# Patient Record
Sex: FEMALE | Race: WHITE | NOT HISPANIC OR LATINO | Employment: FULL TIME | URBAN - METROPOLITAN AREA
[De-identification: names, ages, dates, MRNs, and addresses within clinical notes are randomized per-mention and may not be internally consistent; named-entity substitution may affect disease eponyms.]

---

## 2017-01-27 ENCOUNTER — OFFICE VISIT - HEALTHEAST (OUTPATIENT)
Dept: INTERNAL MEDICINE | Facility: CLINIC | Age: 27
End: 2017-01-27

## 2017-01-27 DIAGNOSIS — F32.A DEPRESSION: ICD-10-CM

## 2017-01-27 ASSESSMENT — MIFFLIN-ST. JEOR: SCORE: 1425.29

## 2017-02-24 ENCOUNTER — OFFICE VISIT - HEALTHEAST (OUTPATIENT)
Dept: INTERNAL MEDICINE | Facility: CLINIC | Age: 27
End: 2017-02-24

## 2017-02-24 DIAGNOSIS — F32.A MILD DEPRESSION: ICD-10-CM

## 2017-02-24 ASSESSMENT — MIFFLIN-ST. JEOR: SCORE: 1411.68

## 2017-03-24 ENCOUNTER — OFFICE VISIT - HEALTHEAST (OUTPATIENT)
Dept: INTERNAL MEDICINE | Facility: CLINIC | Age: 27
End: 2017-03-24

## 2017-03-24 DIAGNOSIS — F32.A MILD DEPRESSION: ICD-10-CM

## 2017-03-24 ASSESSMENT — MIFFLIN-ST. JEOR: SCORE: 1420.75

## 2017-05-20 ENCOUNTER — COMMUNICATION - HEALTHEAST (OUTPATIENT)
Dept: INTERNAL MEDICINE | Facility: CLINIC | Age: 27
End: 2017-05-20

## 2017-05-20 DIAGNOSIS — F32.A MILD DEPRESSION: ICD-10-CM

## 2018-02-06 ENCOUNTER — COMMUNICATION - HEALTHEAST (OUTPATIENT)
Dept: INTERNAL MEDICINE | Facility: CLINIC | Age: 28
End: 2018-02-06

## 2018-02-06 DIAGNOSIS — F32.A MILD DEPRESSION: ICD-10-CM

## 2018-02-13 ENCOUNTER — AMBULATORY - HEALTHEAST (OUTPATIENT)
Dept: INTERNAL MEDICINE | Facility: CLINIC | Age: 28
End: 2018-02-13

## 2018-02-13 DIAGNOSIS — F32.A MILD DEPRESSION: ICD-10-CM

## 2018-03-14 ENCOUNTER — OFFICE VISIT - HEALTHEAST (OUTPATIENT)
Dept: INTERNAL MEDICINE | Facility: CLINIC | Age: 28
End: 2018-03-14

## 2018-03-14 DIAGNOSIS — F32.A MILD DEPRESSION: ICD-10-CM

## 2018-04-18 ENCOUNTER — TELEPHONE (OUTPATIENT)
Dept: OTHER | Facility: CLINIC | Age: 28
End: 2018-04-18

## 2018-04-18 NOTE — TELEPHONE ENCOUNTER
4/18/2018    Call Regarding Onboarding [employer] other     Attempt 1    Message on voicemail     Comments:       Outreach   sb

## 2018-05-17 ENCOUNTER — COMMUNICATION - HEALTHEAST (OUTPATIENT)
Dept: FAMILY MEDICINE | Facility: CLINIC | Age: 28
End: 2018-05-17

## 2018-05-22 NOTE — TELEPHONE ENCOUNTER
5/22/2018    Call Regarding Onboarding [employer] [facility] OTHER     Attempt 2    Message on voicemail     Comments:       Outreach   SB

## 2018-06-06 NOTE — TELEPHONE ENCOUNTER
6/6/2018    Call Regarding Onboarding [employer] [facility] other     Attempt 3    Message on voicemail    Comments:       Outreach   Cathy Nelson

## 2019-03-09 ENCOUNTER — COMMUNICATION - HEALTHEAST (OUTPATIENT)
Dept: INTERNAL MEDICINE | Facility: CLINIC | Age: 29
End: 2019-03-09

## 2019-03-09 DIAGNOSIS — F32.A MILD DEPRESSION: ICD-10-CM

## 2019-03-22 ENCOUNTER — COMMUNICATION - HEALTHEAST (OUTPATIENT)
Dept: INTERNAL MEDICINE | Facility: CLINIC | Age: 29
End: 2019-03-22

## 2019-04-01 ENCOUNTER — OFFICE VISIT - HEALTHEAST (OUTPATIENT)
Dept: INTERNAL MEDICINE | Facility: CLINIC | Age: 29
End: 2019-04-01

## 2019-04-01 DIAGNOSIS — R68.82 LOW LIBIDO: ICD-10-CM

## 2019-04-01 DIAGNOSIS — F32.A MILD DEPRESSION: ICD-10-CM

## 2019-04-01 ASSESSMENT — MIFFLIN-ST. JEOR: SCORE: 1479.72

## 2019-09-20 ENCOUNTER — COMMUNICATION - HEALTHEAST (OUTPATIENT)
Dept: INTERNAL MEDICINE | Facility: CLINIC | Age: 29
End: 2019-09-20

## 2019-09-20 DIAGNOSIS — F32.A MILD DEPRESSION: ICD-10-CM

## 2019-12-17 ENCOUNTER — OFFICE VISIT - HEALTHEAST (OUTPATIENT)
Dept: INTERNAL MEDICINE | Facility: CLINIC | Age: 29
End: 2019-12-17

## 2019-12-17 DIAGNOSIS — F32.A MILD DEPRESSION: ICD-10-CM

## 2019-12-17 DIAGNOSIS — Z00.00 ANNUAL PHYSICAL EXAM: ICD-10-CM

## 2019-12-17 ASSESSMENT — MIFFLIN-ST. JEOR: SCORE: 1543.22

## 2019-12-17 ASSESSMENT — PATIENT HEALTH QUESTIONNAIRE - PHQ9: SUM OF ALL RESPONSES TO PHQ QUESTIONS 1-9: 3

## 2019-12-18 LAB
HPV SOURCE: NORMAL
HUMAN PAPILLOMA VIRUS 16 DNA: NEGATIVE
HUMAN PAPILLOMA VIRUS 18 DNA: NEGATIVE
HUMAN PAPILLOMA VIRUS FINAL DIAGNOSIS: NORMAL
HUMAN PAPILLOMA VIRUS OTHER HR: NEGATIVE
SPECIMEN DESCRIPTION: NORMAL

## 2019-12-27 LAB
BKR LAB AP ABNORMAL BLEEDING: NO
BKR LAB AP BIRTH CONTROL/HORMONES: NORMAL
BKR LAB AP CERVICAL APPEARANCE: NORMAL
BKR LAB AP GYN ADEQUACY: NORMAL
BKR LAB AP GYN INTERPRETATION: NORMAL
BKR LAB AP HPV REFLEX: NORMAL
BKR LAB AP LMP: 2017
BKR LAB AP PATIENT STATUS: NORMAL
BKR LAB AP PREVIOUS ABNORMAL: NORMAL
BKR LAB AP PREVIOUS NORMAL: 2016
HIGH RISK?: NO
PATH REPORT.COMMENTS IMP SPEC: NORMAL
RESULT FLAG (HE HISTORICAL CONVERSION): NORMAL

## 2019-12-28 ENCOUNTER — COMMUNICATION - HEALTHEAST (OUTPATIENT)
Dept: INTERNAL MEDICINE | Facility: CLINIC | Age: 29
End: 2019-12-28

## 2019-12-28 DIAGNOSIS — F32.A MILD DEPRESSION: ICD-10-CM

## 2019-12-31 ENCOUNTER — COMMUNICATION - HEALTHEAST (OUTPATIENT)
Dept: INTERNAL MEDICINE | Facility: CLINIC | Age: 29
End: 2019-12-31

## 2020-01-11 ENCOUNTER — COMMUNICATION - HEALTHEAST (OUTPATIENT)
Dept: INTERNAL MEDICINE | Facility: CLINIC | Age: 30
End: 2020-01-11

## 2020-01-11 DIAGNOSIS — F32.A MILD DEPRESSION: ICD-10-CM

## 2020-02-17 ENCOUNTER — COMMUNICATION - HEALTHEAST (OUTPATIENT)
Dept: INTERNAL MEDICINE | Facility: CLINIC | Age: 30
End: 2020-02-17

## 2020-02-17 ASSESSMENT — PATIENT HEALTH QUESTIONNAIRE - PHQ9: SUM OF ALL RESPONSES TO PHQ QUESTIONS 1-9: 1

## 2020-07-05 ENCOUNTER — COMMUNICATION - HEALTHEAST (OUTPATIENT)
Dept: INTERNAL MEDICINE | Facility: CLINIC | Age: 30
End: 2020-07-05

## 2020-07-05 DIAGNOSIS — F32.A MILD DEPRESSION: ICD-10-CM

## 2020-07-13 ENCOUNTER — OFFICE VISIT - HEALTHEAST (OUTPATIENT)
Dept: INTERNAL MEDICINE | Facility: CLINIC | Age: 30
End: 2020-07-13

## 2020-07-13 DIAGNOSIS — F32.A MILD DEPRESSION: ICD-10-CM

## 2020-07-13 ASSESSMENT — ANXIETY QUESTIONNAIRES
IF YOU CHECKED OFF ANY PROBLEMS ON THIS QUESTIONNAIRE, HOW DIFFICULT HAVE THESE PROBLEMS MADE IT FOR YOU TO DO YOUR WORK, TAKE CARE OF THINGS AT HOME, OR GET ALONG WITH OTHER PEOPLE: NOT DIFFICULT AT ALL
4. TROUBLE RELAXING: NOT AT ALL
1. FEELING NERVOUS, ANXIOUS, OR ON EDGE: SEVERAL DAYS
3. WORRYING TOO MUCH ABOUT DIFFERENT THINGS: NOT AT ALL
6. BECOMING EASILY ANNOYED OR IRRITABLE: SEVERAL DAYS
7. FEELING AFRAID AS IF SOMETHING AWFUL MIGHT HAPPEN: NOT AT ALL
GAD7 TOTAL SCORE: 2
5. BEING SO RESTLESS THAT IT IS HARD TO SIT STILL: NOT AT ALL
2. NOT BEING ABLE TO STOP OR CONTROL WORRYING: NOT AT ALL

## 2020-07-13 ASSESSMENT — PATIENT HEALTH QUESTIONNAIRE - PHQ9: SUM OF ALL RESPONSES TO PHQ QUESTIONS 1-9: 2

## 2020-08-04 ENCOUNTER — COMMUNICATION - HEALTHEAST (OUTPATIENT)
Dept: INTERNAL MEDICINE | Facility: CLINIC | Age: 30
End: 2020-08-04

## 2020-08-04 DIAGNOSIS — F32.A MILD DEPRESSION: ICD-10-CM

## 2020-08-24 ENCOUNTER — OFFICE VISIT - HEALTHEAST (OUTPATIENT)
Dept: INTERNAL MEDICINE | Facility: CLINIC | Age: 30
End: 2020-08-24

## 2020-08-24 DIAGNOSIS — F33.8 SEASONAL AFFECTIVE DISORDER (H): ICD-10-CM

## 2020-08-24 DIAGNOSIS — F32.A MILD DEPRESSION: ICD-10-CM

## 2020-08-24 ASSESSMENT — PATIENT HEALTH QUESTIONNAIRE - PHQ9: SUM OF ALL RESPONSES TO PHQ QUESTIONS 1-9: 3

## 2020-09-26 ENCOUNTER — COMMUNICATION - HEALTHEAST (OUTPATIENT)
Dept: INTERNAL MEDICINE | Facility: CLINIC | Age: 30
End: 2020-09-26

## 2020-09-26 DIAGNOSIS — F32.A MILD DEPRESSION: ICD-10-CM

## 2020-10-07 ENCOUNTER — COMMUNICATION - HEALTHEAST (OUTPATIENT)
Dept: INTERNAL MEDICINE | Facility: CLINIC | Age: 30
End: 2020-10-07

## 2021-04-27 ENCOUNTER — OFFICE VISIT - HEALTHEAST (OUTPATIENT)
Dept: INTERNAL MEDICINE | Facility: CLINIC | Age: 31
End: 2021-04-27

## 2021-04-27 ENCOUNTER — COMMUNICATION - HEALTHEAST (OUTPATIENT)
Dept: INTERNAL MEDICINE | Facility: CLINIC | Age: 31
End: 2021-04-27

## 2021-04-27 DIAGNOSIS — Z00.00 ANNUAL PHYSICAL EXAM: ICD-10-CM

## 2021-04-27 DIAGNOSIS — N63.20 LEFT BREAST MASS: ICD-10-CM

## 2021-04-27 DIAGNOSIS — F32.A MILD DEPRESSION: ICD-10-CM

## 2021-04-27 ASSESSMENT — ANXIETY QUESTIONNAIRES
6. BECOMING EASILY ANNOYED OR IRRITABLE: MORE THAN HALF THE DAYS
5. BEING SO RESTLESS THAT IT IS HARD TO SIT STILL: SEVERAL DAYS
6. BECOMING EASILY ANNOYED OR IRRITABLE: MORE THAN HALF THE DAYS
3. WORRYING TOO MUCH ABOUT DIFFERENT THINGS: SEVERAL DAYS
5. BEING SO RESTLESS THAT IT IS HARD TO SIT STILL: SEVERAL DAYS
7. FEELING AFRAID AS IF SOMETHING AWFUL MIGHT HAPPEN: NOT AT ALL
2. NOT BEING ABLE TO STOP OR CONTROL WORRYING: SEVERAL DAYS
4. TROUBLE RELAXING: NOT AT ALL
GAD7 TOTAL SCORE: 7
3. WORRYING TOO MUCH ABOUT DIFFERENT THINGS: SEVERAL DAYS
1. FEELING NERVOUS, ANXIOUS, OR ON EDGE: MORE THAN HALF THE DAYS
4. TROUBLE RELAXING: NOT AT ALL
GAD7 TOTAL SCORE: 5
IF YOU CHECKED OFF ANY PROBLEMS ON THIS QUESTIONNAIRE, HOW DIFFICULT HAVE THESE PROBLEMS MADE IT FOR YOU TO DO YOUR WORK, TAKE CARE OF THINGS AT HOME, OR GET ALONG WITH OTHER PEOPLE: SOMEWHAT DIFFICULT
2. NOT BEING ABLE TO STOP OR CONTROL WORRYING: SEVERAL DAYS
IF YOU CHECKED OFF ANY PROBLEMS ON THIS QUESTIONNAIRE, HOW DIFFICULT HAVE THESE PROBLEMS MADE IT FOR YOU TO DO YOUR WORK, TAKE CARE OF THINGS AT HOME, OR GET ALONG WITH OTHER PEOPLE: NOT DIFFICULT AT ALL
7. FEELING AFRAID AS IF SOMETHING AWFUL MIGHT HAPPEN: NOT AT ALL
1. FEELING NERVOUS, ANXIOUS, OR ON EDGE: NOT AT ALL

## 2021-04-27 ASSESSMENT — MIFFLIN-ST. JEOR: SCORE: 1563.64

## 2021-04-27 ASSESSMENT — PATIENT HEALTH QUESTIONNAIRE - PHQ9
SUM OF ALL RESPONSES TO PHQ QUESTIONS 1-9: 5
SUM OF ALL RESPONSES TO PHQ QUESTIONS 1-9: 5

## 2021-04-27 NOTE — ASSESSMENT & PLAN NOTE
ADD: wellbutrin (for appetite suppression and to counter effects of antidepressant on libido)  Follow up: 6 weeks VV

## 2021-05-04 ENCOUNTER — HOSPITAL ENCOUNTER (OUTPATIENT)
Dept: MAMMOGRAPHY | Facility: CLINIC | Age: 31
Discharge: HOME | End: 2021-05-04

## 2021-05-04 ENCOUNTER — AMBULATORY - HEALTHEAST (OUTPATIENT)
Dept: SURGERY | Facility: CLINIC | Age: 31
End: 2021-05-04

## 2021-05-04 DIAGNOSIS — N63.20 LEFT BREAST MASS: ICD-10-CM

## 2021-05-07 ENCOUNTER — COMMUNICATION - HEALTHEAST (OUTPATIENT)
Dept: ONCOLOGY | Facility: CLINIC | Age: 31
End: 2021-05-07

## 2021-05-10 ENCOUNTER — COMMUNICATION - HEALTHEAST (OUTPATIENT)
Dept: INTERNAL MEDICINE | Facility: CLINIC | Age: 31
End: 2021-05-10
Payer: COMMERCIAL

## 2021-05-11 ENCOUNTER — HOSPITAL ENCOUNTER (OUTPATIENT)
Dept: SURGERY | Facility: CLINIC | Age: 31
Discharge: HOME | End: 2021-05-11
Attending: SPECIALIST

## 2021-05-11 DIAGNOSIS — C50.412 MALIGNANT NEOPLASM OF UPPER-OUTER QUADRANT OF LEFT BREAST IN FEMALE, ESTROGEN RECEPTOR POSITIVE (H): ICD-10-CM

## 2021-05-11 DIAGNOSIS — Z17.0 MALIGNANT NEOPLASM OF UPPER-OUTER QUADRANT OF LEFT BREAST IN FEMALE, ESTROGEN RECEPTOR POSITIVE (H): ICD-10-CM

## 2021-05-11 ASSESSMENT — MIFFLIN-ST. JEOR: SCORE: 1563.64

## 2021-05-17 ENCOUNTER — RECORDS - HEALTHEAST (OUTPATIENT)
Dept: ADMINISTRATIVE | Facility: OTHER | Age: 31
End: 2021-05-17

## 2021-05-17 ENCOUNTER — AMBULATORY - HEALTHEAST (OUTPATIENT)
Dept: SURGERY | Facility: CLINIC | Age: 31
End: 2021-05-17

## 2021-05-17 LAB
LAB AP CHARGES (HE HISTORICAL CONVERSION): NORMAL
PATH REPORT.ADDENDUM SPEC: NORMAL
PATH REPORT.COMMENTS IMP SPEC: NORMAL
PATH REPORT.COMMENTS IMP SPEC: NORMAL
PATH REPORT.FINAL DX SPEC: NORMAL
PATH REPORT.GROSS SPEC: NORMAL
PATH REPORT.MICROSCOPIC SPEC OTHER STN: NORMAL
PATH REPORT.MICROSCOPIC SPEC OTHER STN: NORMAL
PATH REPORT.RELEVANT HX SPEC: NORMAL
RESULT FLAG (HE HISTORICAL CONVERSION): NORMAL

## 2021-05-18 ENCOUNTER — COMMUNICATION - HEALTHEAST (OUTPATIENT)
Dept: ONCOLOGY | Facility: HOSPITAL | Age: 31
End: 2021-05-18

## 2021-05-18 ENCOUNTER — AMBULATORY - HEALTHEAST (OUTPATIENT)
Dept: SURGERY | Facility: CLINIC | Age: 31
End: 2021-05-18

## 2021-05-18 DIAGNOSIS — C50.412 MALIGNANT NEOPLASM OF UPPER-OUTER QUADRANT OF LEFT BREAST IN FEMALE, ESTROGEN RECEPTOR POSITIVE (H): ICD-10-CM

## 2021-05-18 DIAGNOSIS — Z17.0 MALIGNANT NEOPLASM OF UPPER-OUTER QUADRANT OF LEFT BREAST IN FEMALE, ESTROGEN RECEPTOR POSITIVE (H): ICD-10-CM

## 2021-05-19 ENCOUNTER — AMBULATORY - HEALTHEAST (OUTPATIENT)
Dept: SURGERY | Facility: CLINIC | Age: 31
End: 2021-05-19

## 2021-05-19 ENCOUNTER — COMMUNICATION - HEALTHEAST (OUTPATIENT)
Dept: ONCOLOGY | Facility: HOSPITAL | Age: 31
End: 2021-05-19

## 2021-05-19 ENCOUNTER — AMBULATORY - HEALTHEAST (OUTPATIENT)
Dept: ONCOLOGY | Facility: HOSPITAL | Age: 31
End: 2021-05-19

## 2021-05-19 ENCOUNTER — OFFICE VISIT - HEALTHEAST (OUTPATIENT)
Dept: ONCOLOGY | Facility: HOSPITAL | Age: 31
End: 2021-05-19

## 2021-05-19 ENCOUNTER — RECORDS - HEALTHEAST (OUTPATIENT)
Dept: ADMINISTRATIVE | Facility: OTHER | Age: 31
End: 2021-05-19

## 2021-05-19 ENCOUNTER — COMMUNICATION - HEALTHEAST (OUTPATIENT)
Dept: INTERNAL MEDICINE | Facility: CLINIC | Age: 31
End: 2021-05-19

## 2021-05-19 DIAGNOSIS — Z17.0 MALIGNANT NEOPLASM OF OVERLAPPING SITES OF BREAST IN FEMALE, ESTROGEN RECEPTOR POSITIVE, UNSPECIFIED LATERALITY (H): ICD-10-CM

## 2021-05-19 DIAGNOSIS — Z17.0 MALIGNANT NEOPLASM OF UPPER-OUTER QUADRANT OF LEFT BREAST IN FEMALE, ESTROGEN RECEPTOR POSITIVE (H): ICD-10-CM

## 2021-05-19 DIAGNOSIS — Z11.59 ENCOUNTER FOR SCREENING FOR OTHER VIRAL DISEASES: ICD-10-CM

## 2021-05-19 DIAGNOSIS — F32.A MILD DEPRESSION: ICD-10-CM

## 2021-05-19 DIAGNOSIS — C50.412 MALIGNANT NEOPLASM OF UPPER-OUTER QUADRANT OF LEFT BREAST IN FEMALE, ESTROGEN RECEPTOR POSITIVE (H): ICD-10-CM

## 2021-05-19 DIAGNOSIS — C50.819 MALIGNANT NEOPLASM OF OVERLAPPING SITES OF BREAST IN FEMALE, ESTROGEN RECEPTOR POSITIVE, UNSPECIFIED LATERALITY (H): ICD-10-CM

## 2021-05-19 RX ORDER — DEXAMETHASONE 4 MG/1
TABLET ORAL
Qty: 36 TABLET | Refills: 1 | Status: SHIPPED | OUTPATIENT
Start: 2021-05-19 | End: 2021-07-30

## 2021-05-19 RX ORDER — PROCHLORPERAZINE MALEATE 10 MG
10 TABLET ORAL EVERY 6 HOURS PRN
Qty: 30 TABLET | Refills: 1 | Status: SHIPPED | OUTPATIENT
Start: 2021-05-19 | End: 2021-07-30

## 2021-05-19 ASSESSMENT — MIFFLIN-ST. JEOR: SCORE: 1578.49

## 2021-05-20 ENCOUNTER — COMMUNICATION - HEALTHEAST (OUTPATIENT)
Dept: ONCOLOGY | Facility: HOSPITAL | Age: 31
End: 2021-05-20

## 2021-05-21 ENCOUNTER — HOSPITAL ENCOUNTER (OUTPATIENT)
Dept: CARDIOLOGY | Facility: HOSPITAL | Age: 31
Discharge: HOME | End: 2021-05-21
Attending: INTERNAL MEDICINE

## 2021-05-21 DIAGNOSIS — C50.412 MALIGNANT NEOPLASM OF UPPER-OUTER QUADRANT OF LEFT BREAST IN FEMALE, ESTROGEN RECEPTOR POSITIVE (H): ICD-10-CM

## 2021-05-21 DIAGNOSIS — Z17.0 MALIGNANT NEOPLASM OF UPPER-OUTER QUADRANT OF LEFT BREAST IN FEMALE, ESTROGEN RECEPTOR POSITIVE (H): ICD-10-CM

## 2021-05-21 LAB
AORTIC ROOT: 3 CM
AORTIC VALVE MEAN VELOCITY: 84.5 CM/S
ASCENDING AORTA: 2.3 CM
AV DIMENSIONLESS INDEX VTI: 0.9
AV MEAN GRADIENT: 3 MMHG
AV PEAK GRADIENT: 7.5 MMHG
AV VALVE AREA: 3.2 CM2
BSA FOR ECHO PROCEDURE: 1.98 M2
CV BLOOD PRESSURE: ABNORMAL MMHG
CV ECHO HEIGHT: 66.3 IN
CV ECHO WEIGHT: 186 LBS
DOP CALC AO PEAK VEL: 137 CM/S
DOP CALC AO VTI: 24.1 CM
DOP CALC LVOT AREA: 3.46 CM2
DOP CALC LVOT DIAMETER: 2.1 CM
DOP CALC LVOT STROKE VOLUME: 76.9 CM3
DOP CALCLVOT PEAK VEL VTI: 22.2 CM
EJECTION FRACTION: 56 % (ref 55–75)
FRACTIONAL SHORTENING: 29.5 % (ref 28–44)
INTERVENTRICULAR SEPTUM IN END DIASTOLE: 1.1 CM (ref 0.6–0.9)
IVC PROX: 1 CM
IVS/PW RATIO: 0.9
LA AREA 1: 10.9 CM2
LA AREA 2: 16.9 CM2
LEFT ATRIUM LENGTH: 4.42 CM
LEFT ATRIUM SIZE: 2.8 CM
LEFT ATRIUM TO AORTIC ROOT RATIO: 0.93 NO UNITS
LEFT ATRIUM VOLUME INDEX: 17.9 ML/M2
LEFT ATRIUM VOLUME: 35.4 ML
LEFT VENTRICLE CARDIAC INDEX: 2.4 L/MIN/M2
LEFT VENTRICLE CARDIAC OUTPUT: 4.8 L/MIN
LEFT VENTRICLE DIASTOLIC VOLUME INDEX: 56.1 CM3/M2 (ref 29–61)
LEFT VENTRICLE DIASTOLIC VOLUME: 111 CM3 (ref 46–106)
LEFT VENTRICLE HEART RATE: 62 BPM
LEFT VENTRICLE MASS INDEX: 90.9 G/M2
LEFT VENTRICLE SYSTOLIC VOLUME INDEX: 24.7 CM3/M2 (ref 8–24)
LEFT VENTRICLE SYSTOLIC VOLUME: 49 CM3 (ref 14–42)
LEFT VENTRICULAR INTERNAL DIMENSION IN DIASTOLE: 4.4 CM (ref 3.8–5.2)
LEFT VENTRICULAR INTERNAL DIMENSION IN SYSTOLE: 3.1 CM (ref 2.2–3.5)
LEFT VENTRICULAR MASS: 180 G
LEFT VENTRICULAR OUTFLOW TRACT MEAN GRADIENT: 3 MMHG
LEFT VENTRICULAR OUTFLOW TRACT MEAN VELOCITY: 71.2 CM/S
LEFT VENTRICULAR POSTERIOR WALL IN END DIASTOLE: 1.2 CM (ref 0.6–0.9)
LV STROKE VOLUME INDEX: 38.8 ML/M2
MITRAL VALVE E/A RATIO: 1.7
MV AVERAGE E/E' RATIO: 5.2 CM/S
MV DECELERATION TIME: 301 MS
MV E'TISSUE VEL-LAT: 16.9 CM/S
MV E'TISSUE VEL-MED: 7.51 CM/S
MV LATERAL E/E' RATIO: 3.8
MV MEDIAL E/E' RATIO: 8.5
MV PEAK A VELOCITY: 37 CM/S
MV PEAK E VELOCITY: 63.9 CM/S
NUC REST DIASTOLIC VOLUME INDEX: 2976 LBS
NUC REST SYSTOLIC VOLUME INDEX: 66.25 IN
TRICUSPID VALVE ANULAR PLANE SYSTOLIC EXCURSION: 2.1 CM

## 2021-05-21 ASSESSMENT — MIFFLIN-ST. JEOR: SCORE: 1574.41

## 2021-05-24 ENCOUNTER — AMBULATORY - HEALTHEAST (OUTPATIENT)
Dept: LAB | Facility: CLINIC | Age: 31
End: 2021-05-24

## 2021-05-24 ENCOUNTER — OFFICE VISIT - HEALTHEAST (OUTPATIENT)
Dept: ONCOLOGY | Facility: HOSPITAL | Age: 31
End: 2021-05-24

## 2021-05-24 ENCOUNTER — RECORDS - HEALTHEAST (OUTPATIENT)
Dept: ADMINISTRATIVE | Facility: OTHER | Age: 31
End: 2021-05-24

## 2021-05-24 DIAGNOSIS — C50.412 MALIGNANT NEOPLASM OF UPPER-OUTER QUADRANT OF LEFT BREAST IN FEMALE, ESTROGEN RECEPTOR POSITIVE (H): ICD-10-CM

## 2021-05-24 DIAGNOSIS — Z17.0 MALIGNANT NEOPLASM OF UPPER-OUTER QUADRANT OF LEFT BREAST IN FEMALE, ESTROGEN RECEPTOR POSITIVE (H): ICD-10-CM

## 2021-05-24 DIAGNOSIS — Z71.83 ENCOUNTER FOR NONPROCREATIVE GENETIC COUNSELING: ICD-10-CM

## 2021-05-24 DIAGNOSIS — Z11.59 ENCOUNTER FOR SCREENING FOR OTHER VIRAL DISEASES: ICD-10-CM

## 2021-05-25 ENCOUNTER — ANESTHESIA - HEALTHEAST (OUTPATIENT)
Dept: SURGERY | Facility: AMBULATORY SURGERY CENTER | Age: 31
End: 2021-05-25

## 2021-05-25 ENCOUNTER — COMMUNICATION - HEALTHEAST (OUTPATIENT)
Dept: SCHEDULING | Facility: CLINIC | Age: 31
End: 2021-05-25

## 2021-05-25 LAB
SARS-COV-2 PCR COMMENT: NORMAL
SARS-COV-2 RNA SPEC QL NAA+PROBE: NEGATIVE
SARS-COV-2 VIRUS SPECIMEN SOURCE: NORMAL

## 2021-05-26 ENCOUNTER — RECORDS - HEALTHEAST (OUTPATIENT)
Dept: ADMINISTRATIVE | Facility: OTHER | Age: 31
End: 2021-05-26

## 2021-05-26 ENCOUNTER — SURGERY - HEALTHEAST (OUTPATIENT)
Dept: SURGERY | Facility: AMBULATORY SURGERY CENTER | Age: 31
End: 2021-05-26
Payer: COMMERCIAL

## 2021-05-26 ASSESSMENT — MIFFLIN-ST. JEOR: SCORE: 1574.41

## 2021-05-26 ASSESSMENT — PATIENT HEALTH QUESTIONNAIRE - PHQ9: SUM OF ALL RESPONSES TO PHQ QUESTIONS 1-9: 3

## 2021-05-26 NOTE — TELEPHONE ENCOUNTER
Who is calling:  Patient called back.  Reason for Call:  Patient states since Marietta Brandt, NP is the only one she has seen, isn't she my primary provider?  Tried to explain needs to be seen for medication refill and establish care with a provider. Patient states can't she just call and say that Mariettakevan Brandt is her primary provider?  Please call patient and explain why she needs a office visit to get more refills.  Date of last appointment with primary care: 3/11/2018  Okay to leave a detailed message: Yes

## 2021-05-27 ENCOUNTER — INFUSION - HEALTHEAST (OUTPATIENT)
Dept: INFUSION THERAPY | Facility: HOSPITAL | Age: 31
End: 2021-05-27

## 2021-05-27 ENCOUNTER — AMBULATORY - HEALTHEAST (OUTPATIENT)
Dept: INFUSION THERAPY | Facility: HOSPITAL | Age: 31
End: 2021-05-27

## 2021-05-27 ENCOUNTER — OFFICE VISIT - HEALTHEAST (OUTPATIENT)
Dept: ONCOLOGY | Facility: HOSPITAL | Age: 31
End: 2021-05-27

## 2021-05-27 VITALS — WEIGHT: 181 LBS | HEIGHT: 67 IN | BODY MASS INDEX: 28.41 KG/M2

## 2021-05-27 DIAGNOSIS — C50.412 MALIGNANT NEOPLASM OF UPPER-OUTER QUADRANT OF LEFT BREAST IN FEMALE, ESTROGEN RECEPTOR POSITIVE (H): ICD-10-CM

## 2021-05-27 DIAGNOSIS — Z17.0 MALIGNANT NEOPLASM OF UPPER-OUTER QUADRANT OF LEFT BREAST IN FEMALE, ESTROGEN RECEPTOR POSITIVE (H): ICD-10-CM

## 2021-05-27 LAB
ALBUMIN SERPL-MCNC: 4 G/DL (ref 3.5–5)
ALP SERPL-CCNC: 57 U/L (ref 45–120)
ALT SERPL W P-5'-P-CCNC: 12 U/L (ref 0–45)
ANION GAP SERPL CALCULATED.3IONS-SCNC: 9 MMOL/L (ref 5–18)
AST SERPL W P-5'-P-CCNC: 15 U/L (ref 0–40)
BASOPHILS # BLD AUTO: 0 THOU/UL (ref 0–0.2)
BASOPHILS NFR BLD AUTO: 0 % (ref 0–2)
BILIRUB SERPL-MCNC: 0.3 MG/DL (ref 0–1)
BUN SERPL-MCNC: 11 MG/DL (ref 8–22)
CALCIUM SERPL-MCNC: 8.6 MG/DL (ref 8.5–10.5)
CHLORIDE BLD-SCNC: 107 MMOL/L (ref 98–107)
CO2 SERPL-SCNC: 24 MMOL/L (ref 22–31)
CREAT SERPL-MCNC: 0.83 MG/DL (ref 0.6–1.1)
EOSINOPHIL # BLD AUTO: 0 THOU/UL (ref 0–0.4)
EOSINOPHIL NFR BLD AUTO: 0 % (ref 0–6)
ERYTHROCYTE [DISTWIDTH] IN BLOOD BY AUTOMATED COUNT: 12.6 % (ref 11–14.5)
GFR SERPL CREATININE-BSD FRML MDRD: >60 ML/MIN/1.73M2
GLUCOSE BLD-MCNC: 114 MG/DL (ref 70–125)
HCT VFR BLD AUTO: 38.6 % (ref 35–47)
HGB BLD-MCNC: 12.9 G/DL (ref 12–16)
IMM GRANULOCYTES # BLD: 0.1 THOU/UL
IMM GRANULOCYTES NFR BLD: 1 %
LYMPHOCYTES # BLD AUTO: 1.7 THOU/UL (ref 0.8–4.4)
LYMPHOCYTES NFR BLD AUTO: 10 % (ref 20–40)
MCH RBC QN AUTO: 29.5 PG (ref 27–34)
MCHC RBC AUTO-ENTMCNC: 33.4 G/DL (ref 32–36)
MCV RBC AUTO: 88 FL (ref 80–100)
MONOCYTES # BLD AUTO: 0.8 THOU/UL (ref 0–0.9)
MONOCYTES NFR BLD AUTO: 5 % (ref 2–10)
NEUTROPHILS # BLD AUTO: 13.6 THOU/UL (ref 2–7.7)
NEUTROPHILS NFR BLD AUTO: 84 % (ref 50–70)
PLATELET # BLD AUTO: 325 THOU/UL (ref 140–440)
PMV BLD AUTO: 9.7 FL (ref 8.5–12.5)
POTASSIUM BLD-SCNC: 3.5 MMOL/L (ref 3.5–5)
PROT SERPL-MCNC: 7 G/DL (ref 6–8)
RBC # BLD AUTO: 4.37 MILL/UL (ref 3.8–5.4)
SODIUM SERPL-SCNC: 140 MMOL/L (ref 136–145)
WBC: 16.2 THOU/UL (ref 4–11)

## 2021-05-27 RX ORDER — LIDOCAINE/PRILOCAINE 2.5 %-2.5%
CREAM (GRAM) TOPICAL
Qty: 30 G | Refills: 1 | Status: ON HOLD | OUTPATIENT
Start: 2021-05-27 | End: 2022-06-10

## 2021-05-27 NOTE — PROGRESS NOTES
"Internal Medicine Office Visit  [facility]  Patient Name: Veronica Spain  Patient Age: 28 y.o.  YOB: 1990  MRN: [id number]    Date of Visit: 2019  Reason for Office Visit:   Chief Complaint   Patient presents with     Medication Management           Assessment / Plan / Medical Decision Makin. Mild depression (H)  2. Low libido  - DECREASE from 75 mg to: venlafaxine (EFFEXOR XR) 37.5 MG 24 hr capsule; Take 1 capsule (37.5 mg total) by mouth daily.  Dispense: 90 capsule; Refill: 1  - Continue 37.5 mg at least one month then could discontinue if doing well  - Schedule physical with PAP and anxiety/depression check in 6 months. May schedule sooner if needing to discuss medication change.         Health Maintenance Review  Health Maintenance   Topic Date Due     TD 18+ HE  2007     TDAP ADULT ONE TIME DOSE  2007     ADVANCE DIRECTIVES DISCUSSED WITH PATIENT  2007     PAP SMEAR  2010     INFLUENZA VACCINE RULE BASED (1) 2019 (Originally 2018)     DEPRESSION FOLLOW UP  10/01/2019         I have discontinued Abbey Spain's venlafaxine. I am also having her start on venlafaxine. Additionally, I am having her maintain her levonorgestrel.      HPI:  Veronica Spain is a 28 y.o. year old who presents to the office today for follow-up of anxiety and depression.  She has been doing very well with venlafaxine 75 mg daily.  Her only complaint is that she has low libido.  She feels like this may be getting worse.  She has been stable with the medication for the past 2 years and also states that she is in \"a different place\" than she was when she began the medication.  She may be interested in weaning off of the medication altogether.    She does continue the Mirena for contraception.  She does not intend to have children.     Last tetanus vaccine was when she began college, she estimates around  or .     She just got  in " "[place] 2 months ago.     Review of Systems- pertinent positive in bold:  A 10-point ROS is negative except as stated in HPI         Current Scheduled Meds:  Outpatient Encounter Medications as of 4/1/2019   Medication Sig Dispense Refill     levonorgestrel (MIRENA) 20 mcg/24 hr (5 years) IUD 1 each by Intrauterine route once. Placed 5/2/2017       [DISCONTINUED] venlafaxine (EFFEXOR-XR) 75 MG 24 hr capsule Take 1 capsule (75 mg total) by mouth daily. Due for follow up in office 30 capsule 0     venlafaxine (EFFEXOR XR) 37.5 MG 24 hr capsule Take 1 capsule (37.5 mg total) by mouth daily. 90 capsule 1     No facility-administered encounter medications on file as of 4/1/2019.      Past Medical History:   Diagnosis Date     Abnormal Pap smear of cervix     age 13, subsequent pap smears normal     Past Surgical History:   Procedure Laterality Date     no surgical history       Social History     Tobacco Use     Smoking status: Never Smoker   Substance Use Topics     Alcohol use: Yes     Alcohol/week: 0.5 oz     Types: 1 drink(s) per week     Drug use: No       Objective / Physical Examination:  Vitals:    04/01/19 0744   BP: 110/72   Pulse: 60   Weight: 166 lb (75.3 kg)   Height: 5' 6\" (1.676 m)     Wt Readings from Last 3 Encounters:   04/01/19 166 lb (75.3 kg)   03/14/18 159 lb (72.1 kg)   03/24/17 153 lb (69.4 kg)     Body mass index is 26.79 kg/m .     General Appearance: Alert and oriented, cooperative, affect appropriate, speech clear, in no apparent distress  Lungs: Clear to auscultation bilaterally. Normal inspiratory and expiratory effort  Cardiovascular: Regular rate, normal S1, S2. No murmurs, rubs, or gallops  Psych: well groomed, maintains appropriate eye contact. Does not appear anxious or depressed. Good insight.       Orders Placed This Encounter   Procedures     Tdap vaccine,  8yo or older,  IM   Followup: Return in about 6 months (around 10/1/2019) for Annual physical. earlier if needed.      Marietta SOLANO" Rikki, CNP

## 2021-05-28 ENCOUNTER — AMBULATORY - HEALTHEAST (OUTPATIENT)
Dept: ONCOLOGY | Facility: HOSPITAL | Age: 31
End: 2021-05-28

## 2021-05-28 DIAGNOSIS — Z17.0 MALIGNANT NEOPLASM OF UPPER-OUTER QUADRANT OF LEFT BREAST IN FEMALE, ESTROGEN RECEPTOR POSITIVE (H): ICD-10-CM

## 2021-05-28 DIAGNOSIS — C50.412 MALIGNANT NEOPLASM OF UPPER-OUTER QUADRANT OF LEFT BREAST IN FEMALE, ESTROGEN RECEPTOR POSITIVE (H): ICD-10-CM

## 2021-05-28 ASSESSMENT — ANXIETY QUESTIONNAIRES
GAD7 TOTAL SCORE: 7
GAD7 TOTAL SCORE: 2

## 2021-05-30 VITALS — WEIGHT: 154 LBS | HEIGHT: 66 IN | BODY MASS INDEX: 24.75 KG/M2

## 2021-05-30 VITALS — BODY MASS INDEX: 24.59 KG/M2 | HEIGHT: 66 IN | WEIGHT: 153 LBS

## 2021-05-30 VITALS — HEIGHT: 66 IN | WEIGHT: 151 LBS | BODY MASS INDEX: 24.27 KG/M2

## 2021-06-01 ENCOUNTER — COMMUNICATION - HEALTHEAST (OUTPATIENT)
Dept: ONCOLOGY | Facility: HOSPITAL | Age: 31
End: 2021-06-01
Payer: COMMERCIAL

## 2021-06-01 VITALS — BODY MASS INDEX: 25.66 KG/M2 | WEIGHT: 159 LBS

## 2021-06-01 NOTE — TELEPHONE ENCOUNTER
called and informed pt that she is due for OV - pt is changing jobs and will call back once her insurance information is given to her.

## 2021-06-02 ENCOUNTER — HOSPITAL ENCOUNTER (OUTPATIENT)
Dept: PET IMAGING | Facility: HOSPITAL | Age: 31
Setting detail: RADIATION/ONCOLOGY SERIES
Discharge: STILL ADMITTED OR ADMINISTRATIVE | End: 2021-06-02
Attending: INTERNAL MEDICINE

## 2021-06-02 VITALS — WEIGHT: 166 LBS | BODY MASS INDEX: 26.68 KG/M2 | HEIGHT: 66 IN

## 2021-06-02 DIAGNOSIS — C50.412 MALIGNANT NEOPLASM OF UPPER-OUTER QUADRANT OF LEFT BREAST IN FEMALE, ESTROGEN RECEPTOR POSITIVE (H): ICD-10-CM

## 2021-06-02 DIAGNOSIS — Z17.0 MALIGNANT NEOPLASM OF UPPER-OUTER QUADRANT OF LEFT BREAST IN FEMALE, ESTROGEN RECEPTOR POSITIVE (H): ICD-10-CM

## 2021-06-02 LAB — GLUCOSE BLDC GLUCOMTR-MCNC: 103 MG/DL (ref 70–139)

## 2021-06-04 ENCOUNTER — RECORDS - HEALTHEAST (OUTPATIENT)
Dept: ADMINISTRATIVE | Facility: OTHER | Age: 31
End: 2021-06-04

## 2021-06-04 ENCOUNTER — AMBULATORY - HEALTHEAST (OUTPATIENT)
Dept: INFUSION THERAPY | Facility: HOSPITAL | Age: 31
End: 2021-06-04

## 2021-06-04 ENCOUNTER — OFFICE VISIT - HEALTHEAST (OUTPATIENT)
Dept: ONCOLOGY | Facility: HOSPITAL | Age: 31
End: 2021-06-04

## 2021-06-04 VITALS
WEIGHT: 180 LBS | DIASTOLIC BLOOD PRESSURE: 70 MMHG | HEART RATE: 80 BPM | BODY MASS INDEX: 28.93 KG/M2 | SYSTOLIC BLOOD PRESSURE: 126 MMHG | HEIGHT: 66 IN

## 2021-06-04 DIAGNOSIS — C50.412 MALIGNANT NEOPLASM OF UPPER-OUTER QUADRANT OF LEFT BREAST IN FEMALE, ESTROGEN RECEPTOR POSITIVE (H): ICD-10-CM

## 2021-06-04 DIAGNOSIS — Z17.0 MALIGNANT NEOPLASM OF UPPER-OUTER QUADRANT OF LEFT BREAST IN FEMALE, ESTROGEN RECEPTOR POSITIVE (H): ICD-10-CM

## 2021-06-04 LAB
ALBUMIN SERPL-MCNC: 4.1 G/DL (ref 3.5–5)
ALP SERPL-CCNC: 60 U/L (ref 45–120)
ALT SERPL W P-5'-P-CCNC: 33 U/L (ref 0–45)
ANION GAP SERPL CALCULATED.3IONS-SCNC: 8 MMOL/L (ref 5–18)
AST SERPL W P-5'-P-CCNC: 21 U/L (ref 0–40)
BASOPHILS # BLD AUTO: 0 THOU/UL (ref 0–0.2)
BASOPHILS NFR BLD AUTO: 0 % (ref 0–2)
BILIRUB SERPL-MCNC: 0.4 MG/DL (ref 0–1)
BUN SERPL-MCNC: 18 MG/DL (ref 8–22)
CALCIUM SERPL-MCNC: 8.7 MG/DL (ref 8.5–10.5)
CHLORIDE BLD-SCNC: 104 MMOL/L (ref 98–107)
CO2 SERPL-SCNC: 26 MMOL/L (ref 22–31)
CREAT SERPL-MCNC: 1.02 MG/DL (ref 0.6–1.1)
EOSINOPHIL # BLD AUTO: 0 THOU/UL (ref 0–0.4)
EOSINOPHIL NFR BLD AUTO: 1 % (ref 0–6)
ERYTHROCYTE [DISTWIDTH] IN BLOOD BY AUTOMATED COUNT: 11.9 % (ref 11–14.5)
GFR SERPL CREATININE-BSD FRML MDRD: >60 ML/MIN/1.73M2
GLUCOSE BLD-MCNC: 116 MG/DL (ref 70–125)
HCT VFR BLD AUTO: 38.2 % (ref 35–47)
HGB BLD-MCNC: 12.8 G/DL (ref 12–16)
IMM GRANULOCYTES # BLD: 0 THOU/UL
IMM GRANULOCYTES NFR BLD: 1 %
LYMPHOCYTES # BLD AUTO: 1 THOU/UL (ref 0.8–4.4)
LYMPHOCYTES NFR BLD AUTO: 45 % (ref 20–40)
MCH RBC QN AUTO: 29 PG (ref 27–34)
MCHC RBC AUTO-ENTMCNC: 33.5 G/DL (ref 32–36)
MCV RBC AUTO: 86 FL (ref 80–100)
MONOCYTES # BLD AUTO: 0.3 THOU/UL (ref 0–0.9)
MONOCYTES NFR BLD AUTO: 11 % (ref 2–10)
NEUTROPHILS # BLD AUTO: 0.9 THOU/UL (ref 2–7.7)
NEUTROPHILS NFR BLD AUTO: 42 % (ref 50–70)
PLATELET # BLD AUTO: 246 THOU/UL (ref 140–440)
PMV BLD AUTO: 9.7 FL (ref 8.5–12.5)
POTASSIUM BLD-SCNC: 3.7 MMOL/L (ref 3.5–5)
PROT SERPL-MCNC: 7.2 G/DL (ref 6–8)
RBC # BLD AUTO: 4.42 MILL/UL (ref 3.8–5.4)
SODIUM SERPL-SCNC: 138 MMOL/L (ref 136–145)
WBC: 2.3 THOU/UL (ref 4–11)

## 2021-06-04 NOTE — PROGRESS NOTES
Assessment/Plan:     1. Annual physical exam  - Reviewed the importance of monitoring for changes in breast appearance such as nipple inversion, changes in breast tissue texture, non-healing wounds, or nipple discharge   - regular exercise and healthy diet recommended   - Gynecologic Cytology (PAP Smear)  - HPV High Risk DNA Cervical    2. Mild depression (H)  - INCREASE from 37.5 to: venlafaxine (EFFEXOR XR) 75 MG 24 hr capsule; Take 1 capsule (75 mg total) by mouth daily.  Dispense: 30 capsule; Refill: 1  - Switching to an alternative antidepressant was discussed to help with low libido side effect or the addition of Wellbutrin to counter this side effect.  She will consider these options but declines to make any changes today      Subjective:     Veronica Nieves is a 29 y.o. female who presents for an annual exam.     Mild depression and anxiety.  Her dose was recently lowered due to issues with low libido.  She did not see any improvement with the dose reduction.  She notes that over the past few months she has felt more depression symptoms.  This has happened to her in the past in the winter months.    The patient reports that there is not domestic violence in her life.     Healthy Habits:   Regular Exercise: Yes, 4 days per week with weight training and cardiovascular exercise. Walks the dog   Sunscreen Use: Yes  Healthy Diet: Yes  Dental Visits Regularly: Yes  Sexually active: Yes    Health Maintenance   Topic Date Due     DEPRESSION ACTION PLAN  1989     HPV TEST  1989     ADVANCE CARE PLANNING  05/20/2007     PAP SMEAR  05/20/2014     PREVENTIVE CARE VISIT  02/10/2017     TD 18+ HE  04/01/2029     INFLUENZA VACCINE RULE BASED  Completed     TDAP ADULT ONE TIME DOSE  Completed     HIV SCREENING  Discontinued       Immunization History   Administered Date(s) Administered     Dtap 1990, 1990, 1990     Influenza,seasonal quad, PF, =/> 6months 12/17/2019     POLIO, Unspecified  1989, 1989     Td, Adult, Absorbed 2003     Tdap 2019     Immunization status: influenza vaccine recommended and given    Gynecologic History  No LMP recorded.  Contraception: IUD, Mirena. Placed   Last Pap: . Results were: normal     OB History    Para Term  AB Living   0 0 0 0 0 0   SAB TAB Ectopic Multiple Live Births   0 0 0 0         Current Outpatient Medications   Medication Sig Dispense Refill     levonorgestrel (MIRENA) 20 mcg/24 hr (5 years) IUD 1 each by Intrauterine route once. Placed 2017       venlafaxine (EFFEXOR XR) 75 MG 24 hr capsule Take 1 capsule (75 mg total) by mouth daily. 30 capsule 1     No current facility-administered medications for this visit.      Past Medical History:   Diagnosis Date     Abnormal Pap smear of cervix     age 13, subsequent pap smears normal     Past Surgical History:   Procedure Laterality Date     no surgical history       Patient has no known allergies.  Family History   Problem Relation Age of Onset     Heart disease Father      Depression Father      Depression Mother      Cancer Paternal Grandfather      Depression Sister      Social History     Socioeconomic History     Marital status: Single     Spouse name: Not on file     Number of children: Not on file     Years of education: Not on file     Highest education level: Not on file   Occupational History     Occupation:  Assistant      Employer: [employer]   Social Needs     Financial resource strain: Not on file     Food insecurity:     Worry: Not on file     Inability: Not on file     Transportation needs:     Medical: Not on file     Non-medical: Not on file   Tobacco Use     Smoking status: Never Smoker     Smokeless tobacco: Never Used   Substance and Sexual Activity     Alcohol use: Yes     Alcohol/week: 0.8 standard drinks     Types: 1 Standard drinks or equivalent per week     Drug use: No     Sexual activity: Yes     Partners:  "Male     Birth control/protection: I.U.D.   Lifestyle     Physical activity:     Days per week: Not on file     Minutes per session: Not on file     Stress: Not on file   Relationships     Social connections:     Talks on phone: Not on file     Gets together: Not on file     Attends Worship service: Not on file     Active member of club or organization: Not on file     Attends meetings of clubs or organizations: Not on file     Relationship status: Not on file     Intimate partner violence:     Fear of current or ex partner: Not on file     Emotionally abused: Not on file     Physically abused: Not on file     Forced sexual activity: Not on file   Other Topics Concern     Not on file   Social History Narrative     Not on file       Review of Systems  General:  Negative except as noted above  Eyes: Negative except as noted above  Ears/Nose/Throat: Negative except as noted above  Cardiovascular: Negative except as noted above  Respiratory:  Negative except as noted above  Gastrointestinal:  Negative except as noted above  Musculoskeletal:  Negative except as noted above  Skin: Negative except as noted above  Neurologic: Negative except as noted above  Psychiatric: Negative except as noted above  Endocrine: Negative except as noted above  Heme/Lymphatic: Negative except as noted above   Allergic/Immunologic: Negative except as noted above      Objective:      Vitals:    12/17/19 0711   BP: 126/70   Pulse: 80   Weight: 180 lb (81.6 kg)   Height: 5' 6\" (1.676 m)     Wt Readings from Last 3 Encounters:   12/17/19 180 lb (81.6 kg)   04/01/19 166 lb (75.3 kg)   03/14/18 159 lb (72.1 kg)     Body mass index is 29.05 kg/m .     Physical Exam:  General Appearance: Alert, cooperative, no distress.  Head: Normocephalic, without obvious abnormality, atraumatic  Eyes: PERRL, conjunctiva/corneas clear, EOM's intact  Ears: Normal TM's and external ear canals, both ears  Throat: Lips, mucosa, and tongue normal  Neck: Supple, " symmetrical, trachea midline, no adenopathy;  thyroid: not enlarged, symmetric, no tenderness/mass/nodules  Lungs: Clear to auscultation bilaterally, respirations unlabored  Breasts: No breast masses, tenderness, asymmetry, or nipple discharge.  Heart: Regular rate and rhythm, S1 and S2 normal, no murmur, rub, or gallop  Abdomen: Soft, non-tender, bowel sounds active all four quadrants,  no masses, no organomegaly  Pelvic: Genital: EXTERNAL GENITALIA: Normal appearing vulva without masses, tenderness or lesions. PERINEUM: normal and intact. URETHRAL MEATUS: normal VAGINA:  vagina with normal color and without discharge or lesions. CERVIX: normal appearing cervix without discharge or lesions. Non-friable. No CMT. UTERUS: uterus is normal size, shape, consistency, and non-tender. ADNEXA: no tenderness or fullness.   Extremities: Extremities normal, atraumatic, no cyanosis or edema  Skin: Skin color, texture, turgor normal, no rashes or lesions  Lymph nodes: Cervical, supraclavicular, and axillary nodes normal  Neurologic: Normal  Psych: Normal affect.  Does not appear anxious or depressed.

## 2021-06-04 NOTE — TELEPHONE ENCOUNTER
RN cannot approve Refill Request    RN can NOT refill this medication Protocol failed and NO refill given.         Monique Dunn, [facility] Triage/Med Refill 12/29/2019    Requested Prescriptions   Pending Prescriptions Disp Refills     venlafaxine (EFFEXOR-XR) 37.5 MG 24 hr capsule [Pharmacy Med Name: VENLAFAXINE HCL ER 37.5 MG CAP] 90 capsule 0     Sig: TAKE 1 CAPSULE BY MOUTH EVERY DAY       Venlafaxine/Desvenlafaxine Refill Protocol Failed - 12/28/2019 12:49 PM        Failed - LFT or AST or ALT in last year     No results found for: ALBUMIN, BILITOT, BILIDIR, ALKPHOS, AST, ALT, PROT             Failed - Fasting lipid cascade in last year     Cholesterol   Date Value Ref Range Status   02/10/2016 189 <=199 mg/dL Final     Triglycerides   Date Value Ref Range Status   02/10/2016 92 <=149 mg/dL Final     HDL Cholesterol   Date Value Ref Range Status   02/10/2016 67 >=50 mg/dL Final     LDL Calculated   Date Value Ref Range Status   02/10/2016 104 <=129 mg/dL Final     Patient Fasting > 8hrs?   Date Value Ref Range Status   02/10/2016 Yes  Final   02/10/2016 Yes  Final             Passed - PCP or prescribing provider visit in last year     Last office visit with prescriber/PCP: 4/1/2019 Marietta Brandt FNP OR same dept: 4/1/2019 Marietta Brandt FNP OR same specialty: 4/1/2019 Marietta Brandt FNP  Last physical: 12/17/2019 Last MTM visit: Visit date not found   Next visit within 3 mo: Visit date not found  Next physical within 3 mo: Visit date not found  Prescriber OR PCP: GUANAKO Velásquez  Last diagnosis associated with med order: 1. Mild depression (H)  - venlafaxine (EFFEXOR-XR) 37.5 MG 24 hr capsule [Pharmacy Med Name: VENLAFAXINE HCL ER 37.5 MG CAP]; TAKE 1 CAPSULE BY MOUTH EVERY DAY  Dispense: 90 capsule; Refill: 0    If protocol passes may refill for 12 months if within 3 months of last provider visit (or a total of 15 months).             Passed - Blood Pressure in last year     BP  Readings from Last 1 Encounters:   12/17/19 126/70

## 2021-06-05 NOTE — TELEPHONE ENCOUNTER
RN cannot approve Refill Request    RN can NOT refill this medication Protocol failed and NO refill given.      Monique Dunn, [facility] Triage/Med Refill 1/14/2020    Requested Prescriptions   Pending Prescriptions Disp Refills     venlafaxine (EFFEXOR-XR) 75 MG 24 hr capsule [Pharmacy Med Name: VENLAFAXINE HCL ER 75 MG CAP] 30 capsule 1     Sig: TAKE 1 CAPSULE BY MOUTH EVERY DAY       Venlafaxine/Desvenlafaxine Refill Protocol Failed - 1/11/2020  7:33 AM        Failed - LFT or AST or ALT in last year     No results found for: ALBUMIN, BILITOT, BILIDIR, ALKPHOS, AST, ALT, PROT             Failed - Fasting lipid cascade in last year     Cholesterol   Date Value Ref Range Status   02/10/2016 189 <=199 mg/dL Final     Triglycerides   Date Value Ref Range Status   02/10/2016 92 <=149 mg/dL Final     HDL Cholesterol   Date Value Ref Range Status   02/10/2016 67 >=50 mg/dL Final     LDL Calculated   Date Value Ref Range Status   02/10/2016 104 <=129 mg/dL Final     Patient Fasting > 8hrs?   Date Value Ref Range Status   02/10/2016 Yes  Final   02/10/2016 Yes  Final             Passed - PCP or prescribing provider visit in last year     Last office visit with prescriber/PCP: 4/1/2019 Marietta Brandt FNP OR [facility] dept: 4/1/2019 Marietta Brandt FNP OR same specialty: 4/1/2019 Marietta Brandt FNP  Last physical: 12/17/2019 Last MTM visit: Visit date not found   Next visit within 3 mo: Visit date not found  Next physical within 3 mo: Visit date not found  Prescriber OR PCP: GUANAKO Velásquez  Last diagnosis associated with med order: 1. Mild depression (H)  - venlafaxine (EFFEXOR-XR) 75 MG 24 hr capsule [Pharmacy Med Name: VENLAFAXINE HCL ER 75 MG CAP]; TAKE 1 CAPSULE BY MOUTH EVERY DAY  Dispense: 30 capsule; Refill: 1    If protocol passes may refill for 12 months if within 3 months of last provider visit (or a total of 15 months).             Passed - Blood Pressure in last year     BP Readings from Last  1 Encounters:   12/17/19 126/70

## 2021-06-06 NOTE — TELEPHONE ENCOUNTER
Who is calling:  Patient   Reason for Call: She had an appt tomorrow but she cancelled it. She stated the new dosage that Marietta Brandt gave her has been working great and she would like to stay on it indefinitely. She's wondering if she still has to come in for a med check, if she does if someone can just call her so she can r/s the appt.   Date of last appointment with primary care: 12/17/19   Okay to leave a detailed message: Yes

## 2021-06-06 NOTE — TELEPHONE ENCOUNTER
Called to pt  - mornings are good - before 830 am    Would do appt prior to 7am if scheduled on Wed     PhQ9 completed in flow sheets

## 2021-06-06 NOTE — TELEPHONE ENCOUNTER
Call patient: Let her know that due to the medication change we require a follow up but since she is doing well we could do this as a phone visit. Please call her to schedule a phone visit at her convenience and send a MyChart message with the PHQ-9 (or do over the phone if she does not have MyChart) so that I can review this prior to the visit

## 2021-06-08 ENCOUNTER — OFFICE VISIT - HEALTHEAST (OUTPATIENT)
Dept: INTERNAL MEDICINE | Facility: CLINIC | Age: 31
End: 2021-06-08

## 2021-06-08 ENCOUNTER — OFFICE VISIT - HEALTHEAST (OUTPATIENT)
Dept: ONCOLOGY | Facility: HOSPITAL | Age: 31
End: 2021-06-08

## 2021-06-08 DIAGNOSIS — F32.A MILD DEPRESSION: ICD-10-CM

## 2021-06-08 DIAGNOSIS — C50.412 MALIGNANT NEOPLASM OF UPPER-OUTER QUADRANT OF LEFT BREAST IN FEMALE, ESTROGEN RECEPTOR POSITIVE (H): ICD-10-CM

## 2021-06-08 DIAGNOSIS — Z17.0 MALIGNANT NEOPLASM OF UPPER-OUTER QUADRANT OF LEFT BREAST IN FEMALE, ESTROGEN RECEPTOR POSITIVE (H): ICD-10-CM

## 2021-06-08 DIAGNOSIS — Z71.83 ENCOUNTER FOR NONPROCREATIVE GENETIC COUNSELING: ICD-10-CM

## 2021-06-08 RX ORDER — DULOXETIN HYDROCHLORIDE 30 MG/1
30 CAPSULE, DELAYED RELEASE ORAL 2 TIMES DAILY
Qty: 180 CAPSULE | Refills: 1 | Status: SHIPPED | OUTPATIENT
Start: 2021-06-08 | End: 2022-01-18

## 2021-06-08 ASSESSMENT — ANXIETY QUESTIONNAIRES
5. BEING SO RESTLESS THAT IT IS HARD TO SIT STILL: NOT AT ALL
6. BECOMING EASILY ANNOYED OR IRRITABLE: SEVERAL DAYS
GAD7 TOTAL SCORE: 3
2. NOT BEING ABLE TO STOP OR CONTROL WORRYING: NOT AT ALL
1. FEELING NERVOUS, ANXIOUS, OR ON EDGE: SEVERAL DAYS
IF YOU CHECKED OFF ANY PROBLEMS ON THIS QUESTIONNAIRE, HOW DIFFICULT HAVE THESE PROBLEMS MADE IT FOR YOU TO DO YOUR WORK, TAKE CARE OF THINGS AT HOME, OR GET ALONG WITH OTHER PEOPLE: NOT DIFFICULT AT ALL
4. TROUBLE RELAXING: NOT AT ALL
7. FEELING AFRAID AS IF SOMETHING AWFUL MIGHT HAPPEN: SEVERAL DAYS
3. WORRYING TOO MUCH ABOUT DIFFERENT THINGS: NOT AT ALL

## 2021-06-08 ASSESSMENT — PATIENT HEALTH QUESTIONNAIRE - PHQ9: SUM OF ALL RESPONSES TO PHQ QUESTIONS 1-9: 1

## 2021-06-08 NOTE — ASSESSMENT & PLAN NOTE
Continue with duloxetine only at this time. She tolerated bupropion well but given diagnosis of breast cancer since starting the medication she would prefer to be on as few medications as possible.

## 2021-06-09 NOTE — TELEPHONE ENCOUNTER
RN cannot approve Refill Request    RN can NOT refill this medication PCP messaged that patient is overdue for Labs. Last office visit: 4/1/2019 Marietta Brandt FNP Last Physical: 12/17/2019 Last MTM visit: Visit date not found Last visit same specialty: 4/1/2019 Marietta Brandt FNP.  Next visit within 3 mo: Visit date not found  Next physical within 3 mo: Visit date not found      Ijeoma Balderrama, [facility] Triage/Med Refill 7/5/2020    Requested Prescriptions   Pending Prescriptions Disp Refills     venlafaxine (EFFEXOR-XR) 75 MG 24 hr capsule [Pharmacy Med Name: VENLAFAXINE HCL ER 75 MG CAP] 90 capsule 1     Sig: TAKE 1 CAPSULE BY MOUTH EVERY DAY       Venlafaxine/Desvenlafaxine Refill Protocol Failed - 7/5/2020 12:20 AM        Failed - LFT or AST or ALT in last year     No results found for: ALBUMIN, BILITOT, BILIDIR, ALKPHOS, AST, ALT, PROT             Failed - Fasting lipid cascade in last year     Cholesterol   Date Value Ref Range Status   02/10/2016 189 <=199 mg/dL Final     Triglycerides   Date Value Ref Range Status   02/10/2016 92 <=149 mg/dL Final     HDL Cholesterol   Date Value Ref Range Status   02/10/2016 67 >=50 mg/dL Final     LDL Calculated   Date Value Ref Range Status   02/10/2016 104 <=129 mg/dL Final     Patient Fasting > 8hrs?   Date Value Ref Range Status   02/10/2016 Yes  Final   02/10/2016 Yes  Final             Passed - PCP or prescribing provider visit in last year     Last office visit with prescriber/PCP: 4/1/2019 Marietta Brandt FNP OR same dept: Visit date not found OR same specialty: 4/1/2019 Marietta Brandt FNP  Last physical: 12/17/2019 Last MTM visit: Visit date not found   Next visit within 3 mo: Visit date not found  Next physical within 3 mo: Visit date not found  Prescriber OR PCP: GUANAKO Velásquez  Last diagnosis associated with med order: 1. Mild depression (H)  - venlafaxine (EFFEXOR-XR) 75 MG 24 hr capsule [Pharmacy Med Name: VENLAFAXINE HCL  ER 75 MG CAP]; TAKE 1 CAPSULE BY MOUTH EVERY DAY  Dispense: 90 capsule; Refill: 1    If protocol passes may refill for 12 months if within 3 months of last provider visit (or a total of 15 months).             Passed - Blood Pressure in last year     BP Readings from Last 1 Encounters:   12/17/19 126/70

## 2021-06-09 NOTE — PROGRESS NOTES
Internal Medicine Office Visit  [facility]   Patient Name: Veronica Nieves  Patient Age: 30 y.o.  YOB: 1990  MRN: [id number]    Date of Visit: 2020  Reason for Office Visit:   Chief Complaint   Patient presents with     Medication Management           Assessment / Plan / Medical Decision Makin. Mild depression and anxiety  - STOP: venlafaxine due to low libido  - START: DULoxetine (CYMBALTA) 30 MG capsule; Take 1 capsule (30 mg total) by mouth daily.  Dispense: 30 capsule; Refill: 1  - Virtual visit in 6 weeks to see if this switch effective and to titrate dose if needed         Health Maintenance Review  Health Maintenance   Topic Date Due     DEPRESSION ACTION PLAN  1989     ADVANCE CARE PLANNING  2007     INFLUENZA VACCINE RULE BASED (1) 2020     PREVENTIVE CARE VISIT  2020     PAP SMEAR  2024     HPV TEST  2024     TD 18+ HE  2029     TDAP ADULT ONE TIME DOSE  Completed     HIV SCREENING  Discontinued         I have discontinued Abbey Nieves's venlafaxine. I am also having her start on DULoxetine. Additionally, I am having her maintain her levonorgestreL.      HPI:  Veronica Nieves is a 30 y.o. year old who presents to the office today for follow up of anxiety and depression. Her mood has been doing well with the venlafaxine 75 mg daily. A lower dose made her feel more irritable and murphy. She still notes poor libido with this medication.         Review of Systems- pertinent positive in bold:  Negative for thoughts of suicide or self harm       Current Scheduled Meds:  Outpatient Encounter Medications as of 2020   Medication Sig Dispense Refill     levonorgestrel (MIRENA) 20 mcg/24 hr (5 years) IUD 1 each by Intrauterine route once. Placed 2017       [DISCONTINUED] venlafaxine (EFFEXOR-XR) 75 MG 24 hr capsule Take 1 capsule (75 mg total) by mouth daily. Due for appointment. Do Not Crush 90 capsule 0      DULoxetine (CYMBALTA) 30 MG capsule Take 1 capsule (30 mg total) by mouth daily. 30 capsule 1     No facility-administered encounter medications on file as of 7/13/2020.          Past Medical History:   Diagnosis Date     Abnormal Pap smear of cervix     age 13, subsequent pap smears normal     Past Surgical History:   Procedure Laterality Date     no surgical history       Social History     Tobacco Use     Smoking status: Never Smoker     Smokeless tobacco: Never Used   Substance Use Topics     Alcohol use: Yes     Alcohol/week: 0.8 standard drinks     Types: 1 Standard drinks or equivalent per week     Drug use: No       Objective / Physical Examination:  Vitals:    07/13/20 0843   BP: 120/64   Pulse: 70   Weight: 168 lb (76.2 kg)     Wt Readings from Last 3 Encounters:   07/13/20 168 lb (76.2 kg)   12/17/19 180 lb (81.6 kg)   04/01/19 166 lb (75.3 kg)     Body mass index is 27.12 kg/m .     Constitutional: In no apparent distress  Psych: Alert and oriented x3.     No orders of the defined types were placed in this encounter.  Followup: Return in about 6 weeks (around 8/24/2020) for Recheck. earlier if needed.        Marietta Brandt, CNP

## 2021-06-10 NOTE — PROGRESS NOTES
"Veronica Nieves is a 30 y.o. female who is being evaluated via a billable video visit.      The patient has been notified of following:     \"This video visit will be conducted via a call between you and your physician/provider. We have found that certain health care needs can be provided without the need for an in-person physical exam.  This service lets us provide the care you need with a video conversation.  If a prescription is necessary we can send it directly to your pharmacy.  If lab work is needed we can place an order for that and you can then stop by our lab to have the test done at a later time.    Video visits are billed at different rates depending on your insurance coverage. Please reach out to your insurance provider with any questions.    If during the course of the call the physician/provider feels a video visit is not appropriate, you will not be charged for this service.\"    Patient has given verbal consent to a Video visit? Yes  How would you like to obtain your AVS? AVS Preference: Mail a copy.  If dropped by the video visit, the video invitation should be sent to: Text to cell phone: [phone]  Will anyone else be joining your video visit? No    Internal Medicine Office Visit- VIDEO VISIT  [facility]   Patient Name: Veronica Nieves  Patient Age: 30 y.o.  YOB: 1990  MRN: [id number]      Video-Visit Details    Type of service:  Video Visit  Video Start Time: 7:53 AM  Video End Time (time video stopped): 8:00 AM  Originating Location (pt. Location): Home    Distant Location (provider location):  [facility]     Mode of Communication:  Video Conference via Paga      Date of Visit: 2020  Reason for Video Visit:   Chief Complaint   Patient presents with     Medication Management       Assessment / Plan / Medical Decision Makin. Mild depression and anxiety  2. Seasonal affective disorder  - continue duloxetine 30 mg daily " "for now.   - in November INCREASE to: DULoxetine (CYMBALTA) 30 MG capsule; Take 1 capsule (30 mg total) by mouth 2 (two) times a day.  Dispense: 180 capsule; Refill: 1  - Could consider low dose of wellbutrin to help offset impact on libido  - February recheck/follow up. Can be either FTF or VV       Video visit duration: 7 minutes     Health Maintenance Review  Health Maintenance   Topic Date Due     DEPRESSION ACTION PLAN  1990     ADVANCE CARE PLANNING  05/20/2007     INFLUENZA VACCINE RULE BASED (1) 08/01/2020     PREVENTIVE CARE VISIT  12/17/2020     PAP SMEAR  12/17/2024     HPV TEST  12/17/2024     TD 18+ HE  04/01/2029     TDAP ADULT ONE TIME DOSE  Completed     HEPATITIS B VACCINES  Aged Out     HIV SCREENING  Discontinued         I have changed Abbey Nieves's DULoxetine. I am also having her maintain her levonorgestreL.      HPI:  Veronica Nieves is 30 y.o. year old and was contacted today for a video visit due. For the first week that she switched to the duloxetine her mood was tammy but then things \"leveled out\". Her libido has improved with the switch slightly. Typically in the winter she increases her dose due to seasonal depression changes.       Review of Systems- pertinent positive in bold:  No thoughts of suicide or self harm       Current Scheduled Meds:  Outpatient Encounter Medications as of 8/24/2020   Medication Sig Dispense Refill     DULoxetine (CYMBALTA) 30 MG capsule Take 1 capsule (30 mg total) by mouth 2 (two) times a day. 180 capsule 1     levonorgestrel (MIRENA) 20 mcg/24 hr (5 years) IUD 1 each by Intrauterine route once. Placed 5/2/2017       [DISCONTINUED] DULoxetine (CYMBALTA) 30 MG capsule TAKE 1 CAPSULE BY MOUTH EVERY DAY 30 capsule 11     No facility-administered encounter medications on file as of 8/24/2020.          Past Medical History:   Diagnosis Date     Abnormal Pap smear of cervix     age 13, subsequent pap smears normal     Past Surgical History:   Procedure " Laterality Date     no surgical history       Social History     Tobacco Use     Smoking status: Never Smoker     Smokeless tobacco: Never Used   Substance Use Topics     Alcohol use: Yes     Alcohol/week: 0.8 standard drinks     Types: 1 Standard drinks or equivalent per week     Drug use: No       Objective / Physical Examination:  PHQ-9 Total Score: 3 (8/24/2020  7:00 AM)  Insight is good. Thought process is logical. Patient is speaking in full sentences.      No orders of the defined types were placed in this encounter.  Followup: Return in about 6 months (around 2/24/2021) for Next scheduled follow up. earlier if needed.

## 2021-06-11 NOTE — TELEPHONE ENCOUNTER
RN cannot approve Refill Request    RN can NOT refill this medication medication not on med list. Last office visit: 7/13/2020 Marietta Brandt FNP Last Physical: 12/17/2019 Last MTM visit: Visit date not found Last visit same specialty: 7/13/2020 Marietta Brandt FNP.  Next visit within 3 mo: Visit date not found  Next physical within 3 mo: Visit date not found      Kate Oliva, [facility] Triage/Med Refill 9/28/2020    Requested Prescriptions   Pending Prescriptions Disp Refills     venlafaxine (EFFEXOR-XR) 75 MG 24 hr capsule [Pharmacy Med Name: VENLAFAXINE HCL ER 75 MG CAP] 90 capsule 0     Sig: TAKE 1 CAPSULE (75 MG TOTAL) BY MOUTH DAILY. DUE FOR APPOINTMENT. DO NOT CRUSH       Venlafaxine/Desvenlafaxine Refill Protocol Failed - 9/26/2020 12:46 AM        Failed - LFT or AST or ALT in last year     No results found for: ALBUMIN, BILITOT, BILIDIR, ALKPHOS, AST, ALT, PROT             Failed - Fasting lipid cascade in last year     Cholesterol   Date Value Ref Range Status   02/10/2016 189 <=199 mg/dL Final     Triglycerides   Date Value Ref Range Status   02/10/2016 92 <=149 mg/dL Final     HDL Cholesterol   Date Value Ref Range Status   02/10/2016 67 >=50 mg/dL Final     LDL Calculated   Date Value Ref Range Status   02/10/2016 104 <=129 mg/dL Final     Patient Fasting > 8hrs?   Date Value Ref Range Status   02/10/2016 Yes  Final   02/10/2016 Yes  Final             Passed - PCP or prescribing provider visit in last year     Last office visit with prescriber/PCP: 7/13/2020 Marietta Brandt FNP OR same dept: 7/13/2020 Marietta Brandt FNP OR same specialty: 7/13/2020 Marietta Brandt FNP  Last physical: 12/17/2019 Last MTM visit: Visit date not found   Next visit within 3 mo: Visit date not found  Next physical within 3 mo: Visit date not found  Prescriber OR PCP: GUANAKO Velásquez  Last diagnosis associated with med order: 1. Mild depression (H)  - venlafaxine (EFFEXOR-XR) 75 MG 24 hr  capsule [Pharmacy Med Name: VENLAFAXINE HCL ER 75 MG CAP]; Take 1 capsule (75 mg total) by mouth daily. Due for appointment. Do Not Crush  Dispense: 90 capsule; Refill: 0    If protocol passes may refill for 12 months if within 3 months of last provider visit (or a total of 15 months).             Passed - Blood Pressure in last year     BP Readings from Last 1 Encounters:   07/13/20 120/64

## 2021-06-12 NOTE — TELEPHONE ENCOUNTER
Medication Request  Medication name: Venlafaxine HCL ER 75 mg, one capsule daily  Requested Pharmacy: [facility]  Reason for request: N/A - electronic request   When did you use medication last?:  N/A - electronic request   Patient offered appointment:  N/A - electronic request  Okay to leave a detailed message: no

## 2021-06-12 NOTE — TELEPHONE ENCOUNTER
Last Office Visit  8/24/2020 Marietta Brandt FNP  Notes:  1. Mild depression and anxiety  2. Seasonal affective disorder  - continue duloxetine 30 mg daily for now.   - in November INCREASE to: DULoxetine (CYMBALTA) 30 MG capsule; Take 1 capsule (30 mg total) by mouth 2 (two) times a day.  Dispense: 180 capsule; Refill: 1  - Could consider low dose of wellbutrin to help offset impact on libido  - February recheck/follow up. Can be either FTF or VV     Last Filled:  venlafaxine (EFFEXOR-XR) 75 MG 24 hr capsule (Discontinued) 90 capsule 0 7/6/2020 7/13/2020 No   Sig - Route: Take 1 capsule (75 mg total) by mouth daily. Due for appointment. Do Not Crush - Oral   Sent to pharmacy as: venlafaxine ER 75 mg capsule,extended release 24 hr (EFFEXOR-XR)   Reason for Discontinue: Therapy completed   E-Prescribing Status: Receipt confirmed by pharmacy (7/6/2020  9:36 AM CDT)       Next OV:  Visit date not found    Requesting discontinued medication     Medication teed up for provider signature

## 2021-06-16 ENCOUNTER — AMBULATORY - HEALTHEAST (OUTPATIENT)
Dept: NURSING | Facility: CLINIC | Age: 31
End: 2021-06-16

## 2021-06-16 NOTE — PROGRESS NOTES
Internal Medicine Office Visit  [facility]  Patient Name: Veronica Spain  Patient Age: 27 y.o.  YOB: 1990  MRN: [id number]    Date of Visit: 3/14/2018  Reason for Office Visit:   Chief Complaint   Patient presents with     Medication Management           Assessment / Plan / Medical Decision Makin. Mild depression and anxiety  - Not interested in dose change of venlafaxine at this time. Discussed a decreased dose to see if this helped with sexual side effects. An alternative would be to add wellbutrin.   - venlafaxine (EFFEXOR-XR) 75 MG 24 hr capsule; TAKE 1 CAPSULE BY MOUTH DAILY.  Dispense: 90 capsule; Refill: 3  - Reminder to schedule physical with PAP, likely due within the next year based on her memory of last PAP smear       Health Maintenance Review  Health Maintenance   Topic Date Due     DEPRESSION FOLLOW UP  1989     TD 18+ HE  2007     TDAP ADULT ONE TIME DOSE  2007     ADVANCE DIRECTIVES DISCUSSED WITH PATIENT  2007     PAP SMEAR  2010     INFLUENZA VACCINE RULE BASED (1) 2017         I have discontinued Ms. Spain's norgestimate-ethinyl estradiol. I am also having her maintain her venlafaxine and levonorgestrel.      HPI:  Veronica Spain is a 27 y.o. year old who presents to the office today for follow up of mild depression and anxiety. She has done very well with venlafaxine since this was started. She does note sexual side effects with decreased libido but is not currently overly concerned by this. She will be getting  next year, not feeling stressed over this but may want to consider options for addressing decreased libido.        Review of Systems- pertinent positive in bold:  A 10-point ROS is negative except as stated in HPI         Current Scheduled Meds:  Outpatient Encounter Prescriptions as of 3/14/2018   Medication Sig Dispense Refill     levonorgestrel (MIRENA) 20 mcg/24 hr (5 years) IUD 1  each by Intrauterine route once. Placed 5/2/2017       [DISCONTINUED] norgestimate-ethinyl estradiol (ORTHO TRI-CYCLEN LO, 28,) 0.18/0.215 mg/ 0.25 mg-25 mcg Tab tablet Take 1 tablet by mouth daily.       [DISCONTINUED] venlafaxine (EFFEXOR-XR) 75 MG 24 hr capsule TAKE 1 CAPSULE BY MOUTH DAILY. 30 capsule 0     venlafaxine (EFFEXOR-XR) 75 MG 24 hr capsule TAKE 1 CAPSULE BY MOUTH DAILY. 90 capsule 3     No facility-administered encounter medications on file as of 3/14/2018.      Past Medical History:   Diagnosis Date     Abnormal Pap smear of cervix     age 13, subsequent pap smears normal     Past Surgical History:   Procedure Laterality Date     no surgical history       Social History   Substance Use Topics     Smoking status: Never Smoker     Smokeless tobacco: Not on file     Alcohol use 0.5 oz/week     1 drink(s) per week       Objective / Physical Examination:  Vitals:    03/14/18 1620   BP: 102/66   Pulse: 62   Weight: 159 lb (72.1 kg)     Wt Readings from Last 3 Encounters:   03/14/18 159 lb (72.1 kg)   03/24/17 153 lb (69.4 kg)   02/24/17 151 lb (68.5 kg)     Body mass index is 25.66 kg/(m^2).     General Appearance: Alert and oriented, cooperative, affect appropriate, speech clear, in no apparent distress  Psych: Alert and oriented x3. Normal mood and affect. Insight is good     No orders of the defined types were placed in this encounter.  Followup: Return in about 1 year (around 3/14/2019) for Annual physical. earlier if needed.          Marietta Brandt, CNP

## 2021-06-17 NOTE — TELEPHONE ENCOUNTER
Records in Epic Chart Review / Radiology images in Nil.    Notes  5/11/2021 - Progress Notes / Surgery Consult, Dr. Newman.  4/27/2021 - Progress Notes / Physical, GUANAKO Bob.    Labs  5/4/2021 - Surgical Pathology / Left Breast Core Biopsy.    Imaging  5/4/2021 - US Left Breast Core Biopsy & Clip placement.  5/4/2021 - Mammo Diagnostic Bilateral & US Left Breast.    Media  5/17/2021 - Genetics Scan / HER2 FISH, [employer].

## 2021-06-17 NOTE — TELEPHONE ENCOUNTER
Telephoned and spoke with Ry to check in after her consult with Dr. Garcia yesterday.  She has a full schedule with the following appointments:  5/21/21 - Echo   5/24/21 - [facility] for genetic counseling  5/26/21 - Dr. Newman port-A-Cath placement    5/27/21 - [facility] start date    Ry feels she has a good grasp of the information shared yesterday, and her questions were sufficiently answered by Dr. Garcia at that time.  We reviewed some support resources such as [employer], [employer], and [employer].  Ry has also reviewed the information Dr. Newman's nurse gave her so this is review.      She understands hair loss is an expected side effect of chemotherapy.  An e-mail with wi resources will be sent today.  Emotional support and encouragement offered and calls welcomed. Dede Levine RN

## 2021-06-17 NOTE — PROGRESS NOTES
[facility] paperwork for Ry's , Anshul, has been completed, signed and faxed to [employer] fax # [phone].

## 2021-06-17 NOTE — PROGRESS NOTES
"This is a 30 y.o.  female who I'm asked to see by Marietta Brandt FNP for evaluation of a left breast cancer.  This was picked up by the patient.  She states that first she had pain in her breast but then noticed a mass and dimpling of the skin.  She had a mammogram and ultrasound where they show a very large area of concern with what appears to be 3 separate masses.  A needle biopsy was done which shows an invasive ductal carcinoma.  DCIS was also seen in all of the specimens taken.  It is estrogen receptor positive, progesterone receptor positive and HER-2 Is indeterminate and has been sent for FISH.  They did not see any suspicious adenopathy on the ultrasound.    She has no family history of breast cancer.      PAST MEDICAL HISTORY:  Past Medical History:   Diagnosis Date     Abnormal Pap smear of cervix     age 13, subsequent pap smears normal     Past Surgical History:   Procedure Laterality Date     no surgical history       US BREAST CORE BIOPSY LEFT Left 5/4/2021       Medications:    Current Outpatient Medications:      buPROPion (WELLBUTRIN XL) 150 MG 24 hr tablet, Take 1 tablet (150 mg total) by mouth every morning., Disp: 30 tablet, Rfl: 2     DULoxetine (CYMBALTA) 30 MG capsule, Take 1 capsule (30 mg total) by mouth 2 (two) times a day., Disp: 180 capsule, Rfl: 1     levonorgestrel (MIRENA) 20 mcg/24 hr (5 years) IUD, 1 each by Intrauterine route once. Placed 5/2/2017, Disp: , Rfl:     Allergies:  No Known Allergies    Social History:   reports that she has never smoked. She has never used smokeless tobacco. She reports current alcohol use of about 3.0 standard drinks of alcohol per week. She reports that she does not use drugs.    ROS:  A 12 point comprehensive review of systems was negative except as noted.    Physical Exam  Resp 16   Ht 5' 7\" (1.702 m)   Wt 181 lb (82.1 kg)   Breastfeeding No   BMI 28.35 kg/m      General:alert, appears stated age, cooperative and mildly obese  Lungs:clear " to auscultation bilaterally  CV:regular rate and rhythm, S1, S2 normal, no murmur, click, rub or gallop  Breasts: Large very ill-defined palpable mass in the lateral aspect of the left breast.  Mild dimpling of the skin but not involving the skin.  Lymph Nodes:no axillary nodes palpated  Neuro:Grossly normal  Musculoskeletal: Normal range of motion of her upper extremities.  Skin: Normal skin turgor.    Reviewed her mammograms and ultrasound and pathology.     Impression: Left Breast Cancer. Clinically T2-3, N0.  Discussed the surgical options for treatment of breast cancer which generally are a lumpectomy (partial mastectomy) combined with radiation versus a mastectomy.  Explained that the survival benefit is the same for both.  The difference is in local recurrence risk.  The patient is a Poor candidate for a lumpectomy.  The mass is simply too large to consider a lumpectomy.  Even if she was a candidate for neoadjuvant chemotherapy, it appears to have calcifications throughout it so this is likely be large area of DCIS and I think even if we could do neoadjuvant I am not sure that it would make her a candidate for a lumpectomy.  At this point is not clear whether or not she is even a candidate for neoadjuvant chemotherapy.  It will depend on the HER-2 analysis and that is pending.  Discussed SLN biopsy.  The procedure and rationale were explained.  Discussed that at this point we do not know yet whether or not she will need chemotherapy and we may not know until we get all of the results of surgery back.  Again the HER-2 results will play a big part of this.  Talked about what a mastectomy looks like.  Talk about the options for reconstruction which she is actually not too interested in at this time.  She asked about the opposite breast and explained that she does not improve her survival by doing anything in the opposite side.  Because of her age, she does qualify for genetic testing and she is interested in  pursuing that.  Answered all of her questions as best as I could.  At this point we are going to wait to see with her to comes back as and then go from there.  I did explain that if it comes back positive, I would strongly recommend neoadjuvant chemotherapy and then we would get her in to see oncology and I would arrange for port placement.      Plan: Await results of HER-2 testing which should be back within the next week.  We will make a referral to genetic testing although the results would not likely change what she does immediately.  Also told her to consider visiting with the plastic surgeon just so that she makes an educated decision about whether or not to do reconstruction at this time.        60 minutes spent on the date of the encounter doing chart review, history and exam, documentation and further activities per the note

## 2021-06-17 NOTE — PROGRESS NOTES
Assessment/Plan:       Problem List Items Addressed This Visit     Mild depression and anxiety     ADD: wellbutrin (for appetite suppression and to counter effects of antidepressant on libido)  Follow up: 6 weeks VV          Relevant Medications    buPROPion (WELLBUTRIN XL) 150 MG 24 hr tablet      Other Visit Diagnoses     Annual physical exam    -  Primary    Left breast mass        Relevant Orders    US Breast Bilateral Limited 1-3 Quadrants    Mammo Diagnostic Bilateral         - Reviewed the importance of monitoring for changes in breast appearance such as nipple inversion, changes in breast tissue texture, non-healing wounds, or nipple discharge   - Plant-based diet and 150 minutes of physical activity per week recommended   - Health screenings and vaccines appropriate for age, biological gender, and risk factors reviewed and ordered as per this discussion         Subjective:     Veronica Nieves is a 30 y.o. female who presents for an annual exam.     In Nov/Dec she was having tenderness in the left breast and now she sees a dimpling on the left outer breast. It is minimally tender, now just if she squeezes it. No family history of breast cancer.     The patient reports that there is not domestic violence in her life.     Healthy Habits:   Regular Exercise: Yes  Sunscreen Use: Yes  Healthy Diet: snacking more, weight gain   Dental Visits Regularly: Yes    Health Maintenance   Topic Date Due     DEPRESSION ACTION PLAN  Never done     HEPATITIS C SCREENING  Never done     COVID-19 Vaccine (1) Never done     ADVANCE CARE PLANNING  Never done     INFLUENZA VACCINE RULE BASED (1) 06/30/2021 (Originally 8/1/2020)     PREVENTIVE CARE VISIT  04/27/2022     PAP SMEAR  12/17/2024     HPV TEST  12/17/2024     TD 18+ HE  04/01/2029     TDAP ADULT ONE TIME DOSE  Completed     Pneumococcal Vaccine: Pediatrics (0 to 5 Years) and At-Risk Patients (6 to 64 Years)  Aged Out     HEPATITIS B VACCINES  Aged Out     HIV  SCREENING  Discontinued         Immunization History   Administered Date(s) Administered     Dtap 1989, 1989, 1989     INFLUENZA,SEASONAL QUAD, PF, =/> 6months 2019     POLIO, Unspecified 1989, 1989     Td, Adult, Absorbed 2003     Tdap 2019       Gynecologic History  No LMP recorded.  Contraception: IUD  Last Pap: 2019. Results were: normal HPV testing: negative     OB History    Para Term  AB Living   0 0 0 0 0 0   SAB TAB Ectopic Multiple Live Births   0 0 0 0         Current Outpatient Medications   Medication Sig Dispense Refill     DULoxetine (CYMBALTA) 30 MG capsule Take 1 capsule (30 mg total) by mouth 2 (two) times a day. 180 capsule 1     levonorgestrel (MIRENA) 20 mcg/24 hr (5 years) IUD 1 each by Intrauterine route once. Placed 2017       buPROPion (WELLBUTRIN XL) 150 MG 24 hr tablet Take 1 tablet (150 mg total) by mouth every morning. 30 tablet 2     No current facility-administered medications for this visit.      Past Medical History:   Diagnosis Date     Abnormal Pap smear of cervix     age 13, subsequent pap smears normal     Past Surgical History:   Procedure Laterality Date     no surgical history       Patient has no known allergies.  Family History   Problem Relation Age of Onset     Heart disease Father      Depression Father      Depression Mother      Cancer Paternal Grandfather      Depression Sister      Social History     Socioeconomic History     Marital status: Single     Spouse name: Not on file     Number of children: Not on file     Years of education: Not on file     Highest education level: Not on file   Occupational History     Occupation:  Assistant      Employer: [employer]   Social Needs     Financial resource strain: Not on file     Food insecurity     Worry: Not on file     Inability: Not on file     Transportation needs     Medical: Not on file     Non-medical: Not on file  "  Tobacco Use     Smoking status: Never Smoker     Smokeless tobacco: Never Used   Substance and Sexual Activity     Alcohol use: Yes     Alcohol/week: 3.0 standard drinks     Types: 3 Standard drinks or equivalent per week     Drug use: No     Sexual activity: Yes     Partners: Male     Birth control/protection: I.U.D.   Lifestyle     Physical activity     Days per week: Not on file     Minutes per session: Not on file     Stress: Not on file   Relationships     Social connections     Talks on phone: Not on file     Gets together: Not on file     Attends Nondenominational service: Not on file     Active member of club or organization: Not on file     Attends meetings of clubs or organizations: Not on file     Relationship status: Not on file     Intimate partner violence     Fear of current or ex partner: Not on file     Emotionally abused: Not on file     Physically abused: Not on file     Forced sexual activity: Not on file   Other Topics Concern     Not on file   Social History Narrative     Not on file         Objective:      Vitals:    04/27/21 0748   BP: 96/62   Pulse: 78   SpO2: 98%   Weight: 181 lb (82.1 kg)   Height: 5' 7\" (1.702 m)     Wt Readings from Last 3 Encounters:   04/27/21 181 lb (82.1 kg)   07/13/20 168 lb (76.2 kg)   12/17/19 180 lb (81.6 kg)     Body mass index is 28.35 kg/m .     Physical Exam:  General Appearance: Alert, cooperative, no distress.  Eyes: PERRL, conjunctiva/corneas clear, EOM's intact  Ears: Normal TM's and external ear canals, both ears  Throat: Lips, mucosa, and tongue normal  Neck: Supple, symmetrical, trachea midline, no adenopathy;  thyroid: not enlarged, symmetric, no tenderness/mass/nodules  Lungs: Clear to auscultation bilaterally, respirations unlabored  Breasts: No breast masses, tenderness, or nipple discharge- right breast. Left lateral breast with a slight dimpling visible with leaning forward. Several palpable masses lateral breast which are non-tender   Heart: Regular " rate and rhythm, S1 and S2 normal, no murmur, rub, or gallop  Abdomen: Soft, non-tender, bowel sounds active all four quadrants,  no masses, no organomegaly  Extremities: Extremities normal, atraumatic, no cyanosis or edema  Skin: Skin color, texture, turgor normal, no rashes or lesions  Lymph nodes: Cervical, supraclavicular, and axillary nodes normal  Neurologic: Normal  Psych: Normal affect.  Does not appear anxious or depressed.

## 2021-06-17 NOTE — CONSULTS
[facility] Hematology and Oncology Consult Note    Patient: Veronica Nieves  MRN: [id number]  Date of Service: 05/19/2021      Reason for Visit:    Invasive ductal carcinoma of the left breast    Assessment/Plan:    1.  Invasive ductal carcinoma of the left breast: Met with the patient and her  today.  We reviewed her pathologic findings.  We spent some time discussing her receptor status.  She appears to have triple positive, multifocal breast cancer, node negative by imaging.  We will plan on giving her neoadjuvant chemotherapy with TCHP.  We reviewed the schedule of this regimen.  We talked about some of the more common side effects.  These include, but are not limited to, fatigue, hair loss, nausea, neuropathy, cytopenias, skin rash, diarrhea, and a low risk of cardiotoxicity.  We also reviewed the overall treatment plan.  She will have surgery after 6 cycles of chemotherapy.  We will then continue 1 year of Herceptin and pertuzumab.  We briefly reviewed adjuvant endocrine therapy.  She was given some written information on the medications to take home and review.  Questions were answered.  Fertility was discussed, they are not planning on having any other children.  We will get a baseline echocardiogram.  She will have a Port-A-Cath placed.  We will plan on starting treatment on May 26.    ECOG Performance   ECOG Performance Status: 0    Distress Assessment  Distress Assessment Score: 1    Problem List:    1. Malignant neoplasm of upper-outer quadrant of left breast in female, estrogen receptor positive (H)  Echo Complete     Staging History:    Cancer Staging  Malignant neoplasm of upper-outer quadrant of left breast in female, estrogen receptor positive (H)  Staging form: Breast, AJCC 8th Edition  - Clinical stage from 5/19/2021: Stage IB (cT2, cN0, cM0, G2, ER+, NE+, HER2+) - Signed by Jefferson Garcia MD on 5/19/2021    History:    Ry is a 30-year-old female who was recently diagnosed with a  left-sided invasive ductal carcinoma.  She states that she had some discomfort in her breast in December.  Over the intervening months she developed some dimpling and a more palpable mass.  She went on to have imaging in early May which showed 3 masses suspicious for malignancy.  The largest was biopsied showing an invasive ductal carcinoma, grade 2.  Sonographic evaluation of the left axilla was negative.  Tumor is ER/FL positive and HER-2/elías positive.  She was referred for consideration of neoadjuvant therapy.  She is feeling okay today.  No acute complaints.  She has less discomfort in her breast.  No areas of pain.    Past History:    Past Medical History:   Diagnosis Date     Abnormal Pap smear of cervix     age 13, subsequent pap smears normal     Breast cancer (H) 05/04/2021    Family History   Problem Relation Age of Onset     Heart disease Father      Depression Father      Depression Mother      No Medical Problems Brother      Depression Sister      Cancer Paternal Grandfather      Cancer Maternal Grandmother      Lung cancer Maternal Grandmother         50's      [unfilled] Social History     Socioeconomic History     Marital status:      Spouse name: Not on file     Number of children: 0     Years of education: Not on file     Highest education level: Not on file   Occupational History     Occupation:  Assistant      Employer: [employer]   Social Needs     Financial resource strain: Not on file     Food insecurity     Worry: Not on file     Inability: Not on file     Transportation needs     Medical: Not on file     Non-medical: Not on file   Tobacco Use     Smoking status: Never Smoker     Smokeless tobacco: Never Used   Substance and Sexual Activity     Alcohol use: Yes     Alcohol/week: 6.0 - 7.0 standard drinks     Types: 3 Standard drinks or equivalent, 3 - 4 Glasses of wine per week     Drug use: No     Sexual activity: Never     Partners: Male     Birth  control/protection: I.U.D.   Lifestyle     Physical activity     Days per week: Not on file     Minutes per session: Not on file     Stress: Not on file   Relationships     Social connections     Talks on phone: Not on file     Gets together: Not on file     Attends Restorationist service: Not on file     Active member of club or organization: Not on file     Attends meetings of clubs or organizations: Not on file     Relationship status: Not on file     Intimate partner violence     Fear of current or ex partner: Not on file     Emotionally abused: Not on file     Physically abused: Not on file     Forced sexual activity: Not on file   Other Topics Concern     Not on file   Social History Narrative     Not on file        Allergies:    No Known Allergies    Review of Systems:    General  General (WDL): Exceptions to WDL  Fatigue: Yes - Recent (Less than 3 months)  ENT  ENT (WDL): All ENT elements are within defined limits  Respiratory  Respiratory (WDL): All respiratory elements are within defined limits  Cardiovascular  Cardiovascular (WDL): All cardiovascular elements are within defined limits  Endocrine  Endocrine (WDL): Exceptions to WDL  Heat Intolerance: Yes - Chronic (Greater than 3 months)  Gastrointestinal  Gastrointestinal (WDL): Exceptions to WDL  Constipation: Yes - Recent (Less than 3 months)  Musculoskeletal  Musculoskeletal (WDL): Exceptions to WDL  Back Pain: Yes - Recent (Less than 3 months)  Neurological  Neurological (WDL): Exceptions to WDL  Headaches: Yes - Chronic (Greater than 3 months)  Dominant Hand: Right  Psychological/Emotional  Psychological/Emotional (WDL): Exceptions to WDL  Depression: Yes - Recent (Less than 3 months)  Anxiety: Yes - Chronic (Greater than 3 months)  Hematological/Lymphatic  Hematological/Lymphatic (WDL): All hematological/lymphatic elements are within defined limits  Dermatological  Dermatologic (WDL): Exceptions to WDL  Changes in moles: Yes - Recent (Less than 3  "months)(left breast)  Genitourinary/Reproductive  Genitourinary/Reproductive (WDL): Exceptions to WDL  Urination more than 2 times a night: Yes - Chronic (Greater than 3 months)  Reproductive (Females only)  Menstrual irritation or increase in discharge: No  Age at start of periods: 12  Last menstural cycle: 2017  Number of pregnancies: 1  Age at first pregnancy: 26  Patient Pregnant?: No  Is the patient trying to get pregnant?: No  Is the patient on birth control: Yes(mirena IUD for 4 years)  Pain  Currently in Pain: No/denies    Physical Exam:    Recent Vitals 5/19/2021   Height 5' 6.25\"   Weight 186 lbs 14 oz   BSA (m2) 1.99 m2   /73   Pulse 65   Temp 98.4   Temp src 1   SpO2 98   Some recent data might be hidden     General: patient appears stated age of 30 y.o.. Nontoxic and in no distress.   HEENT: Head: atraumatic, normocephalic. Sclerae anicteric.  Chest:  Normal respiratory effort  Cardiac:  No edema.   Abdomen: abdomen is soft, non-distended  Extremities: normal tone and muscle bulk.  Skin: no lesions or rash. Warm and dry.   CNS: alert and oriented. Grossly non-focal.   Psychiatric: normal mood and affect.     Lab Results:    No results found for this or any previous visit (from the past 168 hour(s)).    Imaging Results:    Mammo Diagnostic Bilateral    Result Date: 5/4/2021  EXAM: MAMMO DIAGNOSTIC 3D BILATERAL, US BREAST LEFT LIMITED 1-3 QUADRANTS LOCATION: [facility] SERVICES  [facility] DATE/TIME: 5/4/2021 1:02 PM INDICATION: 30-year-old female presents with a palpable lump within the left breast. The patient initially noted pain within this area in November/December 2020 which later developed into a palpable lump and now notes associated dimpling of the skin. COMPARISON: None. Baseline examination. MAMMOGRAPHIC FINDINGS: Bilateral full-field and left breast spot magnification digital diagnostic mammograms performed. The breasts are extremely dense, which lowers the sensitivity of " mammography. Images evaluated with the assistance of CAD. Breast tomosynthesis was used in interpretation. A BB marker was placed on the skin overlying the area of palpable concern within the left breast. There are multiple underlying irregular masses with spiculated margins in a segmental distribution within the left breast at the 3:00 position, posterior depth. These masses measure approximately 3.3 cm in AP dimension on mammography. There are associated fine, pleomorphic calcifications which extend to the anterior and posterior margins of the masses. Recommend targeted left breast ultrasound for further  evaluation. The remaining left breast parenchyma is unremarkable. There are no suspicious masses, calcifications or architectural distortion within the right breast. ULTRASOUND FINDINGS: Patient directed physical examination of the area of palpable concern within the left breast demonstrates an approximately 2.5 cm irregular, firm mass. Targeted left breast ultrasound at the area of palpable concern at the 3:00 position, extending from 2-6 cm from the nipple, demonstrates at least three irregular, hypoechoic masses with spiculated and angular margins. The centrally located and largest of the three masses measures approximately 2.4 x 1.6 x 2.9 cm. Associated hyperechoic foci within this mass are consistent with calcifications. The remaining two masses are smaller in size. The maximal extent of these three masses together measures approximately 4.8 cm. Sonographic evaluation of the left axilla demonstrates no definitive lymphadenopathy.     1.  Three irregular masses within the left breast at the 3:00 position correlate with the palpable area of concern. These findings are highly suspicious for malignancy. Recommend a single site, representative ultrasound-guided biopsy of the largest, centrally located mass. Surgical consultation is also recommended. 2.  No definitive left axillary lymphadenopathy. ACR BI-RADS  Category 5: Highly Suggestive of Malignancy. I personally scanned and discussed the findings and recommendations with the patient at the conclusion of the examination. For the convenience of the patient, a same day ultrasound-guided left breast biopsy is offered and will subsequently be performed.    Mammo Post Clip Placement Left    Addendum Date: 5/7/2021    Pathology shows invasive ductal carcinoma. This is consistent with imaging findings. A verbal report was given to the patient. Surgical consultation is recommended.    Result Date: 5/4/2021  US Breast Core Biopsy Left Mammo Post Clip Placement Left INDICATION: Left breast mass. PROCEDURE: Informed consent was obtained from the patient. Ultrasound was used to localize the mass at the 3 o'clock position of the left breast, 4 cm from the nipple.  The skin was marked, then prepped and draped in a sterile fashion. 1% lidocaine was used for local anesthesia. Under direct sonographic guidance, a 12-gauge coaxial needle was used to obtain 3 core biopsies.  A biopsy marking clip was then placed.  Digital mammograms performed in a separate room, using separate equipment, to document clip placement. The mammogram showed the clip to be in good position. The patient tolerated this well; there are no immediate complications. IMPRESSION: 1. Ultrasound-guided biopsy of mass in the left breast at the 3 o'clock position. Pathology pending. 2. Post procedure mammogram for marker placement.     Us Breast Core Biopsy Left    Addendum Date: 5/7/2021    Pathology shows invasive ductal carcinoma. This is consistent with imaging findings. A verbal report was given to the patient. Surgical consultation is recommended.    Result Date: 5/4/2021  US Breast Core Biopsy Left Mammo Post Clip Placement Left INDICATION: Left breast mass. PROCEDURE: Informed consent was obtained from the patient. Ultrasound was used to localize the mass at the 3 o'clock position of the left breast, 4 cm from  the nipple.  The skin was marked, then prepped and draped in a sterile fashion. 1% lidocaine was used for local anesthesia. Under direct sonographic guidance, a 12-gauge coaxial needle was used to obtain 3 core biopsies.  A biopsy marking clip was then placed.  Digital mammograms performed in a separate room, using separate equipment, to document clip placement. The mammogram showed the clip to be in good position. The patient tolerated this well; there are no immediate complications. IMPRESSION: 1. Ultrasound-guided biopsy of mass in the left breast at the 3 o'clock position. Pathology pending. 2. Post procedure mammogram for marker placement.     Us Breast Left Limited 1-3 Quadrants    Result Date: 5/4/2021  EXAM: MAMMO DIAGNOSTIC 3D BILATERAL, US BREAST LEFT LIMITED 1-3 QUADRANTS LOCATION: [facility] DATE/TIME: 5/4/2021 1:02 PM INDICATION: 30-year-old female presents with a palpable lump within the left breast. The patient initially noted pain within this area in November/December 2020 which later developed into a palpable lump and now notes associated dimpling of the skin. COMPARISON: None. Baseline examination. MAMMOGRAPHIC FINDINGS: Bilateral full-field and left breast spot magnification digital diagnostic mammograms performed. The breasts are extremely dense, which lowers the sensitivity of mammography. Images evaluated with the assistance of CAD. Breast tomosynthesis was used in interpretation. A BB marker was placed on the skin overlying the area of palpable concern within the left breast. There are multiple underlying irregular masses with spiculated margins in a segmental distribution within the left breast at the 3:00 position, posterior depth. These masses measure approximately 3.3 cm in AP dimension on mammography. There are associated fine, pleomorphic calcifications which extend to the anterior and posterior margins of the masses. Recommend targeted left breast  ultrasound for further  evaluation. The remaining left breast parenchyma is unremarkable. There are no suspicious masses, calcifications or architectural distortion within the right breast. ULTRASOUND FINDINGS: Patient directed physical examination of the area of palpable concern within the left breast demonstrates an approximately 2.5 cm irregular, firm mass. Targeted left breast ultrasound at the area of palpable concern at the 3:00 position, extending from 2-6 cm from the nipple, demonstrates at least three irregular, hypoechoic masses with spiculated and angular margins. The centrally located and largest of the three masses measures approximately 2.4 x 1.6 x 2.9 cm. Associated hyperechoic foci within this mass are consistent with calcifications. The remaining two masses are smaller in size. The maximal extent of these three masses together measures approximately 4.8 cm. Sonographic evaluation of the left axilla demonstrates no definitive lymphadenopathy.     1.  Three irregular masses within the left breast at the 3:00 position correlate with the palpable area of concern. These findings are highly suspicious for malignancy. Recommend a single site, representative ultrasound-guided biopsy of the largest, centrally located mass. Surgical consultation is also recommended. 2.  No definitive left axillary lymphadenopathy. ACR BI-RADS Category 5: Highly Suggestive of Malignancy. I personally scanned and discussed the findings and recommendations with the patient at the conclusion of the examination. For the convenience of the patient, a same day ultrasound-guided left breast biopsy is offered and will subsequently be performed.      Signed by: Jefferson Garcia MD

## 2021-06-17 NOTE — TELEPHONE ENCOUNTER
Per  echo needs to be done before may 26th. There is no availability spoke with heart care manager they will be working on getting the pt before 05/26/21 and call us.

## 2021-06-17 NOTE — TELEPHONE ENCOUNTER
Called Abbey to follow up regarding the positive results of her left  breast biopsy performed at [facility]  on  5/4/2021 . Reviewed pathology report, receptror studies, and the anatomy of the breast. She understands that the Her-2 receptor study has been sent out for further testing (FISH) and she will be informed of the result in the future when the result is available. She has already been scheduled for a surgical consult with Dr. Newman on 5/11/2021 at [facility], check in at 12:00 p.m. She is aware that she may bring one visitor with her to her consult.  Educational resource packet was sent to confirmed e-mail address on file. Support and reassurance provided. Writer's direct number provided for any questions or concerns.

## 2021-06-17 NOTE — PROGRESS NOTES
Called [facility] regarding patient's Her 2 by FISH results. LMOM to check into these results and return my call.

## 2021-06-17 NOTE — PROGRESS NOTES
Per Breast Radiologist request, scheduled patient to see Dr. Newman  for a surgical consult on 5-11-21 at 1220 at the [facility].  Patient given RN contact information for questions or concerns.

## 2021-06-17 NOTE — TELEPHONE ENCOUNTER
New Patient Oncology Nurse Navigator Note     Referring provider: Dr. Audrey Newman     Referring Clinic/Organization: [facility]     Referred to (specialty): Medical Oncology    Requested provider (if applicable): N/A     Date Referral Received: 5/18/2021     Evaluation for : Breast cancer     Clinical History (per Nurse review of records provided):      This is a 30 y.o.  female evaluated by Dr. Audrey Newman for a left breast cancer.  This was picked up by the patient.  Patient first noted pain in her breast but then noticed a mass and dimpling of the skin.  She had a mammogram and ultrasound where they show a very large area of concern with what appears to be 3 separate masses.  A needle biopsy was done which shows an invasive ductal carcinoma.  DCIS was also seen in all of the specimens taken.  It is estrogen receptor positive, progesterone receptor positive and HER-2 positive by FISH.  No suspicious adenopathy on the ultrasound.       Clinical Assessment / Barriers to Care (Per Nurse): N/A     Records Location (Care Everywhere, Media, etc.): [facility]     Records Needed: N/A     Additional testing needed prior to consult: N/A    Miguelcarolyn is scheduled to meet with Dr. Jefferson Garcia tomorrow 5/19.  Welcome letter and Patient History form will be e-mailed to Abbey today.  She is currently in [place] and will be flying back to [place] later today. Dede Levine RN

## 2021-06-17 NOTE — PROGRESS NOTES
Left a message with Abbey that her tumor is HER2 pos. I think she should do ela-adjuvant chemotherapy. Referral placed. Will call her again and make arrangements for a Port Placement.

## 2021-06-17 NOTE — PROGRESS NOTES
"5/24/2021     Veronica Nieves is a 31 y.o. female who is being evaluated via a billable telephone visit.      The patient has been notified of following:     \"This telephone visit will be conducted via a call between you and your physician/provider. We have found that certain health care needs can be provided without the need for a physical exam.  This service lets us provide the care you need with a short phone conversation.  If a prescription is necessary we can send it directly to your pharmacy.  If lab work is needed we can place an order for that and you can then stop by our lab to have the test done at a later time.    Telephone visits are billed at different rates depending on your insurance coverage. During this emergency period, for some insurers they may be billed the same as an in-person visit.  Please reach out to your insurance provider with any questions.    If during the course of the call the physician/provider feels a telephone visit is not appropriate, you will not be charged for this service.\"    Patient has given verbal consent to a Telephone visit? Yes    What phone number would you like to be contacted at? [phone]    Patient would like to receive their AVS by AVS Preference: Gabriela.    Referring Provider: Marietta Brandt FNP; Dr. Newman    Present for Today's Visit: Veronica     Presenting Information:   I met with Veronica Nieves today for genetic counseling (telephone visit due to covid19) to discuss her recent diagnosis of breast cancer.  She is here today to review this history, cancer screening recommendations, and available genetic testing options.    Personal History:  Veronica is a 31 y.o. female. She was recently diagnosed with a left breast cancer at age 30. Biopsy completed 5/4/2021 revealed an invasive ductal carcinoma with DCIS; ER positive, ND positive, and HER2 positive. She has met with Dr. Garcia and will begin neoadjuvant chemotherapy on 5/27/2021.       She had her " "first menstrual period at age 12, she does not have children, and is premenopausal. Veronica has her ovaries, fallopian tubes and uterus in place, and she has had no ovarian cancer screening to date.  She reports past history of oral contraceptive use (most recently has had mirena IUD) and that she has never been on hormone replacement therapy.      Her most recent OB-GYN exam and Pap smear in December 2019 were normal. She has not yet had a colonoscopy given her age.  She does not regularly do any other cancer screening at this time.  Veronica reported no tobacco use, and about 3-4 drinks per week alcohol use on average.    Family History: (Please see scanned pedigree for detailed family history information)  Siblings    One full-brother, age 33, healthy with no known cancer history.     One maternal half-sister, age 38, with a history of ovarian cysts and no known cancer history.   Maternal    Mother, age 59, healthy with no known cancer history; s/p ROMAIN-BSO for endometriosis in her late 30's/early 40's.    Maternal grandfather passed in his mid 80's with a history of an unspecified type of cancer diagnosed in his 80's.     Maternal grandmother, in her late 80's, with a history of a lung cancer. She does have smoking history.     No cancer history reported in a maternal aunt and uncle or any of her maternal first-cousins.   Paternal    Father passed at age 48 from heart issues with no known cancer history.     Paternal grandfather passed at age 59 from an unspecified type of cancer diagnosed in his 50's. Ry reports that this cancer \"attacked the nerves\".     No cancer history reported in one paternal aunt and uncle, her paternal grandmother, or any of her paternal first-cousins.       Her maternal ethnicity is [place]. Her paternal ethnicity is [place].  There is no known Ashkenazi Roman Catholic ancestry on either side of her family. There is no reported consanguinity.    Discussion:    Veronica's personal history of early-onset " breast cancer is suggestive of a hereditary cancer syndrome.    We reviewed the features of sporadic, familial, and hereditary cancers. In looking at Veronica's family history, it is possible that a cancer susceptibility gene is present due to her breast cancer diagnosis at age 30.    We discussed the natural history and genetics of hereditary cancers. A detailed handout regarding the information we discussed will be sent to Veronica via [employer]. Topics included: inheritance pattern, cancer risks, cancer screening recommendations, and also risks, benefits and limitations of testing.    We discussed the BRCA1 and BRCA2 genes, which are associated with a condition known as Hereditary Breast and Ovarian Cancer syndrome (HBOC). Women with a mutation in either of these genes are at increased risk for breast and ovarian cancer. There is also an increased risk for a second primary breast cancer for women. Men with a mutation in either BRCA1 or BRCA2 are at increased risk for male breast and prostate cancer. Both women and men may also be at increased risk for pancreatic cancer and melanoma.     Based on her personal and family history, Veronica meets current National Comprehensive Cancer Network (NCCN) criteria for genetic testing of high-penetrance breast and/or ovarian cancer susceptibility genes.      We discussed that there are additional genes that could cause increased risk for breast cancer. As many of these genes present with overlapping features in a family and accurate cancer risk cannot always be established based upon the pedigree analysis alone, it would be reasonable for Veronica to consider panel genetic testing to analyze multiple genes at once.    We discussed genetic testing options for Ry given her personal history of breast cancer including genes associated with an increased risk for breast cancer (Breast and Gynecologic Cancers Panel; 23 genes). After our discussion, Ry opted to proceed with genetic testing  via the Breast and Gynecologic Cancers Panel through [facility].   Genetic testing is available for 23 genes associated with hereditary gynecologic, breast, and related cancers: Breast and gynecologic cancers panel (JOHN, BARD1, BRCA1, BRCA2, BRIP1, CDH1, CHEK2, DICER1, EPCAM, MLH1, MSH2, MSH6, NBN, NF1, PALB2, PMS2, PTEN, RAD51C, RAD51D, RECQL, SMARCA4, STK11, TP53).  We discussed that some of the genes in the BRCANext-Expanded panel are associated with specific hereditary cancer syndromes and published management guidelines: Hereditary Breast and Ovarian Cancer syndrome (BRCA1, BRCA2), Valencia syndrome (MLH1, MSH2, MSH6, PMS2, EPCAM), Hereditary Diffuse Gastric Cancer (CDH1), Cowden syndrome (PTEN), Li Fraumeni syndrome (TP53), Peutz-Jeghers syndrome (STK11), and Neurofibromatosis type 1 (NF1).    Risk-reducing salpingo-oophorectomy can be considered in women with mutations in BRIP1, RAD51C, or RAD51D. Breast and/or other cancer risk management guidelines are available for JOHN, CHEK2, PALB2, NF1, and NBN.  The remaining genes (BARD1, DICER1, RECQL, and SMARCA4) are associated with increased cancer risk and may allow us to make medical recommendations when mutations are identified.      Consent was obtained over the phone with no witness required due to the current covid19 global pandemic.    Medical Management: For Veronica, we reviewed that the information from genetic testing may determine:    surgery to treat Veronica's active cancer diagnosis (i.e. lumpectomy versus bilateral mastectomy, partial versus total colectomy, etc.),    additional cancer screening for which Veronica may qualify (i.e. mammogram and breast MRI, more frequent colonoscopies, more frequent dermatologic exams, etc.),    options for risk reducing surgeries Veronica could consider (i.e. bilateral mastectomy, surgery to remove her ovaries and/or uterus, etc.),      and targeted chemotherapies for Veronica's active cancer, or if she were to develop certain  cancers in the future (i.e. immunotherapy for individuals with Valencia syndrome, PARP inhibitors, etc.).     These recommendations and possible targeted chemotherapies will be discussed in detail once genetic testing is completed.     Plan:  1) Today Veronica elected to proceed with Breast and Gynecologic Cancers panel genetic testing (23 genes) through [facility].  2) This information should be available in approximately 3 weeks.  3) Veronica will be contacted by our scheduling department to set up a result disclosure appointment over the phone.     Time spent over the phone: 54 minutes    Mary Michael MS, [facility] [place]  Licensed Genetic Counselor  [facility]  [phone]

## 2021-06-17 NOTE — PROGRESS NOTES
"Abbey presents to [facility] today for a surgical consult with Dr. Newman  regarding her newly diagnosed left breast cancer.  She is accompanied by her  for consult.    RN assessment and EMR update. Resp 16   Ht 5' 7\" (1.702 m)   Wt 181 lb (82.1 kg)   Breastfeeding No   BMI 28.35 kg/m    Patient has  Mirena IUD.  Talked to her about changing that to a non-hormonal IUD or using a non-hormonal type of birth control.  Patient given a Breast Cancer Packet, contents reviewed with emphasis on resources for young survivors.  She met with Dr. Newman  see dictation for details of visit. She will plan to await Her 2 results by FISH.  She will also have genetic testing.  Will make final decisions after.   Support provided, invited calls.  RN time 22 mins.  "

## 2021-06-17 NOTE — PROGRESS NOTES
"Oncology Rooming Note    05/19/21 11:05 AM    Veronica Nieves is a 30 y.o. female who presents for:    Chief Complaint   Patient presents with     HE Cancer     Consult     Breast Cancer       Initial Vitals: /73   Pulse 65   Temp 98.4  F (36.9  C) (Oral)   Ht 5' 6.25\" (1.683 m)   Wt 186 lb 14.4 oz (84.8 kg)   SpO2 98%   BMI 29.94 kg/m       Estimated body mass index is 29.94 kg/m  as calculated from the following:    Height as of this encounter: 5' 6.25\" (1.683 m).    Weight as of this encounter: 186 lb 14.4 oz (84.8 kg).     Body surface area is 1.99 meters squared.      Allergies reviewed: Yes  Medications reviewed: Yes    Refills needed: No  Pharmacy name entered into EPIC: @PrefferedPHARMACY@      Clinical concerns: none      Conchis Carias RN      "

## 2021-06-18 ENCOUNTER — OFFICE VISIT - HEALTHEAST (OUTPATIENT)
Dept: ONCOLOGY | Facility: HOSPITAL | Age: 31
End: 2021-06-18

## 2021-06-18 ENCOUNTER — INFUSION - HEALTHEAST (OUTPATIENT)
Dept: INFUSION THERAPY | Facility: HOSPITAL | Age: 31
End: 2021-06-18

## 2021-06-18 ENCOUNTER — AMBULATORY - HEALTHEAST (OUTPATIENT)
Dept: INFUSION THERAPY | Facility: HOSPITAL | Age: 31
End: 2021-06-18

## 2021-06-18 DIAGNOSIS — C50.412 MALIGNANT NEOPLASM OF UPPER-OUTER QUADRANT OF LEFT BREAST IN FEMALE, ESTROGEN RECEPTOR POSITIVE (H): ICD-10-CM

## 2021-06-18 DIAGNOSIS — Z17.0 MALIGNANT NEOPLASM OF UPPER-OUTER QUADRANT OF LEFT BREAST IN FEMALE, ESTROGEN RECEPTOR POSITIVE (H): ICD-10-CM

## 2021-06-18 LAB
ALBUMIN SERPL-MCNC: 4 G/DL (ref 3.5–5)
ALP SERPL-CCNC: 72 U/L (ref 45–120)
ALT SERPL W P-5'-P-CCNC: 21 U/L (ref 0–45)
ANION GAP SERPL CALCULATED.3IONS-SCNC: 9 MMOL/L (ref 5–18)
AST SERPL W P-5'-P-CCNC: 13 U/L (ref 0–40)
BASOPHILS # BLD AUTO: 0 THOU/UL (ref 0–0.2)
BASOPHILS NFR BLD AUTO: 0 % (ref 0–2)
BILIRUB SERPL-MCNC: 0.3 MG/DL (ref 0–1)
BUN SERPL-MCNC: 13 MG/DL (ref 8–22)
CALCIUM SERPL-MCNC: 9.2 MG/DL (ref 8.5–10.5)
CHLORIDE BLD-SCNC: 108 MMOL/L (ref 98–107)
CO2 SERPL-SCNC: 23 MMOL/L (ref 22–31)
CREAT SERPL-MCNC: 0.96 MG/DL (ref 0.6–1.1)
EOSINOPHIL # BLD AUTO: 0 THOU/UL (ref 0–0.4)
EOSINOPHIL NFR BLD AUTO: 0 % (ref 0–6)
ERYTHROCYTE [DISTWIDTH] IN BLOOD BY AUTOMATED COUNT: 12.4 % (ref 11–14.5)
GFR SERPL CREATININE-BSD FRML MDRD: >60 ML/MIN/1.73M2
GLUCOSE BLD-MCNC: 168 MG/DL (ref 70–125)
HCT VFR BLD AUTO: 32.9 % (ref 35–47)
HGB BLD-MCNC: 11 G/DL (ref 12–16)
IMM GRANULOCYTES # BLD: 0.3 THOU/UL
IMM GRANULOCYTES NFR BLD: 1 %
LYMPHOCYTES # BLD AUTO: 1.3 THOU/UL (ref 0.8–4.4)
LYMPHOCYTES NFR BLD AUTO: 6 % (ref 20–40)
MCH RBC QN AUTO: 29.3 PG (ref 27–34)
MCHC RBC AUTO-ENTMCNC: 33.4 G/DL (ref 32–36)
MCV RBC AUTO: 88 FL (ref 80–100)
MONOCYTES # BLD AUTO: 0.5 THOU/UL (ref 0–0.9)
MONOCYTES NFR BLD AUTO: 2 % (ref 2–10)
NEUTROPHILS # BLD AUTO: 21.1 THOU/UL (ref 2–7.7)
NEUTROPHILS NFR BLD AUTO: 91 % (ref 50–70)
PLATELET # BLD AUTO: 249 THOU/UL (ref 140–440)
PMV BLD AUTO: 9.6 FL (ref 8.5–12.5)
POTASSIUM BLD-SCNC: 3.7 MMOL/L (ref 3.5–5)
PROT SERPL-MCNC: 6.9 G/DL (ref 6–8)
RBC # BLD AUTO: 3.75 MILL/UL (ref 3.8–5.4)
SODIUM SERPL-SCNC: 140 MMOL/L (ref 136–145)
WBC: 23.2 THOU/UL (ref 4–11)

## 2021-06-18 RX ORDER — BISMUTH SUBSALICYLATE 262 MG/1
1 TABLET, CHEWABLE ORAL 4 TIMES DAILY PRN
Status: SHIPPED | COMMUNITY
Start: 2021-06-18 | End: 2022-09-22

## 2021-06-18 RX ORDER — ACETAMINOPHEN 325 MG/1
650 TABLET ORAL EVERY 6 HOURS PRN
Status: SHIPPED | COMMUNITY
Start: 2021-06-18

## 2021-06-18 NOTE — PATIENT INSTRUCTIONS - HE
Patient Instructions by Jefferson Garcia MD at 5/19/2021 10:45 AM     Author: Jefferson Garcia MD Service: -- Author Type: Physician    Filed: 5/19/2021 12:38 PM Encounter Date: 5/19/2021 Status: Signed    : Jefferson Garcia MD (Physician)       Patient Education     Taxotere Injection 20 mg/mL  Uses  For treating cancer.  Instructions  This is an IV medicine. It is given through a sterile tube directly into the vein by a healthcare provider.  This medicine is given gradually through the IV line.  This medicine should be given over 60 minutes.  This medicine should be given by a trained health care provider.  It is important that you keep taking each dose of this medicine on time even if you are feeling well.  If you miss a dose, contact your doctor for instructions.  Please tell your doctor and pharmacist about all the medicines you take. Include both prescription and over-the-counter medicines. Also tell them about any vitamins, herbal medicines, or anything else you take for your health.  Your doctor may prescribe other medications to reduce side effects. Follow instructions carefully.  It is very important that you continue using this medicine for the full number of days that it is prescribed. Please do not stop the medicine even if you start to feel better after the first few days.  It is very important that you keep all appointments for medical exams and tests while on this medicine.  Cautions  Tell your doctor and pharmacist if you ever had an allergic reaction to a medicine. Symptoms of an allergic reaction can include trouble breathing, skin rash, itching, swelling, or severe dizziness.  Some patients on this medicine have developed severe, life-threatening infections. Please speak with your doctor about the risks and benefits of using this medicine.  Some patients taking this medicine have experienced serious side effects. Please speak with your doctor to understand the risks and benefits  associated with this medicine.  Your ability to stay alert or to react quickly may be impaired by this medicine. Do not drive or operate machinery until you know how this medicine will affect you.  Please check with your doctor before drinking alcohol while on this medicine.  If possible, avoid using with marijuana or other medicines that can cause dizziness or drowsiness. These include allergy/cold products, muscle relaxers, sleep aids, and pain relievers.  This medicine may reduce your body's ability to fight infections. Try to avoid contact with people with colds, flu or other infections.  Contact your doctor if you develop any signs of a new infection such as fever, cough, sore throat, or chills.  Wash your hands often and avoid close contact with people with infections such as colds and flu.  Speak with your health care provider before receiving any vaccinations.  Tell the doctor or pharmacist if you are pregnant, planning to be pregnant, or breastfeeding.  Do not breastfeed while on this medicine. You may safely start breastfeeding 1 week after stopping treatment.  This medicine can hurt a new baby in the womb. If you become pregnant while on this medicine, tell your doctor immediately. Your doctor may switch you to a different medicine.  Women must use reliable forms of birth control while taking this medicine and for 6 months after stopping to prevent pregnancy.  Men with a female partner who is of childbearing age must use a condom and a second form of birth control during sexual activity while taking this medicine and for 3 months after stopping to prevent pregnancy.  Do not take Atglen's wort while on this medicine.  Ask your pharmacist if this medicine can interact with any of your other medicines. Be sure to tell them about all the medicines you take.  Please tell all your doctors and dentists that you are on this medicine before they provide care.  Do not start or stop any other medicines without  first speaking to your doctor or pharmacist.  Call your doctor right away if you notice any unusual bleeding or bruising.  Side Effects  The following is a list of some common side effects from this medicine. Please speak with your doctor about what you should do if you experience these or other side effects.    decreased appetite    constipation    diarrhea    dizziness    drowsiness or sedation    feeling of heat or flushing    hair loss    increased risk for an infection    reaction at the area of the injection (pain, redness, swelling)    nausea    darkening of skin, especially face and breast    stomach upset or abdominal pain    vomiting  Call your doctor or get medical help right away if you notice any of these more serious side effects:    bleeding that is severe or takes longer to stop    unusual bruising or discoloration on skin    increased risk of cancer    chest pain    severe, watery or bloody diarrhea    swelling of the legs, feet, and hands    pain in the eye    fever or chills    flu-like symptoms    fast or irregular heart beats    pain in the joints    signs of kidney damage (such as bloody or bubbly urine, or changes in urine color or amount)    mouth sores or irritation    muscle cramps    muscle pain    muscle weakness    shortness of breath    sore throat    unusual or unexplained tiredness or weakness    blood in urine    blurring or changes of vision    sudden or unexplained weight gain  A few people may have an allergic reactions to this medicine. Symptoms can include difficulty breathing, skin rash, itching, swelling, or severe dizziness. If you notice any of these symptoms, seek medical help quickly.  Extra  Please speak with your doctor, nurse, or pharmacist if you have any questions about this medicine.  [email or web address]  IMPORTANT NOTE: This document tells you briefly how to take your medicine, but it does not tell you all there is to know about it.Your  doctor or pharmacist may give you other documents about your medicine. Please talk to them if you have any questions.Always follow their advice. There is a more complete description of this medicine available in English.Scan this code on your smartphone or tablet or use the web address below. You can also ask your pharmacist for a printout. If you have any questions, please ask your pharmacist.     2021 [employer].      Patient Education     Carboplatin Solution for injection  What is this medicine?  CARBOPLATIN (MARIANNE pierce mahendra tin) is a chemotherapy drug. It targets fast dividing cells, like cancer cells, and causes these cells to die. This medicine is used to treat ovarian cancer and many other cancers.  This medicine may be used for other purposes; ask your health care provider or pharmacist if you have questions.  What should I tell my health care provider before I take this medicine?  They need to know if you have any of these conditions:    blood disorders    hearing problems    kidney disease    recent or ongoing radiation therapy    an unusual or allergic reaction to carboplatin, cisplatin, other chemotherapy, other medicines, foods, dyes, or preservatives    pregnant or trying to get pregnant    breast-feeding  How should I use this medicine?  This drug is usually given as an infusion into a vein. It is administered in a hospital or clinic by a specially trained health care professional.  Talk to your pediatrician regarding the use of this medicine in children. Special care may be needed.  Overdosage: If you think you have taken too much of this medicine contact a poison control center or emergency room at once.  NOTE: This medicine is only for you. Do not share this medicine with others.  What if I miss a dose?  It is important not to miss a dose. Call your doctor or health care professional if you are unable to keep an appointment.  What may interact with this medicine?    medicines for  seizures    medicines to increase blood counts like filgrastim, pegfilgrastim, sargramostim    some antibiotics like amikacin, gentamicin, neomycin, streptomycin, tobramycin    vaccines  Talk to your doctor or health care professional before taking any of these medicines:    acetaminophen    aspirin    ibuprofen    ketoprofen    naproxen  This list may not describe all possible interactions. Give your health care provider a list of all the medicines, herbs, non-prescription drugs, or dietary supplements you use. Also tell them if you smoke, drink alcohol, or use illegal drugs. Some items may interact with your medicine.  What should I watch for while using this medicine?  Your condition will be monitored carefully while you are receiving this medicine. You will need important blood work done while you are taking this medicine.  This drug may make you feel generally unwell. This is not uncommon, as chemotherapy can affect healthy cells as well as cancer cells. Report any side effects. Continue your course of treatment even though you feel ill unless your doctor tells you to stop.  In some cases, you may be given additional medicines to help with side effects. Follow all directions for their use.  Call your doctor or health care professional for advice if you get a fever, chills or sore throat, or other symptoms of a cold or flu. Do not treat yourself. This drug decreases your body's ability to fight infections. Try to avoid being around people who are sick.  This medicine may increase your risk to bruise or bleed. Call your doctor or health care professional if you notice any unusual bleeding.  Be careful brushing and flossing your teeth or using a toothpick because you may get an infection or bleed more easily. If you have any dental work done, tell your dentist you are receiving this medicine.  Avoid taking products that contain aspirin, acetaminophen, ibuprofen, naproxen, or ketoprofen unless instructed by your  doctor. These medicines may hide a fever.  Do not become pregnant while taking this medicine. Women should inform their doctor if they wish to become pregnant or think they might be pregnant. There is a potential for serious side effects to an unborn child. Talk to your health care professional or pharmacist for more information. Do not breast-feed an infant while taking this medicine.  What side effects may I notice from receiving this medicine?  Side effects that you should report to your doctor or health care professional as soon as possible:    allergic reactions like skin rash, itching or hives, swelling of the face, lips, or tongue    signs of infection - fever or chills, cough, sore throat, pain or difficulty passing urine    signs of decreased platelets or bleeding - bruising, pinpoint red spots on the skin, black, tarry stools, nosebleeds    signs of decreased red blood cells - unusually weak or tired, fainting spells, lightheadedness    breathing problems    changes in hearing    changes in vision    chest pain    high blood pressure    low blood counts - This drug may decrease the number of white blood cells, red blood cells and platelets. You may be at increased risk for infections and bleeding.    nausea and vomiting    pain, swelling, redness or irritation at the injection site    pain, tingling, numbness in the hands or feet    problems with balance, talking, walking    trouble passing urine or change in the amount of urine  Side effects that usually do not require medical attention (report to your doctor or health care professional if they continue or are bothersome):    hair loss    loss of appetite    metallic taste in the mouth or changes in taste  This list may not describe all possible side effects. Call your doctor for medical advice about side effects. You may report side effects to FDA at 7-881-FDA-6865.  Where should I keep my medicine?  This drug is given in a hospital or clinic and will  not be stored at home.  NOTE:This sheet is a summary. It may not cover all possible information. If you have questions about this medicine, talk to your doctor, pharmacist, or health care provider. Copyright  2015 Gold Standard      Patient Education     Pertuzumab Solution for injection  What is this medicine?  PERTUZUMAB (per TOOZ ue mab) is a monoclonal antibody that targets a protein called HER2. HER2 is found in some breast cancers. This medicine can stop cancer cell growth. This medicine is used with other cancer treatments.  This medicine may be used for other purposes; ask your health care provider or pharmacist if you have questions.  What should I tell my health care provider before I take this medicine?  They need to know if you have any of these conditions:    heart disease    heart failure    high blood pressure    history of irregular heart beat    recent or ongoing radiation therapy    an unusual or allergic reaction to pertuzumab, other medicines, foods, dyes, or preservatives    pregnant or trying to get pregnant    breast-feeding  How should I use this medicine?  This medicine is for infusion into a vein. It is given by a health care professional in a hospital or clinic setting.  Talk to your pediatrician regarding the use of this medicine in children. Special care may be needed.  Overdosage: If you think you've taken too much of this medicine contact a poison control center or emergency room at once.  NOTE: This medicine is only for you. Do not share this medicine with others.  What if I miss a dose?  It is important not to miss your dose. Call your doctor or health care professional if you are unable to keep an appointment.  What may interact with this medicine?  Interactions are not expected.  Give your health care provider a list of all the medicines, herbs, non-prescription drugs, or dietary supplements you use. Also tell them if you smoke, drink alcohol, or use illegal drugs. Some items may  interact with your medicine.  What should I watch for while using this medicine?  Your condition will be monitored carefully while you are receiving this medicine. Report any side effects. Continue your course of treatment even though you feel ill unless your doctor tells you to stop.  Do not become pregnant while taking this medicine. Women should inform their doctor if they wish to become pregnant or think they might be pregnant. There is a potential for serious side effects to an unborn child. Talk to your health care professional or pharmacist for more information. Do not breast-feed an infant while taking this medicine.  Call your doctor or health care professional for advice if you get a fever, chills or sore throat, or other symptoms of a cold or flu. Do not treat yourself. Try to avoid being around people who are sick.  You may experience fever, chills, and headache during the infusion. Report any side effects during the infusion to your health care professional.  What side effects may I notice from receiving this medicine?  Side effects that you should report to your doctor or health care professional as soon as possible:    breathing problems    chest pain or palpitations    dizziness    feeling faint or lightheaded    fever or chills    skin rash, itching or hives    sore throat    swelling of the face, lips, or tongue    swelling of the legs or ankles    unusually weak or tired  Side effects that usually do not require medical attention (Report these to your doctor or health care professional if they continue or are bothersome.):    diarrhea    hair loss    nausea, vomiting    tiredness  This list may not describe all possible side effects. Call your doctor for medical advice about side effects. You may report side effects to FDA at 4-088-FDA-7403.  Where should I keep my medicine?  This drug is given in a hospital or clinic and will not be stored at home.  NOTE: This sheet is a summary. It may not cover  all possible information. If you have questions about this medicine, talk to your doctor, pharmacist, or health care provider.  NOTE:This sheet is a summary. It may not cover all possible information. If you have questions about this medicine, talk to your doctor, pharmacist, or health care provider. Copyright  2015 Gold Standard          Patient Education     Trastuzumab Solution for injection  What is this medicine?  TRASTUZUMAB (tras TOO zoo mab) is a monoclonal antibody. It targets a protein called HER2. This protein is found in some stomach and breast cancers. This medicine can stop cancer cell growth. This medicine may be used with other cancer treatments.  This medicine may be used for other purposes; ask your health care provider or pharmacist if you have questions.  What should I tell my health care provider before I take this medicine?  They need to know if you have any of these conditions:    heart disease    heart failure    infection (especially a virus infection such as chickenpox, cold sores, or herpes)    lung or breathing disease, like asthma    recent or ongoing radiation therapy    an unusual or allergic reaction to trastuzumab, benzyl alcohol, or other medications, foods, dyes, or preservatives    pregnant or trying to get pregnant    breast-feeding  How should I use this medicine?  This drug is given as an infusion into a vein. It is administered in a hospital or clinic by a specially trained health care professional.  Talk to your pediatrician regarding the use of this medicine in children. This medicine is not approved for use in children.  Overdosage: If you think you have taken too much of this medicine contact a poison control center or emergency room at once.  NOTE: This medicine is only for you. Do not share this medicine with others.  What if I miss a dose?  It is important not to miss a dose. Call your doctor or health care professional if you are unable to keep an appointment.  What  may interact with this medicine?    cyclophosphamide    doxorubicin    warfarin  This list may not describe all possible interactions. Give your health care provider a list of all the medicines, herbs, non-prescription drugs, or dietary supplements you use. Also tell them if you smoke, drink alcohol, or use illegal drugs. Some items may interact with your medicine.  What should I watch for while using this medicine?  Visit your doctor for checks on your progress. Report any side effects. Continue your course of treatment even though you feel ill unless your doctor tells you to stop.  Call your doctor or health care professional for advice if you get a fever, chills or sore throat, or other symptoms of a cold or flu. Do not treat yourself. Try to avoid being around people who are sick.  You may experience fever, chills and shaking during your first infusion. These effects are usually mild and can be treated with other medicines. Report any side effects during the infusion to your health care professional. Fever and chills usually do not happen with later infusions.  What side effects may I notice from receiving this medicine?  Side effects that you should report to your doctor or other health care professional as soon as possible:    breathing difficulties    chest pain or palpitations    cough    dizziness or fainting    fever or chills, sore throat    skin rash, itching or hives    swelling of the legs or ankles    unusually weak or tired  Side effects that usually do not require medical attention (report to your doctor or other health care professional if they continue or are bothersome):    loss of appetite    headache    muscle aches    nausea  This list may not describe all possible side effects. Call your doctor for medical advice about side effects. You may report side effects to FDA at 4-875-FDA-1491.  Where should I keep my medicine?  This drug is given in a hospital or clinic and will not be stored at  home.  NOTE:This sheet is a summary. It may not cover all possible information. If you have questions about this medicine, talk to your doctor, pharmacist, or health care provider. Copyright  2015 Gold Standard

## 2021-06-18 NOTE — LETTER
Letter by Marietta Brandt FNP at      Author: Marietta Brandt FNP Service: -- Author Type: --    Filed:  Encounter Date: 3/9/2019 Status: (Other)       Veronica Spain  [address]  [place] [address]      03/11/19      Dear Abbey,      In reviewing your records, we have determined a medication check is needed before your next refill, please call the [facility] to schedule an appointment.      Medication check/review      We have made attempts to call you for an appointment, please verify your contact information is correct when calling back for an appointment, or if you have transferred your care to another clinic, please contact us so we can update our records.     Please call [phone] to schedule an appointment.    We believe that a strong preventative care program, including regular physicals and follow-up care is an important part of a healthy lifestyle and we are committed to helping you maintain your health.    Thank you for choosing us as your health care provider.    Sincerely,    [place] [id number] [facility], [address]  [place] [address] [phone]

## 2021-06-19 ENCOUNTER — HEALTH MAINTENANCE LETTER (OUTPATIENT)
Age: 31
End: 2021-06-19

## 2021-06-20 NOTE — LETTER
Letter by Marietta Brandt FNP at      Author: Marietta Brandt FNP Service: -- Author Type: --    Filed:  Encounter Date: 12/31/2019 Status: Signed         Veronica M Timon  [address] [place] [address]             December 31, 2019         Dear Ms. Nieves,    Below are the results from your recent visit:    Your pap smear is normal and negative for HPV. As a result you may repeat it in five years.     Please call with questions or contact us using [id number].    Sincerely,        Electronically signed by GUANAKO Velásquez

## 2021-06-21 NOTE — LETTER
Letter by Dede Levine RN at      Author: Dede Levine RN Service: -- Author Type: --    Filed:  Encounter Date: 5/18/2021 Status: (Other)       Dear Abbey:    Thank you for choosing [facility] for your care.  We are committed to providing you with the highest quality and compassionate healthcare services.  The following information pertains to your first appointment with our clinic.    Date/Time of appointment:  Wednesday, May 19, 2021--please arrive no later than 10:30am for your 11:00am consult with Dr. Garcia    Name of your Physician: Jefferson Garcia MD (Medical oncology, 2nd floor)    What to bring to your appointment:    Completed Patient History/Initial Nursing Assessment     Your current insurance card(s).    Parking:    Please refer to the map included to direct you.  The [facility] is located at the [address] end of [facility] in [place], [place].      After turning onto [address] from [facility], take a right turn at the first stop sign.  We have designated parking on the left, identified as parking for Cancer Center patients (Lot D).     The Code to Enter Lot D is: 0501. This code changes monthly and will always coincide with the current month followed by 01. For example August will be 0801.  The month will continue to change but the 01 will remain constant.  If lot D is full please use Parking Lot A, directly across the street.    Please enter the Cancer Center on the north end of the [facility].  You will see a sign on the building.        For Medical Oncology appointments, please take the elevator to the second floor to check in.   We hope these instructions are helpful to you.  If you have any questions or concerns, please call us at [phone].  It is our pleasure to assist you.    Warm Regards,  Dede Levine RN, BSN, OCN, CBCN  Cancer Care Navigator  [phone]

## 2021-06-23 ENCOUNTER — COMMUNICATION - HEALTHEAST (OUTPATIENT)
Dept: ONCOLOGY | Facility: HOSPITAL | Age: 31
End: 2021-06-23

## 2021-06-24 NOTE — TELEPHONE ENCOUNTER
Pt needs to Est care with PCP - please assist in scheduling appt    No more fills until seen      LVM for pt and mailed letter

## 2021-06-25 NOTE — PROGRESS NOTES
Ry came to chemo infusion following port lab draw and NP visit for cycle 1 day 1 treatment with TCHP.  She has been educated and consented.  Each medication given today was reviewed prior to administration.  Education folder also given and potential side effects discussed.  After Chemotherapy discharge instructions also reviewed. Ry received treatment as ordered and tolerated it well while in clinic today.  Port was flushed with ns, heparinized then de-accessed and site covered.  Ry left clinic ambulatory.

## 2021-06-25 NOTE — PROGRESS NOTES
Progress Notes by Anshul Carbajal at 2/24/2017  2:20 PM     Author: Anshul Carbajal Service: -- Author Type: Nurse Practitioner    Filed: 2/24/2017  3:33 PM Encounter Date: 2/24/2017 Status: Signed    : Anshul Carbajal [facility]    Date of Service: 2/24/2017  Primary Provider: No Primary Care Provider    Patient Care Team:  No Primary Care Provider as PCP - General     ______________________________________________________________________     Patient's Pharmacy:    [facility] [id number] [facility] [address] [place] [address]  [address] [place] [address]  Phone: [phone] Fax: [phone]     Patient's Insurance:    Payor: MEDICA / Plan: [employer] INSPIRATION HE / Product Type: PPO/POS/FFS /      ______________________________________________________________________     Veronica LIGIA Spain is a 26 y.o. female who comes in today for:    Chief Complaint   Patient presents with   ? Depression     Follow up. Has been doing better. Has had been irritated a little bit, but better.        Current Outpatient Prescriptions   Medication Sig   ? norgestimate-ethinyl estradiol (ORTHO TRI-CYCLEN LO, 28,) 0.18/0.215 mg/ 0.25 mg-25 mcg Tab tablet Take 1 tablet by mouth daily.   ? venlafaxine (EFFEXOR XR) 75 MG 24 hr capsule Take 1 capsule (75 mg total) by mouth daily.     ______________________________________________________________________     History of present illness:  Veronica Spain is a pleasant 26 year-old female that presents today in clinic for a one-month follow-up of a new diagnosis of mild depression.  She reports she's been doing well on the Effexor XR 37.5 mg each morning and feels this seems to keep her on a more even keel, but she has experienced a bit of increased irritability over the last 1-1/2 weeks.  She denies any suicidal thoughts/ideation or plan.  She denies any outward side effects of her medication.  She feels medication has been beneficial  "and feels an overall improvement to her symptoms.  Her boyfriend has also noted some improvement in her demeanor and attitude which she feels has improved.      On review of systems, the patient has no fever, chest pain, palpitations, or shortness of breath.  Positive for symptoms as noted in the HPI.    ______________________________________________________________________      Wt Readings from Last 3 Encounters:   02/24/17 151 lb (68.5 kg)   01/27/17 154 lb (69.9 kg)   02/10/16 150 lb 9.6 oz (68.3 kg)     BP Readings from Last 3 Encounters:   02/24/17 118/70   01/27/17 132/80   02/10/16 116/62      Visit Vitals   ? /70   ? Pulse 62   ? Ht 5' 6\" (1.676 m)   ? Wt 151 lb (68.5 kg)   ? BMI 24.37 kg/m2        Physical Exam:  General Appearance: Alert, cooperative, no distress, appears stated age  Eyes: PERRL, conjunctiva/corneas clear, EOM's intact  Nose: Nares normal, septum midline,mucosa normal, no drainage  Throat: Lips, mucosa, and tongue normal; teeth and gums normal  Lungs: Clear to auscultation bilaterally, respirations unlabored  Heart: Regular rate and rhythm, S1 and S2 normal, no murmur, rub, or gallop  Lymph nodes: Cervical, supraclavicular nodes normal  Neurologic: She is alert & oriented x 3.  Psychiatric: She has a normal mood and affect.     ______________________________________________________________________     Assessment:    1. Mild depression - improved, but still with irritability.      ______________________________________________________________________     PHQ-2 Total Score: 2 (2/9/2017  8:00 AM)  PHQ-9 Total Score: 6 (2/9/2017  8:00 AM)    Plan:  1. Check BMP  2. Increase to Effexor XR 75 mg by mouth once daily #30, RF x 2  3. Follow-up in 1 month    Anshul Carbajal, [facility]  Internal Medicine  [facility]    Return in about 1 month (around 3/24/2017), or if symptoms worsen or fail to improve.        "

## 2021-06-25 NOTE — PROGRESS NOTES
Progress Notes by Anshul Carbajal at 3/24/2017  2:20 PM     Author: Anshul Carbajal Service: -- Author Type: Nurse Practitioner    Filed: 4/19/2017  9:11 PM Encounter Date: 3/24/2017 Status: Signed    : Anshul Carbajal [facility]    Date of Service: 3/24/2017  Primary Provider: Anshul Carbajal CNP    Patient Care Team:  Anshul Carbajal CNP as PCP - General (Nurse Practitioner)     ______________________________________________________________________     Patient's Pharmacy:    [facility] [id number] [address] [address]  Phone: [phone] Fax: [phone]     Patient's Insurance:    Payor: MEDICA / Plan: [employer] INSPIRATION HE / Product Type: PPO/POS/FFS /      ______________________________________________________________________     Veronica LIGIA Spain is a 26 y.o. female who comes in today for:    Chief Complaint   Patient presents with   ? Follow-up     Medication check.      Current Outpatient Prescriptions   Medication Sig   ? norgestimate-ethinyl estradiol (ORTHO TRI-CYCLEN LO, 28,) 0.18/0.215 mg/ 0.25 mg-25 mcg Tab tablet Take 1 tablet by mouth daily.   ? venlafaxine (EFFEXOR XR) 75 MG 24 hr capsule Take 1 capsule (75 mg total) by mouth daily.     ______________________________________________________________________     History of present illness:  Depression  Veronica Spain is a pleasant 26 year-old female who presents in clinic today for follow-up of depression.  She complains of late afternoon/evening irritability after work at times. Onset was approximately 2 months ago. Symptoms have been stable since that time. Current symptoms are no longer present. Patient denies anhedonia, depressed mood, difficulty concentrating, fatigue, feelings of worthlessness/guilt, hopelessness, hypersomnia, impaired memory, insomnia, psychomotor agitation, psychomotor retardation, recurrent thoughts of death,  "suicidal attempt, suicidal thoughts with specific plan, suicidal thoughts without plan, weight gain and weight loss. Family history significant for anxiety and depression. Possible organic causes contributing are: none. Risk factors: positive family history in  father, mother and sister(s) and negative life event miscarriage. Previous treatment includes individual therapy. She complains of the following side effects from the treatment: none.    On review of systems, the patient denies fever, chills, night sweats, shortness of breath, chest pain or palpitations.  Positive for symptoms as noted in the HPI.  ______________________________________________________________________      Wt Readings from Last 3 Encounters:   03/24/17 153 lb (69.4 kg)   02/24/17 151 lb (68.5 kg)   01/27/17 154 lb (69.9 kg)     BP Readings from Last 3 Encounters:   03/24/17 104/62   02/24/17 118/70   01/27/17 132/80      /62  Pulse 60  Ht 5' 6\" (1.676 m)  Wt 153 lb (69.4 kg)  BMI 24.69 kg/m2     Physical Exam:  General Appearance: Alert, cooperative, no distress, appears stated age  Head: Normocephalic, without obvious abnormality, atraumatic  Eyes: PERRL, conjunctiva/corneas clear, EOM's intact, eyeglasses  Ears: Normal TM's and external ear canals, both ears  Nose: Nares normal, septum midline,mucosa normal, no drainage  Throat: Lips, mucosa, and tongue normal; teeth and gums normal  Neck: Supple, symmetrical, trachea midline, no adenopathy;  thyroid: not enlarged, symmetric, no tenderness/mass/nodules  Back: Symmetric, no curvature, ROM normal, no CVA tenderness  Lungs: Clear to auscultation bilaterally, respirations unlabored  Heart: Regular rate and rhythm, S1 and S2 normal, no murmur, rub, or gallop  Abdomen: Soft, non-tender, bowel sounds active all four quadrants  Musculoskeletal: Normal range of motion. No joint swelling or deformity.   Extremities: Extremities normal, atraumatic, no cyanosis or edema  Skin: Skin color, " texture, turgor normal, no rashes or lesions  Lymph nodes: Cervical and supraclavicular nodes normal  Neurologic: She is alert & oriented x 3.  Psychiatric: She has a normal mood and affect.     ______________________________________________________________________     Assessment:    1. Mild depression       ______________________________________________________________________     PHQ-2 Total Score: 2 (2/9/2017  8:00 AM)  PHQ-9 Total Score: 6 (2/9/2017  8:00 AM)    Plan:  Patient Instructions   1. Continue current dose Effexor XR  2. Follow up in 3 months    Anshul Carbajal [facility]  Internal Medicine  [facility]    Return in about 3 months (around 6/24/2017).

## 2021-06-25 NOTE — PROGRESS NOTES
[email or web address] [facility] Hematology and Oncology Progress Note    Patient: Veronica Nieves  MRN: [id number]  Date of Service: 05/27/2021        Reason for Visit    Chief Complaint   Patient presents with     HE Cancer       Assessment and Plan  Cancer Staging  Malignant neoplasm of upper-outer quadrant of left breast in female, estrogen receptor positive (H)  Staging form: Breast, AJCC 8th Edition  - Clinical stage from 5/19/2021: Stage IB (cT2, cN0, cM0, G2, ER+, CT+, HER2+) - Signed by Jefferson Garcia MD on 5/19/2021    1. Breast cancer, stage IB, Triple positive: she will start neoadjuvant chemo today with TCHP. She already signed a consent with Dr. Garcia.  I did review the expected side effects again.  We talked about myelosuppression and infection.  Patient states that she is not interested in having children but I did tell her that she will likely go into menopause when she is on chemotherapy but because of her young age it will likely not last so she will need to continue using some sort of birth control.  Patient does understand that she is in a low stage so she is curable but we do want to do the chemotherapy first to give her the best chance of that.  She will work with the surgeon once her chemotherapy is done.    2.  Leukocytosis: Unclear where that is coming from.  Could be stress related.  At this time no signs of infection.  We will continue to monitor her blood counts.    3.  Early onset cancer: Patient did have genetic testing done today.        ECOG Performance   ECOG Performance Status: 0    Distress Assessment  Distress Assessment Score: 4(new treatment): anxiety related to diagnosis and the unknown side effect of treatment. She declined any further assistance at this time and thinks it will get better with chemo. Continue to monitor and refer to psychologist if needed.       Pain  Currently in Pain: Yes  Location: port site        Problem List    1. Malignant neoplasm of upper-outer quadrant of left  breast in female, estrogen receptor positive (H)  CC OFFICE VISIT LONG    lidocaine-prilocaine (EMLA) cream    Infusion Appointment    CC OFFICE VISIT LONG    DISCONTINUED: sodium chloride 0.9% 250 mL infusion    DISCONTINUED: palonosetron injection 0.25 mg (ALOXI)    DISCONTINUED: fosaprepitant 150 mg, dexAMETHasone 12 mg in sodium chloride 0.9% 150 mL    DISCONTINUED: CARBOplatin 900 mg in dextrose 5% 250 mL (PARAPLATIN)    DISCONTINUED: sodium chloride flush 20 mL (NS)    DISCONTINUED: heparin lockflush (PF) porcine 300-600 Units    DISCONTINUED: diphenhydrAMINE injection 50 mg (BENADRYL)    DISCONTINUED: famotidine 20 mg/2 mL injection 20 mg (PEPCID)    DISCONTINUED: hydrocortisone sod succ (PF) injection 100 mg    DISCONTINUED: acetaminophen tablet 1,000 mg (TYLENOL)    DISCONTINUED: sodium chloride 0.9% 500 mL        CC: Marietta Brandt FNP    ______________________________________________________________________________    History of Present Illness    DIAGNOSIS: left-sided invasive ductal carcinoma. Tumor is ER/NV positive and HER-2/elías positive. Grade 2. Multifocal. Largest of 2.9cm. 3 tumors.  Mammogram does not show any lymphadenopathy    TREATMENT: here today to start neoadjuvant chemo with TCHP.     INTERIM HISTORY: Patient is here today to start her neoadjuvant chemotherapy.  She is a little nervous to get started but feels ready and wants to get going.  She says that her breast seems to be a little more painful since she has had the biopsy and things like that.  No changes to her skin.  She denies any new bone or back pain.      Review of Systems    ROS: As above in the history, otherwise the remainder of the ROS is negative and not contributory to current reason for visit.     Past History  Past Medical History:   Diagnosis Date     Abnormal Pap smear of cervix     age 13, subsequent pap smears normal     Anxiety      Breast cancer (H) 05/04/2021     Depression          Past Surgical History:    Procedure Laterality Date     no surgical history       US BREAST CORE BIOPSY LEFT Left 5/4/2021     WISDOM TOOTH EXTRACTION  2008       PHYSICAL EXAM  /71   Pulse 66   Temp 98.5  F (36.9  C) (Oral)   Wt 186 lb (84.4 kg)   SpO2 96%   BMI 29.80 kg/m      GENERAL: no acute distress. Cooperative in conversation. Here alone. Mask on  RESP: Regular respiratory rate. No expiratory wheezes   MUSCULOSKELETAL: no bilateral leg swelling  NEURO: non focal. Alert and oriented x3.   PSYCH: within normal limits. No depression or anxiety.  SKIN: exposed skin is dry intact.         Lab Results    Recent Results (from the past 168 hour(s))   Echo Complete   Result Value Ref Range    LV volume diastolic 111 (!) 46.0 - 106.0 cm3    LV volume systolic 49 (!) 14.0 - 42.0 cm3    HR 62 bpm    IVSd 1.1 (!) 0.6 - 0.9 cm    LVIDd 4.4 3.8 - 5.2 cm    LVIDs 3.1 2.2 - 3.5 cm    LVOT diam 2.1 cm    LVOT mean gradient 3 mmHg    LVOT peak VTI 22.2 cm    LVOT mean sona 71.2 cm/s    LV PWd 1.2 (!) 0.6 - 0.9 cm    MV E' lat sona 16.9 cm/s    MV E' med sona 7.51 cm/s    AV mean sona 84.5 cm/s    AV mean gradient 3 mmHg    AV VTI 24.1 cm    AV peak sona 137 cm/s    AO root 3 cm    AO ascending 2.3 cm    LA size 2.8 cm    LA/AO root ratio 0.933 no units    MV decel time 301 ms    MV peak A sona 37 cm/s    MV peak E sona 63.9 cm/s    TAPSE 2.1 cm    LA area 2 16.9 cm2    LA area 1 10.9 cm2    LA length 4.42 cm    BSA 1.98 m2    Hieght 66.25 in    Weight 2,976 lbs    /74 mmHg    IVS/PW ratio 0.9     LV FS 29.5 28.0 - 44.0 %    IVC proximal 1.0 cm    Echo LVEF calculated 56 55 - 75 %    LA volume 35.4 mL    LV mass 180.0 g    AV area 3.2 cm2    MV E/A Ratio 1.7     LVOT area 3.46 cm2    LVOT SV 76.9 cm3    AV peak gradient 7.5 mmHg    LV systolic volume index 24.7 8.0 - 24.0 cm3/m2    LV diastolic volume index 56.1 29.0 - 61.0 cm3/m2    LA volume index 17.9 mL/m2    LV mass index 90.9 g/m2    LV SVi 38.8 ml/m2    MV med E/e' ratio 8.5     MV  lat E/e' ratio 3.8     LV CO 4.8 l/min    LV Ci 2.4 l/min/m2    Height 66.3 in    Weight 186 lbs    MV Avg E/e' Ratio 5.2 cm/s    AV DIM IND VTI 0.9    COVID-19 Virus PCR MRF    Specimen: Respiratory   Result Value Ref Range    COVID-19 VIRUS SPECIMEN SOURCE Nasopharyngeal     2019-nCOV       Test received-See reflex to IDDL test SARS CoV2 (COVID-19) Virus RT-PCR   SARS-CoV-2 (COVID-19)-PCR    Specimen: Respiratory   Result Value Ref Range    SARS-CoV-2 Virus Specimen Source Nasopharyngeal     SARS-CoV-2 PCR Result NEGATIVE     SARS-COV-2 PCR COMMENT       Testing was performed using the Xpert Xpress SARS-CoV-2 Assay on the [employer]   POCT Pregnancy (Beta-hCG, Qual), Urine (Point of Care) on DOS   Result Value Ref Range    POC Preg, Urine Negative Negative    POCT Kit Lot Number [id number]     POCT Kit Expiration Date 202,301     Pos Control Valid Control Valid Control    Neg Control Valid Control Valid Control    Dipstick Lot Number      Dipstick Expiration Date      POC Specific [place], Urine     Comprehensive Metabolic Panel   Result Value Ref Range    Sodium 140 136 - 145 mmol/L    Potassium 3.5 3.5 - 5.0 mmol/L    Chloride 107 98 - 107 mmol/L    CO2 24 22 - 31 mmol/L    Anion Gap, Calculation 9 5 - 18 mmol/L    Glucose 114 70 - 125 mg/dL    BUN 11 8 - 22 mg/dL    Creatinine 0.83 0.60 - 1.10 mg/dL    GFR MDRD Af Amer >60 >60 mL/min/1.73m2    GFR MDRD Non Af Amer >60 >60 mL/min/1.73m2    Bilirubin, Total 0.3 0.0 - 1.0 mg/dL    Calcium 8.6 8.5 - 10.5 mg/dL    Protein, Total 7.0 6.0 - 8.0 g/dL    Albumin 4.0 3.5 - 5.0 g/dL    Alkaline Phosphatase 57 45 - 120 U/L    AST 15 0 - 40 U/L    ALT 12 0 - 45 U/L   HM1 (CBC with Diff)   Result Value Ref Range    WBC 16.2 (H) 4.0 - 11.0 thou/uL    RBC 4.37 3.80 - 5.40 mill/uL    Hemoglobin 12.9 12.0 - 16.0 g/dL    Hematocrit 38.6 35.0 - 47.0 %    MCV 88 80 - 100 fL    MCH 29.5 27.0 - 34.0 pg    MCHC 33.4 32.0 - 36.0 g/dL    RDW 12.6 11.0 - 14.5 %    Platelets 325 140 - 440  thou/uL    MPV 9.7 8.5 - 12.5 fL    Neutrophils % 84 (H) 50 - 70 %    Lymphocytes % 10 (L) 20 - 40 %    Monocytes % 5 2 - 10 %    Eosinophils % 0 0 - 6 %    Basophils % 0 0 - 2 %    Immature Granulocyte % 1 (H) <=0 %    Neutrophils Absolute 13.6 (H) 2.0 - 7.7 thou/uL    Lymphocytes Absolute 1.7 0.8 - 4.4 thou/uL    Monocytes Absolute 0.8 0.0 - 0.9 thou/uL    Eosinophils Absolute 0.0 0.0 - 0.4 thou/uL    Basophils Absolute 0.0 0.0 - 0.2 thou/uL    Immature Granulocyte Absolute 0.1 (H) <=0.0 thou/uL       Imaging    Echo Complete    Result Date: 5/21/2021  1. Normal left ventricular size and systolic performance with a visually estimated ejection fraction of 60-65%. 2. No significant valvular heart disease is identified on this study. 3. Normal right ventricular size and systolic performance.    Mammo Diagnostic Bilateral    Result Date: 5/4/2021  EXAM: MAMMO DIAGNOSTIC 3D BILATERAL, US BREAST LEFT LIMITED 1-3 QUADRANTS LOCATION: [facility]  [facility] DATE/TIME: 5/4/2021 1:02 PM INDICATION: 30-year-old female presents with a palpable lump within the left breast. The patient initially noted pain within this area in November/December 2020 which later developed into a palpable lump and now notes associated dimpling of the skin. COMPARISON: None. Baseline examination. MAMMOGRAPHIC FINDINGS: Bilateral full-field and left breast spot magnification digital diagnostic mammograms performed. The breasts are extremely dense, which lowers the sensitivity of mammography. Images evaluated with the assistance of CAD. Breast tomosynthesis was used in interpretation. A BB marker was placed on the skin overlying the area of palpable concern within the left breast. There are multiple underlying irregular masses with spiculated margins in a segmental distribution within the left breast at the 3:00 position, posterior depth. These masses measure approximately 3.3 cm in AP dimension on mammography. There are associated  fine, pleomorphic calcifications which extend to the anterior and posterior margins of the masses. Recommend targeted left breast ultrasound for further  evaluation. The remaining left breast parenchyma is unremarkable. There are no suspicious masses, calcifications or architectural distortion within the right breast. ULTRASOUND FINDINGS: Patient directed physical examination of the area of palpable concern within the left breast demonstrates an approximately 2.5 cm irregular, firm mass. Targeted left breast ultrasound at the area of palpable concern at the 3:00 position, extending from 2-6 cm from the nipple, demonstrates at least three irregular, hypoechoic masses with spiculated and angular margins. The centrally located and largest of the three masses measures approximately 2.4 x 1.6 x 2.9 cm. Associated hyperechoic foci within this mass are consistent with calcifications. The remaining two masses are smaller in size. The maximal extent of these three masses together measures approximately 4.8 cm. Sonographic evaluation of the left axilla demonstrates no definitive lymphadenopathy.     1.  Three irregular masses within the left breast at the 3:00 position correlate with the palpable area of concern. These findings are highly suspicious for malignancy. Recommend a single site, representative ultrasound-guided biopsy of the largest, centrally located mass. Surgical consultation is also recommended. 2.  No definitive left axillary lymphadenopathy. ACR BI-RADS Category 5: Highly Suggestive of Malignancy. I personally scanned and discussed the findings and recommendations with the patient at the conclusion of the examination. For the convenience of the patient, a same day ultrasound-guided left breast biopsy is offered and will subsequently be performed.    Mammo Post Clip Placement Left    Addendum Date: 5/7/2021    Pathology shows invasive ductal carcinoma. This is consistent with imaging findings. A verbal report  was given to the patient. Surgical consultation is recommended.    Result Date: 5/4/2021  US Breast Core Biopsy Left Mammo Post Clip Placement Left INDICATION: Left breast mass. PROCEDURE: Informed consent was obtained from the patient. Ultrasound was used to localize the mass at the 3 o'clock position of the left breast, 4 cm from the nipple.  The skin was marked, then prepped and draped in a sterile fashion. 1% lidocaine was used for local anesthesia. Under direct sonographic guidance, a 12-gauge coaxial needle was used to obtain 3 core biopsies.  A biopsy marking clip was then placed.  Digital mammograms performed in a separate room, using separate equipment, to document clip placement. The mammogram showed the clip to be in good position. The patient tolerated this well; there are no immediate complications. IMPRESSION: 1. Ultrasound-guided biopsy of mass in the left breast at the 3 o'clock position. Pathology pending. 2. Post procedure mammogram for marker placement.     Us Breast Core Biopsy Left    Addendum Date: 5/7/2021    Pathology shows invasive ductal carcinoma. This is consistent with imaging findings. A verbal report was given to the patient. Surgical consultation is recommended.    Result Date: 5/4/2021  US Breast Core Biopsy Left Mammo Post Clip Placement Left INDICATION: Left breast mass. PROCEDURE: Informed consent was obtained from the patient. Ultrasound was used to localize the mass at the 3 o'clock position of the left breast, 4 cm from the nipple.  The skin was marked, then prepped and draped in a sterile fashion. 1% lidocaine was used for local anesthesia. Under direct sonographic guidance, a 12-gauge coaxial needle was used to obtain 3 core biopsies.  A biopsy marking clip was then placed.  Digital mammograms performed in a separate room, using separate equipment, to document clip placement. The mammogram showed the clip to be in good position. The patient tolerated this well; there are no  immediate complications. IMPRESSION: 1. Ultrasound-guided biopsy of mass in the left breast at the 3 o'clock position. Pathology pending. 2. Post procedure mammogram for marker placement.     Us Breast Left Limited 1-3 Quadrants    Result Date: 5/4/2021  EXAM: MAMMO DIAGNOSTIC 3D BILATERAL, US BREAST LEFT LIMITED 1-3 QUADRANTS LOCATION: [facility] DATE/TIME: 5/4/2021 1:02 PM INDICATION: 30-year-old female presents with a palpable lump within the left breast. The patient initially noted pain within this area in November/December 2020 which later developed into a palpable lump and now notes associated dimpling of the skin. COMPARISON: None. Baseline examination. MAMMOGRAPHIC FINDINGS: Bilateral full-field and left breast spot magnification digital diagnostic mammograms performed. The breasts are extremely dense, which lowers the sensitivity of mammography. Images evaluated with the assistance of CAD. Breast tomosynthesis was used in interpretation. A BB marker was placed on the skin overlying the area of palpable concern within the left breast. There are multiple underlying irregular masses with spiculated margins in a segmental distribution within the left breast at the 3:00 position, posterior depth. These masses measure approximately 3.3 cm in AP dimension on mammography. There are associated fine, pleomorphic calcifications which extend to the anterior and posterior margins of the masses. Recommend targeted left breast ultrasound for further  evaluation. The remaining left breast parenchyma is unremarkable. There are no suspicious masses, calcifications or architectural distortion within the right breast. ULTRASOUND FINDINGS: Patient directed physical examination of the area of palpable concern within the left breast demonstrates an approximately 2.5 cm irregular, firm mass. Targeted left breast ultrasound at the area of palpable concern at the 3:00 position, extending from 2-6 cm  from the nipple, demonstrates at least three irregular, hypoechoic masses with spiculated and angular margins. The centrally located and largest of the three masses measures approximately 2.4 x 1.6 x 2.9 cm. Associated hyperechoic foci within this mass are consistent with calcifications. The remaining two masses are smaller in size. The maximal extent of these three masses together measures approximately 4.8 cm. Sonographic evaluation of the left axilla demonstrates no definitive lymphadenopathy.     1.  Three irregular masses within the left breast at the 3:00 position correlate with the palpable area of concern. These findings are highly suspicious for malignancy. Recommend a single site, representative ultrasound-guided biopsy of the largest, centrally located mass. Surgical consultation is also recommended. 2.  No definitive left axillary lymphadenopathy. ACR BI-RADS Category 5: Highly Suggestive of Malignancy. I personally scanned and discussed the findings and recommendations with the patient at the conclusion of the examination. For the convenience of the patient, a same day ultrasound-guided left breast biopsy is offered and will subsequently be performed.    Total time spent with patient and  in face to face time, chart review and documentation was 30 minutes.      Signed by: Marleen Shukla, JAROCHO

## 2021-06-25 NOTE — PROGRESS NOTES
"Oncology Rooming Note    05/27/21 8:42 AM    Veronica Nieves is a 31 y.o. female who presents for:    Chief Complaint   Patient presents with     HE Cancer       Initial Vitals: /71   Pulse 66   Temp 98.5  F (36.9  C) (Oral)   Wt 186 lb (84.4 kg)   SpO2 96%   BMI 29.80 kg/m       Estimated body mass index is 29.8 kg/m  as calculated from the following:    Height as of 5/26/21: 5' 6.25\" (1.683 m).    Weight as of this encounter: 186 lb (84.4 kg).     Body surface area is 1.99 meters squared.      Allergies reviewed: Yes  Medications reviewed: Yes    Refills needed: No  Pharmacy name entered into EPIC:       Clinical concerns: no concerns      Ellen Ace RN      "

## 2021-06-25 NOTE — PROGRESS NOTES
Progress Notes by Anshul Carbajal at 1/27/2017  2:20 PM     Author: Anshul Carbajal Service: -- Author Type: Nurse Practitioner    Filed: 2/8/2017 11:23 PM Encounter Date: 1/27/2017 Status: Signed    : Anshul Carbajal Internal Medicine/[facility]    Date of Service: 1/27/2017  Primary Provider: No Primary Care Provider    Patient Care Team:  No Primary Care Provider as PCP - General     ______________________________________________________________________     Patient's Pharmacy:    [facility] [id number] [address]  [address] [facility] [address]  Phone: [phone] Fax: [phone]     Patient's Insurance:    Payor: MEDICA / Plan: MEDICA INSPIRATION HE / Product Type: PPO/POS/FFS /      ______________________________________________________________________     Veronica LIGIA Spain is 26 y.o. female who comes in today for:    Chief Complaint   Patient presents with   ? Depression     Has been going through some things the past year. Having mood swings where she is fine, and then one little thing can change that. Is wanting to get on antidepressesnts .       Patient Active Problem List   Diagnosis   ? Mild depression     Current Outpatient Prescriptions   Medication Sig   ? norgestimate-ethinyl estradiol (ORTHO TRI-CYCLEN LO, 28,) 0.18/0.215 mg/ 0.25 mg-25 mcg Tab tablet Take 1 tablet by mouth daily.   ? venlafaxine (EFFEXOR XR) 37.5 MG 24 hr capsule Take 1 capsule (37.5 mg total) by mouth daily.     Social History     Social History   ? Marital status: Single     Spouse name: N/A   ? Number of children: N/A   ? Years of education: N/A     Occupational History   ?  Assistant  [employer]     Social History Main Topics   ? Smoking status: Never Smoker   ? Smokeless tobacco: Not on file   ? Alcohol use 0.5 oz/week     1 drink(s) per week   ? Drug use: No   ? Sexual activity: Yes     Partners: Male     Birth control/  protection: OCP     Other Topics Concern   ? Not on file     Social History Narrative     ______________________________________________________________________     History of present illness:  Veronica Spain is a pleasant 26 year-old white female with a past medical history significant for mild depression who presents in clinic today to discuss possibly starting on an antidepressant medication for symptoms of general anxiety and depression.  She states that she has noticed more irritability with her work, driving during her commute, and with her significant other all of which have been affecting her mood.  She describes herself as a somewhat anxious, happy person with an outgoing personality and an optimistic outlook on life, generally speaking.  She states that in college she had feelings of insecurity and negative self-worth for which she sought professional counseling at the school. She felt that the counseling was beneficial and improved those feelings of herself.  Since then, her life has been fairly well balanced until recently, when she and her boyfriend of 3 years suffered a miscarriage approximately 3 months into her pregnancy.  This has been quite difficult for her and her boyfriend and has also caused some distance between them.  She has also had feelings of negativity and a more pessimistic outlook on things lately.  In addition, another stressor in her life is that she has not talked to her mother for some time now and this has been bothering her a bit recently, possibly as a result of her miscarriage.  She reveals that her mother, father, and sister all have major depression disorder and her brother has general anxiety disorder.        On review of systems, the patient denies any chest pain, shortness of breath, suicidal ideation, or plans to hurt herself or others. Positive for symptoms as noted in the HPI.    ______________________________________________________________________      Wt Readings from  "Last 3 Encounters:   01/27/17 154 lb (69.9 kg)   02/10/16 150 lb 9.6 oz (68.3 kg)     BP Readings from Last 3 Encounters:   01/27/17 132/80   02/10/16 116/62     Visit Vitals   ? /80   ? Pulse 74   ? Ht 5' 6\" (1.676 m)   ? Wt 154 lb (69.9 kg)   ? BMI 24.86 kg/m2        Physical Exam:  General Appearance: Alert, pleasant, cooperative, no distress, appears stated age  Lungs: Clear to auscultation bilaterally, respirations unlabored  Heart: Regular rate and rhythm, S1 and S2 normal, no murmur, rub, or gallop,  Neurologic: She is alert and oriented x 3.  Psychiatric: She has a normal mood and affect, anxious at times, some slight tearfulness   ______________________________________________________________________    Assessment:    1. Depression       ______________________________________________________________________      No Data Recorded  PHQ-9 Total Score: 8 (2/10/2016  7:00 AM)     Plan:  Patient Instructions   1. Try Effexor XR 37.5 mg Take 1 capsule orally once daily.  2. This treats both anxiety and depression symptoms.  3. Follow-up in 1 month to assess how you tolerate this medication.      Return in about 1 month (around 2/27/2017), or if symptoms worsen or fail to improve.    Anshul Carbajal, [facility]  Internal Medicine  [facility]     Return in about 1 month (around 2/27/2017), or if symptoms worsen or fail to improve.        "

## 2021-06-26 NOTE — ANESTHESIA POSTPROCEDURE EVALUATION
Patient: Veronica Nieves  Procedure(s):  Port Placement  Anesthesia type: MAC    Patient location: Phase II Recovery  Last vitals:   Vitals Value Taken Time   /72 05/26/21 0800   Temp 36.6  C (97.9  F) 05/26/21 0735   Pulse 74 05/26/21 0800   Resp 16 05/26/21 0800   SpO2 97 % 05/26/21 0800     Post vital signs: stable  Level of consciousness: awake, alert and oriented  Post-anesthesia pain: pain controlled  Post-anesthesia nausea and vomiting: no  Pulmonary: unassisted, return to baseline  Cardiovascular: stable and blood pressure at baseline  Hydration: adequate  Anesthetic events: no    QCDR Measures:  ASA# 11 - Brie-op Cardiac Arrest: ASA11B - Patient did NOT experience unanticipated cardiac arrest  ASA# 12 - Brie-op Mortality Rate: ASA12B - Patient did NOT die  ASA# 13 - PACU Re-Intubation Rate: NA - No ETT / LMA used for case  ASA# 10 - Composite Anes Safety: ASA10A - No serious adverse event    Additional Notes:

## 2021-06-26 NOTE — PROGRESS NOTES
"Oncology Rooming Note    06/04/21 12:55 PM    Veronica Nieves is a 31 y.o. female who presents for:    Chief Complaint   Patient presents with     HE Cancer     Breast Cancer       Initial Vitals: /82   Pulse 94   Temp 97.9  F (36.6  C) (Oral)   Wt 179 lb 14.4 oz (81.6 kg)   SpO2 98%   BMI 28.82 kg/m       Estimated body mass index is 28.82 kg/m  as calculated from the following:    Height as of 5/26/21: 5' 6.25\" (1.683 m).    Weight as of this encounter: 179 lb 14.4 oz (81.6 kg).     Body surface area is 1.95 meters squared.      Allergies reviewed: Yes  Medications reviewed: Yes    Refills needed: No  Pharmacy name entered into EPIC: @PrefferedPHARMACY@      Clinical concerns: concerns - Fatigue, intermittent nausea. Thought only supposed to take nausea meds if vomiting. Took today and nausea improved.  Tongue feels like, like burnt on pizza. Brushed it and bled.       Maria Ines Burnett RN      "

## 2021-06-26 NOTE — PROGRESS NOTES
[facility] Hematology and Oncology Progress Note    Patient: Veronica Nieves  MRN: [id number]  Date of Service: 06/04/2021        Reason for Visit    Chief Complaint   Patient presents with     HE Cancer     Breast Cancer       Assessment and Plan  Cancer Staging  Malignant neoplasm of upper-outer quadrant of left breast in female, estrogen receptor positive (H)  Staging form: Breast, AJCC 8th Edition  - Clinical stage from 5/19/2021: Stage IB (cT2, cN0, cM0, G2, ER+, SD+, HER2+) - Signed by Jefferson Garcia MD on 5/19/2021    1. Breast cancer, stage IB, Triple positive: she has started neoadjuvant chemo with TCHP. Doing pretty well. She will return in 2 weeks for cycle 2. Will monitor clinically if she is responding. PET scan done for radiation planning just shows breast tumor. No other disease.     2.  Neutropenia: chemo induced. Educated pt and family about neutropenic precautions. take temp TID for the next couple of days and call with fever 100.4 or higher or any symptoms of infections. increase handwashing. Stay away from sick people.     3.  Early onset cancer: Patient did have genetic testing done. Still pending.     4. Mild mucositis, nausea: use antiemetics. Alcohol free dental products. Use salt/baking soda rinses. Encourage her to call us if she wants magic mouthwash.         ECOG Performance   ECOG Performance Status: 0    Distress Assessment  Distress Assessment Score: 3      Pain  Currently in Pain: No/denies        Problem List    1. Malignant neoplasm of upper-outer quadrant of left breast in female, estrogen receptor positive (H)  CC OFFICE VISIT LONG        CC: Marietta Brandt FNP    ______________________________________________________________________________    History of Present Illness    DIAGNOSIS: left-sided invasive ductal carcinoma. Tumor is ER/SD positive and HER-2/elías positive. Grade 2. Multifocal. Largest of 2.9cm. 3 tumors.  Mammogram does not show any  lymphadenopathy    TREATMENT: started neoadjuvant chemo with TCHP on 5/27/21. Today is cycle 1, day 9.     INTERIM HISTORY: Patient is here today for midcycle check. She is doing pretty well. She is having mild nausea, no vomiting. Tongue feels like there are cuts/burning. Not preventing her from eating. Feels tired. Mild constipation.       Review of Systems    ROS: As above in the history, otherwise the remainder of the ROS is negative and not contributory to current reason for visit.     Past History  Past Medical History:   Diagnosis Date     Abnormal Pap smear of cervix     age 13, subsequent pap smears normal     Anxiety      Breast cancer (H) 05/04/2021     Depression          Past Surgical History:   Procedure Laterality Date     no surgical history       OK INSJ PRPH CTR VAD W/SUBQ PORT AGE 5 YR/> N/A 5/26/2021    Procedure: Port Placement;  Surgeon: Audrey Newman MD;  Location: [facility];  Service: General     US BREAST CORE BIOPSY LEFT Left 5/4/2021     WISDOM TOOTH EXTRACTION  2008       PHYSICAL EXAM  /82   Pulse 94   Temp 97.9  F (36.6  C) (Oral)   Wt 179 lb 14.4 oz (81.6 kg)   SpO2 98%   BMI 28.82 kg/m      GENERAL: no acute distress. Cooperative in conversation. Here with . Masks on  RESP: Regular respiratory rate. No expiratory wheezes   MUSCULOSKELETAL: no bilateral leg swelling  NEURO: non focal. Alert and oriented x3.   PSYCH: within normal limits. No depression or anxiety.  SKIN: exposed skin is dry intact.         Lab Results    Recent Results (from the past 168 hour(s))   POCT Glucose    Specimen: Capillary; Blood   Result Value Ref Range    Glucose 103 70 - 139 mg/dL   Comprehensive Metabolic Panel   Result Value Ref Range    Sodium 138 136 - 145 mmol/L    Potassium 3.7 3.5 - 5.0 mmol/L    Chloride 104 98 - 107 mmol/L    CO2 26 22 - 31 mmol/L    Anion Gap, Calculation 8 5 - 18 mmol/L    Glucose 116 70 - 125 mg/dL    BUN 18 8 - 22 mg/dL    Creatinine 1.02 0.60 -  1.10 mg/dL    GFR MDRD Af Amer >60 >60 mL/min/1.73m2    GFR MDRD Non Af Amer >60 >60 mL/min/1.73m2    Bilirubin, Total 0.4 0.0 - 1.0 mg/dL    Calcium 8.7 8.5 - 10.5 mg/dL    Protein, Total 7.2 6.0 - 8.0 g/dL    Albumin 4.1 3.5 - 5.0 g/dL    Alkaline Phosphatase 60 45 - 120 U/L    AST 21 0 - 40 U/L    ALT 33 0 - 45 U/L   HM1 (CBC with Diff)   Result Value Ref Range    WBC 2.3 (L) 4.0 - 11.0 thou/uL    RBC 4.42 3.80 - 5.40 mill/uL    Hemoglobin 12.8 12.0 - 16.0 g/dL    Hematocrit 38.2 35.0 - 47.0 %    MCV 86 80 - 100 fL    MCH 29.0 27.0 - 34.0 pg    MCHC 33.5 32.0 - 36.0 g/dL    RDW 11.9 11.0 - 14.5 %    Platelets 246 140 - 440 thou/uL    MPV 9.7 8.5 - 12.5 fL    Neutrophils % 42 (L) 50 - 70 %    Lymphocytes % 45 (H) 20 - 40 %    Monocytes % 11 (H) 2 - 10 %    Eosinophils % 1 0 - 6 %    Basophils % 0 0 - 2 %    Immature Granulocyte % 1 (H) <=0 %    Neutrophils Absolute 0.9 (L) 2.0 - 7.7 thou/uL    Lymphocytes Absolute 1.0 0.8 - 4.4 thou/uL    Monocytes Absolute 0.3 0.0 - 0.9 thou/uL    Eosinophils Absolute 0.0 0.0 - 0.4 thou/uL    Basophils Absolute 0.0 0.0 - 0.2 thou/uL    Immature Granulocyte Absolute 0.0 <=0.0 thou/uL       Imaging    Echo Complete    Result Date: 5/21/2021  1. Normal left ventricular size and systolic performance with a visually estimated ejection fraction of 60-65%. 2. No significant valvular heart disease is identified on this study. 3. Normal right ventricular size and systolic performance.    Nm Pet Ct Skull To Mid Thigh    Result Date: 6/2/2021  EXAM: NM PET CT SKULL TO MID THIGH LOCATION: [facility] DATE/TIME: 6/2/2021 12:11 PM INDICATION: New diagnosis of malignant neoplasm of upper-outer quadrant of left breast in female, estrogen receptor positive. Radiation treatment planning for breast cancer. Adjuvant chemotherapy. Initial treatment strategy. COMPARISON: None. TECHNIQUE: Serum glucose level 103 mg/dL. One hour post intravenous administration of 8.5 mCi F-18  FDG, PET imaging was performed from the skull base to the mid thighs utilizing attenuation correction with concurrent axial CT and PET/CT image fusion. Dose reduction techniques were used. FINDINGS: Small FDG avid mass lateral left breast, SUV max 6.7, corresponds to the patient's breast cancer. FDG activity throughout the remainder of the body is physiologic. Specifically, no enlarged or FDG avid left axillary lymph nodes. Right Port-A-Cath in good position. IUD within the uterus.     1.  FDG activity confined to the patient's left breast cancer. No additional sites of suspicious FDG activity.        Signed by: Marleen Shukla, CNP

## 2021-06-26 NOTE — TELEPHONE ENCOUNTER
LM for patient as a reminder per Dr Garcia that she be cautious when she goes out of town this weekend, to bring a thermometer and monitor for fever, report temperature over 100.5 degrees, avoid being in public, avoid strenuous activity, wear a mask and avoid spending time with people who are ill.  LM with this information on patient VM.  Advised to call if any concerns.

## 2021-06-26 NOTE — PROGRESS NOTES
Veronica Nieves is a 31 y.o. female who is being evaluated via a billable video visit.      How would you like to obtain your AVS? MyChart.  If dropped from the video visit, the video invitation should be resent by: Text to cell phone: [phone]  Will anyone else be joining your video visit? No    Internal Medicine Office Visit- VIDEO VISIT  [facility]   Patient Name: Veronica Nieves  Patient Age: 31 y.o.  YOB: 1990  MRN: [id number]      Video-Visit Details    Type of service:  Video Visit  Video Start Time: 7:54 AM  Video End Time (time video stopped): 8:02 AM  Originating Location (pt. Location): [place]    Distant Location (provider location):  [place] [facility]     Mode of Communication:  Video Conference via [employer]      Date of Visit: 6/8/2021  Reason for Video Visit:   Chief Complaint   Patient presents with     Anxiety     med check, GAD7 score 3 , PHQ9 score 1, ran out of wellbutrin 150 1 1/2 week ago, would like to discuss        Assessment / Plan / Medical Decision Making:    Problem List Items Addressed This Visit     Mild depression and anxiety     Continue with duloxetine only at this time. She tolerated bupropion well but given diagnosis of breast cancer since starting the medication she would prefer to be on as few medications as possible.          Relevant Medications    DULoxetine (CYMBALTA) 30 MG capsule           Video visit duration: 8 minutes     Health Maintenance Review  Health Maintenance   Topic Date Due     DEPRESSION ACTION PLAN  Never done     HEPATITIS C SCREENING  Never done     Pneumococcal Vaccine: Pediatrics (0 to 5 Years) and At-Risk Patients (6 to 64 Years) (1 of 4 - PCV13) Never done     COVID-19 Vaccine (1) Never done     ADVANCE CARE PLANNING  Never done     INFLUENZA VACCINE RULE BASED (Season Ended) 08/01/2021     PREVENTIVE CARE VISIT  04/27/2022     PAP SMEAR  12/17/2024     HPV TEST  12/17/2024     TD 18+ HE   04/01/2029     TDAP ADULT ONE TIME DOSE  Completed     HEPATITIS B VACCINES  Aged Out     HIV SCREENING  Discontinued         I have discontinued Ry Nieves's buPROPion and HYDROcodone-acetaminophen. I am also having her maintain her levonorgestreL, inulin (FIBER GUMMIES ORAL), lactobacillus combination no.8 (ADULT PROBIOTIC ORAL), dexAMETHasone, prochlorperazine, lidocaine-prilocaine, and DULoxetine.    No orders of the defined types were placed in this encounter.  Followup: Return in about 6 months (around 12/8/2021) for Next scheduled follow up. earlier if needed.  Marietta Brandt, CNP      HPI:  Veronica Nieves is 31 y.o. year old and was contacted today for a video visit. Since her last visit for depression and anxiety she was diagnosed with breast cancer. She has been taking things one day at a time and generally feels like she is doing well. She hasn't felt a big change in mood generally. She started wellbutrin to help with low libido but then she was diagnosed with breast cancer.        Current Scheduled Meds:  Outpatient Encounter Medications as of 6/8/2021   Medication Sig Dispense Refill     dexAMETHasone (DECADRON) 4 MG tablet Take 8mg every 12 hours for 3 days, starting the day before chemotherapy.. 36 tablet 1     DULoxetine (CYMBALTA) 30 MG capsule Take 1 capsule (30 mg total) by mouth 2 (two) times a day. 180 capsule 1     inulin (FIBER GUMMIES ORAL) Take by mouth daily.       lactobacillus combination no.8 (ADULT PROBIOTIC ORAL) Take 1 capsule by mouth daily.       levonorgestrel (MIRENA) 20 mcg/24 hr (5 years) IUD 1 each by Intrauterine route once. Placed 5/2/2017       lidocaine-prilocaine (EMLA) cream Place over port 30 min. before being accessed. 30 g 1     prochlorperazine (COMPAZINE) 10 MG tablet Take 1 tablet (10 mg total) by mouth every 6 (six) hours as needed (For breakthrough nausea/vomiting). 30 tablet 1     [DISCONTINUED] DULoxetine (CYMBALTA) 30 MG capsule Take 1 capsule (30 mg  total) by mouth 2 (two) times a day. 180 capsule 1     [DISCONTINUED] buPROPion (WELLBUTRIN XL) 150 MG 24 hr tablet Take 1 tablet (150 mg total) by mouth every morning. 30 tablet 2     [DISCONTINUED] HYDROcodone-acetaminophen 5-325 mg per tablet Take 1 tablet by mouth every 4 (four) hours as needed for pain. 20 tablet 0     No facility-administered encounter medications on file as of 6/8/2021.          Past Medical History:   Diagnosis Date     Abnormal Pap smear of cervix     age 13, subsequent pap smears normal     Anxiety      Breast cancer (H) 05/04/2021     Depression      Past Surgical History:   Procedure Laterality Date     no surgical history       [facility] INSJ PRPH CTR VAD W/SUBQ PORT AGE 5 YR/> N/A 5/26/2021    Procedure: Port Placement;  Surgeon: Audrey Newman MD;  Location: [facility];  Service: General     US BREAST CORE BIOPSY LEFT Left 5/4/2021     WISDOM TOOTH EXTRACTION  2008     Social History     Tobacco Use     Smoking status: Never Smoker     Smokeless tobacco: Never Used   Substance Use Topics     Alcohol use: Yes     Alcohol/week: 6.0 - 7.0 standard drinks     Types: 3 - 4 Glasses of wine, 3 Standard drinks or equivalent per week     Drug use: No       Objective / Physical Examination:  Insight is good. Does not appear anxious/depressed

## 2021-06-26 NOTE — ANESTHESIA CARE TRANSFER NOTE
Last vitals:   Vitals:    05/26/21 0634   BP: 127/68   Pulse: (!) 56   Resp: 16   Temp: 36.6  C (97.9  F)   SpO2: 98%     Patient's level of consciousness is drowsy  Spontaneous respirations: yes  Maintains airway independently: yes  Dentition unchanged: yes  Oropharynx: oropharynx clear of all foreign objects    QCDR Measures:  ASA# 20 - Surgical Safety Checklist: WHO surgical safety checklist completed prior to induction    PQRS# 430 - Adult PONV Prevention: 4558F - Pt received => 2 anti-emetic agents (different classes) preop & intraop  ASA# 8 - Peds PONV Prevention: NA - Not pediatric patient, not GA or 2 or more risk factors NOT present  PQRS# 424 - Brie-op Temp Management: 4559F - At least one body temp DOCUMENTED => 35.5C or 95.9F within required timeframe  PQRS# 426 - PACU Transfer Protocol: - Transfer of care checklist used  ASA# 14 - Acute Post-op Pain: ASA14B - Patient did NOT experience pain >= 7 out of 10

## 2021-06-26 NOTE — PROGRESS NOTES
"6/8/2021    Referring Provider: Dr. Newman    Presenting Information:  I spoke to Veronica by phone today to discuss her genetic testing results. Her blood was drawn on 5/27/2021. Breast and Gyn Cancers panel testing was ordered  from [facility]. This testing was done because of Veronica's personal history of early-onset breast cancer.    Genetic Testing Result: NEGATIVE  Veronica is negative for mutations in the JOHN, BARD1, BRCA1, BRCA2, BRIP1, CDH1, CHEK2, DICER1, EPCAM, MLH1, MSH2, MSH6, NBN, NF1, PALB2, PMS2, PTEN, RAD51C, RAD51D, RECQL, SMARCA4, STK11, and TP53 genes. No mutations were found in any of the 23 genes analyzed. This test involved sequencing and deletion/duplication analysis of all genes with the exceptions of EPCAM (deletions/duplications only).    Testing did not detect an identifiable mutation associated with Hereditary Breast and Ovarian Cancer syndrome (BRCA1, BRCA2), Valencia syndrome (MLH1, MSH2, MSH6, PMS2, EPCAM), Hereditary Diffuse Gastric Cancer (CDH1), Cowden syndrome (PTEN), Li Fraumeni syndrome (TP53), Peutz-Jeghers syndrome (STK11), or Neurofibromatosis type 1 (NF1).    A copy of the test report can be found in the Media tab and named \"Genetics Scan- INVITAE\". The report is scanned in as a linked document.    Interpretation:  We discussed several different interpretations of this negative test result.    1. One explanation may be that there is a different gene or combination of genes and environment that are associated with Veronica's cancer history.    2. There is also a small possibility that there is a mutation in one of these genes, and we could not find it with our current testing methods.       Screening:  Based on this negative test result, it is important for Veronica and her relatives to refer back to the family history for appropriate cancer screening.      Veronica should continue to follow her oncology team's recommendations for the treatment and follow-up for her breast cancer.     Veronica s " close female relatives remain at increased risk for breast cancer given their family history. Breast cancer screening is generally recommended to begin approximately 10 years younger than the earliest age of breast cancer diagnosis in the family (but not before age 30), or at age 40, whichever comes first. In this family, screening may begin at age 30. Breast screening options should be discussed with an individual's primary care provider and a genetic counselor, to determine at what age to begin screening, what screening is appropriate, and if additional screening (such as breast MRI) is necessary based on personal/family history factors.     Other population cancer screening options, such as those recommended by the American Cancer Society and the National Comprehensive Cancer Network (NCCN), are also appropriate for Veronica and her family. These screening recommendations may change if there are changes to Veronica's personal and/or family history. Final screening recommendations should be made by each individual's managing physician.      Inheritance:  We reviewed the autosomal dominant inheritance of mutations in these 23 genes. We discussed that Veronica cannot/did not pass on an identifiable mutation in these genes to her children based on this test result.  Mutations in these genes do not skip generations.      Additional Testing Considerations:  No further genetic is recommended for Veronica or her family at this time. Veronica was encouraged to contact me should her personal or family history change as this may change genetic testing recommendations.     Summary:  We do not have an explanation for Veronica's personal history of breast cancer.  Because of that, it is important that she continue with cancer screening based on her personal and family history as discussed above.    Genetic testing is rapidly advancing, and new cancer susceptibility genes will most likely be identified in the future.  Therefore, I  encouraged Veronica to contact me annually or if there are changes in her personal or family history.  This may change how we assess her cancer risk, screening, and the testing we would offer.    Plan:  1. A copy of the test results will be mailed to Veronica.  2. She plans to follow-up with her oncology team for treatment and follow up for her breast cancer and her primary care providers for routine medical care and cancer screening.  3. She should contact me annually, or sooner if her family history changes.    If Veronica has any further questions, I encouraged her to contact me at [phone].    Time spent on the phone: 9 minutes.    Mary Michael MS, [facility]  Licensed Genetic Counselor  [facility]  [phone]

## 2021-06-26 NOTE — ANESTHESIA PREPROCEDURE EVALUATION
Anesthesia Evaluation      Patient summary reviewed   No history of anesthetic complications     Airway   Mallampati: I  Neck ROM: full   Pulmonary - negative ROS and normal exam                          Cardiovascular - negative ROS and normal exam  Exercise tolerance: > or = 4 METS   Neuro/Psych    (+) depression, anxiety/panic attacks,     Endo/Other       Comments: Left breast Ca    GI/Hepatic/Renal - negative ROS           Dental - normal exam                        Anesthesia Plan  Planned anesthetic: MAC  FiO2 less than 0.30 during cautery  Fire risk precautions    Decadron  Zofran  ASA 2     Anesthetic plan and risks discussed with: patient  Anesthesia plan special considerations: antiemetics,   Post-op plan: routine recovery

## 2021-06-26 NOTE — PROGRESS NOTES
Ry came to chemo infusion following port lab draw and MD visit for cycle 2 day 1 treatment with TCHP.  Lab results noted and she met [facility] for treatment today.   Each medication given today was reviewed prior to administration.  Ry received treatment as ordered and tolerated it well while in clinic today.  Port was flushed with ns, heparinized then de-accessed and site covered.  Ry left clinic ambulatory.

## 2021-06-27 DIAGNOSIS — Z17.0 MALIGNANT NEOPLASM OF UPPER-OUTER QUADRANT OF LEFT BREAST IN FEMALE, ESTROGEN RECEPTOR POSITIVE (H): Primary | ICD-10-CM

## 2021-06-27 DIAGNOSIS — C50.412 MALIGNANT NEOPLASM OF UPPER-OUTER QUADRANT OF LEFT BREAST IN FEMALE, ESTROGEN RECEPTOR POSITIVE (H): Primary | ICD-10-CM

## 2021-06-28 ENCOUNTER — AMBULATORY - HEALTHEAST (OUTPATIENT)
Dept: INFUSION THERAPY | Facility: HOSPITAL | Age: 31
End: 2021-06-28

## 2021-06-28 ENCOUNTER — COMMUNICATION - HEALTHEAST (OUTPATIENT)
Dept: ONCOLOGY | Facility: HOSPITAL | Age: 31
End: 2021-06-28

## 2021-06-28 DIAGNOSIS — C50.412 MALIGNANT NEOPLASM OF UPPER-OUTER QUADRANT OF LEFT BREAST IN FEMALE, ESTROGEN RECEPTOR POSITIVE (H): ICD-10-CM

## 2021-06-28 DIAGNOSIS — Z17.0 MALIGNANT NEOPLASM OF UPPER-OUTER QUADRANT OF LEFT BREAST IN FEMALE, ESTROGEN RECEPTOR POSITIVE (H): ICD-10-CM

## 2021-06-28 LAB
BASOPHILS # BLD AUTO: 0 THOU/UL (ref 0–0.2)
BASOPHILS NFR BLD AUTO: 0 % (ref 0–2)
EOSINOPHIL COUNT (ABSOLUTE): 0 THOU/UL (ref 0–0.4)
EOSINOPHIL NFR BLD AUTO: 2 % (ref 0–6)
ERYTHROCYTE [DISTWIDTH] IN BLOOD BY AUTOMATED COUNT: 12.4 % (ref 11–14.5)
HCT VFR BLD AUTO: 30.4 % (ref 35–47)
HGB BLD-MCNC: 10.1 G/DL (ref 12–16)
IMMATURE GRANULOCYTE % - MAN (DIFF): 0 %
IMMATURE GRANULOCYTE ABSOLUTE - MAN (DIFF): 0 THOU/UL
LYMPHOCYTES # BLD AUTO: 0.9 THOU/UL (ref 0.8–4.4)
LYMPHOCYTES NFR BLD AUTO: 62 % (ref 20–40)
MCH RBC QN AUTO: 28.8 PG (ref 27–34)
MCHC RBC AUTO-ENTMCNC: 33.2 G/DL (ref 32–36)
MCV RBC AUTO: 87 FL (ref 80–100)
MONOCYTES # BLD AUTO: 0.4 THOU/UL (ref 0–0.9)
MONOCYTES NFR BLD AUTO: 28 % (ref 2–10)
PLAT MORPH BLD: ABNORMAL
PLATELET # BLD AUTO: 139 THOU/UL (ref 140–440)
PMV BLD AUTO: 9.4 FL (ref 8.5–12.5)
RBC # BLD AUTO: 3.51 MILL/UL (ref 3.8–5.4)
TOTAL NEUTROPHILS-ABS(DIFF): 0.1 THOU/UL (ref 2–7.7)
TOTAL NEUTROPHILS-REL(DIFF): 8 % (ref 50–70)
WBC: 1.4 THOU/UL (ref 4–11)

## 2021-06-29 ENCOUNTER — COMMUNICATION - HEALTHEAST (OUTPATIENT)
Dept: ONCOLOGY | Facility: HOSPITAL | Age: 31
End: 2021-06-29
Payer: COMMERCIAL

## 2021-07-04 NOTE — LETTER
Letter by Mary Michael, Genetic Counselor at      Author: Mary Michael, Genetic Counselor Service: -- Author Type: --    Filed:  Encounter Date: 6/8/2021 Status: (Other)         Jefferson Garcia MD  [address] [place] [address]                                  June 8, 2021    Patient: Veronica Nieves   MR Number: [id number]   YOB: 1990   Date of Visit: 6/8/2021     Dear Dr. Jose MD:    Thank you for referring Veronica Nieves to me for evaluation. Below are the relevant portions of my assessment and plan of care.    If you have questions, please do not hesitate to call me. I look forward to following Veronica along with you.    Sincerely,        Mary Michael, Genetic Counselor          CC  Marge Childress, CNP  Dede Levine, RN  Marleen Shukla, CNP  MD Marietta Rojas, [facility]  Mary Michael, Genetic Counselor  6/8/2021 10:24 AM  Sign when Signing Visit  6/8/2021    Referring Provider: Dr. Newman    Presenting Information:  I spoke to Veronica by phone today to discuss her genetic testing results. Her blood was drawn on 5/27/2021. Breast and Gyn Cancers panel testing was ordered  from [facility]. This testing was done because of Veronica's personal history of early-onset breast cancer.    Genetic Testing Result: NEGATIVE  Veronica is negative for mutations in the JOHN, BARD1, BRCA1, BRCA2, BRIP1, CDH1, CHEK2, DICER1, EPCAM, MLH1, MSH2, MSH6, NBN, NF1, PALB2, PMS2, PTEN, RAD51C, RAD51D, RECQL, SMARCA4, STK11, and TP53 genes. No mutations were found in any of the 23 genes analyzed. This test involved sequencing and deletion/duplication analysis of all genes with the exceptions of EPCAM (deletions/duplications only).    Testing did not detect an identifiable mutation associated with Hereditary Breast and Ovarian Cancer syndrome (BRCA1, BRCA2), Valencia syndrome (MLH1, MSH2, MSH6, PMS2, EPCAM), Hereditary Diffuse Gastric Cancer (CDH1), Cowden syndrome (PTEN), Li  "Fraumeni syndrome (TP53), Peutz-Jeghers syndrome (STK11), or Neurofibromatosis type 1 (NF1).    A copy of the test report can be found in the Media tab and named \"Genetics Scan- INVITAE\". The report is scanned in as a linked document.    Interpretation:  We discussed several different interpretations of this negative test result.    1. One explanation may be that there is a different gene or combination of genes and environment that are associated with Veronica's cancer history.    2. There is also a small possibility that there is a mutation in one of these genes, and we could not find it with our current testing methods.       Screening:  Based on this negative test result, it is important for Veronica and her relatives to refer back to the family history for appropriate cancer screening.      Veronica should continue to follow her oncology team's recommendations for the treatment and follow-up for her breast cancer.     Angélica close female relatives remain at increased risk for breast cancer given their family history. Breast cancer screening is generally recommended to begin approximately 10 years younger than the earliest age of breast cancer diagnosis in the family (but not before age 30), or at age 40, whichever comes first. In this family, screening may begin at age 30. Breast screening options should be discussed with an individual's primary care provider and a genetic counselor, to determine at what age to begin screening, what screening is appropriate, and if additional screening (such as breast MRI) is necessary based on personal/family history factors.     Other population cancer screening options, such as those recommended by the American Cancer Society and the National Comprehensive Cancer Network (NCCN), are also appropriate for Veronica and her family. These screening recommendations may change if there are changes to Veronica's personal and/or family history. Final screening recommendations should be made " by each individual's managing physician.      Inheritance:  We reviewed the autosomal dominant inheritance of mutations in these 23 genes. We discussed that Veronica cannot/did not pass on an identifiable mutation in these genes to her children based on this test result.  Mutations in these genes do not skip generations.      Additional Testing Considerations:  No further genetic is recommended for Veronica or her family at this time. Veronica was encouraged to contact me should her personal or family history change as this may change genetic testing recommendations.     Summary:  We do not have an explanation for Veronica's personal history of breast cancer.  Because of that, it is important that she continue with cancer screening based on her personal and family history as discussed above.    Genetic testing is rapidly advancing, and new cancer susceptibility genes will most likely be identified in the future.  Therefore, I encouraged Veronica to contact me annually or if there are changes in her personal or family history.  This may change how we assess her cancer risk, screening, and the testing we would offer.    Plan:  1. A copy of the test results will be mailed to Veronica.  2. She plans to follow-up with her oncology team for treatment and follow up for her breast cancer and her primary care providers for routine medical care and cancer screening.  3. She should contact me annually, or sooner if her family history changes.    If Veronica has any further questions, I encouraged her to contact me at [phone].    Time spent on the phone: 9 minutes.    Mary Michale MS, [facility]  Licensed Genetic Counselor  [facility]  [phone]

## 2021-07-04 NOTE — LETTER
Letter by Mary Michael, Genetic Counselor at      Author: Mary Michael, Genetic Counselor Service: -- Author Type: --    Filed:  Encounter Date: 6/8/2021 Status: (Other)       Negative Genetic Test Result    Genetic Testing  You had a blood test that looked at the genetic information in one or more genes associated with increased cancer risk.  The testing looked for any harmful changes that would stop this particular gene from working like it should. If an individual does not have any harmful changes or variants of unknown significance found from their blood test, their genetic test result is reported as negative.       Results  The genetic test did not identify any pathogenic (harmful) changes in the genes that were tested. There are several possible explanations for a negative test result. Without knowing the gene mutation in your family, the cause of the cancer in you or your relatives is still unknown. Your genetic counselor can help interpret the result for you and your relatives. In this case, there are several reasons that may explain the negative test result:    There may be a gene mutation in the family that you did not inherit.     You may have a gene mutation in a different gene that was not included in the test, or has not yet been discovered.     The cancers in you or your family may be due to a combination of genetic factors and environment (multifactorial/familial).    The cancers in you or your family may be sporadic/random cancers.    There is very small chance that a mutation was not found by current testing methods.  As testing technology evolves over time, it may still be possible to identify a mutation in a gene that was not found on this test.    It is important to note which genes were included in your test. A list of these genes can be found on your test result.    Screening Recommendations  Due to this negative test result, cancer screening recommendations should be based on your  personal and family history. This may include increased cancer screening for you and/or your family members. Your genetic counselor and health care provider can help make appropriate recommendations.      Please call us if you have any questions or concerns.   [facility] Mary Michael MS, [facility]  [phone]

## 2021-07-06 VITALS — WEIGHT: 186 LBS | HEIGHT: 66 IN | BODY MASS INDEX: 29.8 KG/M2 | BODY MASS INDEX: 29.8 KG/M2

## 2021-07-06 VITALS — BODY MASS INDEX: 29.89 KG/M2 | WEIGHT: 186 LBS | HEIGHT: 66 IN

## 2021-07-07 ENCOUNTER — AMBULATORY - HEALTHEAST (OUTPATIENT)
Dept: NURSING | Facility: CLINIC | Age: 31
End: 2021-07-07

## 2021-07-07 ENCOUNTER — COMMUNICATION - HEALTHEAST (OUTPATIENT)
Dept: ONCOLOGY | Facility: HOSPITAL | Age: 31
End: 2021-07-07

## 2021-07-07 NOTE — TELEPHONE ENCOUNTER
Telephone Encounter by Gina Hayes RN at 7/7/2021 12:20 PM     Author: Gina Hayes RN Service: -- Author Type: Registered Nurse    Filed: 7/7/2021 12:21 PM Encounter Date: 7/7/2021 Status: Signed    : Gina Hayes RN (Registered Nurse)       Patient calls in today stating that she has been fighting a cold for the past couple of weeks.  She states that she is slowly getting better but does still have a little stuffy nose and some mucus when she coughs.  She wants to know if she should continue to go forward with her second Covid vaccine this evening or if she should reschedule.  Per Dr. Garcia, she should be okay to go ahead and get her second vaccine.  This information was given to the patient who verbalized understanding and was appreciative.    Gina Hayes RN

## 2021-07-07 NOTE — TELEPHONE ENCOUNTER
Patient was in today for a midcycle check after receiving cycle 2-day 1 TCHP on 6/18/2021.  Patient is getting this treatment for her diagnosis of breast cancer that is being followed by Dr. Jefferson Garcia.  WBC 1.4, hemoglobin 10.1, platelets 139, ANC 0.1.  These lab results have been reviewed by Marleen Shukla CNP in the absence of Dr. Garcia.  Today is day 10 of cycle 2.  Patient is neutropenic but denies any fevers or ill feelings.  She has been given neutropenic precautions in the past and again today and has been told to call if anything comes up.  She verbalized understanding and will be back for cycle 3-day 1 on 7/9 for a lab appointment, DrJohnnie Appointment and then infusion.    Patient was given information on white blood cell boosting agents.  I did let her know that if her counts do not rebound on their own when she comes back for the next cycle, she may be talk to you about these medications.  She verbalized understanding and was very appreciative.    Gina Hayes RN

## 2021-07-08 ASSESSMENT — ANXIETY QUESTIONNAIRES: GAD7 TOTAL SCORE: 3

## 2021-07-09 ENCOUNTER — AMBULATORY - HEALTHEAST (OUTPATIENT)
Dept: INFUSION THERAPY | Facility: HOSPITAL | Age: 31
End: 2021-07-09

## 2021-07-09 ENCOUNTER — OFFICE VISIT - HEALTHEAST (OUTPATIENT)
Dept: ONCOLOGY | Facility: HOSPITAL | Age: 31
End: 2021-07-09

## 2021-07-09 DIAGNOSIS — Z17.0 MALIGNANT NEOPLASM OF UPPER-OUTER QUADRANT OF LEFT BREAST IN FEMALE, ESTROGEN RECEPTOR POSITIVE (H): ICD-10-CM

## 2021-07-09 DIAGNOSIS — C50.412 MALIGNANT NEOPLASM OF UPPER-OUTER QUADRANT OF LEFT BREAST IN FEMALE, ESTROGEN RECEPTOR POSITIVE (H): ICD-10-CM

## 2021-07-09 LAB
ALBUMIN SERPL-MCNC: 4 G/DL (ref 3.5–5)
ALP SERPL-CCNC: 62 U/L (ref 45–120)
ALT SERPL W P-5'-P-CCNC: 28 U/L (ref 0–45)
ANION GAP SERPL CALCULATED.3IONS-SCNC: 9 MMOL/L (ref 5–18)
AST SERPL W P-5'-P-CCNC: 19 U/L (ref 0–40)
BASOPHILS # BLD AUTO: 0 THOU/UL (ref 0–0.2)
BASOPHILS NFR BLD AUTO: 0 % (ref 0–2)
BILIRUB SERPL-MCNC: 0.2 MG/DL (ref 0–1)
BUN SERPL-MCNC: 15 MG/DL (ref 8–22)
CALCIUM SERPL-MCNC: 9.4 MG/DL (ref 8.5–10.5)
CHLORIDE BLD-SCNC: 107 MMOL/L (ref 98–107)
CO2 SERPL-SCNC: 24 MMOL/L (ref 22–31)
CREAT SERPL-MCNC: 0.89 MG/DL (ref 0.6–1.1)
EOSINOPHIL # BLD AUTO: 0 THOU/UL (ref 0–0.4)
EOSINOPHIL NFR BLD AUTO: 0 % (ref 0–6)
ERYTHROCYTE [DISTWIDTH] IN BLOOD BY AUTOMATED COUNT: 13.2 % (ref 11–14.5)
GFR SERPL CREATININE-BSD FRML MDRD: >60 ML/MIN/1.73M2
GLUCOSE BLD-MCNC: 142 MG/DL (ref 70–125)
HCT VFR BLD AUTO: 30.9 % (ref 35–47)
HGB BLD-MCNC: 10.5 G/DL (ref 12–16)
IMM GRANULOCYTES # BLD: 0.2 THOU/UL
IMM GRANULOCYTES NFR BLD: 1 %
LYMPHOCYTES # BLD AUTO: 1.1 THOU/UL (ref 0.8–4.4)
LYMPHOCYTES NFR BLD AUTO: 7 % (ref 20–40)
MCH RBC QN AUTO: 29.2 PG (ref 27–34)
MCHC RBC AUTO-ENTMCNC: 34 G/DL (ref 32–36)
MCV RBC AUTO: 86 FL (ref 80–100)
MONOCYTES # BLD AUTO: 0.6 THOU/UL (ref 0–0.9)
MONOCYTES NFR BLD AUTO: 4 % (ref 2–10)
NEUTROPHILS # BLD AUTO: 12.3 THOU/UL (ref 2–7.7)
NEUTROPHILS NFR BLD AUTO: 87 % (ref 50–70)
PLATELET # BLD AUTO: 253 THOU/UL (ref 140–440)
PMV BLD AUTO: 8.5 FL (ref 8.5–12.5)
POTASSIUM BLD-SCNC: 3.7 MMOL/L (ref 3.5–5)
PROT SERPL-MCNC: 7.2 G/DL (ref 6–8)
RBC # BLD AUTO: 3.59 MILL/UL (ref 3.8–5.4)
SODIUM SERPL-SCNC: 140 MMOL/L (ref 136–145)
WBC: 14.1 THOU/UL (ref 4–11)

## 2021-07-09 ASSESSMENT — MIFFLIN-ST. JEOR: SCORE: 1583.94

## 2021-07-11 NOTE — PROGRESS NOTES
Progress Notes by Jefferson Garcia MD at 7/9/2021  9:15 AM     Author: Jefferson Garcia MD Service: -- Author Type: Physician    Filed: 7/11/2021 10:26 AM Encounter Date: 7/9/2021 Status: Signed    : Jefferson Garcia MD (Physician)       [facility] Hematology and Oncology Progress Note    Patient: Veronica Nieves  MRN: [id number]  Date of Service: 07/09/2021      Assessment and Plan:    Cancer Staging  Malignant neoplasm of upper-outer quadrant of left breast in female, estrogen receptor positive (H)  Staging form: Breast, AJCC 8th Edition  - Clinical stage from 5/19/2021: Stage IB (cT2, cN0, cM0, G2, ER+, NH+, HER2+) - Signed by Jefferson Garcia MD on 5/19/2021    1.  Invasive ductal carcinoma of the left breast: She will receive her third cycle of neoadjuvant chemotherapy today.  We will continue same dosing.  She is tolerating the treatment generally well.  We will repeat an ultrasound prior to her next dose.  6 cycles are planned.  I spent some time with the patient and her  today reviewing the treatment plan in general and how the adjuvant therapy depends on surgical pathologic findings.  Multiple questions were answered.    2.  Loose stools: She is not having a lot of frequency but just some stools.  She will take Imodium as needed.    3.  Chemotherapy-induced neutropenia: She has severe neutropenia on day 10.  We will going to start pegfilgrastim with this cycle.  Rationale for this was explained.    Total time on this visit was 50 minutes including document review, face-to-face time with patient, and documentation.    ECOG Performance    0    Distress Assessment  Distress Assessment Score: 4(Having Cancer)    Pain  Currently in Pain: No/denies    Diagnosis:    1.  Invasive ductal carcinoma of the left breast: Diagnosed May, 2021.  Initial imaging suggested 3 masses.  Biopsy of the largest mass showed an invasive ductal carcinoma, grade 2.  ER/NH positive, HER-2 positive.  Evaluation of the  "axilla was negative.    Treatment:    Neoadjuvant chemotherapy with TCHP initiated on May 27, 2021.    Interim History:    Ry presents today for follow-up visit.  She is here for cycle 3 of chemotherapy.  Having some loose stool but not diarrhea.  No skin rash or mouth sores.  No shortness of breath or edema.    Review of Systems:    As above in the history.     Review of Systems otherwise Negative for:  General: chills, fever or night sweats  Psychological: anxiety or depression  Ophthalmic: blurry vision, double vision or loss of vision, vision change  ENT: epistaxis, oral lesions, hearing changes  Hematological and Lymphatic: bleeding, bruising, jaundice, swollen lymph nodes  Endocrine: hot flashes, unexpected weight changes  Respiratory: cough, hemoptysis, orthopnea or shortness of breath/SHAHID  Cardiovascular: chest pain, edema, palpitations or PND  Gastrointestinal: abdominal pain, blood in stools, change in bowel habits  Genito-Urinary: change in urinary stream, incontinence, frequency/urgency  Musculoskeletal: joint pain, stiffness, swelling, muscle pain  Neurological: dizziness, headaches, numbness/tingling  Dermatological: lumps and rash    Past History:    Past Medical History:   Diagnosis Date   ? Abnormal Pap smear of cervix     age 13, subsequent pap smears normal   ? Anxiety    ? Breast cancer (H) 05/04/2021   ? Depression      Physical Exam:    Recent Vitals 7/9/2021   Height 5' 6.25\"   Weight 188 lbs 2 oz   BSA (m2) 2 m2   /82   Pulse 72   Temp 98.2   Temp src 1   SpO2 96   Some recent data might be hidden     General: patient appears stated age of 31 y.o.. Nontoxic and in no distress.   HEENT: Head: atraumatic, normocephalic. Sclerae anicteric.  Chest:  Normal respiratory effort  Cardiac:  No edema.   Abdomen: abdomen is non-distended  Extremities: normal tone and muscle bulk.  Skin: no lesions or rash. Warm and dry.   CNS: alert and oriented. Grossly non-focal.   Psychiatric: normal mood and " affect.     Lab Results:    Recent Results (from the past 168 hour(s))   Comprehensive Metabolic Panel   Result Value Ref Range    Sodium 140 136 - 145 mmol/L    Potassium 3.7 3.5 - 5.0 mmol/L    Chloride 107 98 - 107 mmol/L    CO2 24 22 - 31 mmol/L    Anion Gap, Calculation 9 5 - 18 mmol/L    Glucose 142 (H) 70 - 125 mg/dL    BUN 15 8 - 22 mg/dL    Creatinine 0.89 0.60 - 1.10 mg/dL    GFR MDRD Af Amer >60 >60 mL/min/1.73m2    GFR MDRD Non Af Amer >60 >60 mL/min/1.73m2    Bilirubin, Total 0.2 0.0 - 1.0 mg/dL    Calcium 9.4 8.5 - 10.5 mg/dL    Protein, Total 7.2 6.0 - 8.0 g/dL    Albumin 4.0 3.5 - 5.0 g/dL    Alkaline Phosphatase 62 45 - 120 U/L    AST 19 0 - 40 U/L    ALT 28 0 - 45 U/L   HM1 (CBC with Diff)   Result Value Ref Range    WBC 14.1 (H) 4.0 - 11.0 thou/uL    RBC 3.59 (L) 3.80 - 5.40 mill/uL    Hemoglobin 10.5 (L) 12.0 - 16.0 g/dL    Hematocrit 30.9 (L) 35.0 - 47.0 %    MCV 86 80 - 100 fL    MCH 29.2 27.0 - 34.0 pg    MCHC 34.0 32.0 - 36.0 g/dL    RDW 13.2 11.0 - 14.5 %    Platelets 253 140 - 440 thou/uL    MPV 8.5 8.5 - 12.5 fL    Neutrophils % 87 (H) 50 - 70 %    Lymphocytes % 7 (L) 20 - 40 %    Monocytes % 4 2 - 10 %    Eosinophils % 0 0 - 6 %    Basophils % 0 0 - 2 %    Immature Granulocyte % 1 (H) <=0 %    Neutrophils Absolute 12.3 (H) 2.0 - 7.7 thou/uL    Lymphocytes Absolute 1.1 0.8 - 4.4 thou/uL    Monocytes Absolute 0.6 0.0 - 0.9 thou/uL    Eosinophils Absolute 0.0 0.0 - 0.4 thou/uL    Basophils Absolute 0.0 0.0 - 0.2 thou/uL    Immature Granulocyte Absolute 0.2 (H) <=0.0 thou/uL     Imaging:    No results found.      Signed by: Jefferson Garcia MD

## 2021-07-11 NOTE — PROGRESS NOTES
"Progress Notes by Elvia Moore RN at 7/9/2021  9:15 AM     Author: Elvia Moore RN Service: -- Author Type: Registered Nurse    Filed: 7/11/2021 10:26 AM Encounter Date: 7/9/2021 Status: Signed    : Elvia Moore RN (Registered Nurse)       Fluoride toothpaste  ..  Oncology Rooming Note    07/09/21 9:21 AM    Veronica Nieves is a 31 y.o. female who presents for:    Chief Complaint   Patient presents with   ? HE Cancer     Malignant neoplasm of upper-outer quadrant of left breast in female, estrogen receptor positive        Initial Vitals: There were no vitals taken for this visit.     Estimated body mass index is 30.44 kg/m  as calculated from the following:    Height as of 5/26/21: 5' 6.25\" (1.683 m).    Weight as of 6/18/21: 190 lb (86.2 kg).     There is no height or weight on file to calculate BSA.      Allergies reviewed: Yes  Medications reviewed: Yes    Refills needed: No      Clinical concerns: concerns - Questions: Taxotere (sp?) hair loss can be permanent?? What about after chemo? Frequency of PET scans?  When/if there is a scan to see if chemo is working?   Cancer is aggressive - some others have chemo everyday - is this chemo that is q three weeks - is it a aggressive treatment.       Elvia Moore RN           "

## 2021-07-13 ENCOUNTER — DOCUMENTATION ONLY (OUTPATIENT)
Dept: ONCOLOGY | Facility: HOSPITAL | Age: 31
End: 2021-07-13

## 2021-07-13 NOTE — PROGRESS NOTES
[employer] paperwork faxed to [employer] @ [phone]. Original at [facility] for patient to .   Pt notified and copy made for chart.

## 2021-07-19 ENCOUNTER — LAB (OUTPATIENT)
Dept: INFUSION THERAPY | Facility: HOSPITAL | Age: 31
End: 2021-07-19
Attending: INTERNAL MEDICINE
Payer: COMMERCIAL

## 2021-07-19 DIAGNOSIS — C50.819 MALIGNANT NEOPLASM OF OVERLAPPING SITES OF BREAST IN FEMALE, ESTROGEN RECEPTOR POSITIVE, UNSPECIFIED LATERALITY (H): Primary | ICD-10-CM

## 2021-07-19 DIAGNOSIS — C50.412 MALIGNANT NEOPLASM OF UPPER-OUTER QUADRANT OF LEFT BREAST IN FEMALE, ESTROGEN RECEPTOR POSITIVE (H): ICD-10-CM

## 2021-07-19 DIAGNOSIS — Z17.0 MALIGNANT NEOPLASM OF OVERLAPPING SITES OF BREAST IN FEMALE, ESTROGEN RECEPTOR POSITIVE, UNSPECIFIED LATERALITY (H): Primary | ICD-10-CM

## 2021-07-19 DIAGNOSIS — Z17.0 MALIGNANT NEOPLASM OF UPPER-OUTER QUADRANT OF LEFT BREAST IN FEMALE, ESTROGEN RECEPTOR POSITIVE (H): ICD-10-CM

## 2021-07-19 LAB
ALBUMIN SERPL-MCNC: 3.9 G/DL (ref 3.5–5)
ALP SERPL-CCNC: 101 U/L (ref 45–120)
ALT SERPL W P-5'-P-CCNC: 24 U/L (ref 0–45)
ANION GAP SERPL CALCULATED.3IONS-SCNC: 6 MMOL/L (ref 5–18)
AST SERPL W P-5'-P-CCNC: 21 U/L (ref 0–40)
BASOPHILS # BLD MANUAL: 0.1 10E3/UL (ref 0–0.2)
BASOPHILS NFR BLD MANUAL: 1 %
BILIRUB SERPL-MCNC: 0.2 MG/DL (ref 0–1)
BUN SERPL-MCNC: 13 MG/DL (ref 8–22)
CALCIUM SERPL-MCNC: 8.6 MG/DL (ref 8.5–10.5)
CHLORIDE BLD-SCNC: 109 MMOL/L (ref 98–107)
CO2 SERPL-SCNC: 25 MMOL/L (ref 22–31)
CREAT SERPL-MCNC: 0.92 MG/DL (ref 0.6–1.1)
EOSINOPHIL # BLD MANUAL: 0 10E3/UL (ref 0–0.7)
EOSINOPHIL NFR BLD MANUAL: 0 %
ERYTHROCYTE [DISTWIDTH] IN BLOOD BY AUTOMATED COUNT: 13.9 % (ref 10–15)
GFR SERPL CREATININE-BSD FRML MDRD: 83 ML/MIN/1.73M2
GLUCOSE BLD-MCNC: 88 MG/DL (ref 70–125)
HCT VFR BLD AUTO: 30.7 % (ref 35–47)
HGB BLD-MCNC: 10.2 G/DL (ref 11.7–15.7)
LYMPHOCYTES # BLD MANUAL: 1.8 10E3/UL (ref 0.8–5.3)
LYMPHOCYTES NFR BLD MANUAL: 17 %
MCH RBC QN AUTO: 29.1 PG (ref 26.5–33)
MCHC RBC AUTO-ENTMCNC: 33.2 G/DL (ref 31.5–36.5)
MCV RBC AUTO: 88 FL (ref 78–100)
METAMYELOCYTES # BLD MANUAL: 0.3 10E3/UL
METAMYELOCYTES NFR BLD MANUAL: 3 %
MONOCYTES # BLD MANUAL: 0.8 10E3/UL (ref 0–1.3)
MONOCYTES NFR BLD MANUAL: 8 %
MYELOCYTES # BLD MANUAL: 0.3 10E3/UL
MYELOCYTES NFR BLD MANUAL: 3 %
NEUTROPHILS # BLD MANUAL: 7.2 10E3/UL (ref 1.6–8.3)
NEUTROPHILS NFR BLD MANUAL: 68 %
PLAT MORPH BLD: ABNORMAL
PLATELET # BLD AUTO: 173 10E3/UL (ref 150–450)
POTASSIUM BLD-SCNC: 3.6 MMOL/L (ref 3.5–5)
PROT SERPL-MCNC: 6.4 G/DL (ref 6–8)
RBC # BLD AUTO: 3.51 10E6/UL (ref 3.8–5.2)
RBC MORPH BLD: ABNORMAL
SODIUM SERPL-SCNC: 140 MMOL/L (ref 136–145)
WBC # BLD AUTO: 10.6 10E3/UL (ref 4–11)

## 2021-07-19 PROCEDURE — 36415 COLL VENOUS BLD VENIPUNCTURE: CPT

## 2021-07-19 PROCEDURE — 85041 AUTOMATED RBC COUNT: CPT

## 2021-07-19 PROCEDURE — 36591 DRAW BLOOD OFF VENOUS DEVICE: CPT

## 2021-07-19 PROCEDURE — 250N000011 HC RX IP 250 OP 636: Performed by: INTERNAL MEDICINE

## 2021-07-19 PROCEDURE — 84075 ASSAY ALKALINE PHOSPHATASE: CPT

## 2021-07-19 RX ORDER — HEPARIN SODIUM,PORCINE 10 UNIT/ML
5 VIAL (ML) INTRAVENOUS
Status: DISCONTINUED | OUTPATIENT
Start: 2021-07-19 | End: 2021-07-19 | Stop reason: HOSPADM

## 2021-07-19 RX ORDER — HEPARIN SODIUM,PORCINE 10 UNIT/ML
5 VIAL (ML) INTRAVENOUS
Status: CANCELLED | OUTPATIENT
Start: 2021-07-19

## 2021-07-19 RX ORDER — HEPARIN SODIUM (PORCINE) LOCK FLUSH IV SOLN 100 UNIT/ML 100 UNIT/ML
5 SOLUTION INTRAVENOUS
Status: DISCONTINUED | OUTPATIENT
Start: 2021-07-19 | End: 2021-07-19 | Stop reason: HOSPADM

## 2021-07-19 RX ORDER — HEPARIN SODIUM (PORCINE) LOCK FLUSH IV SOLN 100 UNIT/ML 100 UNIT/ML
5 SOLUTION INTRAVENOUS
Status: CANCELLED | OUTPATIENT
Start: 2021-07-19

## 2021-07-19 RX ADMIN — HEPARIN 5 ML: 100 SYRINGE at 08:24

## 2021-07-20 ENCOUNTER — TELEPHONE (OUTPATIENT)
Dept: ONCOLOGY | Facility: HOSPITAL | Age: 31
End: 2021-07-20

## 2021-07-20 NOTE — TELEPHONE ENCOUNTER
Call placed to patient after Dr. Garcia reviewed her blood counts from yesterday.  We had added in pegfilgrastim after her last cycle as she was neutropenic previously.  Per Dr. Garcia the Neulasta is doing what we want to do and we will continue with cycles going forward having Neulasta as part of the plan.  This information was given to the patient who verbalized understanding and was appreciative of the call.    Gina Hayes RN

## 2021-07-22 NOTE — PROGRESS NOTES
[facility] Hematology and Oncology Progress Note    Patient: Veronica Nieves  MRN: [id number]  Date of Service: 06/18/2021      Assessment and Plan:    Cancer Staging  Malignant neoplasm of upper-outer quadrant of left breast in female, estrogen receptor positive (H)  Staging form: Breast, AJCC 8th Edition  - Clinical stage from 5/19/2021: Stage IB (cT2, cN0, cM0, G2, ER+, CA+, HER2+) - Signed by Jefferson Garcia MD on 5/19/2021    1.  Invasive ductal carcinoma of the left breast: She is here for cycle 2 of chemotherapy today.  She has been tolerating it okay.  He has had some diarrhea and some reflux.  We will continue with cycle 2 today at full dosing.  I plan is to give 6 cycles of treatment.  We will reimage after cycle 3 to confirm response.  I asked her to try Imodium if needed for the diarrhea.    ECOG Performance   ECOG Performance Status: 0    Distress Assessment  Distress Assessment Score: 3    Pain  Currently in Pain: Yes  Location: scalp, headache    Diagnosis:    1.  Invasive ductal carcinoma of the left breast: Diagnosed May, 2021.  Initial imaging suggested 3 masses.  Biopsy of the largest mass showed an invasive ductal carcinoma, grade 2.  ER/CA positive, HER-2 positive.  Evaluation of the axilla was negative.    Treatment:    Neoadjuvant chemotherapy with TCHP initiated on May 27, 2021.    Interim History:    Ry presents today for follow-up visit.  She is here for cycle 2 of chemotherapy.  Overall doing okay.  She has had some GERD and is taking Pepcid for it.  She is also had occasional diarrhea.  Appetite is okay.  Maybe some slight tingling in the fingertips for a day or 2 after her first treatment.    Review of Systems:    As above in the history.     Review of Systems otherwise Negative for:  General: chills, fever or night sweats  Psychological: anxiety or depression  Ophthalmic: blurry vision, double vision or loss of vision, vision change  ENT: epistaxis, oral lesions, hearing  changes  Hematological and Lymphatic: bleeding, bruising, jaundice, swollen lymph nodes  Endocrine: hot flashes, unexpected weight changes  Respiratory: cough, hemoptysis, orthopnea or shortness of breath/SHAHID  Cardiovascular: chest pain, edema, palpitations or PND  Gastrointestinal: abdominal pain, blood in stools, change in bowel habits  Genito-Urinary: change in urinary stream, incontinence, frequency/urgency  Musculoskeletal: joint pain, stiffness, swelling, muscle pain  Neurological: dizziness, headaches, numbness/tingling  Dermatological: lumps and rash    Past History:    Past Medical History:   Diagnosis Date     Abnormal Pap smear of cervix     age 13, subsequent pap smears normal     Anxiety      Breast cancer (H) 05/04/2021     Depression      Physical Exam:    Recent Vitals 6/18/2021   Height -   Weight 190 lbs   BSA (m2) 2.01 m2   /68   Pulse 85   Temp 98.1   Temp src -   SpO2 97   Some recent data might be hidden     General: patient appears stated age of 31 y.o.. Nontoxic and in no distress.   HEENT: Head: atraumatic, normocephalic. Sclerae anicteric.  Chest:  Normal respiratory effort  Cardiac:  No edema.   Abdomen: abdomen is non-distended  Extremities: normal tone and muscle bulk.  Skin: no lesions or rash. Warm and dry.   CNS: alert and oriented. Grossly non-focal.   Psychiatric: normal mood and affect.     Lab Results:    Recent Results (from the past 168 hour(s))   Comprehensive Metabolic Panel   Result Value Ref Range    Sodium 140 136 - 145 mmol/L    Potassium 3.7 3.5 - 5.0 mmol/L    Chloride 108 (H) 98 - 107 mmol/L    CO2 23 22 - 31 mmol/L    Anion Gap, Calculation 9 5 - 18 mmol/L    Glucose 168 (H) 70 - 125 mg/dL    BUN 13 8 - 22 mg/dL    Creatinine 0.96 0.60 - 1.10 mg/dL    GFR MDRD Af Amer >60 >60 mL/min/1.73m2    GFR MDRD Non Af Amer >60 >60 mL/min/1.73m2    Bilirubin, Total 0.3 0.0 - 1.0 mg/dL    Calcium 9.2 8.5 - 10.5 mg/dL    Protein, Total 6.9 6.0 - 8.0 g/dL    Albumin 4.0 3.5 -  5.0 g/dL    Alkaline Phosphatase 72 45 - 120 U/L    AST 13 0 - 40 U/L    ALT 21 0 - 45 U/L   HM1 (CBC with Diff)   Result Value Ref Range    WBC 23.2 (H) 4.0 - 11.0 thou/uL    RBC 3.75 (L) 3.80 - 5.40 mill/uL    Hemoglobin 11.0 (L) 12.0 - 16.0 g/dL    Hematocrit 32.9 (L) 35.0 - 47.0 %    MCV 88 80 - 100 fL    MCH 29.3 27.0 - 34.0 pg    MCHC 33.4 32.0 - 36.0 g/dL    RDW 12.4 11.0 - 14.5 %    Platelets 249 140 - 440 thou/uL    MPV 9.6 8.5 - 12.5 fL     Imaging:    Echo Complete    Result Date: 5/21/2021  1. Normal left ventricular size and systolic performance with a visually estimated ejection fraction of 60-65%. 2. No significant valvular heart disease is identified on this study. 3. Normal right ventricular size and systolic performance.    Nm Pet Ct Skull To Mid Thigh    Result Date: 6/2/2021  EXAM: NM PET CT SKULL TO MID THIGH LOCATION: [facility] DATE/TIME: 6/2/2021 12:11 PM INDICATION: New diagnosis of malignant neoplasm of upper-outer quadrant of left breast in female, estrogen receptor positive. Radiation treatment planning for breast cancer. Adjuvant chemotherapy. Initial treatment strategy. COMPARISON: None. TECHNIQUE: Serum glucose level 103 mg/dL. One hour post intravenous administration of 8.5 mCi F-18 FDG, PET imaging was performed from the skull base to the mid thighs utilizing attenuation correction with concurrent axial CT and PET/CT image fusion. Dose reduction techniques were used. FINDINGS: Small FDG avid mass lateral left breast, SUV max 6.7, corresponds to the patient's breast cancer. FDG activity throughout the remainder of the body is physiologic. Specifically, no enlarged or FDG avid left axillary lymph nodes. Right Port-A-Cath in good position. IUD within the uterus.     1.  FDG activity confined to the patient's left breast cancer. No additional sites of suspicious FDG activity.      Signed by: Jefferson Garcia MD

## 2021-07-22 NOTE — PROGRESS NOTES
"Oncology Rooming Note    06/18/21 8:22 AM    Veronica Nieves is a 31 y.o. female who presents for:    Chief Complaint   Patient presents with     HE Cancer       Initial Vitals: /68   Pulse 85   Temp 98.1  F (36.7  C)   Wt 190 lb (86.2 kg)   SpO2 97%   BMI 30.44 kg/m       Estimated body mass index is 30.44 kg/m  as calculated from the following:    Height as of 5/26/21: 5' 6.25\" (1.683 m).    Weight as of this encounter: 190 lb (86.2 kg).     Body surface area is 2.01 meters squared.      Allergies reviewed: Yes  Medications reviewed: Yes    Refills needed: no    Pharmacy name entered into EPIC:     Clinical concerns: no concerns      Ellen Ace RN      "

## 2021-07-23 ENCOUNTER — ANCILLARY PROCEDURE (OUTPATIENT)
Dept: MAMMOGRAPHY | Facility: CLINIC | Age: 31
End: 2021-07-23
Attending: INTERNAL MEDICINE
Payer: COMMERCIAL

## 2021-07-23 DIAGNOSIS — C50.411 MALIGNANT NEOPLASM OF UPPER-OUTER QUADRANT OF RIGHT BREAST IN FEMALE, ESTROGEN RECEPTOR POSITIVE (H): ICD-10-CM

## 2021-07-23 DIAGNOSIS — Z17.0 MALIGNANT NEOPLASM OF UPPER-OUTER QUADRANT OF RIGHT BREAST IN FEMALE, ESTROGEN RECEPTOR POSITIVE (H): ICD-10-CM

## 2021-07-23 PROCEDURE — 76642 ULTRASOUND BREAST LIMITED: CPT | Mod: LT

## 2021-07-23 PROCEDURE — 77065 DX MAMMO INCL CAD UNI: CPT | Mod: LT

## 2021-07-30 ENCOUNTER — ONCOLOGY VISIT (OUTPATIENT)
Dept: ONCOLOGY | Facility: HOSPITAL | Age: 31
End: 2021-07-30
Attending: INTERNAL MEDICINE
Payer: COMMERCIAL

## 2021-07-30 ENCOUNTER — LAB (OUTPATIENT)
Dept: INFUSION THERAPY | Facility: HOSPITAL | Age: 31
End: 2021-07-30
Attending: INTERNAL MEDICINE
Payer: COMMERCIAL

## 2021-07-30 VITALS
HEIGHT: 66 IN | SYSTOLIC BLOOD PRESSURE: 151 MMHG | TEMPERATURE: 98.7 F | WEIGHT: 186.9 LBS | OXYGEN SATURATION: 99 % | DIASTOLIC BLOOD PRESSURE: 77 MMHG | RESPIRATION RATE: 18 BRPM | HEART RATE: 72 BPM | BODY MASS INDEX: 30.04 KG/M2

## 2021-07-30 DIAGNOSIS — C50.412 MALIGNANT NEOPLASM OF UPPER-OUTER QUADRANT OF LEFT BREAST IN FEMALE, ESTROGEN RECEPTOR POSITIVE (H): Primary | ICD-10-CM

## 2021-07-30 DIAGNOSIS — Z17.0 MALIGNANT NEOPLASM OF UPPER-OUTER QUADRANT OF LEFT BREAST IN FEMALE, ESTROGEN RECEPTOR POSITIVE (H): Primary | ICD-10-CM

## 2021-07-30 LAB
ALBUMIN SERPL-MCNC: 4.1 G/DL (ref 3.5–5)
ALP SERPL-CCNC: 83 U/L (ref 45–120)
ALT SERPL W P-5'-P-CCNC: 17 U/L (ref 0–45)
ANION GAP SERPL CALCULATED.3IONS-SCNC: 3 MMOL/L (ref 5–18)
AST SERPL W P-5'-P-CCNC: 16 U/L (ref 0–40)
BASOPHILS # BLD AUTO: 0 10E3/UL (ref 0–0.2)
BASOPHILS NFR BLD AUTO: 0 %
BILIRUB SERPL-MCNC: 0.2 MG/DL (ref 0–1)
BUN SERPL-MCNC: 14 MG/DL (ref 8–22)
CALCIUM SERPL-MCNC: 9.9 MG/DL (ref 8.5–10.5)
CHLORIDE BLD-SCNC: 109 MMOL/L (ref 98–107)
CO2 SERPL-SCNC: 25 MMOL/L (ref 22–31)
CREAT SERPL-MCNC: 1.01 MG/DL (ref 0.6–1.1)
EOSINOPHIL # BLD AUTO: 0 10E3/UL (ref 0–0.7)
EOSINOPHIL NFR BLD AUTO: 0 %
ERYTHROCYTE [DISTWIDTH] IN BLOOD BY AUTOMATED COUNT: 15 % (ref 10–15)
GFR SERPL CREATININE-BSD FRML MDRD: 74 ML/MIN/1.73M2
GLUCOSE BLD-MCNC: 233 MG/DL (ref 70–125)
HCT VFR BLD AUTO: 28.6 % (ref 35–47)
HGB BLD-MCNC: 9.7 G/DL (ref 11.7–15.7)
IMM GRANULOCYTES # BLD: 0.2 10E3/UL
IMM GRANULOCYTES NFR BLD: 1 %
LYMPHOCYTES # BLD AUTO: 0.9 10E3/UL (ref 0.8–5.3)
LYMPHOCYTES NFR BLD AUTO: 6 %
MCH RBC QN AUTO: 30 PG (ref 26.5–33)
MCHC RBC AUTO-ENTMCNC: 33.9 G/DL (ref 31.5–36.5)
MCV RBC AUTO: 89 FL (ref 78–100)
MONOCYTES # BLD AUTO: 0.2 10E3/UL (ref 0–1.3)
MONOCYTES NFR BLD AUTO: 1 %
NEUTROPHILS # BLD AUTO: 14.1 10E3/UL (ref 1.6–8.3)
NEUTROPHILS NFR BLD AUTO: 92 %
NRBC # BLD AUTO: 0 10E3/UL
NRBC BLD AUTO-RTO: 0 /100
PLATELET # BLD AUTO: 258 10E3/UL (ref 150–450)
POTASSIUM BLD-SCNC: 4 MMOL/L (ref 3.5–5)
PROT SERPL-MCNC: 7.1 G/DL (ref 6–8)
RBC # BLD AUTO: 3.23 10E6/UL (ref 3.8–5.2)
SODIUM SERPL-SCNC: 137 MMOL/L (ref 136–145)
WBC # BLD AUTO: 15.2 10E3/UL (ref 4–11)

## 2021-07-30 PROCEDURE — 82247 BILIRUBIN TOTAL: CPT | Performed by: INTERNAL MEDICINE

## 2021-07-30 PROCEDURE — 96372 THER/PROPH/DIAG INJ SC/IM: CPT | Performed by: INTERNAL MEDICINE

## 2021-07-30 PROCEDURE — 85025 COMPLETE CBC W/AUTO DIFF WBC: CPT | Performed by: INTERNAL MEDICINE

## 2021-07-30 PROCEDURE — G0463 HOSPITAL OUTPT CLINIC VISIT: HCPCS | Mod: 25

## 2021-07-30 PROCEDURE — 96367 TX/PROPH/DG ADDL SEQ IV INF: CPT

## 2021-07-30 PROCEDURE — 258N000003 HC RX IP 258 OP 636: Performed by: INTERNAL MEDICINE

## 2021-07-30 PROCEDURE — 96413 CHEMO IV INFUSION 1 HR: CPT

## 2021-07-30 PROCEDURE — 96417 CHEMO IV INFUS EACH ADDL SEQ: CPT

## 2021-07-30 PROCEDURE — 250N000011 HC RX IP 250 OP 636: Performed by: INTERNAL MEDICINE

## 2021-07-30 PROCEDURE — 99214 OFFICE O/P EST MOD 30 MIN: CPT | Performed by: INTERNAL MEDICINE

## 2021-07-30 PROCEDURE — 96377 APPLICATON ON-BODY INJECTOR: CPT | Performed by: INTERNAL MEDICINE

## 2021-07-30 PROCEDURE — 96375 TX/PRO/DX INJ NEW DRUG ADDON: CPT

## 2021-07-30 PROCEDURE — 82040 ASSAY OF SERUM ALBUMIN: CPT | Performed by: INTERNAL MEDICINE

## 2021-07-30 RX ORDER — PALONOSETRON 0.05 MG/ML
0.25 INJECTION, SOLUTION INTRAVENOUS ONCE
Status: CANCELLED
Start: 2021-08-19

## 2021-07-30 RX ORDER — METHYLPREDNISOLONE SODIUM SUCCINATE 125 MG/2ML
125 INJECTION, POWDER, LYOPHILIZED, FOR SOLUTION INTRAMUSCULAR; INTRAVENOUS
Status: CANCELLED
Start: 2021-08-19

## 2021-07-30 RX ORDER — DIPHENHYDRAMINE HYDROCHLORIDE 50 MG/ML
50 INJECTION INTRAMUSCULAR; INTRAVENOUS
Status: CANCELLED
Start: 2021-07-30

## 2021-07-30 RX ORDER — ALBUTEROL SULFATE 0.83 MG/ML
2.5 SOLUTION RESPIRATORY (INHALATION)
Status: CANCELLED | OUTPATIENT
Start: 2021-08-19

## 2021-07-30 RX ORDER — ALBUTEROL SULFATE 0.83 MG/ML
2.5 SOLUTION RESPIRATORY (INHALATION)
Status: CANCELLED | OUTPATIENT
Start: 2021-07-30

## 2021-07-30 RX ORDER — DIPHENHYDRAMINE HYDROCHLORIDE 50 MG/ML
50 INJECTION INTRAMUSCULAR; INTRAVENOUS
Status: CANCELLED
Start: 2021-08-19

## 2021-07-30 RX ORDER — EPINEPHRINE 1 MG/ML
0.3 INJECTION, SOLUTION INTRAMUSCULAR; SUBCUTANEOUS EVERY 5 MIN PRN
Status: CANCELLED | OUTPATIENT
Start: 2021-08-19

## 2021-07-30 RX ORDER — MEPERIDINE HYDROCHLORIDE 25 MG/ML
25 INJECTION INTRAMUSCULAR; INTRAVENOUS; SUBCUTANEOUS EVERY 30 MIN PRN
Status: CANCELLED | OUTPATIENT
Start: 2021-08-19

## 2021-07-30 RX ORDER — METHYLPREDNISOLONE SODIUM SUCCINATE 125 MG/2ML
125 INJECTION, POWDER, LYOPHILIZED, FOR SOLUTION INTRAMUSCULAR; INTRAVENOUS
Status: CANCELLED
Start: 2021-07-30

## 2021-07-30 RX ORDER — PROCHLORPERAZINE MALEATE 10 MG
10 TABLET ORAL EVERY 6 HOURS PRN
Qty: 30 TABLET | Refills: 1 | Status: SHIPPED | OUTPATIENT
Start: 2021-07-30 | End: 2021-10-05

## 2021-07-30 RX ORDER — PALONOSETRON 0.05 MG/ML
0.25 INJECTION, SOLUTION INTRAVENOUS ONCE
Status: COMPLETED | OUTPATIENT
Start: 2021-07-30 | End: 2021-07-30

## 2021-07-30 RX ORDER — ACETAMINOPHEN 325 MG/1
650 TABLET ORAL
Status: CANCELLED
Start: 2021-07-30

## 2021-07-30 RX ORDER — HEPARIN SODIUM (PORCINE) LOCK FLUSH IV SOLN 100 UNIT/ML 100 UNIT/ML
5 SOLUTION INTRAVENOUS
Status: CANCELLED | OUTPATIENT
Start: 2021-07-30

## 2021-07-30 RX ORDER — DEXAMETHASONE 4 MG/1
TABLET ORAL
Qty: 36 TABLET | Refills: 1 | Status: SHIPPED | OUTPATIENT
Start: 2021-07-30 | End: 2021-10-05

## 2021-07-30 RX ORDER — LORAZEPAM 2 MG/ML
0.5 INJECTION INTRAMUSCULAR EVERY 4 HOURS PRN
Status: CANCELLED
Start: 2021-08-19

## 2021-07-30 RX ORDER — ACETAMINOPHEN 325 MG/1
650 TABLET ORAL
Status: CANCELLED
Start: 2021-08-19

## 2021-07-30 RX ORDER — ALBUTEROL SULFATE 90 UG/1
1-2 AEROSOL, METERED RESPIRATORY (INHALATION)
Status: CANCELLED
Start: 2021-08-19

## 2021-07-30 RX ORDER — DIPHENHYDRAMINE HCL 50 MG
50 CAPSULE ORAL
Status: CANCELLED | OUTPATIENT
Start: 2021-07-30

## 2021-07-30 RX ORDER — HEPARIN SODIUM (PORCINE) LOCK FLUSH IV SOLN 100 UNIT/ML 100 UNIT/ML
5 SOLUTION INTRAVENOUS
Status: DISCONTINUED | OUTPATIENT
Start: 2021-07-30 | End: 2021-07-30 | Stop reason: HOSPADM

## 2021-07-30 RX ORDER — NALOXONE HYDROCHLORIDE 0.4 MG/ML
0.2 INJECTION, SOLUTION INTRAMUSCULAR; INTRAVENOUS; SUBCUTANEOUS
Status: CANCELLED | OUTPATIENT
Start: 2021-08-19

## 2021-07-30 RX ORDER — PALONOSETRON 0.05 MG/ML
0.25 INJECTION, SOLUTION INTRAVENOUS ONCE
Status: CANCELLED
Start: 2021-07-30

## 2021-07-30 RX ORDER — HEPARIN SODIUM,PORCINE 10 UNIT/ML
5 VIAL (ML) INTRAVENOUS
Status: CANCELLED | OUTPATIENT
Start: 2021-08-19

## 2021-07-30 RX ORDER — LORAZEPAM 2 MG/ML
0.5 INJECTION INTRAMUSCULAR EVERY 4 HOURS PRN
Status: CANCELLED
Start: 2021-07-30

## 2021-07-30 RX ORDER — ALBUTEROL SULFATE 90 UG/1
1-2 AEROSOL, METERED RESPIRATORY (INHALATION)
Status: CANCELLED
Start: 2021-07-30

## 2021-07-30 RX ORDER — HEPARIN SODIUM,PORCINE 10 UNIT/ML
5 VIAL (ML) INTRAVENOUS
Status: CANCELLED | OUTPATIENT
Start: 2021-07-30

## 2021-07-30 RX ORDER — DIPHENHYDRAMINE HCL 50 MG
50 CAPSULE ORAL
Status: CANCELLED | OUTPATIENT
Start: 2021-08-19

## 2021-07-30 RX ORDER — EPINEPHRINE 1 MG/ML
0.3 INJECTION, SOLUTION INTRAMUSCULAR; SUBCUTANEOUS EVERY 5 MIN PRN
Status: CANCELLED | OUTPATIENT
Start: 2021-07-30

## 2021-07-30 RX ORDER — HEPARIN SODIUM (PORCINE) LOCK FLUSH IV SOLN 100 UNIT/ML 100 UNIT/ML
5 SOLUTION INTRAVENOUS
Status: CANCELLED | OUTPATIENT
Start: 2021-08-19

## 2021-07-30 RX ORDER — NALOXONE HYDROCHLORIDE 0.4 MG/ML
0.2 INJECTION, SOLUTION INTRAMUSCULAR; INTRAVENOUS; SUBCUTANEOUS
Status: CANCELLED | OUTPATIENT
Start: 2021-07-30

## 2021-07-30 RX ORDER — MEPERIDINE HYDROCHLORIDE 25 MG/ML
25 INJECTION INTRAMUSCULAR; INTRAVENOUS; SUBCUTANEOUS EVERY 30 MIN PRN
Status: CANCELLED | OUTPATIENT
Start: 2021-07-30

## 2021-07-30 RX ADMIN — CARBOPLATIN 680 MG: 10 INJECTION, SOLUTION INTRAVENOUS at 14:25

## 2021-07-30 RX ADMIN — PALONOSETRON 0.25 MG: 0.25 INJECTION, SOLUTION INTRAVENOUS at 11:24

## 2021-07-30 RX ADMIN — PERTUZUMAB 420 MG: 30 INJECTION, SOLUTION, CONCENTRATE INTRAVENOUS at 12:32

## 2021-07-30 RX ADMIN — SODIUM CHLORIDE 250 ML: 9 INJECTION, SOLUTION INTRAVENOUS at 11:23

## 2021-07-30 RX ADMIN — DOCETAXEL 150 MG: 20 INJECTION, SOLUTION, CONCENTRATE INTRAVENOUS at 13:21

## 2021-07-30 RX ADMIN — PEGFILGRASTIM 6 MG: KIT SUBCUTANEOUS at 14:26

## 2021-07-30 RX ADMIN — SODIUM CHLORIDE 508 MG: 9 INJECTION, SOLUTION INTRAVENOUS at 11:58

## 2021-07-30 RX ADMIN — HEPARIN 5 ML: 100 SYRINGE at 14:58

## 2021-07-30 RX ADMIN — FOSAPREPITANT: 150 INJECTION, POWDER, LYOPHILIZED, FOR SOLUTION INTRAVENOUS at 11:25

## 2021-07-30 ASSESSMENT — MIFFLIN-ST. JEOR: SCORE: 1578.65

## 2021-07-30 ASSESSMENT — PAIN SCALES - GENERAL: PAINLEVEL: NO PAIN (0)

## 2021-07-30 NOTE — LETTER
"    7/30/2021         RE: Veronica Nieves  [address]  [facility] [address]        Dear Colleague,    Thank you for referring your patient, Veronica Nieves, to the [facility]. Please see a copy of my visit note below.    Oncology Rooming Note    July 30, 2021 9:53 AM   Veronica Nieves is a 31 year old female who presents for:    Chief Complaint   Patient presents with     Oncology Clinic Visit     3 weeks Malignant neoplasm of upper-outer quadrant of left breast, Labs & Infusion today     Initial Vitals: BP (!) 151/77 (BP Location: Right arm, Patient Position: Sitting, Cuff Size: Adult Regular)   Pulse 72   Temp 98.7  F (37.1  C)   Resp 18   Ht 1.683 m (5' 6.26\")   Wt 84.8 kg (186 lb 14.4 oz)   SpO2 99%   BMI 29.93 kg/m   Estimated body mass index is 29.93 kg/m  as calculated from the following:    Height as of this encounter: 1.683 m (5' 6.26\").    Weight as of this encounter: 84.8 kg (186 lb 14.4 oz). Body surface area is 1.99 meters squared.  No Pain (0) Comment: Data Unavailable   No LMP recorded.  Allergies reviewed: Yes  Medications reviewed: Yes    Medications: MEDICATION REFILLS NEEDED TODAY. Provider was notified.  Pharmacy name entered into [id number] [place] [facility] [address]    Clinical concerns Needs refill of Dexamethasone 4 tabs to go to [facility]. Until her mail order arrives. Refill Compazine as well.       Angie Henriquez [facility] Hematology and Oncology Progress Note    Patient: Veronica Nieves  MRN: [id number]  Date of Service: Jul 30, 2021        Assessment and Plan:    Cancer Staging  Malignant neoplasm of upper-outer quadrant of left breast in female, estrogen receptor positive (H)  Staging form: Breast, AJCC 8th Edition  - Clinical stage from 5/19/2021: Stage IB (cT2, cN0, cM0, G2, ER+, WV+, HER2+) - Signed by Jefferson Garcia MD on 5/19/2021     1.  Invasive ductal carcinoma of the left " breast: She will receive her fourth cycle of neoadjuvant chemotherapy today.  She remains at full doses.  She had an ultrasound a week ago which shows decreased tumor size.  Reviewed with them that whether she has a mastectomy or lumpectomy will be based on discussions with her Dr. Newman.  We will plan on 6 cycles of chemotherapy.  We will get her back set up with surgery and plastic surgery when she comes in for her next treatment in 3 weeks.     2.  Chemotherapy-induced neutropenia: She is now receiving Neulasta with each cycle.    ECOG Performance  1    Diagnosis:    1.  Invasive ductal carcinoma of the left breast: Diagnosed May, 2021.  Initial imaging suggested 3 masses.  Biopsy of the largest mass showed an invasive ductal carcinoma, grade 2.  ER/CA positive, HER-2 positive.  Evaluation of the axilla was negative.    Treatment:    Neoadjuvant chemotherapy with TCHP initiated on May 27, 2021.    Interim History:    Abbey presents today for a follow-up visit.  He has some peripheral neuropathy.  No constipation.  Some dyspnea on exertion.  He is getting cycle 4 of chemotherapy today.  Taste is off.  Appetite is decreased.  No acute complaints today.    Review of Systems:    As above in the history.     Review of Systems otherwise Negative for:  General: chills, fever or night sweats  Psychological: anxiety or depression  Ophthalmic: blurry vision, double vision or loss of vision, vision change  ENT: epistaxis, oral lesions, hearing changes  Hematological and Lymphatic: bleeding, bruising, jaundice, swollen lymph nodes  Endocrine: hot flashes, unexpected weight changes  Respiratory: cough, hemoptysis, orthopnea or shortness of breath/SHAHID  Cardiovascular: chest pain, edema, palpitations or PND  Gastrointestinal: abdominal pain, blood in stools, change in bowel habits, constipation, diarrhea or nausea/vomiting  Genito-Urinary: change in urinary stream, incontinence, frequency/urgency  Musculoskeletal: joint pain,  "stiffness, swelling, muscle pain  Neurological: dizziness, headaches, numbness/tingling  Dermatological: lumps and rash    Past History:    Past Medical History:   Diagnosis Date     Abnormal Pap smear of cervix     age 13, subsequent pap smears normal     Anxiety      Breast cancer (H) 05/04/2021     Depression      Physical Exam:    BP (!) 151/77 (BP Location: Right arm, Patient Position: Sitting, Cuff Size: Adult Regular)   Pulse 72   Temp 98.7  F (37.1  C)   Resp 18   Ht 1.683 m (5' 6.26\")   Wt 84.8 kg (186 lb 14.4 oz)   SpO2 99%   BMI 29.93 kg/m      General: patient appears stated age of 31 year old. Nontoxic and in no distress.   HEENT: Head: atraumatic, normocephalic. Sclerae anicteric.  Chest:  Normal respiratory effort  Cardiac:  No edema.   Abdomen: abdomen is non-distended  Extremities: normal tone and muscle bulk.  Skin: no lesions or rash on visible skin. Warm and dry.   CNS: alert and oriented. Grossly non-focal.   Psychiatric: normal mood and affect.     Lab Results:    Imaging:    No results found.      Signed by: Jefferson Garcia MD        Again, thank you for allowing me to participate in the care of your patient.        Sincerely,        Jefferson Garcia MD    "

## 2021-07-30 NOTE — PROGRESS NOTES
Pt arrived ambulatory to clinic for Cycle # 4 Day # 1 of her chemotherapy regimen.  Port was accessed using aseptic technique without difficulties with excellent blood return.  Administered premedications, chemotherapy, and Neulasta patch per MD order.  Pt tolerated infusion well, no s/s of infusion reaction.  Placed Neulasta patch at 1430, instructed pt to remove 28 hours later.  Port was flushed with NS and Heparin then de-accessed using 2x2 and papertape.  Pt verbalized understanding of plan of care and return to clinic.

## 2021-07-30 NOTE — PROGRESS NOTES
"Oncology Rooming Note    July 30, 2021 9:53 AM   Veronica Nieves is a 31 year old female who presents for:    Chief Complaint   Patient presents with     Oncology Clinic Visit     3 weeks Malignant neoplasm of upper-outer quadrant of left breast, Labs & Infusion today     Initial Vitals: BP (!) 151/77 (BP Location: Right arm, Patient Position: Sitting, Cuff Size: Adult Regular)   Pulse 72   Temp 98.7  F (37.1  C)   Resp 18   Ht 1.683 m (5' 6.26\")   Wt 84.8 kg (186 lb 14.4 oz)   SpO2 99%   BMI 29.93 kg/m   Estimated body mass index is 29.93 kg/m  as calculated from the following:    Height as of this encounter: 1.683 m (5' 6.26\").    Weight as of this encounter: 84.8 kg (186 lb 14.4 oz). Body surface area is 1.99 meters squared.  No Pain (0) Comment: Data Unavailable   No LMP recorded.  Allergies reviewed: Yes  Medications reviewed: Yes    Medications: MEDICATION REFILLS NEEDED TODAY. Provider was notified.  Pharm[facility] into [id number] [place] [facility] [address] [facility]    Clinical concerns Needs refill of Dexamethasone 4 tabs to go to [facility]. Until her mail order arrives. Refill Compazine as well.       Angie Henriquez, [facility]              "

## 2021-08-09 ENCOUNTER — LAB (OUTPATIENT)
Dept: INFUSION THERAPY | Facility: HOSPITAL | Age: 31
End: 2021-08-09
Attending: INTERNAL MEDICINE
Payer: COMMERCIAL

## 2021-08-09 DIAGNOSIS — C50.412 MALIGNANT NEOPLASM OF UPPER-OUTER QUADRANT OF LEFT BREAST IN FEMALE, ESTROGEN RECEPTOR POSITIVE (H): Primary | ICD-10-CM

## 2021-08-09 DIAGNOSIS — Z17.0 MALIGNANT NEOPLASM OF UPPER-OUTER QUADRANT OF LEFT BREAST IN FEMALE, ESTROGEN RECEPTOR POSITIVE (H): Primary | ICD-10-CM

## 2021-08-09 LAB
ALBUMIN SERPL-MCNC: 3.6 G/DL (ref 3.5–5)
ALP SERPL-CCNC: 131 U/L (ref 45–120)
ALT SERPL W P-5'-P-CCNC: 20 U/L (ref 0–45)
ANION GAP SERPL CALCULATED.3IONS-SCNC: 9 MMOL/L (ref 5–18)
AST SERPL W P-5'-P-CCNC: 20 U/L (ref 0–40)
BASOPHILS # BLD MANUAL: 0 10E3/UL (ref 0–0.2)
BASOPHILS NFR BLD MANUAL: 0 %
BILIRUB SERPL-MCNC: 0.2 MG/DL (ref 0–1)
BUN SERPL-MCNC: 9 MG/DL (ref 8–22)
CALCIUM SERPL-MCNC: 8.3 MG/DL (ref 8.5–10.5)
CHLORIDE BLD-SCNC: 108 MMOL/L (ref 98–107)
CO2 SERPL-SCNC: 24 MMOL/L (ref 22–31)
CREAT SERPL-MCNC: 0.94 MG/DL (ref 0.6–1.1)
ELLIPTOCYTES BLD QL SMEAR: SLIGHT
EOSINOPHIL # BLD MANUAL: 0 10E3/UL (ref 0–0.7)
EOSINOPHIL NFR BLD MANUAL: 0 %
ERYTHROCYTE [DISTWIDTH] IN BLOOD BY AUTOMATED COUNT: 15.9 % (ref 10–15)
GFR SERPL CREATININE-BSD FRML MDRD: 81 ML/MIN/1.73M2
GLUCOSE BLD-MCNC: 97 MG/DL (ref 70–125)
HCT VFR BLD AUTO: 29.4 % (ref 35–47)
HGB BLD-MCNC: 9.6 G/DL (ref 11.7–15.7)
LYMPHOCYTES # BLD MANUAL: 1.8 10E3/UL (ref 0.8–5.3)
LYMPHOCYTES NFR BLD MANUAL: 9 %
MCH RBC QN AUTO: 29.4 PG (ref 26.5–33)
MCHC RBC AUTO-ENTMCNC: 32.7 G/DL (ref 31.5–36.5)
MCV RBC AUTO: 90 FL (ref 78–100)
METAMYELOCYTES # BLD MANUAL: 0.4 10E3/UL
METAMYELOCYTES NFR BLD MANUAL: 2 %
MONOCYTES # BLD MANUAL: 1 10E3/UL (ref 0–1.3)
MONOCYTES NFR BLD MANUAL: 5 %
MYELOCYTES # BLD MANUAL: 1.4 10E3/UL
MYELOCYTES NFR BLD MANUAL: 7 %
NEUTROPHILS # BLD MANUAL: 15.5 10E3/UL (ref 1.6–8.3)
NEUTROPHILS NFR BLD MANUAL: 77 %
NRBC # BLD AUTO: 0.4 10E3/UL
NRBC BLD MANUAL-RTO: 2 %
PLAT MORPH BLD: ABNORMAL
PLATELET # BLD AUTO: 191 10E3/UL (ref 150–450)
POLYCHROMASIA BLD QL SMEAR: SLIGHT
POTASSIUM BLD-SCNC: 3.6 MMOL/L (ref 3.5–5)
PROT SERPL-MCNC: 6.4 G/DL (ref 6–8)
RBC # BLD AUTO: 3.26 10E6/UL (ref 3.8–5.2)
RBC MORPH BLD: ABNORMAL
SODIUM SERPL-SCNC: 141 MMOL/L (ref 136–145)
WBC # BLD AUTO: 20.1 10E3/UL (ref 4–11)

## 2021-08-09 PROCEDURE — 85027 COMPLETE CBC AUTOMATED: CPT

## 2021-08-09 PROCEDURE — 250N000011 HC RX IP 250 OP 636: Performed by: INTERNAL MEDICINE

## 2021-08-09 PROCEDURE — 36591 DRAW BLOOD OFF VENOUS DEVICE: CPT

## 2021-08-09 PROCEDURE — 82040 ASSAY OF SERUM ALBUMIN: CPT

## 2021-08-09 RX ORDER — HEPARIN SODIUM,PORCINE 10 UNIT/ML
5 VIAL (ML) INTRAVENOUS
Status: CANCELLED | OUTPATIENT
Start: 2021-08-09

## 2021-08-09 RX ORDER — HEPARIN SODIUM (PORCINE) LOCK FLUSH IV SOLN 100 UNIT/ML 100 UNIT/ML
5 SOLUTION INTRAVENOUS
Status: DISCONTINUED | OUTPATIENT
Start: 2021-08-09 | End: 2021-08-09 | Stop reason: HOSPADM

## 2021-08-09 RX ORDER — HEPARIN SODIUM (PORCINE) LOCK FLUSH IV SOLN 100 UNIT/ML 100 UNIT/ML
5 SOLUTION INTRAVENOUS
Status: CANCELLED | OUTPATIENT
Start: 2021-08-09

## 2021-08-09 RX ADMIN — HEPARIN 5 ML: 100 SYRINGE at 08:23

## 2021-08-10 ENCOUNTER — MYC MEDICAL ADVICE (OUTPATIENT)
Dept: ONCOLOGY | Facility: HOSPITAL | Age: 31
End: 2021-08-10

## 2021-08-18 ENCOUNTER — TELEPHONE (OUTPATIENT)
Dept: ONCOLOGY | Facility: HOSPITAL | Age: 31
End: 2021-08-18

## 2021-08-20 ENCOUNTER — TELEPHONE (OUTPATIENT)
Dept: SURGERY | Facility: CLINIC | Age: 31
End: 2021-08-20

## 2021-08-20 ENCOUNTER — INFUSION THERAPY VISIT (OUTPATIENT)
Dept: INFUSION THERAPY | Facility: HOSPITAL | Age: 31
End: 2021-08-20
Attending: INTERNAL MEDICINE
Payer: COMMERCIAL

## 2021-08-20 ENCOUNTER — TELEPHONE (OUTPATIENT)
Dept: ONCOLOGY | Facility: HOSPITAL | Age: 31
End: 2021-08-20

## 2021-08-20 VITALS
DIASTOLIC BLOOD PRESSURE: 70 MMHG | SYSTOLIC BLOOD PRESSURE: 151 MMHG | WEIGHT: 181 LBS | BODY MASS INDEX: 28.99 KG/M2 | TEMPERATURE: 98.5 F | RESPIRATION RATE: 16 BRPM | OXYGEN SATURATION: 98 % | HEART RATE: 77 BPM

## 2021-08-20 DIAGNOSIS — Z17.0 MALIGNANT NEOPLASM OF UPPER-OUTER QUADRANT OF LEFT BREAST IN FEMALE, ESTROGEN RECEPTOR POSITIVE (H): Primary | ICD-10-CM

## 2021-08-20 DIAGNOSIS — C50.412 MALIGNANT NEOPLASM OF UPPER-OUTER QUADRANT OF LEFT BREAST IN FEMALE, ESTROGEN RECEPTOR POSITIVE (H): Primary | ICD-10-CM

## 2021-08-20 LAB
ALBUMIN SERPL-MCNC: 4 G/DL (ref 3.5–5)
ALP SERPL-CCNC: 75 U/L (ref 45–120)
ALT SERPL W P-5'-P-CCNC: 25 U/L (ref 0–45)
ANION GAP SERPL CALCULATED.3IONS-SCNC: 12 MMOL/L (ref 5–18)
AST SERPL W P-5'-P-CCNC: 18 U/L (ref 0–40)
BASOPHILS # BLD AUTO: 0 10E3/UL (ref 0–0.2)
BASOPHILS NFR BLD AUTO: 0 %
BILIRUB SERPL-MCNC: 0.3 MG/DL (ref 0–1)
BUN SERPL-MCNC: 13 MG/DL (ref 8–22)
CALCIUM SERPL-MCNC: 9.6 MG/DL (ref 8.5–10.5)
CHLORIDE BLD-SCNC: 108 MMOL/L (ref 98–107)
CO2 SERPL-SCNC: 22 MMOL/L (ref 22–31)
CREAT SERPL-MCNC: 0.88 MG/DL (ref 0.6–1.1)
EOSINOPHIL # BLD AUTO: 0 10E3/UL (ref 0–0.7)
EOSINOPHIL NFR BLD AUTO: 0 %
ERYTHROCYTE [DISTWIDTH] IN BLOOD BY AUTOMATED COUNT: 17 % (ref 10–15)
GFR SERPL CREATININE-BSD FRML MDRD: 87 ML/MIN/1.73M2
GLUCOSE BLD-MCNC: 149 MG/DL (ref 70–125)
HCT VFR BLD AUTO: 27.7 % (ref 35–47)
HGB BLD-MCNC: 9.3 G/DL (ref 11.7–15.7)
IMM GRANULOCYTES # BLD: 0.1 10E3/UL
IMM GRANULOCYTES NFR BLD: 1 %
LYMPHOCYTES # BLD AUTO: 0.8 10E3/UL (ref 0.8–5.3)
LYMPHOCYTES NFR BLD AUTO: 6 %
MCH RBC QN AUTO: 30.7 PG (ref 26.5–33)
MCHC RBC AUTO-ENTMCNC: 33.6 G/DL (ref 31.5–36.5)
MCV RBC AUTO: 91 FL (ref 78–100)
MONOCYTES # BLD AUTO: 0.3 10E3/UL (ref 0–1.3)
MONOCYTES NFR BLD AUTO: 2 %
NEUTROPHILS # BLD AUTO: 11.2 10E3/UL (ref 1.6–8.3)
NEUTROPHILS NFR BLD AUTO: 91 %
NRBC # BLD AUTO: 0 10E3/UL
NRBC BLD AUTO-RTO: 0 /100
PLATELET # BLD AUTO: 218 10E3/UL (ref 150–450)
POTASSIUM BLD-SCNC: 3.9 MMOL/L (ref 3.5–5)
PROT SERPL-MCNC: 6.8 G/DL (ref 6–8)
RBC # BLD AUTO: 3.03 10E6/UL (ref 3.8–5.2)
SODIUM SERPL-SCNC: 142 MMOL/L (ref 136–145)
WBC # BLD AUTO: 12.2 10E3/UL (ref 4–11)

## 2021-08-20 PROCEDURE — 96417 CHEMO IV INFUS EACH ADDL SEQ: CPT

## 2021-08-20 PROCEDURE — 250N000011 HC RX IP 250 OP 636: Performed by: INTERNAL MEDICINE

## 2021-08-20 PROCEDURE — 96413 CHEMO IV INFUSION 1 HR: CPT

## 2021-08-20 PROCEDURE — 96377 APPLICATON ON-BODY INJECTOR: CPT | Mod: XS

## 2021-08-20 PROCEDURE — 82040 ASSAY OF SERUM ALBUMIN: CPT | Performed by: INTERNAL MEDICINE

## 2021-08-20 PROCEDURE — 85025 COMPLETE CBC W/AUTO DIFF WBC: CPT | Performed by: INTERNAL MEDICINE

## 2021-08-20 PROCEDURE — 96372 THER/PROPH/DIAG INJ SC/IM: CPT | Performed by: INTERNAL MEDICINE

## 2021-08-20 PROCEDURE — 96367 TX/PROPH/DG ADDL SEQ IV INF: CPT

## 2021-08-20 PROCEDURE — 258N000003 HC RX IP 258 OP 636: Performed by: INTERNAL MEDICINE

## 2021-08-20 PROCEDURE — 96375 TX/PRO/DX INJ NEW DRUG ADDON: CPT

## 2021-08-20 RX ORDER — ALBUTEROL SULFATE 90 UG/1
1-2 AEROSOL, METERED RESPIRATORY (INHALATION)
Status: CANCELLED
Start: 2021-09-09

## 2021-08-20 RX ORDER — DIPHENHYDRAMINE HYDROCHLORIDE 50 MG/ML
50 INJECTION INTRAMUSCULAR; INTRAVENOUS
Status: DISCONTINUED | OUTPATIENT
Start: 2021-08-20 | End: 2021-08-20 | Stop reason: HOSPADM

## 2021-08-20 RX ORDER — HEPARIN SODIUM,PORCINE 10 UNIT/ML
5 VIAL (ML) INTRAVENOUS
Status: CANCELLED | OUTPATIENT
Start: 2021-09-09

## 2021-08-20 RX ORDER — DIPHENHYDRAMINE HCL 50 MG
50 CAPSULE ORAL
Status: CANCELLED | OUTPATIENT
Start: 2021-09-09

## 2021-08-20 RX ORDER — METHYLPREDNISOLONE SODIUM SUCCINATE 125 MG/2ML
125 INJECTION, POWDER, LYOPHILIZED, FOR SOLUTION INTRAMUSCULAR; INTRAVENOUS
Status: DISCONTINUED | OUTPATIENT
Start: 2021-08-20 | End: 2021-08-20 | Stop reason: HOSPADM

## 2021-08-20 RX ORDER — ALBUTEROL SULFATE 0.83 MG/ML
2.5 SOLUTION RESPIRATORY (INHALATION)
Status: DISCONTINUED | OUTPATIENT
Start: 2021-08-20 | End: 2021-08-20 | Stop reason: HOSPADM

## 2021-08-20 RX ORDER — LORAZEPAM 2 MG/ML
0.5 INJECTION INTRAMUSCULAR EVERY 4 HOURS PRN
Status: CANCELLED
Start: 2021-09-09

## 2021-08-20 RX ORDER — PALONOSETRON 0.05 MG/ML
0.25 INJECTION, SOLUTION INTRAVENOUS ONCE
Status: COMPLETED | OUTPATIENT
Start: 2021-08-20 | End: 2021-08-20

## 2021-08-20 RX ORDER — HEPARIN SODIUM (PORCINE) LOCK FLUSH IV SOLN 100 UNIT/ML 100 UNIT/ML
5 SOLUTION INTRAVENOUS
Status: CANCELLED | OUTPATIENT
Start: 2021-09-09

## 2021-08-20 RX ORDER — METHYLPREDNISOLONE SODIUM SUCCINATE 125 MG/2ML
125 INJECTION, POWDER, LYOPHILIZED, FOR SOLUTION INTRAMUSCULAR; INTRAVENOUS
Status: CANCELLED
Start: 2021-09-09

## 2021-08-20 RX ORDER — NALOXONE HYDROCHLORIDE 0.4 MG/ML
0.2 INJECTION, SOLUTION INTRAMUSCULAR; INTRAVENOUS; SUBCUTANEOUS
Status: DISCONTINUED | OUTPATIENT
Start: 2021-08-20 | End: 2021-08-20 | Stop reason: HOSPADM

## 2021-08-20 RX ORDER — MEPERIDINE HYDROCHLORIDE 25 MG/ML
25 INJECTION INTRAMUSCULAR; INTRAVENOUS; SUBCUTANEOUS EVERY 30 MIN PRN
Status: DISCONTINUED | OUTPATIENT
Start: 2021-08-20 | End: 2021-08-20 | Stop reason: HOSPADM

## 2021-08-20 RX ORDER — DIPHENHYDRAMINE HYDROCHLORIDE 50 MG/ML
50 INJECTION INTRAMUSCULAR; INTRAVENOUS
Status: CANCELLED
Start: 2021-09-09

## 2021-08-20 RX ORDER — HEPARIN SODIUM (PORCINE) LOCK FLUSH IV SOLN 100 UNIT/ML 100 UNIT/ML
5 SOLUTION INTRAVENOUS
Status: DISCONTINUED | OUTPATIENT
Start: 2021-08-20 | End: 2021-08-20 | Stop reason: HOSPADM

## 2021-08-20 RX ORDER — ALBUTEROL SULFATE 90 UG/1
1-2 AEROSOL, METERED RESPIRATORY (INHALATION)
Status: DISCONTINUED | OUTPATIENT
Start: 2021-08-20 | End: 2021-08-20 | Stop reason: HOSPADM

## 2021-08-20 RX ORDER — NALOXONE HYDROCHLORIDE 0.4 MG/ML
0.2 INJECTION, SOLUTION INTRAMUSCULAR; INTRAVENOUS; SUBCUTANEOUS
Status: CANCELLED | OUTPATIENT
Start: 2021-09-09

## 2021-08-20 RX ORDER — ACETAMINOPHEN 325 MG/1
650 TABLET ORAL
Status: CANCELLED
Start: 2021-09-09

## 2021-08-20 RX ORDER — SODIUM FLUORIDE 6 MG/ML
PASTE, DENTIFRICE DENTAL
Status: ON HOLD | COMMUNITY
Start: 2021-08-05 | End: 2022-06-10

## 2021-08-20 RX ORDER — PALONOSETRON 0.05 MG/ML
0.25 INJECTION, SOLUTION INTRAVENOUS ONCE
Status: CANCELLED
Start: 2021-09-09

## 2021-08-20 RX ORDER — EPINEPHRINE 1 MG/ML
0.3 INJECTION, SOLUTION INTRAMUSCULAR; SUBCUTANEOUS EVERY 5 MIN PRN
Status: DISCONTINUED | OUTPATIENT
Start: 2021-08-20 | End: 2021-08-20 | Stop reason: HOSPADM

## 2021-08-20 RX ORDER — EPINEPHRINE 1 MG/ML
0.3 INJECTION, SOLUTION INTRAMUSCULAR; SUBCUTANEOUS EVERY 5 MIN PRN
Status: CANCELLED | OUTPATIENT
Start: 2021-09-09

## 2021-08-20 RX ORDER — ALBUTEROL SULFATE 0.83 MG/ML
2.5 SOLUTION RESPIRATORY (INHALATION)
Status: CANCELLED | OUTPATIENT
Start: 2021-09-09

## 2021-08-20 RX ORDER — MEPERIDINE HYDROCHLORIDE 25 MG/ML
25 INJECTION INTRAMUSCULAR; INTRAVENOUS; SUBCUTANEOUS EVERY 30 MIN PRN
Status: CANCELLED | OUTPATIENT
Start: 2021-09-09

## 2021-08-20 RX ADMIN — SODIUM CHLORIDE 250 ML: 9 INJECTION, SOLUTION INTRAVENOUS at 10:31

## 2021-08-20 RX ADMIN — HEPARIN 5 ML: 100 SYRINGE at 14:10

## 2021-08-20 RX ADMIN — CARBOPLATIN 720 MG: 10 INJECTION INTRAVENOUS at 13:30

## 2021-08-20 RX ADMIN — DOCETAXEL 150 MG: 20 INJECTION, SOLUTION, CONCENTRATE INTRAVENOUS at 12:22

## 2021-08-20 RX ADMIN — PEGFILGRASTIM 6 MG: KIT SUBCUTANEOUS at 13:33

## 2021-08-20 RX ADMIN — SODIUM CHLORIDE 508 MG: 9 INJECTION, SOLUTION INTRAVENOUS at 11:45

## 2021-08-20 RX ADMIN — PERTUZUMAB 420 MG: 30 INJECTION, SOLUTION, CONCENTRATE INTRAVENOUS at 11:09

## 2021-08-20 RX ADMIN — DEXAMETHASONE SODIUM PHOSPHATE: 10 INJECTION, SOLUTION INTRAMUSCULAR; INTRAVENOUS at 10:37

## 2021-08-20 RX ADMIN — PALONOSETRON 0.25 MG: 0.05 INJECTION, SOLUTION INTRAVENOUS at 10:31

## 2021-08-20 ASSESSMENT — PAIN SCALES - GENERAL: PAINLEVEL: NO PAIN (0)

## 2021-08-20 NOTE — TELEPHONE ENCOUNTER
----- Message from Dede DANIEL Lazar sent at 8/20/2021  4:18 PM CDT -----  Regarding: FW: Surgery    ----- Message -----  From: Aundrea Ma  Sent: 8/20/2021   4:16 PM CDT  To: Dede SANCHEZ Cady  Subject: FW: Surgery                                      This appears to be a request for a consult not actually surgery? Can you help her set up a consult with Trent if it isn't done yet.  Thank you!   ----- Message -----  From: Rohini Charles RN  Sent: 8/20/2021  11:36 AM CDT  To: Laurence London, Audrey Newman MD, #  Subject: Surgery                                          Hello - This is a patient of Dr Garcia who is currently receiving 6 rounds of chemo for her breast cancer.  She is currently receiving cycle 5 of 6 and would like to get a surgery consult set up.  Please contact patient at your convenience.  Thanks!  DEMETRIUS Nova

## 2021-08-20 NOTE — PROGRESS NOTES
Infusion Nursing Note:  Veronica Nieves presents today for C5 D1.    Patient seen by provider today: No   present during visit today: Not Applicable.    Note: Pt arrived ambulatory to [facility] infusion. No visit with provider today. Pt had questions concerning up coming planning for surgery and radiation. Rohini ROMO came over to discuss questions with patient.      Intravenous Access:  Port with good blood return, labs drawn.    Treatment Conditions:  Lab Results   Component Value Date    HGB 9.3 08/20/2021     Lab Results   Component Value Date    WBC 12.2 08/20/2021      Lab Results   Component Value Date    ANEU 15.5 08/09/2021     Lab Results   Component Value Date     08/20/2021      Lab Results   Component Value Date     08/20/2021                   Lab Results   Component Value Date    POTASSIUM 3.9 08/20/2021           No results found for: MAG         Lab Results   Component Value Date    CR 0.88 08/20/2021                   Lab Results   Component Value Date    BURT 9.6 08/20/2021                Lab Results   Component Value Date    BILITOTAL 0.3 08/20/2021           Lab Results   Component Value Date    ALBUMIN 4.0 08/20/2021                    Lab Results   Component Value Date    ALT 25 08/20/2021           Lab Results   Component Value Date    AST 18 08/20/2021       Results reviewed, labs MET treatment parameters, ok to proceed with treatment.      Post Infusion Assessment:  Patient pre-medicated and tolerated infusion treatment plan without incident. Neulasta placed.  Blood return noted pre and post infusion.  Site patent and intact, free from redness, edema or discomfort.  No evidence of extravasations.  Access discontinued per protocol.       Discharge Plan:   Patient discharged in stable condition accompanied by: self. Plan to check labs 8/30.      Vandana Dillon RN

## 2021-08-20 NOTE — TELEPHONE ENCOUNTER
Spoke to patient today in clinic and addressed her questions about surgery timing, radiation and COVID booster per Dr Garcia notes.  Pt will have radiation M-F for 6 weeks in the future, message sent to Dr Matthews team to set up surgical consult and she can have the COVID booster per CDC guidelines when it becomes available.  In talking to the patient it was discovered that the recent [facility] paperwork willed out for [employer] was supposed to be for her , document was corrected and re-faxed to [phone].  Pt verbalized understanding of all.

## 2021-08-23 NOTE — PROGRESS NOTES
[facility] Hematology and Oncology Progress Note    Patient: Veronica Nieves  MRN: [id number]  Date of Service: Jul 30, 2021        Assessment and Plan:    Cancer Staging  Malignant neoplasm of upper-outer quadrant of left breast in female, estrogen receptor positive (H)  Staging form: Breast, AJCC 8th Edition  - Clinical stage from 5/19/2021: Stage IB (cT2, cN0, cM0, G2, ER+, GA+, HER2+) - Signed by Jefferson Garcia MD on 5/19/2021     1.  Invasive ductal carcinoma of the left breast: She will receive her fourth cycle of neoadjuvant chemotherapy today.  She remains at full doses.  She had an ultrasound a week ago which shows decreased tumor size.  Reviewed with them that whether she has a mastectomy or lumpectomy will be based on discussions with her Dr. Newman.  We will plan on 6 cycles of chemotherapy.  We will get her back set up with surgery and plastic surgery when she comes in for her next treatment in 3 weeks.     2.  Chemotherapy-induced neutropenia: She is now receiving Neulasta with each cycle.    ECOG Performance  1    Diagnosis:    1.  Invasive ductal carcinoma of the left breast: Diagnosed May, 2021.  Initial imaging suggested 3 masses.  Biopsy of the largest mass showed an invasive ductal carcinoma, grade 2.  ER/GA positive, HER-2 positive.  Evaluation of the axilla was negative.    Treatment:    Neoadjuvant chemotherapy with TCHP initiated on May 27, 2021.    Interim History:    Abbey presents today for a follow-up visit.  He has some peripheral neuropathy.  No constipation.  Some dyspnea on exertion.  He is getting cycle 4 of chemotherapy today.  Taste is off.  Appetite is decreased.  No acute complaints today.    Review of Systems:    As above in the history.     Review of Systems otherwise Negative for:  General: chills, fever or night sweats  Psychological: anxiety or depression  Ophthalmic: blurry vision, double vision or loss of vision, vision change  ENT: epistaxis, oral lesions,  "hearing changes  Hematological and Lymphatic: bleeding, bruising, jaundice, swollen lymph nodes  Endocrine: hot flashes, unexpected weight changes  Respiratory: cough, hemoptysis, orthopnea or shortness of breath/SHAHID  Cardiovascular: chest pain, edema, palpitations or PND  Gastrointestinal: abdominal pain, blood in stools, change in bowel habits, constipation, diarrhea or nausea/vomiting  Genito-Urinary: change in urinary stream, incontinence, frequency/urgency  Musculoskeletal: joint pain, stiffness, swelling, muscle pain  Neurological: dizziness, headaches, numbness/tingling  Dermatological: lumps and rash    Past History:    Past Medical History:   Diagnosis Date     Abnormal Pap smear of cervix     age 13, subsequent pap smears normal     Anxiety      Breast cancer (H) 05/04/2021     Depression      Physical Exam:    BP (!) 151/77 (BP Location: Right arm, Patient Position: Sitting, Cuff Size: Adult Regular)   Pulse 72   Temp 98.7  F (37.1  C)   Resp 18   Ht 1.683 m (5' 6.26\")   Wt 84.8 kg (186 lb 14.4 oz)   SpO2 99%   BMI 29.93 kg/m      General: patient appears stated age of 31 year old. Nontoxic and in no distress.   HEENT: Head: atraumatic, normocephalic. Sclerae anicteric.  Chest:  Normal respiratory effort  Cardiac:  No edema.   Abdomen: abdomen is non-distended  Extremities: normal tone and muscle bulk.  Skin: no lesions or rash on visible skin. Warm and dry.   CNS: alert and oriented. Grossly non-focal.   Psychiatric: normal mood and affect.     Lab Results:    Imaging:    No results found.      Signed by: Jefferson Garcia MD    "

## 2021-08-30 ENCOUNTER — LAB (OUTPATIENT)
Dept: INFUSION THERAPY | Facility: HOSPITAL | Age: 31
End: 2021-08-30
Attending: INTERNAL MEDICINE
Payer: COMMERCIAL

## 2021-08-30 DIAGNOSIS — C50.412 MALIGNANT NEOPLASM OF UPPER-OUTER QUADRANT OF LEFT BREAST IN FEMALE, ESTROGEN RECEPTOR POSITIVE (H): Primary | ICD-10-CM

## 2021-08-30 DIAGNOSIS — Z17.0 MALIGNANT NEOPLASM OF UPPER-OUTER QUADRANT OF LEFT BREAST IN FEMALE, ESTROGEN RECEPTOR POSITIVE (H): Primary | ICD-10-CM

## 2021-08-30 LAB
BASOPHILS # BLD AUTO: 0 10E3/UL (ref 0–0.2)
BASOPHILS NFR BLD AUTO: 0 %
EOSINOPHIL # BLD AUTO: 0 10E3/UL (ref 0–0.7)
EOSINOPHIL NFR BLD AUTO: 0 %
ERYTHROCYTE [DISTWIDTH] IN BLOOD BY AUTOMATED COUNT: 16.3 % (ref 10–15)
HCT VFR BLD AUTO: 28.6 % (ref 35–47)
HGB BLD-MCNC: 9.4 G/DL (ref 11.7–15.7)
IMM GRANULOCYTES # BLD: 0.4 10E3/UL
IMM GRANULOCYTES NFR BLD: 4 %
LYMPHOCYTES # BLD AUTO: 1.4 10E3/UL (ref 0.8–5.3)
LYMPHOCYTES NFR BLD AUTO: 17 %
MCH RBC QN AUTO: 31 PG (ref 26.5–33)
MCHC RBC AUTO-ENTMCNC: 32.9 G/DL (ref 31.5–36.5)
MCV RBC AUTO: 94 FL (ref 78–100)
MONOCYTES # BLD AUTO: 0.7 10E3/UL (ref 0–1.3)
MONOCYTES NFR BLD AUTO: 9 %
NEUTROPHILS # BLD AUTO: 6.2 10E3/UL (ref 1.6–8.3)
NEUTROPHILS NFR BLD AUTO: 70 %
NRBC # BLD AUTO: 0 10E3/UL
NRBC BLD AUTO-RTO: 0 /100
PLATELET # BLD AUTO: 145 10E3/UL (ref 150–450)
RBC # BLD AUTO: 3.03 10E6/UL (ref 3.8–5.2)
WBC # BLD AUTO: 8.4 10E3/UL (ref 4–11)

## 2021-08-30 PROCEDURE — 36591 DRAW BLOOD OFF VENOUS DEVICE: CPT

## 2021-08-30 PROCEDURE — 250N000011 HC RX IP 250 OP 636: Performed by: INTERNAL MEDICINE

## 2021-08-30 PROCEDURE — 85025 COMPLETE CBC W/AUTO DIFF WBC: CPT

## 2021-08-30 RX ORDER — HEPARIN SODIUM (PORCINE) LOCK FLUSH IV SOLN 100 UNIT/ML 100 UNIT/ML
5 SOLUTION INTRAVENOUS
Status: CANCELLED | OUTPATIENT
Start: 2021-08-30

## 2021-08-30 RX ORDER — HEPARIN SODIUM,PORCINE 10 UNIT/ML
5 VIAL (ML) INTRAVENOUS
Status: DISCONTINUED | OUTPATIENT
Start: 2021-08-30 | End: 2021-08-30 | Stop reason: HOSPADM

## 2021-08-30 RX ORDER — HEPARIN SODIUM (PORCINE) LOCK FLUSH IV SOLN 100 UNIT/ML 100 UNIT/ML
5 SOLUTION INTRAVENOUS
Status: DISCONTINUED | OUTPATIENT
Start: 2021-08-30 | End: 2021-08-30 | Stop reason: HOSPADM

## 2021-08-30 RX ORDER — HEPARIN SODIUM,PORCINE 10 UNIT/ML
5 VIAL (ML) INTRAVENOUS
Status: CANCELLED | OUTPATIENT
Start: 2021-08-30

## 2021-08-30 RX ADMIN — HEPARIN 5 ML: 100 SYRINGE at 09:57

## 2021-08-30 NOTE — PROGRESS NOTES
Infusion Nursing Note:  Veronica Nieves presents today for lab draw.    Patient seen by provider today: No   present during visit today: Not Applicable.    Note: N/A.      Intravenous Access:  Implanted Port.    Treatment Conditions:  Not Applicable.      Post Infusion Assessment:  Not Applicable.       Discharge Plan:   Patient and/or family verbalized understanding of discharge instructions and all questions answered.  Patient discharged in stable condition accompanied by: self.  Departure Mode: Ambulatory.      Raquel DOYLE RN                  .

## 2021-09-07 ENCOUNTER — OFFICE VISIT (OUTPATIENT)
Dept: SURGERY | Facility: CLINIC | Age: 31
End: 2021-09-07
Attending: NURSE PRACTITIONER
Payer: COMMERCIAL

## 2021-09-07 DIAGNOSIS — Z17.0 MALIGNANT NEOPLASM OF UPPER-OUTER QUADRANT OF LEFT BREAST IN FEMALE, ESTROGEN RECEPTOR POSITIVE (H): Primary | ICD-10-CM

## 2021-09-07 DIAGNOSIS — C50.412 MALIGNANT NEOPLASM OF UPPER-OUTER QUADRANT OF LEFT BREAST IN FEMALE, ESTROGEN RECEPTOR POSITIVE (H): Primary | ICD-10-CM

## 2021-09-07 PROCEDURE — G0463 HOSPITAL OUTPT CLINIC VISIT: HCPCS

## 2021-09-07 PROCEDURE — 99215 OFFICE O/P EST HI 40 MIN: CPT | Performed by: SPECIALIST

## 2021-09-07 NOTE — NURSING NOTE
Abbey presents to [facility] for a surgical consult with Dr. Newman  regarding her triple positive  breast cancer.  She has been receiving NAC with Dr. Garcia.  She is accompanied by her  for consult.  RN assessment and EMR update.  Patient given a Breast Cancer Packet, contents reviewed.  She met with Dr. Newman  see dictation for details of visit. She will plan a bilateral mastectomy with reconstruction.  She will call and make an appointment with a plastic surgeon.  Support provided, invited calls.  RN time 15 mins.

## 2021-09-07 NOTE — LETTER
9/7/2021         RE: Veronica Nieves  [address]  [place] [address]        Dear Colleague,    Thank you for referring your patient, Veronica Nieves, to the [facility] [place]. Please see a copy of my visit note below.    History of Present Illness:  Abbey comes in to discuss further surgical treatment options for her left breast cancer.  This is a patient who I first met several months ago when she was diagnosed with a HER-2 positive breast cancer.  She has been receiving neoadjuvant chemotherapy.  She states that the mass essentially went away after the second dose of chemo.  She has just finished her last dose.  She is overall feeling well.  She did have a follow-up mammogram about assisted through the initial portion of her chemo which showed that the lesion was getting smaller.    PAST MEDICAL HISTORY:  Past Medical History:   Diagnosis Date     Abnormal Pap smear of cervix     age 13, subsequent pap smears normal     Anxiety      Breast cancer (H) 05/04/2021     Depression      Past Surgical History:   Procedure Laterality Date     OTHER SURGICAL HISTORY      no surgical history     HI INSJ PRPH CTR VAD W/SUBQ PORT AGE 4 YR/> N/A 5/26/2021    Procedure: Port Placement;  Surgeon: Audrey Newman MD;  Location: [facility];  Service: General     US BREAST CORE BIOPSY LEFT Left 5/4/2021     WISDOM TOOTH EXTRACTION  2008         Medications:    Current Outpatient Medications:      acetaminophen (TYLENOL) 325 MG tablet, [ACETAMINOPHEN (TYLENOL) 325 MG TABLET] Take 650 mg by mouth every 6 (six) hours as needed for pain., Disp: , Rfl:      bismuth subsalicylate (BISMUTH SUBSALICYLATE) 262 mg Chew chew tab, [BISMUTH SUBSALICYLATE (BISMUTH SUBSALICYLATE) 262 MG CHEW CHEW TAB] Chew 1 tablet 4 (four) times a day as needed., Disp: , Rfl:      dexamethasone (DECADRON) 4 MG tablet, [DEXAMETHASONE (DECADRON) 4 MG TABLET] Take 8mg every 12 hours for 3 days, starting the day before  chemotherapy.., Disp: 36 tablet, Rfl: 1     DULoxetine (CYMBALTA) 30 MG capsule, [DULOXETINE (CYMBALTA) 30 MG CAPSULE] Take 1 capsule (30 mg total) by mouth 2 (two) times a day., Disp: 180 capsule, Rfl: 1     famotidine-calcium carbonate-magnesium hydroxide (PEPCID COMPLETE) -165 mg Chew, [FAMOTIDINE-CALCIUM CARBONATE-MAGNESIUM HYDROXIDE (PEPCID COMPLETE) -165 MG CHEW] Chew 1 tablet daily as needed., Disp: , Rfl:      inulin (FIBER GUMMIES ORAL), [INULIN (FIBER GUMMIES ORAL)] Take by mouth daily., Disp: , Rfl:      lactobacillus combination no.8 (ADULT PROBIOTIC ORAL), [LACTOBACILLUS COMBINATION NO.8 (ADULT PROBIOTIC ORAL)] Take 1 capsule by mouth daily. (Patient not taking: Reported on 7/30/2021), Disp: , Rfl:      levonorgestrel (MIRENA) 20 mcg/24 hr (5 years) IUD, [LEVONORGESTREL (MIRENA) 20 MCG/24 HR (5 YEARS) IUD] 1 each by Intrauterine route once. Placed 5/2/2017, Disp: , Rfl:      lidocaine-prilocaine (EMLA) cream, [LIDOCAINE-PRILOCAINE (EMLA) CREAM] Place over port 30 min. before being accessed., Disp: 30 g, Rfl: 1     prochlorperazine (COMPAZINE) 10 MG tablet, Take 1 tablet (10 mg) by mouth every 6 hours as needed, Disp: 30 tablet, Rfl: 1     SODIUM FLUORIDE 5000 PPM 1.1 % PSTE, , Disp: , Rfl:       Allergies:   No Known Allergies     Social History:  Social History     Tobacco Use     Smoking status: Never Smoker     Smokeless tobacco: Never Used   Substance Use Topics     Alcohol use: Yes     Alcohol/week: 6.0 - 7.0 standard drinks     Drug use: No        ROS:  Pertinent items are noted in HPI.    PHYSICAL EXAM:  General: alert, appears stated age and cooperative  Lungs:  clear to auscultation bilaterally  CV: regular rate and rhythm  Breast: No palpable masses in either breast.  Skin dimpling has completely resolved.  Axilla: No axillary adenopathy    Impression: HER-2 positive left breast cancer, status post neoadjuvant chemotherapy.  We went over the options again for surgery.  These would  include a lumpectomy combined with radiation versus a mastectomy which may or may not need radiation depending on the final pathology.  I did explain that because this was multifocal, we could try a lumpectomy but I would still have to remove a fairly large area.  She states that she is already thought about it and she actually would like to have bilateral mastectomies even though her genetic testing did come back negative.  We then talked about the different options for reconstruction.  I think she is definitely a candidate for immediate reconstruction unless she wants tissue flaps.  If she wants that then she should just do a tissue expander to start with until we are absolutely sure she does not need radiation.  I think there is a good chance she will not need radiation given the fact that this was not quite 5 cm to start with and she was clinically node-negative to start with.  We went over all of the different options.  She is not a perfect candidate for nipple sparing and she is not eager to try to pursue that.    Plan: Bilateral mastectomies with left sentinel lymph node biopsy.  She is going to make an appointment with the plastic surgeon soon and then we will start scheduling.  She asked appropriate questions.  Explained that this is all now typically an outpatient procedure.  We talked about typical follow-up.  Answered all of her questions.        60 minutes spent on the date of the encounter doing chart review, review of test results, patient visit, documentation and discussion with other provider(s)         Again, thank you for allowing me to participate in the care of your patient.        Sincerely,        Audrey Newman MD

## 2021-09-08 RX ORDER — CEFAZOLIN SODIUM 2 G/100ML
2 INJECTION, SOLUTION INTRAVENOUS
Status: CANCELLED | OUTPATIENT
Start: 2021-10-07

## 2021-09-08 RX ORDER — CEFAZOLIN SODIUM 2 G/100ML
2 INJECTION, SOLUTION INTRAVENOUS SEE ADMIN INSTRUCTIONS
Status: CANCELLED | OUTPATIENT
Start: 2021-10-07

## 2021-09-08 NOTE — H&P (VIEW-ONLY)
History of Present Illness:  Abbey comes in to discuss further surgical treatment options for her left breast cancer.  This is a patient who I first met several months ago when she was diagnosed with a HER-2 positive breast cancer.  She has been receiving neoadjuvant chemotherapy.  She states that the mass essentially went away after the second dose of chemo.  She has just finished her last dose.  She is overall feeling well.  She did have a follow-up mammogram about half-way through the initial portion of her chemo which showed that the lesion was getting smaller.    PAST MEDICAL HISTORY:  Past Medical History:   Diagnosis Date     Abnormal Pap smear of cervix     age 13, subsequent pap smears normal     Anxiety      Breast cancer (H) 05/04/2021     Depression      Past Surgical History:   Procedure Laterality Date     OTHER SURGICAL HISTORY      no surgical history     [place] INSJ PRPH CTR VAD W/SUBQ PORT AGE 5 YR/> N/A 5/26/2021    Procedure: Port Placement;  Surgeon: Audrey Newman MD;  Location: [facility];  Service: General     US BREAST CORE BIOPSY LEFT Left 5/4/2021     WISDOM TOOTH EXTRACTION  2008         Medications:    Current Outpatient Medications:      acetaminophen (TYLENOL) 325 MG tablet, [ACETAMINOPHEN (TYLENOL) 325 MG TABLET] Take 650 mg by mouth every 6 (six) hours as needed for pain., Disp: , Rfl:      bismuth subsalicylate (BISMUTH SUBSALICYLATE) 262 mg Chew chew tab, [BISMUTH SUBSALICYLATE (BISMUTH SUBSALICYLATE) 262 MG CHEW CHEW TAB] Chew 1 tablet 4 (four) times a day as needed., Disp: , Rfl:      dexamethasone (DECADRON) 4 MG tablet, [DEXAMETHASONE (DECADRON) 4 MG TABLET] Take 8mg every 12 hours for 3 days, starting the day before chemotherapy.., Disp: 36 tablet, Rfl: 1     DULoxetine (CYMBALTA) 30 MG capsule, [DULOXETINE (CYMBALTA) 30 MG CAPSULE] Take 1 capsule (30 mg total) by mouth 2 (two) times a day., Disp: 180 capsule, Rfl: 1     famotidine-calcium carbonate-magnesium hydroxide  (PEPCID COMPLETE) -165 mg Chew, [FAMOTIDINE-CALCIUM CARBONATE-MAGNESIUM HYDROXIDE (PEPCID COMPLETE) -165 MG CHEW] Chew 1 tablet daily as needed., Disp: , Rfl:      inulin (FIBER GUMMIES ORAL), [INULIN (FIBER GUMMIES ORAL)] Take by mouth daily., Disp: , Rfl:      lactobacillus combination no.8 (ADULT PROBIOTIC ORAL), [LACTOBACILLUS COMBINATION NO.8 (ADULT PROBIOTIC ORAL)] Take 1 capsule by mouth daily. (Patient not taking: Reported on 7/30/2021), Disp: , Rfl:      levonorgestrel (MIRENA) 20 mcg/24 hr (5 years) IUD, [LEVONORGESTREL (MIRENA) 20 MCG/24 HR (5 YEARS) IUD] 1 each by Intrauterine route once. Placed 5/2/2017, Disp: , Rfl:      lidocaine-prilocaine (EMLA) cream, [LIDOCAINE-PRILOCAINE (EMLA) CREAM] Place over port 30 min. before being accessed., Disp: 30 g, Rfl: 1     prochlorperazine (COMPAZINE) 10 MG tablet, Take 1 tablet (10 mg) by mouth every 6 hours as needed, Disp: 30 tablet, Rfl: 1     SODIUM FLUORIDE 5000 PPM 1.1 % PSTE, , Disp: , Rfl:       Allergies:   No Known Allergies     Social History:  Social History     Tobacco Use     Smoking status: Never Smoker     Smokeless tobacco: Never Used   Substance Use Topics     Alcohol use: Yes     Alcohol/week: 6.0 - 7.0 standard drinks     Drug use: No        ROS:  Pertinent items are noted in HPI.    PHYSICAL EXAM:  General: alert, appears stated age and cooperative  Lungs:  clear to auscultation bilaterally  CV: regular rate and rhythm  Breast: No palpable masses in either breast.  Skin dimpling has completely resolved.  Axilla: No axillary adenopathy    Impression: HER-2 positive left breast cancer, status post neoadjuvant chemotherapy.  We went over the options again for surgery.  These would include a lumpectomy combined with radiation versus a mastectomy which may or may not need radiation depending on the final pathology.  I did explain that because this was multifocal, we could try a lumpectomy but I would still have to remove a fairly large  area.  She states that she is already thought about it and she actually would like to have bilateral mastectomies even though her genetic testing did come back negative.  We then talked about the different options for reconstruction.  I think she is definitely a candidate for immediate reconstruction unless she wants tissue flaps.  If she wants that then she should just do a tissue expander to start with until we are absolutely sure she does not need radiation.  I think there is a good chance she will not need radiation given the fact that this was not quite 5 cm to start with and she was clinically node-negative to start with.  We went over all of the different options.  She is not a perfect candidate for nipple sparing and she is not eager to try to pursue that.    Plan: Bilateral mastectomies with left sentinel lymph node biopsy.  She is going to make an appointment with the plastic surgeon soon and then we will start scheduling.  She asked appropriate questions.  Explained that this is all now typically an outpatient procedure.  We talked about typical follow-up.  Answered all of her questions.        60 minutes spent on the date of the encounter doing chart review, review of test results, patient visit, documentation and discussion with other provider(s)

## 2021-09-08 NOTE — PROGRESS NOTES
History of Present Illness:  Abbey comes in to discuss further surgical treatment options for her left breast cancer.  This is a patient who I first met several months ago when she was diagnosed with a HER-2 positive breast cancer.  She has been receiving neoadjuvant chemotherapy.  She states that the mass essentially went away after the second dose of chemo.  She has just finished her last dose.  She is overall feeling well.  She did have a follow-up mammogram about half-way through the initial portion of her chemo which showed that the lesion was getting smaller.    PAST MEDICAL HISTORY:  Past Medical History:   Diagnosis Date     Abnormal Pap smear of cervix     age 13, subsequent pap smears normal     Anxiety      Breast cancer (H) 05/04/2021     Depression      Past Surgical History:   Procedure Laterality Date     OTHER SURGICAL HISTORY      no surgical history     [place] INSJ PRPH CTR VAD W/SUBQ PORT AGE 4 YR/> N/A 5/26/2021    Procedure: Port Placement;  Surgeon: Audrey Newman MD;  Location: [facility];  Service: General     US BREAST CORE BIOPSY LEFT Left 5/4/2021     WISDOM TOOTH EXTRACTION  2008         Medications:    Current Outpatient Medications:      acetaminophen (TYLENOL) 325 MG tablet, [ACETAMINOPHEN (TYLENOL) 325 MG TABLET] Take 650 mg by mouth every 6 (six) hours as needed for pain., Disp: , Rfl:      bismuth subsalicylate (BISMUTH SUBSALICYLATE) 262 mg Chew chew tab, [BISMUTH SUBSALICYLATE (BISMUTH SUBSALICYLATE) 262 MG CHEW CHEW TAB] Chew 1 tablet 4 (four) times a day as needed., Disp: , Rfl:      dexamethasone (DECADRON) 4 MG tablet, [DEXAMETHASONE (DECADRON) 4 MG TABLET] Take 8mg every 12 hours for 3 days, starting the day before chemotherapy.., Disp: 36 tablet, Rfl: 1     DULoxetine (CYMBALTA) 30 MG capsule, [DULOXETINE (CYMBALTA) 30 MG CAPSULE] Take 1 capsule (30 mg total) by mouth 2 (two) times a day., Disp: 180 capsule, Rfl: 1     famotidine-calcium carbonate-magnesium hydroxide  (PEPCID COMPLETE) -165 mg Chew, [FAMOTIDINE-CALCIUM CARBONATE-MAGNESIUM HYDROXIDE (PEPCID COMPLETE) -165 MG CHEW] Chew 1 tablet daily as needed., Disp: , Rfl:      inulin (FIBER GUMMIES ORAL), [INULIN (FIBER GUMMIES ORAL)] Take by mouth daily., Disp: , Rfl:      lactobacillus combination no.8 (ADULT PROBIOTIC ORAL), [LACTOBACILLUS COMBINATION NO.8 (ADULT PROBIOTIC ORAL)] Take 1 capsule by mouth daily. (Patient not taking: Reported on 7/30/2021), Disp: , Rfl:      levonorgestrel (MIRENA) 20 mcg/24 hr (5 years) IUD, [LEVONORGESTREL (MIRENA) 20 MCG/24 HR (5 YEARS) IUD] 1 each by Intrauterine route once. Placed 5/2/2017, Disp: , Rfl:      lidocaine-prilocaine (EMLA) cream, [LIDOCAINE-PRILOCAINE (EMLA) CREAM] Place over port 30 min. before being accessed., Disp: 30 g, Rfl: 1     prochlorperazine (COMPAZINE) 10 MG tablet, Take 1 tablet (10 mg) by mouth every 6 hours as needed, Disp: 30 tablet, Rfl: 1     SODIUM FLUORIDE 5000 PPM 1.1 % PSTE, , Disp: , Rfl:       Allergies:   No Known Allergies     Social History:  Social History     Tobacco Use     Smoking status: Never Smoker     Smokeless tobacco: Never Used   Substance Use Topics     Alcohol use: Yes     Alcohol/week: 6.0 - 7.0 standard drinks     Drug use: No        ROS:  Pertinent items are noted in HPI.    PHYSICAL EXAM:  General: alert, appears stated age and cooperative  Lungs:  clear to auscultation bilaterally  CV: regular rate and rhythm  Breast: No palpable masses in either breast.  Skin dimpling has completely resolved.  Axilla: No axillary adenopathy    Impression: HER-2 positive left breast cancer, status post neoadjuvant chemotherapy.  We went over the options again for surgery.  These would include a lumpectomy combined with radiation versus a mastectomy which may or may not need radiation depending on the final pathology.  I did explain that because this was multifocal, we could try a lumpectomy but I would still have to remove a fairly large  area.  She states that she is already thought about it and she actually would like to have bilateral mastectomies even though her genetic testing did come back negative.  We then talked about the different options for reconstruction.  I think she is definitely a candidate for immediate reconstruction unless she wants tissue flaps.  If she wants that then she should just do a tissue expander to start with until we are absolutely sure she does not need radiation.  I think there is a good chance she will not need radiation given the fact that this was not quite 5 cm to start with and she was clinically node-negative to start with.  We went over all of the different options.  She is not a perfect candidate for nipple sparing and she is not eager to try to pursue that.    Plan: Bilateral mastectomies with left sentinel lymph node biopsy.  She is going to make an appointment with the plastic surgeon soon and then we will start scheduling.  She asked appropriate questions.  Explained that this is all now typically an outpatient procedure.  We talked about typical follow-up.  Answered all of her questions.        60 minutes spent on the date of the encounter doing chart review, review of test results, patient visit, documentation and discussion with other provider(s)

## 2021-09-10 ENCOUNTER — LAB (OUTPATIENT)
Dept: INFUSION THERAPY | Facility: HOSPITAL | Age: 31
End: 2021-09-10
Attending: INTERNAL MEDICINE
Payer: COMMERCIAL

## 2021-09-10 ENCOUNTER — ONCOLOGY VISIT (OUTPATIENT)
Dept: ONCOLOGY | Facility: HOSPITAL | Age: 31
End: 2021-09-10
Attending: INTERNAL MEDICINE
Payer: COMMERCIAL

## 2021-09-10 ENCOUNTER — MYC MEDICAL ADVICE (OUTPATIENT)
Dept: ONCOLOGY | Facility: HOSPITAL | Age: 31
End: 2021-09-10

## 2021-09-10 VITALS
SYSTOLIC BLOOD PRESSURE: 138 MMHG | HEART RATE: 82 BPM | BODY MASS INDEX: 29.47 KG/M2 | DIASTOLIC BLOOD PRESSURE: 73 MMHG | RESPIRATION RATE: 12 BRPM | TEMPERATURE: 98.4 F | WEIGHT: 184 LBS | OXYGEN SATURATION: 99 %

## 2021-09-10 DIAGNOSIS — C50.412 MALIGNANT NEOPLASM OF UPPER-OUTER QUADRANT OF LEFT BREAST IN FEMALE, ESTROGEN RECEPTOR POSITIVE (H): Primary | ICD-10-CM

## 2021-09-10 DIAGNOSIS — Z17.0 MALIGNANT NEOPLASM OF UPPER-OUTER QUADRANT OF LEFT BREAST IN FEMALE, ESTROGEN RECEPTOR POSITIVE (H): Primary | ICD-10-CM

## 2021-09-10 DIAGNOSIS — Z11.59 ENCOUNTER FOR SCREENING FOR OTHER VIRAL DISEASES: ICD-10-CM

## 2021-09-10 LAB
ALBUMIN SERPL-MCNC: 4.2 G/DL (ref 3.5–5)
ALP SERPL-CCNC: 69 U/L (ref 45–120)
ALT SERPL W P-5'-P-CCNC: 36 U/L (ref 0–45)
ANION GAP SERPL CALCULATED.3IONS-SCNC: 9 MMOL/L (ref 5–18)
AST SERPL W P-5'-P-CCNC: 22 U/L (ref 0–40)
BASOPHILS # BLD AUTO: 0 10E3/UL (ref 0–0.2)
BASOPHILS NFR BLD AUTO: 0 %
BILIRUB SERPL-MCNC: 0.3 MG/DL (ref 0–1)
BUN SERPL-MCNC: 14 MG/DL (ref 8–22)
CALCIUM SERPL-MCNC: 9.8 MG/DL (ref 8.5–10.5)
CHLORIDE BLD-SCNC: 109 MMOL/L (ref 98–107)
CO2 SERPL-SCNC: 22 MMOL/L (ref 22–31)
CREAT SERPL-MCNC: 0.99 MG/DL (ref 0.6–1.1)
EOSINOPHIL # BLD AUTO: 0 10E3/UL (ref 0–0.7)
EOSINOPHIL NFR BLD AUTO: 0 %
ERYTHROCYTE [DISTWIDTH] IN BLOOD BY AUTOMATED COUNT: 16.1 % (ref 10–15)
GFR SERPL CREATININE-BSD FRML MDRD: 76 ML/MIN/1.73M2
GLUCOSE BLD-MCNC: 229 MG/DL (ref 70–125)
HCT VFR BLD AUTO: 28.7 % (ref 35–47)
HGB BLD-MCNC: 9.5 G/DL (ref 11.7–15.7)
IMM GRANULOCYTES # BLD: 0.1 10E3/UL
IMM GRANULOCYTES NFR BLD: 1 %
LYMPHOCYTES # BLD AUTO: 0.6 10E3/UL (ref 0.8–5.3)
LYMPHOCYTES NFR BLD AUTO: 6 %
MCH RBC QN AUTO: 32 PG (ref 26.5–33)
MCHC RBC AUTO-ENTMCNC: 33.1 G/DL (ref 31.5–36.5)
MCV RBC AUTO: 97 FL (ref 78–100)
MONOCYTES # BLD AUTO: 0.2 10E3/UL (ref 0–1.3)
MONOCYTES NFR BLD AUTO: 2 %
NEUTROPHILS # BLD AUTO: 9.4 10E3/UL (ref 1.6–8.3)
NEUTROPHILS NFR BLD AUTO: 91 %
NRBC # BLD AUTO: 0 10E3/UL
NRBC BLD AUTO-RTO: 0 /100
PLATELET # BLD AUTO: 228 10E3/UL (ref 150–450)
POTASSIUM BLD-SCNC: 3.9 MMOL/L (ref 3.5–5)
PROT SERPL-MCNC: 7.1 G/DL (ref 6–8)
RBC # BLD AUTO: 2.97 10E6/UL (ref 3.8–5.2)
SODIUM SERPL-SCNC: 140 MMOL/L (ref 136–145)
WBC # BLD AUTO: 10.2 10E3/UL (ref 4–11)

## 2021-09-10 PROCEDURE — 85025 COMPLETE CBC W/AUTO DIFF WBC: CPT | Performed by: INTERNAL MEDICINE

## 2021-09-10 PROCEDURE — 99214 OFFICE O/P EST MOD 30 MIN: CPT | Performed by: INTERNAL MEDICINE

## 2021-09-10 PROCEDURE — 250N000011 HC RX IP 250 OP 636: Performed by: INTERNAL MEDICINE

## 2021-09-10 PROCEDURE — 96413 CHEMO IV INFUSION 1 HR: CPT

## 2021-09-10 PROCEDURE — 96372 THER/PROPH/DIAG INJ SC/IM: CPT | Performed by: INTERNAL MEDICINE

## 2021-09-10 PROCEDURE — 96417 CHEMO IV INFUS EACH ADDL SEQ: CPT

## 2021-09-10 PROCEDURE — 96377 APPLICATON ON-BODY INJECTOR: CPT | Performed by: INTERNAL MEDICINE

## 2021-09-10 PROCEDURE — G0463 HOSPITAL OUTPT CLINIC VISIT: HCPCS | Mod: 25

## 2021-09-10 PROCEDURE — 258N000003 HC RX IP 258 OP 636: Performed by: INTERNAL MEDICINE

## 2021-09-10 PROCEDURE — 96367 TX/PROPH/DG ADDL SEQ IV INF: CPT

## 2021-09-10 PROCEDURE — 96375 TX/PRO/DX INJ NEW DRUG ADDON: CPT

## 2021-09-10 PROCEDURE — 82040 ASSAY OF SERUM ALBUMIN: CPT | Performed by: INTERNAL MEDICINE

## 2021-09-10 RX ORDER — HEPARIN SODIUM (PORCINE) LOCK FLUSH IV SOLN 100 UNIT/ML 100 UNIT/ML
5 SOLUTION INTRAVENOUS
Status: DISCONTINUED | OUTPATIENT
Start: 2021-09-10 | End: 2021-09-10 | Stop reason: HOSPADM

## 2021-09-10 RX ORDER — METHYLPREDNISOLONE SODIUM SUCCINATE 125 MG/2ML
125 INJECTION, POWDER, LYOPHILIZED, FOR SOLUTION INTRAMUSCULAR; INTRAVENOUS
Status: DISCONTINUED | OUTPATIENT
Start: 2021-09-10 | End: 2021-09-10 | Stop reason: HOSPADM

## 2021-09-10 RX ORDER — DIPHENHYDRAMINE HYDROCHLORIDE 50 MG/ML
50 INJECTION INTRAMUSCULAR; INTRAVENOUS
Status: DISCONTINUED | OUTPATIENT
Start: 2021-09-10 | End: 2021-09-10 | Stop reason: HOSPADM

## 2021-09-10 RX ORDER — PALONOSETRON 0.05 MG/ML
0.25 INJECTION, SOLUTION INTRAVENOUS ONCE
Status: COMPLETED | OUTPATIENT
Start: 2021-09-10 | End: 2021-09-10

## 2021-09-10 RX ADMIN — SODIUM CHLORIDE 250 ML: 9 INJECTION, SOLUTION INTRAVENOUS at 10:28

## 2021-09-10 RX ADMIN — DEXAMETHASONE SODIUM PHOSPHATE: 10 INJECTION, SOLUTION INTRAMUSCULAR; INTRAVENOUS at 10:57

## 2021-09-10 RX ADMIN — PALONOSETRON 0.25 MG: 0.05 INJECTION, SOLUTION INTRAVENOUS at 10:50

## 2021-09-10 RX ADMIN — DOCETAXEL 150 MG: 20 INJECTION, SOLUTION, CONCENTRATE INTRAVENOUS at 12:42

## 2021-09-10 RX ADMIN — PEGFILGRASTIM 6 MG: KIT SUBCUTANEOUS at 13:47

## 2021-09-10 RX ADMIN — PERTUZUMAB 420 MG: 30 INJECTION, SOLUTION, CONCENTRATE INTRAVENOUS at 11:31

## 2021-09-10 RX ADMIN — HEPARIN 5 ML: 100 SYRINGE at 14:24

## 2021-09-10 RX ADMIN — SODIUM CHLORIDE 508 MG: 9 INJECTION, SOLUTION INTRAVENOUS at 12:05

## 2021-09-10 RX ADMIN — CARBOPLATIN 685 MG: 10 INJECTION INTRAVENOUS at 13:44

## 2021-09-10 ASSESSMENT — PAIN SCALES - GENERAL: PAINLEVEL: NO PAIN (0)

## 2021-09-10 NOTE — LETTER
"    9/10/2021         RE: Veronica Nieves  [address]  [facility] [address]        Dear Colleague,    Thank you for referring your patient, Veronica Nieves, to the [facility]. Please see a copy of my visit note below.    Oncology Rooming Note    September 10, 2021 9:11 AM   Veronica Nieves is a 31 year old female who presents for:    Chief Complaint   Patient presents with     Oncology Clinic Visit     Malignant neoplasm of upper-outer quadrant of left breast in female, estrogen receptor positive (H)     Initial Vitals: /73 (BP Location: Right arm, Patient Position: Sitting, Cuff Size: Adult Regular)   Pulse 82   Temp 98.4  F (36.9  C) (Oral)   Resp 12   Wt 83.5 kg (184 lb)   SpO2 99%   BMI 29.47 kg/m   Estimated body mass index is 29.47 kg/m  as calculated from the following:    Height as of 7/30/21: 1.683 m (5' 6.26\").    Weight as of this encounter: 83.5 kg (184 lb). Body surface area is 1.98 meters squared.  No Pain (0) Comment: Data Unavailable   No LMP recorded. Patient has had an implant.  Allergies reviewed: Yes  Medications reviewed: Yes    Medications: Medication refills not needed today.  Pharmacy name entered into [employer]: [id number] [place] [address]    Clinical concerns:  Malignant neoplasm of upper-outer quadrant of left breast in female, estrogen receptor positive (H)      Shanda Lema [facility] Hematology and Oncology Progress Note    Patient: Veronica Nieves  MRN: [id number]  Date of Service: Sep 10, 2021        Assessment and Plan:    Cancer Staging  Malignant neoplasm of upper-outer quadrant of left breast in female, estrogen receptor positive (H)  Staging form: Breast, AJCC 8th Edition  - Clinical stage from 5/19/2021: Stage IB (cT2, cN0, cM0, G2, ER+, CT+, HER2+) - Signed by Jefferson Garcia MD on 5/19/2021     1.  Invasive ductal carcinoma of the left breast: Today here is her sixth and " final cycle of chemotherapy.  Overall she is tolerated it well.  No major side effects.  She will be having surgery on October 7.  We will see her back a few weeks after that to review the pathology report and decide on the plan going forward.  She is planning on mastectomy.  Probably will not need chemotherapy.  She will receive adjuvant endocrine therapy.  Questions were answered.    ECOG Performance  1    Diagnosis:    1.  Invasive ductal carcinoma of the left breast: Diagnosed May, 2021.  Initial imaging suggested 3 masses.  Biopsy of the largest mass showed an invasive ductal carcinoma, grade 2.  ER/VT positive, HER-2 positive.  Evaluation of the axilla was negative.    Treatment:    Neoadjuvant chemotherapy with TCHP initiated on May 27, 2021.    Interim History:    Abbey presents today for a follow-up visit.  Last chemotherapy today.  Occasional diarrhea.  Occasional peripheral neuropathy.  She is exercising.  Taste is off.  No acute complaints today.     Review of Systems:    As above in the history.     Review of Systems otherwise Negative for:  General: chills, fever or night sweats  Psychological: anxiety or depression  Ophthalmic: blurry vision, double vision or loss of vision, vision change  ENT: epistaxis, oral lesions, hearing changes  Hematological and Lymphatic: bleeding, bruising, jaundice, swollen lymph nodes  Endocrine: hot flashes, unexpected weight changes  Respiratory: cough, hemoptysis, orthopnea or shortness of breath/SHAHID  Cardiovascular: chest pain, edema, palpitations or PND  Gastrointestinal: abdominal pain, blood in stools, change in bowel habits, constipation, diarrhea or nausea/vomiting  Genito-Urinary: change in urinary stream, incontinence, frequency/urgency  Musculoskeletal: joint pain, stiffness, swelling, muscle pain  Neurological: dizziness, headaches, numbness/tingling  Dermatological: lumps and rash    Past History:    Past Medical History:   Diagnosis Date     Abnormal Pap  smear of cervix     age 13, subsequent pap smears normal     Anxiety      Breast cancer (H) 05/04/2021     Depression      Physical Exam:    /73 (BP Location: Right arm, Patient Position: Sitting, Cuff Size: Adult Regular)   Pulse 82   Temp 98.4  F (36.9  C) (Oral)   Resp 12   Wt 83.5 kg (184 lb)   SpO2 99%   BMI 29.47 kg/m      General: patient appears stated age of 31 year old. Nontoxic and in no distress.   HEENT: Head: atraumatic, normocephalic. Sclerae anicteric.  Chest:  Normal respiratory effort  Cardiac:  No edema.   Abdomen: abdomen is non-distended  Extremities: normal tone and muscle bulk.  Skin: no lesions or rash on visible skin. Warm and dry.   CNS: alert and oriented. Grossly non-focal.   Psychiatric: normal mood and affect.     Lab Results:    Imaging:      Signed by: Jefferson Garcia MD        Again, thank you for allowing me to participate in the care of your patient.        Sincerely,        Jefferson Garcia MD

## 2021-09-10 NOTE — PROGRESS NOTES
"Oncology Rooming Note    September 10, 2021 9:11 AM   Veronica Nieves is a 31 year old female who presents for:    Chief Complaint   Patient presents with     Oncology Clinic Visit     Malignant neoplasm of upper-outer quadrant of left breast in female, estrogen receptor positive (H)     Initial Vitals: /73 (BP Location: Right arm, Patient Position: Sitting, Cuff Size: Adult Regular)   Pulse 82   Temp 98.4  F (36.9  C) (Oral)   Resp 12   Wt 83.5 kg (184 lb)   SpO2 99%   BMI 29.47 kg/m   Estimated body mass index is 29.47 kg/m  as calculated from the following:    Height as of 7/30/21: 1.683 m (5' 6.26\").    Weight as of this encounter: 83.5 kg (184 lb). Body surface area is 1.98 meters squared.  No Pain (0) Comment: Data Unavailable   No LMP recorded. Patient has had an implant.  Allergies reviewed: Yes  Medications reviewed: Yes    Medications: Medication refills not needed today.  Pharmacy name entered into [employer]: [id number] [place] [address]    Clinical concerns:  Malignant neoplasm of upper-outer quadrant of left breast in female, estrogen receptor positive (H)      Shanda Lema, JERRI            "

## 2021-09-10 NOTE — PROGRESS NOTES
Ry came to chemo infusion following port lab draw and MD visit for cycle 6 day 1 treatment with TCHP.  Lab results noted and she met provison for treatment today.   Each medication given today was reviewed prior to administration.  Ry received treatment as ordered and tolerated it well while in clinic today.  Port was flushed with ns, heparinized then de-accessed and site covered. Neulasta on body injector placed on her right lower abdomen and care of this was reviewd.  Ry left clinic ambulatory.

## 2021-09-13 NOTE — PROGRESS NOTES
RECORDS STATUS - BREAST    RECORDS REQUESTED FROM: EPIC   DATE REQUESTED: 9/14/2021    NOTES DETAILS STATUS   OFFICE NOTE from referring provider     OFFICE NOTE from medical oncologist Complete 9/10/2021 Malignant Neoplasm    OFFICE NOTE from surgeon Complete See Breast Biopsy in EPIC   OFFICE NOTE from radiation oncologist     DISCHARGE SUMMARY from hospital Complete 10/7/2021 Pre-Admit    5/26/2021 Malignant Neoplasm of upper-outer quad of breast    DISCHARGE REPORT from the ER     OPERATIVE REPORT Complete See Breast Biopsy in EPIC   MEDICATION LIST Complete Epic    CLINICAL TRIAL TREATMENTS TO DATE     LABS     REQUEST BLOCKS FOR ALL BREAST CANCER PTS     PATHOLOGY REPORTS  (Tissue diagnosis, Stage, ER/SC percentage positive and intensity of staining, HER2 IHC, FISH, and all biopsies from breast and any distant metastasis)                 Complete-   [facility]   5/4/2021   BREAST BIOPSY CONTAINING NEOPLASTIC LESION:     -   BIOPSY TYPE:  ULTRASOUND-GUIDED CORE BIOPSIES     -   BIOPSY SITE:  LEFT BREAST, 3 O'CLOCK POSITION, 4 CM FROM NIPPLE     -   HISTOLOGIC TYPE:  INVASIVE DUCTAL CARCINOMA     -   HISTOLOGIC GRADE (JAY HISTOLOGIC SCORE):              -  TUBULE FORMATION SCORE 3              -  NUCLEAR PLEOMORPHISM SCORE 2              -  MITOTIC RATE SCORE 2            -  OVERALL GRADE:  2 (TOTAL SCORE:  7/9)     -   DUCTAL CARCINOMA IN SITU (DCIS):              -  PRESENT, SOLID AND PAPILLARY FORMS              -  INTERMEDIATE NUCLEAR GRADE              -  NO NECROSIS              -  DCIS IS PRESENT IN EACH OF THREE BIOPSY CORES, AND COMPRISES APPROXIMATELY                   20% OF TOTAL TUMOR VOLUME       -   LOBULAR CARCINOMA IN SITU (LCIS):  NOT IDENTIFIED     -   MICROCALCIFICATIONS:  RARE, ASSOCIATED WITH MALIGNANT EPITHELIUM     -   SMALL-VESSEL LYMPHATIC INVASION:  NOT IDENTIFIED     -   ADDITIONAL FINDINGS:  SMALL FOCUS OF MUCINOUS CHANGE IN TUMOR; NORMAL ADIPOSE TISSUE     -    ADDITIONAL STUDIES:            -  ESTROGEN RECEPTORS POSITIVE (90% OF TUMOR NUCLEI STAIN STRONGLY)            -  PROGESTERONE RECEPTORS POSITIVE (60% OF TUMOR NUCLEI STAIN STRONGLY)            -  HER2/VAIBHAV RECEPTORS EQUIVOCAL TO WEAKLY POSITIVE FOR OVEREXPRESSION    GENONOMIC TESTING     TYPE:   (Next Generation Sequencing, including Foundation One testing, and Oncotype score) Complete 5/17/2021 Her 2 Darion Genomic Report in EPIC   IMAGING (NEED IMAGES & REPORT)     CT SCANS     MRI     MAMMO Complete 7/23/2021, 5/7/2021 more in PACS   ULTRASOUND Complete US Breast 7/23/2021 more in PACS   PET Complete 6/2/2021   BONE SCAN     BRAIN MRI       Action    Action Taken 9/13/2021 8:14AM EDUARDO     I pulled HE imaging into PACS.

## 2021-09-14 ENCOUNTER — TELEPHONE (OUTPATIENT)
Dept: SURGERY | Facility: CLINIC | Age: 31
End: 2021-09-14

## 2021-09-14 ENCOUNTER — PRE VISIT (OUTPATIENT)
Dept: RADIATION ONCOLOGY | Facility: HOSPITAL | Age: 31
End: 2021-09-14

## 2021-09-14 ENCOUNTER — OFFICE VISIT (OUTPATIENT)
Dept: RADIATION ONCOLOGY | Facility: HOSPITAL | Age: 31
End: 2021-09-14
Attending: RADIOLOGY
Payer: COMMERCIAL

## 2021-09-14 DIAGNOSIS — C50.412 MALIGNANT NEOPLASM OF UPPER-OUTER QUADRANT OF LEFT BREAST IN FEMALE, ESTROGEN RECEPTOR POSITIVE (H): ICD-10-CM

## 2021-09-14 DIAGNOSIS — Z17.0 MALIGNANT NEOPLASM OF UPPER-OUTER QUADRANT OF LEFT BREAST IN FEMALE, ESTROGEN RECEPTOR POSITIVE (H): ICD-10-CM

## 2021-09-14 PROCEDURE — G0463 HOSPITAL OUTPT CLINIC VISIT: HCPCS

## 2021-09-14 PROCEDURE — 99205 OFFICE O/P NEW HI 60 MIN: CPT | Performed by: RADIOLOGY

## 2021-09-14 NOTE — PROGRESS NOTES
Pt here for consult with Dr. Love for breast cancer. She is finished with NAC, plan for bilateral mastectomies 10/7/21. She meets with Plastics later this week to discuss reconstruction. Pt was given new patient folder which includes [facility] RT info, RN welcomeletter, site specific RT education sheet, Prior authorization info, and ACS RT booklet. We discussed the routine for radiation including consult, simulation and immobilization devices, treatment positioning, daily treatment,weekly RO visit, and common s/e of skin irritation and fatigue. Pt verbalized their understanding.

## 2021-09-14 NOTE — PROGRESS NOTES
[facility] Radiation Oncology Consult Note      Patient: Veronica Nieves  MRN: [id number]  Date of Service: 09/14/2021    Assessment:       ICD-10-CM    1. Malignant neoplasm of upper-outer quadrant of left breast in female, estrogen receptor positive (H)  C50.412 Radiation Therapy Referral    Z17.0         Impression/Plan:   31 year old female S/P neoadjuvant TCHP chemotherapy for HER-2 pos left breast cancer. Multifocal masses measuring approximately 4.8 cm. PET CT showing confined to left breast, no additional site of suspicious FDG activity.     1. This is a 31 year old female who felt a lump in her left breast. This was a multifocal mass measuring about 4.8 cm and was ultimately found to be triple positive left breast IDC. Her initial PET CT was node negative, FDG activity confined to left breast mass. She has completed TCHP chemotherapy which will be followed by Trastuzumab/Pertuzumab.     She is planning to undergo bilateral mastectomy and SLN biopsy with immediate reconstruction. Given her current node negative status and <5cm mass, minimal benefit for radiation therapy in regards to local recurrence. If there is residual disease remaining in the chest with a questionable margin and/or at least one positive sentinel lymph node, she will return for further discussion of radiation therapy as there will be a benefit for local recurrence.     Subjective:   This patient was referred to myself by Dr. Garcia for consideration of radiation therapy.    HPI:  Veronica Nieves is a 31 year old female with triple positive left breast cancer.     The patient felt a lump in her left breast and presented for a diagnostic mammogram + left breast US on 5/4/2021. These showed at least three irregular masses together measuring approximately 4.8 cm, largest centrally located mass being 2.4 x 1.6 x 2.9 cm. An US core guided biopsy was performed same day, pathology showed IDC grade II, DCIS intermediate grade,  ER/AZ pos, HER-2 pos.     A PET CT was performed on 6/2/2021, findings showing FDG activity confined to patient's left breast cancer without additional sites of suspicious FDG activity. She completed 6 cycles of TCHP chemotherapy from 5/27/2021 - 9/10/2021. Ultrasound and diagnostic mammogram on 7/23/2021 did show decrease in size of largest mass from 2.4cm down to 1.4 cm consistent with treatment response. She is planning to undergo bilateral mastectomy + SLN biopsy with immediate reconstruction.    The patient presents to discuss radiation therapy. She has no complaints today.     Past Medical History:   Diagnosis Date     Abnormal Pap smear of cervix     age 13, subsequent pap smears normal     Anxiety      Breast cancer (H) 05/04/2021     Depression      Past Surgical History:   Procedure Laterality Date     OTHER SURGICAL HISTORY      no surgical history     AZ INSJ PRPH CTR VAD W/SUBQ PORT AGE 5 YR/> N/A 5/26/2021    Procedure: Port Placement;  Surgeon: Audrey Newman MD;  Location: [facility];  Service: General     US BREAST CORE BIOPSY LEFT Left 5/4/2021     WISDOM TOOTH EXTRACTION  2008     Current Outpatient Medications   Medication     acetaminophen (TYLENOL) 325 MG tablet     bismuth subsalicylate (BISMUTH SUBSALICYLATE) 262 mg Chew chew tab     dexamethasone (DECADRON) 4 MG tablet     DULoxetine (CYMBALTA) 30 MG capsule     famotidine-calcium carbonate-magnesium hydroxide (PEPCID COMPLETE) -165 mg Chew     inulin (FIBER GUMMIES ORAL)     lactobacillus combination no.8 (ADULT PROBIOTIC ORAL)     levonorgestrel (MIRENA) 20 mcg/24 hr (5 years) IUD     lidocaine-prilocaine (EMLA) cream     prochlorperazine (COMPAZINE) 10 MG tablet     SODIUM FLUORIDE 5000 PPM 1.1 % PSTE     No current facility-administered medications for this visit.     Patient has no known allergies.  Social History     Socioeconomic History     Marital status:      Spouse name: Not on file     Number of children: 0      Years of education: Not on file     Highest education level: Not on file   Occupational History     Not on file   Tobacco Use     Smoking status: Never Smoker     Smokeless tobacco: Never Used   Substance and Sexual Activity     Alcohol use: Yes     Alcohol/week: 6.0 - 7.0 standard drinks     Drug use: No     Sexual activity: Never     Partners: Male     Birth control/protection: I.U.D.   Other Topics Concern     Not on file   Social History Narrative     Not on file     Social Determinants of Health     Financial Resource Strain:      Difficulty of Paying Living Expenses:    Food Insecurity:      Worried About Running Out of Food in the Last Year:      Ran Out of Food in the Last Year:    Transportation Needs:      Lack of Transportation (Medical):      Lack of Transportation (Non-Medical):    Physical Activity:      Days of Exercise per Week:      Minutes of Exercise per Session:    Stress:      Feeling of Stress :    Social Connections:      Frequency of Communication with Friends and Family:      Frequency of Social Gatherings with Friends and Family:      Attends [employer] Services:      Active Member of Clubs or Organizations:      Attends Club or Organization Meetings:      Marital Status:    Intimate Partner Violence:      Fear of Current or Ex-Partner:      Emotionally Abused:      Physically Abused:      Sexually Abused:      , never smoker. Endorses about 7 alcoholic drinks per week.     Family History   Problem Relation Age of Onset     Heart Disease Father      Depression Father      Depression Mother      No Known Problems Brother      Depression Sister      Cancer Paternal Grandfather      Cancer Maternal Grandmother      Lung Cancer Maternal Grandmother         50's     Cancer in paternal grandfather, lung cancer in maternal grandmother.    ROS:  Reviewed with Veronica Nieves today.      General  Constitutional  Constitutional (WDL): Exceptions to WDL  Fatigue: Fatigue relieved by  rest  EENT  Eye Disorders  Eye Disorder (WDL): All eye disorder elements are within defined limits  Ear Disorders  Ear Disorder (WDL): All ear disorder elements are within defined limits  Respiratory    Respiratory  Respiratory (WDL): All respiratory elements are within defined limits  Cardiovascular  Cardiovascular  Cardiovascular (WDL): All cardiovascular elements are within defined limits  Gastrointestinal  Gastrointestinal  Gastrointestinal (WDL): Exceptions to WDL  Anorexia: Loss of appetite without alteration in eating habits  Nausea: Loss of appetite without alteration in eating habits  Musculoskeletal  Musculoskeletal and Connective Tissue Disorders  Musculoskeletal & Connective (WDL): All musculoskeletal & connective elements are within defined limits  Integumentary              Integumentary  Integumentary (WDL): Exceptions to WDL  Alopecia: Hair loss of greater than or equal to 50% normal for that individual that is readily apparent to others OR a wig or hair piece is necessary if the patient desires to completely camouflage the hair loss OR associated with psychosocial impact  Neurological  Neurosensory  Neurosensory (WDL): All neurosensory elements are within defined limits  Genitourinary/Reproductive  Genitourinary  Genitourinary (WDL): All genitourinary elements are within defined limits  Lymphatic   Lymph System Disorders  Lymph (WDL): All lymph elements are within defined limits  Patient Coping  Patient Coping: Accepting;Open/discussion  Pain       AUA Assessment                                                                         Accompanied by  Accompanied By: self only      Objective:        Exam:  There were no vitals filed for this visit.    GENERAL: No acute distress. Cooperative in conversation. Mask on.   RESP: Normal respiratory effort.   ABD: Soft, nontender.  NEURO: Non focal. Alert and oriented x3.   PSYCH: Within normal limits. No depression or anxiety.  SKIN: Warm dry intact.        Recent Labs:   Recent Results (from the past 168 hour(s))   Comprehensive metabolic panel   Result Value Ref Range    Sodium 140 136 - 145 mmol/L    Potassium 3.9 3.5 - 5.0 mmol/L    Chloride 109 (H) 98 - 107 mmol/L    Carbon Dioxide (CO2) 22 22 - 31 mmol/L    Anion Gap 9 5 - 18 mmol/L    Urea Nitrogen 14 8 - 22 mg/dL    Creatinine 0.99 0.60 - 1.10 mg/dL    Calcium 9.8 8.5 - 10.5 mg/dL    Glucose 229 (H) 70 - 125 mg/dL    Alkaline Phosphatase 69 45 - 120 U/L    AST 22 0 - 40 U/L    ALT 36 0 - 45 U/L    Protein Total 7.1 6.0 - 8.0 g/dL    Albumin 4.2 3.5 - 5.0 g/dL    Bilirubin Total 0.3 0.0 - 1.0 mg/dL    GFR Estimate 76 >60 mL/min/1.73m2   CBC with platelets and differential   Result Value Ref Range    WBC Count 10.2 4.0 - 11.0 10e3/uL    RBC Count 2.97 (L) 3.80 - 5.20 10e6/uL    Hemoglobin 9.5 (L) 11.7 - 15.7 g/dL    Hematocrit 28.7 (L) 35.0 - 47.0 %    MCV 97 78 - 100 fL    MCH 32.0 26.5 - 33.0 pg    MCHC 33.1 31.5 - 36.5 g/dL    RDW 16.1 (H) 10.0 - 15.0 %    Platelet Count 228 150 - 450 10e3/uL    % Neutrophils 91 %    % Lymphocytes 6 %    % Monocytes 2 %    % Eosinophils 0 %    % Basophils 0 %    % Immature Granulocytes 1 %    NRBCs per 100 WBC 0 <1 /100    Absolute Neutrophils 9.4 (H) 1.6 - 8.3 10e3/uL    Absolute Lymphocytes 0.6 (L) 0.8 - 5.3 10e3/uL    Absolute Monocytes 0.2 0.0 - 1.3 10e3/uL    Absolute Eosinophils 0.0 0.0 - 0.7 10e3/uL    Absolute Basophils 0.0 0.0 - 0.2 10e3/uL    Absolute Immature Granulocytes 0.1 (H) <=0.0 10e3/uL    Absolute NRBCs 0.0 10e3/uL     5/4/2021  BREAST BIOPSY CONTAINING NEOPLASTIC LESION:     -   BIOPSY TYPE:  ULTRASOUND-GUIDED CORE BIOPSIES     -   BIOPSY SITE:  LEFT BREAST, 3 O'CLOCK POSITION, 4 CM FROM NIPPLE     -   HISTOLOGIC TYPE:  INVASIVE DUCTAL CARCINOMA     -   HISTOLOGIC GRADE (JAY HISTOLOGIC SCORE):              -  TUBULE FORMATION SCORE 3              -  NUCLEAR PLEOMORPHISM SCORE 2              -  MITOTIC RATE SCORE 2            -  OVERALL GRADE:   2 (TOTAL SCORE:  7/9)     -   DUCTAL CARCINOMA IN SITU (DCIS):              -  PRESENT, SOLID AND PAPILLARY FORMS              -  INTERMEDIATE NUCLEAR GRADE              -  NO NECROSIS              -  DCIS IS PRESENT IN EACH OF THREE BIOPSY CORES, AND COMPRISES APPROXIMATELY                   20% OF TOTAL TUMOR VOLUME       -   LOBULAR CARCINOMA IN SITU (LCIS):  NOT IDENTIFIED     -   MICROCALCIFICATIONS:  RARE, ASSOCIATED WITH MALIGNANT EPITHELIUM     -   SMALL-VESSEL LYMPHATIC INVASION:  NOT IDENTIFIED     -   ADDITIONAL FINDINGS:  SMALL FOCUS OF MUCINOUS CHANGE IN TUMOR; NORMAL ADIPOSE TISSUE     -   ADDITIONAL STUDIES:            -  ESTROGEN RECEPTORS POSITIVE (90% OF TUMOR NUCLEI STAIN STRONGLY)            -  PROGESTERONE RECEPTORS POSITIVE (60% OF TUMOR NUCLEI STAIN STRONGLY)            -  HER2/VAIBHAV RECEPTORS EQUIVOCAL TO WEAKLY POSITIVE FOR OVEREXPRESSION                   (2+ TO FOCAL 3+ TUMOR-CELL MEMBRANE STAINING)     MCRS   Electronically signed by Joshua Oakes MD on 5/7/2021 at 11:26 AM     Imaging: Imaging results    EXAM: NM PET CT SKULL TO MID THIGH  LOCATION: [facility]  DATE/TIME: 6/2/2021 12:11 PM     INDICATION: New diagnosis of malignant neoplasm of upper-outer quadrant of left breast in female, estrogen receptor positive. Radiation treatment planning for breast cancer. Adjuvant chemotherapy. Initial treatment strategy.  COMPARISON: None.  TECHNIQUE: Serum glucose level 103 mg/dL. One hour post intravenous administration of 8.5 mCi F-18 FDG, PET imaging was performed from the skull base to the mid thighs utilizing attenuation correction with concurrent axial CT and PET/CT image fusion.   Dose reduction techniques were used.     FINDINGS: Small FDG avid mass lateral left breast, SUV max 6.7, corresponds to the patient's breast cancer.     FDG activity throughout the remainder of the body is physiologic. Specifically, no enlarged or FDG avid left axillary lymph  nodes.     Right Port-A-Cath in good position. IUD within the uterus.     IMPRESSION:  1.  FDG activity confined to the patient's left breast cancer. No additional sites of suspicious FDG activity.    EXAM: ULTRASOUND BREAST UNILATERAL LEFT LIMITED, MA DIAGNOSTIC DIGITAL LET  LOCATION: [facility]  DATE/TIME: 7/23/2021 9:50 AM     INDICATION: 31-year-old female presents for follow-up imaging of the left breast. Known, biopsy-proven left breast IDC/DCIS/LCIS diagnosed on 5/4/2021. The patient is currently receiving neoadjuvant chemotherapy. Ultrasound imaging was recommended to   evaluate response to therapy.     COMPARISON: 5/4/2021     ULTRASOUND FINDINGS:   Targeted ultrasound performed on 5/4/2021 demonstrated three irregular, hypoechoic masses centered at the 3:00 position, 2-6 cm from the nipple.      On today's sonographic evaluation, only two irregular masses are definitively visualized. At the 3:00 position, 4 cm from the nipple there is a 1.4 x 0.7 x 1.4 cm irregular, hypoechoic mass with angular margins and associated calcifications. There is an   associated biopsy clip within the center of this mass. This primary mass has decreased in size compared to the prior examination dated 5/4/2021 (previously 2.4 x 1.6 x 2.9 cm), consistent with treatment response. The maximal extent of the two visualized   masses together measures approximately 3.2 cm (compared to 4.8 cm previously).     MAMMOGRAM FINDINGS:  Diagnostic mammography of the left breast is recommended for further evaluation of treatment response. Full-field views of the left breast were obtained and again demonstrate irregular masses in a segmental distribution centered at the 3:00 position,   posterior depth. The associated fine, pleomorphic calcifications measure approximately 4.6 cm in anterior-posterior dimension and 2.8 cm in craniocaudal dimension, overall stable compared to prior examination dated 5/4/2021.                                                                       IMPRESSION:   Known, biopsy-proven malignancy within the left breast at the 3:00 position. Two irregular masses are visualized on today's sonographic evaluation, whereas three were visualized previously. The largest, primary mass has decreased in size measuring up to   1.4 cm, compared to 2.9 cm previously, consistent with treatment response. The associated calcifications are overall stable compared to prior examination. Additional follow-up imaging per the patient's clinical team.     ACR BI-RADS Category 6: Known Biopsy-Proven Malignancy.     I personally scanned and discussed the findings and recommendations with the patient at the conclusion of the examination.       Pathology:   Biopsy results viewed personally and in HPI       60 minutes spent on the date of the encounter doing chart review, review of test results, interpretation of tests, patient visit, documentation.    I, Danyelle Love MD personally performed the services described in this documentation, as scribed by Fan Euceda in my presence, and it is both accurate and complete.    Signed by: Danyelle Love MD, MPH

## 2021-09-14 NOTE — LETTER
9/14/2021         RE: Veronica Nieves  [address]  [place] [address]        Dear Colleague,    Thank you for referring your patient, Veronica Nieves, to the [facility] [place]. Please see a copy of my visit note below.    Pt here for consult with Dr. Love for breast cancer. She is finished with NAC, plan for bilateral mastectomies 10/7/21. She meets with Plastics later this week to discuss reconstruction. Pt was given new patient folder which includes [facility] RT info, RN welcomeletter, site specific RT education sheet, Prior authorization info, and ACS RT booklet. We discussed the routine for radiation including consult, simulation and immobilization devices, treatment positioning, daily treatment,weekly RO visit, and common s/e of skin irritation and fatigue. Pt verbalized their understanding.             [facility] Radiation Oncology Consult Note      Patient: Veronica Nieves  MRN: [id number]  Date of Service: 09/14/2021    Assessment:       ICD-10-CM    1. Malignant neoplasm of upper-outer quadrant of left breast in female, estrogen receptor positive (H)  C50.412 Radiation Therapy Referral    Z17.0         Impression/Plan:   31 year old female S/P neoadjuvant TCHP chemotherapy for HER-2 pos left breast cancer. Multifocal masses measuring approximately 4.8 cm. PET CT showing confined to left breast, no additional site of suspicious FDG activity.     1. This is a 31 year old female who felt a lump in her left breast. This was a multifocal mass measuring about 4.8 cm and was ultimately found to be triple positive left breast IDC. Her initial PET CT was node negative, FDG activity confined to left breast mass. She has completed TCHP chemotherapy which will be followed by Trastuzumab/Pertuzumab.     She is planning to undergo bilateral mastectomy and SLN biopsy with immediate reconstruction. Given her current node negative status and <5cm mass, minimal benefit for  radiation therapy in regards to local recurrence. If there is residual disease remaining in the chest with a questionable margin and/or at least one positive sentinel lymph node, she will return for further discussion of radiation therapy as there will be a benefit for local recurrence.     Subjective:   This patient was referred to myself by Dr. Garcia for consideration of radiation therapy.    HPI:  Veronica Nieves is a 31 year old female with triple positive left breast cancer.     The patient felt a lump in her left breast and presented for a diagnostic mammogram + left breast US on 5/4/2021. These showed at least three irregular masses together measuring approximately 4.8 cm, largest centrally located mass being 2.4 x 1.6 x 2.9 cm. An US core guided biopsy was performed same day, pathology showed IDC grade II, DCIS intermediate grade, ER/MA pos, HER-2 pos.     A PET CT was performed on 6/2/2021, findings showing FDG activity confined to patient's left breast cancer without additional sites of suspicious FDG activity. She completed 6 cycles of TCHP chemotherapy from 5/27/2021 - 9/10/2021. Ultrasound and diagnostic mammogram on 7/23/2021 did show decrease in size of largest mass from 2.4cm down to 1.4 cm consistent with treatment response. She is planning to undergo bilateral mastectomy + SLN biopsy with immediate reconstruction.    The patient presents to discuss radiation therapy. She has no complaints today.     Past Medical History:   Diagnosis Date     Abnormal Pap smear of cervix     age 13, subsequent pap smears normal     Anxiety      Breast cancer (H) 05/04/2021     Depression      Past Surgical History:   Procedure Laterality Date     OTHER SURGICAL HISTORY      no surgical history     MA INSJ PRPH CTR VAD W/SUBQ PORT AGE 5 YR/> N/A 5/26/2021    Procedure: Port Placement;  Surgeon: Audrey Newman MD;  Location: [facility];  Service: General     US BREAST CORE BIOPSY LEFT Left 5/4/2021      WISDOM TOOTH EXTRACTION  2008     Current Outpatient Medications   Medication     acetaminophen (TYLENOL) 325 MG tablet     bismuth subsalicylate (BISMUTH SUBSALICYLATE) 262 mg Chew chew tab     dexamethasone (DECADRON) 4 MG tablet     DULoxetine (CYMBALTA) 30 MG capsule     famotidine-calcium carbonate-magnesium hydroxide (PEPCID COMPLETE) -165 mg Chew     inulin (FIBER GUMMIES ORAL)     lactobacillus combination no.8 (ADULT PROBIOTIC ORAL)     levonorgestrel (MIRENA) 20 mcg/24 hr (5 years) IUD     lidocaine-prilocaine (EMLA) cream     prochlorperazine (COMPAZINE) 10 MG tablet     SODIUM FLUORIDE 5000 PPM 1.1 % PSTE     No current facility-administered medications for this visit.     Patient has no known allergies.  Social History     Socioeconomic History     Marital status:      Spouse name: Not on file     Number of children: 0     Years of education: Not on file     Highest education level: Not on file   Occupational History     Not on file   Tobacco Use     Smoking status: Never Smoker     Smokeless tobacco: Never Used   Substance and Sexual Activity     Alcohol use: Yes     Alcohol/week: 6.0 - 7.0 standard drinks     Drug use: No     Sexual activity: Never     Partners: Male     Birth control/protection: I.U.D.   Other Topics Concern     Not on file   Social History Narrative     Not on file     Social Determinants of Health     Financial Resource Strain:      Difficulty of Paying Living Expenses:    Food Insecurity:      Worried About Running Out of Food in the Last Year:      Ran Out of Food in the Last Year:    Transportation Needs:      Lack of Transportation (Medical):      Lack of Transportation (Non-Medical):    Physical Activity:      Days of Exercise per Week:      Minutes of Exercise per Session:    Stress:      Feeling of Stress :    Social Connections:      Frequency of Communication with Friends and Family:      Frequency of Social Gatherings with Friends and Family:      Attends  Christian Services:      Active Member of Clubs or Organizations:      Attends Club or Organization Meetings:      Marital Status:    Intimate Partner Violence:      Fear of Current or Ex-Partner:      Emotionally Abused:      Physically Abused:      Sexually Abused:      , never smoker. Endorses about 7 alcoholic drinks per week.     Family History   Problem Relation Age of Onset     Heart Disease Father      Depression Father      Depression Mother      No Known Problems Brother      Depression Sister      Cancer Paternal Grandfather      Cancer Maternal Grandmother      Lung Cancer Maternal Grandmother         50's     Cancer in paternal grandfather, lung cancer in maternal grandmother.    ROS:  Reviewed with Veronica Nieves today.      General  Constitutional  Constitutional (WDL): Exceptions to WDL  Fatigue: Fatigue relieved by rest  EENT  Eye Disorders  Eye Disorder (WDL): All eye disorder elements are within defined limits  Ear Disorders  Ear Disorder (WDL): All ear disorder elements are within defined limits  Respiratory    Respiratory  Respiratory (WDL): All respiratory elements are within defined limits  Cardiovascular  Cardiovascular  Cardiovascular (WDL): All cardiovascular elements are within defined limits  Gastrointestinal  Gastrointestinal  Gastrointestinal (WDL): Exceptions to WDL  Anorexia: Loss of appetite without alteration in eating habits  Nausea: Loss of appetite without alteration in eating habits  Musculoskeletal  Musculoskeletal and Connective Tissue Disorders  Musculoskeletal & Connective (WDL): All musculoskeletal & connective elements are within defined limits  Integumentary              Integumentary  Integumentary (WDL): Exceptions to WDL  Alopecia: Hair loss of greater than or equal to 50% normal for that individual that is readily apparent to others OR a wig or hair piece is necessary if the patient desires to completely camouflage the hair loss OR associated with  psychosocial impact  Neurological  Neurosensory  Neurosensory (WDL): All neurosensory elements are within defined limits  Genitourinary/Reproductive  Genitourinary  Genitourinary (WDL): All genitourinary elements are within defined limits  Lymphatic   Lymph System Disorders  Lymph (WDL): All lymph elements are within defined limits  Patient Coping  Patient Coping: Accepting;Open/discussion  Pain       AUA Assessment                                                                         Accompanied by  Accompanied By: self only      Objective:        Exam:  There were no vitals filed for this visit.    GENERAL: No acute distress. Cooperative in conversation. Mask on.   RESP: Normal respiratory effort.   ABD: Soft, nontender.  NEURO: Non focal. Alert and oriented x3.   PSYCH: Within normal limits. No depression or anxiety.  SKIN: Warm dry intact.       Recent Labs:   Recent Results (from the past 168 hour(s))   Comprehensive metabolic panel   Result Value Ref Range    Sodium 140 136 - 145 mmol/L    Potassium 3.9 3.5 - 5.0 mmol/L    Chloride 109 (H) 98 - 107 mmol/L    Carbon Dioxide (CO2) 22 22 - 31 mmol/L    Anion Gap 9 5 - 18 mmol/L    Urea Nitrogen 14 8 - 22 mg/dL    Creatinine 0.99 0.60 - 1.10 mg/dL    Calcium 9.8 8.5 - 10.5 mg/dL    Glucose 229 (H) 70 - 125 mg/dL    Alkaline Phosphatase 69 45 - 120 U/L    AST 22 0 - 40 U/L    ALT 36 0 - 45 U/L    Protein Total 7.1 6.0 - 8.0 g/dL    Albumin 4.2 3.5 - 5.0 g/dL    Bilirubin Total 0.3 0.0 - 1.0 mg/dL    GFR Estimate 76 >60 mL/min/1.73m2   CBC with platelets and differential   Result Value Ref Range    WBC Count 10.2 4.0 - 11.0 10e3/uL    RBC Count 2.97 (L) 3.80 - 5.20 10e6/uL    Hemoglobin 9.5 (L) 11.7 - 15.7 g/dL    Hematocrit 28.7 (L) 35.0 - 47.0 %    MCV 97 78 - 100 fL    MCH 32.0 26.5 - 33.0 pg    MCHC 33.1 31.5 - 36.5 g/dL    RDW 16.1 (H) 10.0 - 15.0 %    Platelet Count 228 150 - 450 10e3/uL    % Neutrophils 91 %    % Lymphocytes 6 %    % Monocytes 2 %    %  Eosinophils 0 %    % Basophils 0 %    % Immature Granulocytes 1 %    NRBCs per 100 WBC 0 <1 /100    Absolute Neutrophils 9.4 (H) 1.6 - 8.3 10e3/uL    Absolute Lymphocytes 0.6 (L) 0.8 - 5.3 10e3/uL    Absolute Monocytes 0.2 0.0 - 1.3 10e3/uL    Absolute Eosinophils 0.0 0.0 - 0.7 10e3/uL    Absolute Basophils 0.0 0.0 - 0.2 10e3/uL    Absolute Immature Granulocytes 0.1 (H) <=0.0 10e3/uL    Absolute NRBCs 0.0 10e3/uL     5/4/2021  BREAST BIOPSY CONTAINING NEOPLASTIC LESION:     -   BIOPSY TYPE:  ULTRASOUND-GUIDED CORE BIOPSIES     -   BIOPSY SITE:  LEFT BREAST, 3 O'CLOCK POSITION, 4 CM FROM NIPPLE     -   HISTOLOGIC TYPE:  INVASIVE DUCTAL CARCINOMA     -   HISTOLOGIC GRADE (JAY HISTOLOGIC SCORE):              -  TUBULE FORMATION SCORE 3              -  NUCLEAR PLEOMORPHISM SCORE 2              -  MITOTIC RATE SCORE 2            -  OVERALL GRADE:  2 (TOTAL SCORE:  7/9)     -   DUCTAL CARCINOMA IN SITU (DCIS):              -  PRESENT, SOLID AND PAPILLARY FORMS              -  INTERMEDIATE NUCLEAR GRADE              -  NO NECROSIS              -  DCIS IS PRESENT IN EACH OF THREE BIOPSY CORES, AND COMPRISES APPROXIMATELY                   20% OF TOTAL TUMOR VOLUME       -   LOBULAR CARCINOMA IN SITU (LCIS):  NOT IDENTIFIED     -   MICROCALCIFICATIONS:  RARE, ASSOCIATED WITH MALIGNANT EPITHELIUM     -   SMALL-VESSEL LYMPHATIC INVASION:  NOT IDENTIFIED     -   ADDITIONAL FINDINGS:  SMALL FOCUS OF MUCINOUS CHANGE IN TUMOR; NORMAL ADIPOSE TISSUE     -   ADDITIONAL STUDIES:            -  ESTROGEN RECEPTORS POSITIVE (90% OF TUMOR NUCLEI STAIN STRONGLY)            -  PROGESTERONE RECEPTORS POSITIVE (60% OF TUMOR NUCLEI STAIN STRONGLY)            -  HER2/VAIBHAV RECEPTORS EQUIVOCAL TO WEAKLY POSITIVE FOR OVEREXPRESSION                   (2+ TO FOCAL 3+ TUMOR-CELL MEMBRANE STAINING)     MCRS   Electronically signed by Joshua Oakes MD on 5/7/2021 at 11:26 AM     Imaging: Imaging results    EXAM: NM PET CT SKULL TO MID  THIGH  LOCATION: [facility]  DATE/TIME: 6/2/2021 12:11 PM     INDICATION: New diagnosis of malignant neoplasm of upper-outer quadrant of left breast in female, estrogen receptor positive. Radiation treatment planning for breast cancer. Adjuvant chemotherapy. Initial treatment strategy.  COMPARISON: None.  TECHNIQUE: Serum glucose level 103 mg/dL. One hour post intravenous administration of 8.5 mCi F-18 FDG, PET imaging was performed from the skull base to the mid thighs utilizing attenuation correction with concurrent axial CT and PET/CT image fusion.   Dose reduction techniques were used.     FINDINGS: Small FDG avid mass lateral left breast, SUV max 6.7, corresponds to the patient's breast cancer.     FDG activity throughout the remainder of the body is physiologic. Specifically, no enlarged or FDG avid left axillary lymph nodes.     Right Port-A-Cath in good position. IUD within the uterus.     IMPRESSION:  1.  FDG activity confined to the patient's left breast cancer. No additional sites of suspicious FDG activity.    EXAM: ULTRASOUND BREAST UNILATERAL LEFT LIMITED, MA DIAGNOSTIC DIGITAL LET  LOCATION: [facility]  DATE/TIME: 7/23/2021 9:50 AM     INDICATION: 31-year-old female presents for follow-up imaging of the left breast. Known, biopsy-proven left breast IDC/DCIS/LCIS diagnosed on 5/4/2021. The patient is currently receiving neoadjuvant chemotherapy. Ultrasound imaging was recommended to   evaluate response to therapy.     COMPARISON: 5/4/2021     ULTRASOUND FINDINGS:   Targeted ultrasound performed on 5/4/2021 demonstrated three irregular, hypoechoic masses centered at the 3:00 position, 2-6 cm from the nipple.      On today's sonographic evaluation, only two irregular masses are definitively visualized. At the 3:00 position, 4 cm from the nipple there is a 1.4 x 0.7 x 1.4 cm irregular, hypoechoic mass with angular margins and associated calcifications.  There is an   associated biopsy clip within the center of this mass. This primary mass has decreased in size compared to the prior examination dated 5/4/2021 (previously 2.4 x 1.6 x 2.9 cm), consistent with treatment response. The maximal extent of the two visualized   masses together measures approximately 3.2 cm (compared to 4.8 cm previously).     MAMMOGRAM FINDINGS:  Diagnostic mammography of the left breast is recommended for further evaluation of treatment response. Full-field views of the left breast were obtained and again demonstrate irregular masses in a segmental distribution centered at the 3:00 position,   posterior depth. The associated fine, pleomorphic calcifications measure approximately 4.6 cm in anterior-posterior dimension and 2.8 cm in craniocaudal dimension, overall stable compared to prior examination dated 5/4/2021.                                                                      IMPRESSION:   Known, biopsy-proven malignancy within the left breast at the 3:00 position. Two irregular masses are visualized on today's sonographic evaluation, whereas three were visualized previously. The largest, primary mass has decreased in size measuring up to   1.4 cm, compared to 2.9 cm previously, consistent with treatment response. The associated calcifications are overall stable compared to prior examination. Additional follow-up imaging per the patient's clinical team.     ACR BI-RADS Category 6: Known Biopsy-Proven Malignancy.     I personally scanned and discussed the findings and recommendations with the patient at the conclusion of the examination.       Pathology:   Biopsy results viewed personally and in HPI       60 minutes spent on the date of the encounter doing chart review, review of test results, interpretation of tests, patient visit, documentation.    I, Danyelle Love MD personally performed the services described in this documentation, as scribed by Fan Euceda in my  presence, and it is both accurate and complete.    Signed by: Danyelle Love MD, MPH             Again, thank you for allowing me to participate in the care of your patient.        Sincerely,        Danyelle Love MD

## 2021-09-14 NOTE — PATIENT INSTRUCTIONS
Patient Education     Radiation Therapy Treatment  Radiation therapy uses high-energy X-rays to kill cancer cells. Radiation therapy can help you in your fight against cancer. It begins with a session to discuss treatment with your healthcare provider. If you and your provider decide on radiation, you will return for a treatment planning visit called a simulation. Then you will start treatment.  The simulation is a planning session that helps your healthcare provider target your cancer. He or she will design a radiation plan to protect your healthy tissues from radiation. Your radiation therapy team uses a special machine called a simulator to map out your treatment. The simulator is usually an X-ray machine (fluoroscopy), CT scanner, MRI scanner, or PET-CT scanner machine. Laser lights act as guides to help position your body accurately. During this visit:    The team figures out the best position for your body. They make notes in your chart so you ll be placed the same way each time.    They may use special devices to keep your body correctly positioned and still during treatment. These may include molds, masks, rests, and blocks.    The team makes ink marks on your skin. These will help you get in the same position for each treatment. Tiny permanent tattoos may also be used. These tattoos may be removed later with laser treatments.    Markers such as metal balls or wires may be put on or in your body. Sometime these are taped to the skin to help with the imaging process. These work with the X-rays to position your body. The markers are removed when the visit is over.  After the team has the imaging and data, the information is sent into the computer planning system. Your doctor and the team of physicists and dosimetrists design a treatment field. The field will best target your cancer and how it might spread. It will also help limit radiation to nearby normal tissues.  Your treatments  When the simulation and  plan are completed, you will begin your daily treatments. Treatment is usually once daily, Monday through Friday, for 5 to 7 weeks. It takes less than 30 minutes. Sometimes you may need radiation twice a day, with about 6 hours between treatments.  You may need to change into a hospital gown. The radiation therapist puts you in the correct position on the treatment table, then leaves the room. Sometimes you may need more imaging before each treatment. The machine may take digital X-rays or a CT scan to help make sure you are lined up correctly. During treatment, lie as still as you can and breathe normally. You will hear noises coming from the machine. You can talk to the radiation therapist, who watches you from the control room on a TV monitor. After treatment, the therapist will help you off the table. You can then get dressed and go back to your normal activities.  After treatment  After your radiation treatments are done, you will have follow-up appointments. These are to make sure the cancer is under control. Tell your healthcare team about any side effects from the treatment. The team will help you manage them.  [employer] last reviewed this educational content on 6/1/2018 2000-2021 [employer]. All rights reserved. This information is not intended as a substitute for professional medical care. Always follow your healthcare professional's instructions.

## 2021-09-27 ENCOUNTER — HOSPITAL ENCOUNTER (OUTPATIENT)
Dept: CARDIOLOGY | Facility: HOSPITAL | Age: 31
Discharge: HOME | End: 2021-09-27
Attending: INTERNAL MEDICINE | Admitting: INTERNAL MEDICINE
Payer: COMMERCIAL

## 2021-09-27 DIAGNOSIS — C50.412 MALIGNANT NEOPLASM OF UPPER-OUTER QUADRANT OF LEFT BREAST IN FEMALE, ESTROGEN RECEPTOR POSITIVE (H): ICD-10-CM

## 2021-09-27 DIAGNOSIS — Z17.0 MALIGNANT NEOPLASM OF UPPER-OUTER QUADRANT OF LEFT BREAST IN FEMALE, ESTROGEN RECEPTOR POSITIVE (H): ICD-10-CM

## 2021-09-27 LAB — LVEF ECHO: NORMAL

## 2021-09-27 PROCEDURE — 93306 TTE W/DOPPLER COMPLETE: CPT | Mod: 26 | Performed by: INTERNAL MEDICINE

## 2021-09-27 PROCEDURE — 93306 TTE W/DOPPLER COMPLETE: CPT

## 2021-09-30 RX ORDER — ACETAMINOPHEN 325 MG/1
650 TABLET ORAL
Status: CANCELLED
Start: 2021-11-12

## 2021-09-30 RX ORDER — METHYLPREDNISOLONE SODIUM SUCCINATE 125 MG/2ML
125 INJECTION, POWDER, LYOPHILIZED, FOR SOLUTION INTRAMUSCULAR; INTRAVENOUS
Status: CANCELLED
Start: 2021-10-01

## 2021-09-30 RX ORDER — DIPHENHYDRAMINE HCL 50 MG
50 CAPSULE ORAL
Status: CANCELLED | OUTPATIENT
Start: 2021-10-22

## 2021-09-30 RX ORDER — NALOXONE HYDROCHLORIDE 0.4 MG/ML
0.2 INJECTION, SOLUTION INTRAMUSCULAR; INTRAVENOUS; SUBCUTANEOUS
Status: CANCELLED | OUTPATIENT
Start: 2021-10-01

## 2021-09-30 RX ORDER — EPINEPHRINE 1 MG/ML
0.3 INJECTION, SOLUTION INTRAMUSCULAR; SUBCUTANEOUS EVERY 5 MIN PRN
Status: CANCELLED | OUTPATIENT
Start: 2021-10-22

## 2021-09-30 RX ORDER — HEPARIN SODIUM (PORCINE) LOCK FLUSH IV SOLN 100 UNIT/ML 100 UNIT/ML
5 SOLUTION INTRAVENOUS
Status: CANCELLED | OUTPATIENT
Start: 2021-10-01

## 2021-09-30 RX ORDER — ALBUTEROL SULFATE 90 UG/1
1-2 AEROSOL, METERED RESPIRATORY (INHALATION)
Status: CANCELLED
Start: 2021-10-01

## 2021-09-30 RX ORDER — NALOXONE HYDROCHLORIDE 0.4 MG/ML
0.2 INJECTION, SOLUTION INTRAMUSCULAR; INTRAVENOUS; SUBCUTANEOUS
Status: CANCELLED | OUTPATIENT
Start: 2021-11-12

## 2021-09-30 RX ORDER — DIPHENHYDRAMINE HYDROCHLORIDE 50 MG/ML
50 INJECTION INTRAMUSCULAR; INTRAVENOUS
Status: CANCELLED
Start: 2021-11-12

## 2021-09-30 RX ORDER — MEPERIDINE HYDROCHLORIDE 25 MG/ML
25 INJECTION INTRAMUSCULAR; INTRAVENOUS; SUBCUTANEOUS EVERY 30 MIN PRN
Status: CANCELLED | OUTPATIENT
Start: 2021-11-12

## 2021-09-30 RX ORDER — ALBUTEROL SULFATE 90 UG/1
1-2 AEROSOL, METERED RESPIRATORY (INHALATION)
Status: CANCELLED
Start: 2021-11-12

## 2021-09-30 RX ORDER — MEPERIDINE HYDROCHLORIDE 25 MG/ML
25 INJECTION INTRAMUSCULAR; INTRAVENOUS; SUBCUTANEOUS EVERY 30 MIN PRN
Status: CANCELLED | OUTPATIENT
Start: 2021-10-22

## 2021-09-30 RX ORDER — HEPARIN SODIUM,PORCINE 10 UNIT/ML
5 VIAL (ML) INTRAVENOUS
Status: CANCELLED | OUTPATIENT
Start: 2021-11-12

## 2021-09-30 RX ORDER — ACETAMINOPHEN 325 MG/1
650 TABLET ORAL
Status: CANCELLED
Start: 2021-10-01

## 2021-09-30 RX ORDER — LORAZEPAM 2 MG/ML
0.5 INJECTION INTRAMUSCULAR EVERY 4 HOURS PRN
Status: CANCELLED
Start: 2021-11-12

## 2021-09-30 RX ORDER — ALBUTEROL SULFATE 90 UG/1
1-2 AEROSOL, METERED RESPIRATORY (INHALATION)
Status: CANCELLED
Start: 2021-10-22

## 2021-09-30 RX ORDER — HEPARIN SODIUM,PORCINE 10 UNIT/ML
5 VIAL (ML) INTRAVENOUS
Status: CANCELLED | OUTPATIENT
Start: 2021-10-01

## 2021-09-30 RX ORDER — METHYLPREDNISOLONE SODIUM SUCCINATE 125 MG/2ML
125 INJECTION, POWDER, LYOPHILIZED, FOR SOLUTION INTRAMUSCULAR; INTRAVENOUS
Status: CANCELLED
Start: 2021-11-12

## 2021-09-30 RX ORDER — DIPHENHYDRAMINE HCL 50 MG
50 CAPSULE ORAL
Status: CANCELLED | OUTPATIENT
Start: 2021-11-12

## 2021-09-30 RX ORDER — EPINEPHRINE 1 MG/ML
0.3 INJECTION, SOLUTION INTRAMUSCULAR; SUBCUTANEOUS EVERY 5 MIN PRN
Status: CANCELLED | OUTPATIENT
Start: 2021-11-12

## 2021-09-30 RX ORDER — MEPERIDINE HYDROCHLORIDE 25 MG/ML
25 INJECTION INTRAMUSCULAR; INTRAVENOUS; SUBCUTANEOUS EVERY 30 MIN PRN
Status: CANCELLED | OUTPATIENT
Start: 2021-10-01

## 2021-09-30 RX ORDER — ACETAMINOPHEN 325 MG/1
650 TABLET ORAL
Status: CANCELLED
Start: 2021-10-22

## 2021-09-30 RX ORDER — HEPARIN SODIUM (PORCINE) LOCK FLUSH IV SOLN 100 UNIT/ML 100 UNIT/ML
5 SOLUTION INTRAVENOUS
Status: CANCELLED | OUTPATIENT
Start: 2021-10-22

## 2021-09-30 RX ORDER — LORAZEPAM 2 MG/ML
0.5 INJECTION INTRAMUSCULAR EVERY 4 HOURS PRN
Status: CANCELLED
Start: 2021-10-22

## 2021-09-30 RX ORDER — DIPHENHYDRAMINE HYDROCHLORIDE 50 MG/ML
50 INJECTION INTRAMUSCULAR; INTRAVENOUS
Status: CANCELLED
Start: 2021-10-22

## 2021-09-30 RX ORDER — DIPHENHYDRAMINE HYDROCHLORIDE 50 MG/ML
50 INJECTION INTRAMUSCULAR; INTRAVENOUS
Status: CANCELLED
Start: 2021-10-01

## 2021-09-30 RX ORDER — NALOXONE HYDROCHLORIDE 0.4 MG/ML
0.2 INJECTION, SOLUTION INTRAMUSCULAR; INTRAVENOUS; SUBCUTANEOUS
Status: CANCELLED | OUTPATIENT
Start: 2021-10-22

## 2021-09-30 RX ORDER — HEPARIN SODIUM,PORCINE 10 UNIT/ML
5 VIAL (ML) INTRAVENOUS
Status: CANCELLED | OUTPATIENT
Start: 2021-10-22

## 2021-09-30 RX ORDER — EPINEPHRINE 1 MG/ML
0.3 INJECTION, SOLUTION INTRAMUSCULAR; SUBCUTANEOUS EVERY 5 MIN PRN
Status: CANCELLED | OUTPATIENT
Start: 2021-10-01

## 2021-09-30 RX ORDER — ALBUTEROL SULFATE 0.83 MG/ML
2.5 SOLUTION RESPIRATORY (INHALATION)
Status: CANCELLED | OUTPATIENT
Start: 2021-10-22

## 2021-09-30 RX ORDER — ALBUTEROL SULFATE 0.83 MG/ML
2.5 SOLUTION RESPIRATORY (INHALATION)
Status: CANCELLED | OUTPATIENT
Start: 2021-11-12

## 2021-09-30 RX ORDER — DIPHENHYDRAMINE HCL 50 MG
50 CAPSULE ORAL
Status: CANCELLED | OUTPATIENT
Start: 2021-10-01

## 2021-09-30 RX ORDER — LORAZEPAM 2 MG/ML
0.5 INJECTION INTRAMUSCULAR EVERY 4 HOURS PRN
Status: CANCELLED
Start: 2021-10-01

## 2021-09-30 RX ORDER — HEPARIN SODIUM (PORCINE) LOCK FLUSH IV SOLN 100 UNIT/ML 100 UNIT/ML
5 SOLUTION INTRAVENOUS
Status: CANCELLED | OUTPATIENT
Start: 2021-11-12

## 2021-09-30 RX ORDER — ALBUTEROL SULFATE 0.83 MG/ML
2.5 SOLUTION RESPIRATORY (INHALATION)
Status: CANCELLED | OUTPATIENT
Start: 2021-10-01

## 2021-09-30 RX ORDER — METHYLPREDNISOLONE SODIUM SUCCINATE 125 MG/2ML
125 INJECTION, POWDER, LYOPHILIZED, FOR SOLUTION INTRAMUSCULAR; INTRAVENOUS
Status: CANCELLED
Start: 2021-10-22

## 2021-09-30 NOTE — PROGRESS NOTES
[facility] Hematology and Oncology Progress Note    Patient: Veronica Nieves  MRN: [id number]  Date of Service: Sep 10, 2021        Assessment and Plan:    Cancer Staging  Malignant neoplasm of upper-outer quadrant of left breast in female, estrogen receptor positive (H)  Staging form: Breast, AJCC 8th Edition  - Clinical stage from 5/19/2021: Stage IB (cT2, cN0, cM0, G2, ER+, WV+, HER2+) - Signed by Jefferson Garcia MD on 5/19/2021     1.  Invasive ductal carcinoma of the left breast: Today here is her sixth and final cycle of chemotherapy.  Overall she is tolerated it well.  No major side effects.  She will be having surgery on October 7.  We will see her back a few weeks after that to review the pathology report and decide on the plan going forward.  She is planning on mastectomy.  Probably will not need chemotherapy.  She will receive adjuvant endocrine therapy.  Questions were answered.    ECOG Performance  1    Diagnosis:    1.  Invasive ductal carcinoma of the left breast: Diagnosed May, 2021.  Initial imaging suggested 3 masses.  Biopsy of the largest mass showed an invasive ductal carcinoma, grade 2.  ER/WV positive, HER-2 positive.  Evaluation of the axilla was negative.    Treatment:    Neoadjuvant chemotherapy with TCHP initiated on May 27, 2021.    Interim History:    Abbey presents today for a follow-up visit.  Last chemotherapy today.  Occasional diarrhea.  Occasional peripheral neuropathy.  She is exercising.  Taste is off.  No acute complaints today.     Review of Systems:    As above in the history.     Review of Systems otherwise Negative for:  General: chills, fever or night sweats  Psychological: anxiety or depression  Ophthalmic: blurry vision, double vision or loss of vision, vision change  ENT: epistaxis, oral lesions, hearing changes  Hematological and Lymphatic: bleeding, bruising, jaundice, swollen lymph nodes  Endocrine: hot flashes, unexpected weight changes  Respiratory: cough,  hemoptysis, orthopnea or shortness of breath/SHAHID  Cardiovascular: chest pain, edema, palpitations or PND  Gastrointestinal: abdominal pain, blood in stools, change in bowel habits, constipation, diarrhea or nausea/vomiting  Genito-Urinary: change in urinary stream, incontinence, frequency/urgency  Musculoskeletal: joint pain, stiffness, swelling, muscle pain  Neurological: dizziness, headaches, numbness/tingling  Dermatological: lumps and rash    Past History:    Past Medical History:   Diagnosis Date     Abnormal Pap smear of cervix     age 13, subsequent pap smears normal     Anxiety      Breast cancer (H) 05/04/2021     Depression      Physical Exam:    /73 (BP Location: Right arm, Patient Position: Sitting, Cuff Size: Adult Regular)   Pulse 82   Temp 98.4  F (36.9  C) (Oral)   Resp 12   Wt 83.5 kg (184 lb)   SpO2 99%   BMI 29.47 kg/m      General: patient appears stated age of 31 year old. Nontoxic and in no distress.   HEENT: Head: atraumatic, normocephalic. Sclerae anicteric.  Chest:  Normal respiratory effort  Cardiac:  No edema.   Abdomen: abdomen is non-distended  Extremities: normal tone and muscle bulk.  Skin: no lesions or rash on visible skin. Warm and dry.   CNS: alert and oriented. Grossly non-focal.   Psychiatric: normal mood and affect.     Lab Results:    Imaging:      Signed by: Jefferson Garcia MD

## 2021-10-01 ENCOUNTER — INFUSION THERAPY VISIT (OUTPATIENT)
Dept: INFUSION THERAPY | Facility: HOSPITAL | Age: 31
End: 2021-10-01
Attending: INTERNAL MEDICINE
Payer: COMMERCIAL

## 2021-10-01 VITALS
BODY MASS INDEX: 28.65 KG/M2 | RESPIRATION RATE: 16 BRPM | SYSTOLIC BLOOD PRESSURE: 128 MMHG | WEIGHT: 178.9 LBS | OXYGEN SATURATION: 97 % | HEART RATE: 87 BPM | TEMPERATURE: 99.1 F | DIASTOLIC BLOOD PRESSURE: 66 MMHG

## 2021-10-01 DIAGNOSIS — Z17.0 MALIGNANT NEOPLASM OF UPPER-OUTER QUADRANT OF LEFT BREAST IN FEMALE, ESTROGEN RECEPTOR POSITIVE (H): Primary | ICD-10-CM

## 2021-10-01 DIAGNOSIS — C50.412 MALIGNANT NEOPLASM OF UPPER-OUTER QUADRANT OF LEFT BREAST IN FEMALE, ESTROGEN RECEPTOR POSITIVE (H): Primary | ICD-10-CM

## 2021-10-01 DIAGNOSIS — Z23 NEED FOR PROPHYLACTIC VACCINATION AND INOCULATION AGAINST INFLUENZA: ICD-10-CM

## 2021-10-01 PROCEDURE — 250N000011 HC RX IP 250 OP 636

## 2021-10-01 PROCEDURE — 90471 IMMUNIZATION ADMIN: CPT

## 2021-10-01 PROCEDURE — 250N000011 HC RX IP 250 OP 636: Performed by: INTERNAL MEDICINE

## 2021-10-01 PROCEDURE — 258N000003 HC RX IP 258 OP 636: Performed by: INTERNAL MEDICINE

## 2021-10-01 PROCEDURE — 96413 CHEMO IV INFUSION 1 HR: CPT

## 2021-10-01 PROCEDURE — 96367 TX/PROPH/DG ADDL SEQ IV INF: CPT

## 2021-10-01 PROCEDURE — 90686 IIV4 VACC NO PRSV 0.5 ML IM: CPT

## 2021-10-01 PROCEDURE — G0008 ADMIN INFLUENZA VIRUS VAC: HCPCS

## 2021-10-01 RX ORDER — DIPHENHYDRAMINE HYDROCHLORIDE 50 MG/ML
50 INJECTION INTRAMUSCULAR; INTRAVENOUS
Status: DISCONTINUED | OUTPATIENT
Start: 2021-10-01 | End: 2021-10-01 | Stop reason: HOSPADM

## 2021-10-01 RX ORDER — METHYLPREDNISOLONE SODIUM SUCCINATE 125 MG/2ML
125 INJECTION, POWDER, LYOPHILIZED, FOR SOLUTION INTRAMUSCULAR; INTRAVENOUS
Status: DISCONTINUED | OUTPATIENT
Start: 2021-10-01 | End: 2021-10-01 | Stop reason: HOSPADM

## 2021-10-01 RX ORDER — HEPARIN SODIUM (PORCINE) LOCK FLUSH IV SOLN 100 UNIT/ML 100 UNIT/ML
5 SOLUTION INTRAVENOUS
Status: DISCONTINUED | OUTPATIENT
Start: 2021-10-01 | End: 2021-10-01 | Stop reason: HOSPADM

## 2021-10-01 RX ADMIN — PERTUZUMAB 420 MG: 30 INJECTION, SOLUTION, CONCENTRATE INTRAVENOUS at 08:39

## 2021-10-01 RX ADMIN — SODIUM CHLORIDE 508 MG: 9 INJECTION, SOLUTION INTRAVENOUS at 09:19

## 2021-10-01 RX ADMIN — HEPARIN 5 ML: 100 SYRINGE at 09:55

## 2021-10-01 RX ADMIN — INFLUENZA A VIRUS A/VICTORIA/2570/2019 IVR-215 (H1N1) ANTIGEN (FORMALDEHYDE INACTIVATED), INFLUENZA A VIRUS A/TASMANIA/503/2020 IVR-221 (H3N2) ANTIGEN (FORMALDEHYDE INACTIVATED), INFLUENZA B VIRUS B/PHUKET/3073/2013 ANTIGEN (FORMALDEHYDE INACTIVATED), AND INFLUENZA B VIRUS B/WASHINGTON/02/2019 ANTIGEN (FORMALDEHYDE INACTIVATED) 0.5 ML: 15; 15; 15; 15 INJECTION, SUSPENSION INTRAMUSCULAR at 08:21

## 2021-10-01 RX ADMIN — SODIUM CHLORIDE 250 ML: 9 INJECTION, SOLUTION INTRAVENOUS at 08:16

## 2021-10-01 ASSESSMENT — PAIN SCALES - GENERAL: PAINLEVEL: NO PAIN (0)

## 2021-10-01 NOTE — PROGRESS NOTES
Abbey came to chemo infusion this morning for her next doses of Trastuzumab and Pertuzumab.  No labs needed for today. VSS.  Pt assessed.  She was educated on her plan of care and both meds reviewed prior to administration.  Abbey received treatment as ordered and tolerated it well while in clinic today.  Port was flushed with ns, heparinized then de-accessed and site covered. Abbey left clinic ambulatory.

## 2021-10-03 ENCOUNTER — LAB (OUTPATIENT)
Dept: FAMILY MEDICINE | Facility: CLINIC | Age: 31
End: 2021-10-03
Attending: SPECIALIST
Payer: COMMERCIAL

## 2021-10-03 DIAGNOSIS — Z11.59 ENCOUNTER FOR SCREENING FOR OTHER VIRAL DISEASES: ICD-10-CM

## 2021-10-03 PROCEDURE — U0005 INFEC AGEN DETEC AMPLI PROBE: HCPCS

## 2021-10-03 PROCEDURE — U0003 INFECTIOUS AGENT DETECTION BY NUCLEIC ACID (DNA OR RNA); SEVERE ACUTE RESPIRATORY SYNDROME CORONAVIRUS 2 (SARS-COV-2) (CORONAVIRUS DISEASE [COVID-19]), AMPLIFIED PROBE TECHNIQUE, MAKING USE OF HIGH THROUGHPUT TECHNOLOGIES AS DESCRIBED BY CMS-2020-01-R: HCPCS

## 2021-10-04 ENCOUNTER — TELEPHONE (OUTPATIENT)
Dept: SURGERY | Facility: CLINIC | Age: 31
End: 2021-10-04

## 2021-10-04 LAB — SARS-COV-2 RNA RESP QL NAA+PROBE: NEGATIVE

## 2021-10-04 NOTE — TELEPHONE ENCOUNTER
Called patient to let her know that Dr. Álvarez's office will be completing her short term disability paperwork for her surgery.

## 2021-10-05 ASSESSMENT — MIFFLIN-ST. JEOR: SCORE: 1537.56

## 2021-10-06 ENCOUNTER — ANESTHESIA EVENT (OUTPATIENT)
Dept: SURGERY | Facility: HOSPITAL | Age: 31
End: 2021-10-06
Payer: COMMERCIAL

## 2021-10-07 ENCOUNTER — DOCUMENTATION ONLY (OUTPATIENT)
Dept: OTHER | Facility: CLINIC | Age: 31
End: 2021-10-07

## 2021-10-07 ENCOUNTER — HOSPITAL ENCOUNTER (OUTPATIENT)
Dept: NUCLEAR MEDICINE | Facility: HOSPITAL | Age: 31
End: 2021-10-07
Attending: SPECIALIST | Admitting: SPECIALIST
Payer: COMMERCIAL

## 2021-10-07 ENCOUNTER — ANESTHESIA (OUTPATIENT)
Dept: SURGERY | Facility: HOSPITAL | Age: 31
End: 2021-10-07
Payer: COMMERCIAL

## 2021-10-07 ENCOUNTER — HOSPITAL ENCOUNTER (OUTPATIENT)
Facility: HOSPITAL | Age: 31
Discharge: HOME | End: 2021-10-07
Attending: SPECIALIST | Admitting: SPECIALIST
Payer: COMMERCIAL

## 2021-10-07 VITALS
RESPIRATION RATE: 20 BRPM | DIASTOLIC BLOOD PRESSURE: 77 MMHG | WEIGHT: 177 LBS | OXYGEN SATURATION: 97 % | BODY MASS INDEX: 27.78 KG/M2 | TEMPERATURE: 98 F | HEIGHT: 67 IN | SYSTOLIC BLOOD PRESSURE: 128 MMHG | HEART RATE: 75 BPM

## 2021-10-07 DIAGNOSIS — C50.412 MALIGNANT NEOPLASM OF UPPER-OUTER QUADRANT OF LEFT BREAST IN FEMALE, ESTROGEN RECEPTOR POSITIVE (H): ICD-10-CM

## 2021-10-07 DIAGNOSIS — Z85.3 PERSONAL HISTORY OF MALIGNANT NEOPLASM OF BREAST: ICD-10-CM

## 2021-10-07 DIAGNOSIS — Z90.13 STATUS POST BILATERAL MASTECTOMY: ICD-10-CM

## 2021-10-07 DIAGNOSIS — Z17.0 MALIGNANT NEOPLASM OF UPPER-OUTER QUADRANT OF LEFT BREAST IN FEMALE, ESTROGEN RECEPTOR POSITIVE (H): ICD-10-CM

## 2021-10-07 PROCEDURE — 250N000013 HC RX MED GY IP 250 OP 250 PS 637: Performed by: PLASTIC SURGERY

## 2021-10-07 PROCEDURE — A9520 TC99 TILMANOCEPT DIAG 0.5MCI: HCPCS | Performed by: SPECIALIST

## 2021-10-07 PROCEDURE — 710N000012 HC RECOVERY PHASE 2, PER MINUTE: Performed by: SPECIALIST

## 2021-10-07 PROCEDURE — 19303 MAST SIMPLE COMPLETE: CPT | Mod: AS | Performed by: NURSE PRACTITIONER

## 2021-10-07 PROCEDURE — C1760 CLOSURE DEV, VASC: HCPCS | Performed by: SPECIALIST

## 2021-10-07 PROCEDURE — 343N000001 HC RX 343: Performed by: SPECIALIST

## 2021-10-07 PROCEDURE — 258N000003 HC RX IP 258 OP 636: Performed by: ANESTHESIOLOGY

## 2021-10-07 PROCEDURE — 88342 IMHCHEM/IMCYTCHM 1ST ANTB: CPT | Mod: 26 | Performed by: PATHOLOGY

## 2021-10-07 PROCEDURE — L8600 IMPLANT BREAST SILICONE/EQ: HCPCS | Performed by: SPECIALIST

## 2021-10-07 PROCEDURE — 250N000009 HC RX 250: Performed by: ANESTHESIOLOGY

## 2021-10-07 PROCEDURE — 272N000001 HC OR GENERAL SUPPLY STERILE: Performed by: SPECIALIST

## 2021-10-07 PROCEDURE — 250N000011 HC RX IP 250 OP 636: Performed by: SPECIALIST

## 2021-10-07 PROCEDURE — 258N000003 HC RX IP 258 OP 636: Performed by: NURSE ANESTHETIST, CERTIFIED REGISTERED

## 2021-10-07 PROCEDURE — 19303 MAST SIMPLE COMPLETE: CPT | Mod: 50 | Performed by: SPECIALIST

## 2021-10-07 PROCEDURE — C9290 INJ, BUPIVACAINE LIPOSOME: HCPCS | Performed by: PLASTIC SURGERY

## 2021-10-07 PROCEDURE — 38792 RA TRACER ID OF SENTINL NODE: CPT

## 2021-10-07 PROCEDURE — 250N000011 HC RX IP 250 OP 636: Performed by: NURSE ANESTHETIST, CERTIFIED REGISTERED

## 2021-10-07 PROCEDURE — 250N000009 HC RX 250: Performed by: NURSE ANESTHETIST, CERTIFIED REGISTERED

## 2021-10-07 PROCEDURE — 88341 IMHCHEM/IMCYTCHM EA ADD ANTB: CPT | Mod: 26 | Performed by: PATHOLOGY

## 2021-10-07 PROCEDURE — 370N000017 HC ANESTHESIA TECHNICAL FEE, PER MIN: Performed by: SPECIALIST

## 2021-10-07 PROCEDURE — 999N000141 HC STATISTIC PRE-PROCEDURE NURSING ASSESSMENT: Performed by: SPECIALIST

## 2021-10-07 PROCEDURE — 38525 BIOPSY/REMOVAL LYMPH NODES: CPT | Mod: LT | Performed by: SPECIALIST

## 2021-10-07 PROCEDURE — 88331 PATH CONSLTJ SURG 1 BLK 1SPC: CPT | Mod: TC | Performed by: SPECIALIST

## 2021-10-07 PROCEDURE — 88307 TISSUE EXAM BY PATHOLOGIST: CPT | Mod: 26 | Performed by: PATHOLOGY

## 2021-10-07 PROCEDURE — 250N000011 HC RX IP 250 OP 636: Performed by: PLASTIC SURGERY

## 2021-10-07 PROCEDURE — 710N000009 HC RECOVERY PHASE 1, LEVEL 1, PER MIN: Performed by: SPECIALIST

## 2021-10-07 PROCEDURE — 250N000011 HC RX IP 250 OP 636: Performed by: ANESTHESIOLOGY

## 2021-10-07 PROCEDURE — 88332 PATH CONSLTJ SURG EA ADD BLK: CPT | Mod: 26 | Performed by: PATHOLOGY

## 2021-10-07 PROCEDURE — 88331 PATH CONSLTJ SURG 1 BLK 1SPC: CPT | Mod: 26 | Performed by: PATHOLOGY

## 2021-10-07 PROCEDURE — 360N000076 HC SURGERY LEVEL 3, PER MIN: Performed by: SPECIALIST

## 2021-10-07 PROCEDURE — 250N000025 HC SEVOFLURANE, PER MIN: Performed by: SPECIALIST

## 2021-10-07 DEVICE — IMPLANTABLE DEVICE: Type: IMPLANTABLE DEVICE | Site: BREAST | Status: FUNCTIONAL

## 2021-10-07 DEVICE — GRAFT ALLODERM 16X20CM  1518320P CHARGE PER SQ CM= 320 UNITS: Type: IMPLANTABLE DEVICE | Site: BREAST | Status: FUNCTIONAL

## 2021-10-07 RX ORDER — HYDROCODONE BITARTRATE AND ACETAMINOPHEN 5; 325 MG/1; MG/1
1-2 TABLET ORAL EVERY 4 HOURS PRN
Qty: 15 TABLET | Refills: 0 | Status: SHIPPED | OUTPATIENT
Start: 2021-10-07 | End: 2021-10-22

## 2021-10-07 RX ORDER — LIDOCAINE 40 MG/G
CREAM TOPICAL
Status: DISCONTINUED | OUTPATIENT
Start: 2021-10-07 | End: 2021-10-07 | Stop reason: HOSPADM

## 2021-10-07 RX ORDER — NALOXONE HYDROCHLORIDE 0.4 MG/ML
0.4 INJECTION, SOLUTION INTRAMUSCULAR; INTRAVENOUS; SUBCUTANEOUS
Status: DISCONTINUED | OUTPATIENT
Start: 2021-10-07 | End: 2021-10-07 | Stop reason: HOSPADM

## 2021-10-07 RX ORDER — OXYCODONE HYDROCHLORIDE 5 MG/1
5 TABLET ORAL EVERY 4 HOURS PRN
Status: DISCONTINUED | OUTPATIENT
Start: 2021-10-07 | End: 2021-10-07 | Stop reason: HOSPADM

## 2021-10-07 RX ORDER — DEXAMETHASONE SODIUM PHOSPHATE 10 MG/ML
INJECTION, SOLUTION INTRAMUSCULAR; INTRAVENOUS PRN
Status: DISCONTINUED | OUTPATIENT
Start: 2021-10-07 | End: 2021-10-07

## 2021-10-07 RX ORDER — ONDANSETRON 2 MG/ML
4 INJECTION INTRAMUSCULAR; INTRAVENOUS EVERY 30 MIN PRN
Status: DISCONTINUED | OUTPATIENT
Start: 2021-10-07 | End: 2021-10-07 | Stop reason: HOSPADM

## 2021-10-07 RX ORDER — NALOXONE HYDROCHLORIDE 0.4 MG/ML
0.2 INJECTION, SOLUTION INTRAMUSCULAR; INTRAVENOUS; SUBCUTANEOUS
Status: DISCONTINUED | OUTPATIENT
Start: 2021-10-07 | End: 2021-10-07 | Stop reason: HOSPADM

## 2021-10-07 RX ORDER — PROPOFOL 10 MG/ML
INJECTION, EMULSION INTRAVENOUS CONTINUOUS PRN
Status: DISCONTINUED | OUTPATIENT
Start: 2021-10-07 | End: 2021-10-07

## 2021-10-07 RX ORDER — KETAMINE HYDROCHLORIDE 50 MG/ML
INJECTION, SOLUTION INTRAMUSCULAR; INTRAVENOUS PRN
Status: DISCONTINUED | OUTPATIENT
Start: 2021-10-07 | End: 2021-10-07

## 2021-10-07 RX ORDER — CEFAZOLIN SODIUM 2 G/100ML
2 INJECTION, SOLUTION INTRAVENOUS
Status: COMPLETED | OUTPATIENT
Start: 2021-10-07 | End: 2021-10-07

## 2021-10-07 RX ORDER — MEPERIDINE HYDROCHLORIDE 25 MG/ML
12.5 INJECTION INTRAMUSCULAR; INTRAVENOUS; SUBCUTANEOUS
Status: DISCONTINUED | OUTPATIENT
Start: 2021-10-07 | End: 2021-10-07 | Stop reason: HOSPADM

## 2021-10-07 RX ORDER — HYDROCODONE BITARTRATE AND ACETAMINOPHEN 5; 325 MG/1; MG/1
1 TABLET ORAL
Status: COMPLETED | OUTPATIENT
Start: 2021-10-07 | End: 2021-10-07

## 2021-10-07 RX ORDER — CEPHALEXIN 500 MG/1
500 CAPSULE ORAL 4 TIMES DAILY
Qty: 28 CAPSULE | Refills: 0 | Status: SHIPPED | OUTPATIENT
Start: 2021-10-07 | End: 2021-10-14

## 2021-10-07 RX ORDER — HYDROMORPHONE HCL IN WATER/PF 6 MG/30 ML
0.2 PATIENT CONTROLLED ANALGESIA SYRINGE INTRAVENOUS EVERY 5 MIN PRN
Status: DISCONTINUED | OUTPATIENT
Start: 2021-10-07 | End: 2021-10-07 | Stop reason: HOSPADM

## 2021-10-07 RX ORDER — PROPOFOL 10 MG/ML
INJECTION, EMULSION INTRAVENOUS PRN
Status: DISCONTINUED | OUTPATIENT
Start: 2021-10-07 | End: 2021-10-07

## 2021-10-07 RX ORDER — ONDANSETRON 2 MG/ML
INJECTION INTRAMUSCULAR; INTRAVENOUS PRN
Status: DISCONTINUED | OUTPATIENT
Start: 2021-10-07 | End: 2021-10-07

## 2021-10-07 RX ORDER — FENTANYL CITRATE 50 UG/ML
25 INJECTION, SOLUTION INTRAMUSCULAR; INTRAVENOUS EVERY 5 MIN PRN
Status: DISCONTINUED | OUTPATIENT
Start: 2021-10-07 | End: 2021-10-07 | Stop reason: HOSPADM

## 2021-10-07 RX ORDER — SCOLOPAMINE TRANSDERMAL SYSTEM 1 MG/1
1 PATCH, EXTENDED RELEASE TRANSDERMAL
Status: DISCONTINUED | OUTPATIENT
Start: 2021-10-07 | End: 2021-10-07 | Stop reason: HOSPADM

## 2021-10-07 RX ORDER — KETOROLAC TROMETHAMINE 30 MG/ML
INJECTION, SOLUTION INTRAMUSCULAR; INTRAVENOUS PRN
Status: DISCONTINUED | OUTPATIENT
Start: 2021-10-07 | End: 2021-10-07

## 2021-10-07 RX ORDER — SODIUM CHLORIDE, SODIUM LACTATE, POTASSIUM CHLORIDE, CALCIUM CHLORIDE 600; 310; 30; 20 MG/100ML; MG/100ML; MG/100ML; MG/100ML
INJECTION, SOLUTION INTRAVENOUS CONTINUOUS
Status: DISCONTINUED | OUTPATIENT
Start: 2021-10-07 | End: 2021-10-07 | Stop reason: HOSPADM

## 2021-10-07 RX ORDER — ONDANSETRON 4 MG/1
4 TABLET, ORALLY DISINTEGRATING ORAL EVERY 30 MIN PRN
Status: DISCONTINUED | OUTPATIENT
Start: 2021-10-07 | End: 2021-10-07 | Stop reason: HOSPADM

## 2021-10-07 RX ORDER — FENTANYL CITRATE 50 UG/ML
INJECTION, SOLUTION INTRAMUSCULAR; INTRAVENOUS PRN
Status: DISCONTINUED | OUTPATIENT
Start: 2021-10-07 | End: 2021-10-07

## 2021-10-07 RX ORDER — CEFAZOLIN SODIUM 2 G/100ML
2 INJECTION, SOLUTION INTRAVENOUS SEE ADMIN INSTRUCTIONS
Status: DISCONTINUED | OUTPATIENT
Start: 2021-10-07 | End: 2021-10-07 | Stop reason: HOSPADM

## 2021-10-07 RX ORDER — FENTANYL CITRATE 50 UG/ML
25 INJECTION, SOLUTION INTRAMUSCULAR; INTRAVENOUS
Status: DISCONTINUED | OUTPATIENT
Start: 2021-10-07 | End: 2021-10-07 | Stop reason: HOSPADM

## 2021-10-07 RX ORDER — LIDOCAINE HYDROCHLORIDE 20 MG/ML
INJECTION, SOLUTION INFILTRATION; PERINEURAL PRN
Status: DISCONTINUED | OUTPATIENT
Start: 2021-10-07 | End: 2021-10-07

## 2021-10-07 RX ADMIN — PROPOFOL 50 MG: 10 INJECTION, EMULSION INTRAVENOUS at 07:46

## 2021-10-07 RX ADMIN — DEXAMETHASONE SODIUM PHOSPHATE 10 MG: 10 INJECTION, SOLUTION INTRAMUSCULAR; INTRAVENOUS at 07:56

## 2021-10-07 RX ADMIN — CEFAZOLIN SODIUM 2 G: 2 INJECTION, SOLUTION INTRAVENOUS at 07:52

## 2021-10-07 RX ADMIN — FENTANYL CITRATE 50 MCG: 50 INJECTION, SOLUTION INTRAMUSCULAR; INTRAVENOUS at 07:48

## 2021-10-07 RX ADMIN — PHENYLEPHRINE HYDROCHLORIDE 0.2 MCG/KG/MIN: 10 INJECTION INTRAVENOUS at 08:45

## 2021-10-07 RX ADMIN — ONDANSETRON 4 MG: 2 INJECTION INTRAMUSCULAR; INTRAVENOUS at 09:07

## 2021-10-07 RX ADMIN — PHENYLEPHRINE HYDROCHLORIDE 100 MCG: 10 INJECTION INTRAVENOUS at 06:25

## 2021-10-07 RX ADMIN — KETOROLAC TROMETHAMINE 15 MG: 30 INJECTION, SOLUTION INTRAMUSCULAR; INTRAVENOUS at 09:22

## 2021-10-07 RX ADMIN — PROPOFOL 200 MCG/KG/MIN: 10 INJECTION, EMULSION INTRAVENOUS at 07:45

## 2021-10-07 RX ADMIN — MIDAZOLAM HYDROCHLORIDE 2 MG: 1 INJECTION, SOLUTION INTRAMUSCULAR; INTRAVENOUS at 07:37

## 2021-10-07 RX ADMIN — PHENYLEPHRINE HYDROCHLORIDE 100 MCG: 10 INJECTION INTRAVENOUS at 08:06

## 2021-10-07 RX ADMIN — FENTANYL CITRATE 25 MCG: 50 INJECTION, SOLUTION INTRAMUSCULAR; INTRAVENOUS at 08:46

## 2021-10-07 RX ADMIN — HYDROCODONE BITARTRATE AND ACETAMINOPHEN 1 TABLET: 5; 325 TABLET ORAL at 11:33

## 2021-10-07 RX ADMIN — SODIUM CHLORIDE, POTASSIUM CHLORIDE, SODIUM LACTATE AND CALCIUM CHLORIDE 100 ML: 600; 310; 30; 20 INJECTION, SOLUTION INTRAVENOUS at 06:34

## 2021-10-07 RX ADMIN — FENTANYL CITRATE 25 MCG: 50 INJECTION, SOLUTION INTRAMUSCULAR; INTRAVENOUS at 08:44

## 2021-10-07 RX ADMIN — KETAMINE HYDROCHLORIDE 25 MG: 50 INJECTION, SOLUTION INTRAMUSCULAR; INTRAVENOUS at 08:04

## 2021-10-07 RX ADMIN — KETAMINE HYDROCHLORIDE 25 MG: 50 INJECTION, SOLUTION INTRAMUSCULAR; INTRAVENOUS at 07:55

## 2021-10-07 RX ADMIN — TILMANOCEPT 0.5 MILLICURIE: KIT at 07:00

## 2021-10-07 RX ADMIN — PROPOFOL 150 MG: 10 INJECTION, EMULSION INTRAVENOUS at 07:44

## 2021-10-07 RX ADMIN — FENTANYL CITRATE 50 MCG: 50 INJECTION, SOLUTION INTRAMUSCULAR; INTRAVENOUS at 08:02

## 2021-10-07 RX ADMIN — HYDROMORPHONE HYDROCHLORIDE 0.2 MG: 0.2 INJECTION, SOLUTION INTRAMUSCULAR; INTRAVENOUS; SUBCUTANEOUS at 10:15

## 2021-10-07 RX ADMIN — PHENYLEPHRINE HYDROCHLORIDE 200 MCG: 10 INJECTION INTRAVENOUS at 08:31

## 2021-10-07 RX ADMIN — SCOPALAMINE 1 PATCH: 1 PATCH, EXTENDED RELEASE TRANSDERMAL at 07:34

## 2021-10-07 RX ADMIN — SODIUM CHLORIDE, POTASSIUM CHLORIDE, SODIUM LACTATE AND CALCIUM CHLORIDE: 600; 310; 30; 20 INJECTION, SOLUTION INTRAVENOUS at 09:13

## 2021-10-07 RX ADMIN — PHENYLEPHRINE HYDROCHLORIDE 100 MCG: 10 INJECTION INTRAVENOUS at 07:52

## 2021-10-07 RX ADMIN — LIDOCAINE HYDROCHLORIDE 3 ML: 20 INJECTION, SOLUTION INFILTRATION; PERINEURAL at 07:44

## 2021-10-07 ASSESSMENT — MIFFLIN-ST. JEOR: SCORE: 1537.56

## 2021-10-07 NOTE — OP NOTE
Procedure Date: 10/07/2021    PREOPERATIVE DIAGNOSES:    1.  Breast cancer.  2.  Planned for bilateral mastectomy.    POSTOPERATIVE DIAGNOSES:    1.  Breast cancer.  2.  Planned for bilateral mastectomy.    INDICATIONS FOR PROCEDURE:   This is a 31-year-old female with breast cancer, anticipating undergoing bilateral mastectomies.  She is interested in reconstruction.  I have discussed with her the risks and benefits, she understands and agrees and wishes to proceed.    NAME OF PROCEDURE:      1.  Bilateral breast reconstruction with placement of immediate implants.  2.  Bilateral AlloDerm sling.    DESCRIPTION OF PROCEDURE:  The patient was identified and marked in the preoperative area, was taken to the operating room.  After an adequate level of anesthesia was obtained, the patient was prepped and draped in sterile fashion.  She underwent bilateral mastectomies.  Please see separate dictated note from Dr. Newman.  We irrigated both pockets out with antibiotic irrigation.  Hemostasis was obtained.  We went ahead and placed Exparel along the muscle, put in the trial of the sizer.  We opted for a 445 implant.  We used, reference [id number] bilaterally, wrapped each one with a 16 x 20 piece of AlloDerm, then placed a 15 round drain bilaterally, placed the implant, wrapped an AlloDerm into the defect, sutured down with 2-0 Vicryl and tacked down the AlloDerm followed by closing the skin incision with 3-0 PDS deep tissue, followed by 3-0 Monocryl deep dermal and a 4-0 subcuticular, Exofin and a bra were placed.  The patient was awakened and taken to recovery room.    ESTIMATED BLOOD LOSS:  5 mL.    BLOOD PRODUCTS GIVEN:  None.    DRAINS, PACKS:  Correct.    Shanice Álvarez MD        D: 10/07/2021   T: 10/07/2021   MT: sd    Name:     TONIO MOJICA  MRN:      [id number]        Account:        [id number]   :      1990           Procedure Date: 10/07/2021     Document: [id number]

## 2021-10-07 NOTE — ANESTHESIA POSTPROCEDURE EVALUATION
Patient: Veronica Nieves    Procedure: Procedure(s):  Bilateral mastectomies,  left sentinel lymph node biopsy  BILATERAL BREAST RECONSTRUCTION WITH IMPLANTS       Diagnosis:Malignant neoplasm of upper-outer quadrant of left breast in female, estrogen receptor positive (H) [C50.412, Z17.0]  Status post bilateral mastectomy [Z90.13]  Personal history of malignant neoplasm of breast [Z85.3]  Diagnosis Additional Information: No value filed.    Anesthesia Type:  General    Note:  Disposition: Outpatient   Postop Pain Control: Uneventful            Sign Out: Well controlled pain   PONV: No   Neuro/Psych: Uneventful            Sign Out: Acceptable/Baseline neuro status   Airway/Respiratory: Uneventful            Sign Out: Acceptable/Baseline resp. status   CV/Hemodynamics: Uneventful            Sign Out: Acceptable CV status; No obvious hypovolemia; No obvious fluid overload   Other NRE: NONE   DID A NON-ROUTINE EVENT OCCUR? No           Last vitals:  Vitals Value Taken Time   /71 10/07/21 1010   Temp 36.2  C (97.2  F) 10/07/21 0944   Pulse 83 10/07/21 1016   Resp 16 10/07/21 1016   SpO2 98 % 10/07/21 1016   Vitals shown include unvalidated device data.    Electronically Signed By: Fabrice Michael MD  October 7, 2021  10:16 AM

## 2021-10-07 NOTE — ANESTHESIA PROCEDURE NOTES
Airway       Patient location during procedure: OR  Staff -        CRNA: Ronnie Duvall APRN CRNA       Performed By: CRNA  Consent for Airway        Urgency: elective  Indications and Patient Condition       Indications for airway management: prashant-procedural       Induction type:intravenous       Mask difficulty assessment: 0 - not attempted    Final Airway Details       Final airway type: supraglottic airway    Supraglottic Airway Details        Type: LMA       Brand: Ambu AuraGain       LMA size: 4    Post intubation assessment        Placement verified by: capnometry, equal breath sounds and chest rise        Number of attempts at approach: 1       Secured with: silk tape       Ease of procedure: easy       Dentition: Intact and Unchanged

## 2021-10-07 NOTE — ANESTHESIA PREPROCEDURE EVALUATION
Anesthesia Pre-Procedure Evaluation    Patient: Veronica Nieves   MRN: [id number] : 1990        Preoperative Diagnosis: Malignant neoplasm of upper-outer quadrant of left breast in female, estrogen receptor positive (H) [C50.412, Z17.0]  Status post bilateral mastectomy [Z90.13]  Personal history of malignant neoplasm of breast [Z85.3]    Procedure : Procedure(s):  Bilateral mastectomies,  left sentinel lymph node biopsy  BILATERAL BREAST RECONSTRUCTION WITH IMPLANTS VERSUS TISSUE EXPANDERS          Past Medical History:   Diagnosis Date     Abnormal Pap smear of cervix     age 13, subsequent pap smears normal     Anxiety      Breast cancer (H) 2021    Left     Depression       Past Surgical History:   Procedure Laterality Date     OTHER SURGICAL HISTORY      no surgical history     OK INS[facility] CTR VAD W/SUBQ PORT AGE 4 YR/> N/A 2021    Procedure: Port Placement;  Surgeon: Audrey Newman MD;  Location: [facility];  Service: General     US BREAST CORE BIOPSY LEFT Left 2021     WISDOM TOOTH EXTRACTION        No Known Allergies   Social History     Tobacco Use     Smoking status: Never Smoker     Smokeless tobacco: Never Used   Substance Use Topics     Alcohol use: Yes     Alcohol/week: 6.0 - 7.0 standard drinks      Wt Readings from Last 1 Encounters:   10/07/21 80.3 kg (177 lb)        Anesthesia Evaluation   Pt has had prior anesthetic. Type: MAC.    No history of anesthetic complications       ROS/MED HX  ENT/Pulmonary:  - neg pulmonary ROS     Neurologic:  - neg neurologic ROS     Cardiovascular:  - neg cardiovascular ROS     METS/Exercise Tolerance:     Hematologic:  - neg hematologic  ROS     Musculoskeletal:  - neg musculoskeletal ROS     GI/Hepatic:  - neg GI/hepatic ROS     Renal/Genitourinary:  - neg Renal ROS     Endo:  - neg endo ROS     Psychiatric/Substance Use:     (+) psychiatric history anxiety and depression     Infectious Disease:  - neg infectious disease ROS      Malignancy:  - neg malignancy ROS (+) Malignancy, History of Breast.Breast CA Active status post Surgery and Chemo.        Other:  - neg other ROS          Physical Exam    Airway  airway exam normal      Mallampati: I   TM distance: > 3 FB   Neck ROM: full   Mouth opening: > 3 cm    Respiratory Devices and Support         Dental  no notable dental history         Cardiovascular   cardiovascular exam normal       Rhythm and rate: regular and normal     Pulmonary   pulmonary exam normal        breath sounds clear to auscultation           OUTSIDE LABS:  CBC:   Lab Results   Component Value Date    WBC 10.2 09/10/2021    WBC 8.4 08/30/2021    HGB 9.5 (L) 09/10/2021    HGB 9.4 (L) 08/30/2021    HCT 28.7 (L) 09/10/2021    HCT 28.6 (L) 08/30/2021     09/10/2021     (L) 08/30/2021     BMP:   Lab Results   Component Value Date     09/10/2021     08/20/2021    POTASSIUM 3.9 09/10/2021    POTASSIUM 3.9 08/20/2021    CHLORIDE 109 (H) 09/10/2021    CHLORIDE 108 (H) 08/20/2021    CO2 22 09/10/2021    CO2 22 08/20/2021    BUN 14 09/10/2021    BUN 13 08/20/2021    CR 0.99 09/10/2021    CR 0.88 08/20/2021     (H) 09/10/2021     (H) 08/20/2021     COAGS: No results found for: PTT, INR, FIBR  POC: No results found for: BGM, HCG, HCGS  HEPATIC:   Lab Results   Component Value Date    ALBUMIN 4.2 09/10/2021    PROTTOTAL 7.1 09/10/2021    ALT 36 09/10/2021    AST 22 09/10/2021    ALKPHOS 69 09/10/2021    BILITOTAL 0.3 09/10/2021     OTHER:   Lab Results   Component Value Date    BURT 9.8 09/10/2021       Anesthesia Plan    ASA Status:  2      Anesthesia Type: General.     - Airway: LMA   Induction: Intravenous, Propofol.   Maintenance: Balanced.        Consents    Anesthesia Plan(s) and associated risks, benefits, and realistic alternatives discussed. Questions answered and patient/representative(s) expressed understanding.     - Discussed with:  Patient, Other (See Comment)      - Extended  Intubation/Ventilatory Support Discussed: No.      - Patient is DNR/DNI Status: No    Use of blood products discussed: No .     Postoperative Care    Pain management: Oral pain medications.   PONV prophylaxis: Ondansetron (or other 5HT-3), Scopolamine patch, Background Propofol Infusion, Dexamethasone or Solumedrol     Comments:                Fabrice Michael MD

## 2021-10-07 NOTE — OP NOTE
Operative Note    Name:  Veronica Nieves  PCP:  Marietta Brandt  Procedure Date:  10/7/2021       Procedure:  Procedure(s):  Bilateral mastectomies,  left sentinel lymph node biopsy  BILATERAL BREAST RECONSTRUCTION WITH IMPLANTS     Pre-Procedure Diagnosis:  Malignant neoplasm of upper-outer quadrant of left breast in female, estrogen receptor positive (H) [C50.412, Z17.0]  Status post bilateral mastectomy [Z90.13]  Personal history of malignant neoplasm of breast [Z85.3]     Post-Procedure Diagnosis:    Same   Surgeon(s):  Audrey Newman MD Heinrich, Cherrie A, MD     Assistant: Clari Lyman NP      Anesthesia Type:  General       Findings:  Normal SLN's.    Operative Report:    The patient was properly identified and brought to the operating suite where she was placed in the supine position.  Gen. anesthesia was administered.  The patient was prepped draped in a sterile fashion.    An elliptical shaped incision encompassing the left nipple was made and skin flaps raised superiorly to the pectoralis major, medial to the sternum, inferior to the rectus sheath and laterally to the lateral chest wall.  The breast tissue was swept  from the chest wall using electrocautery.  Using the gamma probe the axilla was scanned and 3 sentinel lymph nodes were identified.  These were sent for frozen section.  Pathology reported these to be normal.    The wound was inspected for hemostasis.  The wound was packed open with a saline soaked gauze.  Attention was then turned the right where a mirror incision was made and skin flaps raised superior to the pectoralis nucleus, medially to the sternum, inferior to the rectus sheath and lateral to lateral chest wall.  The breast tissue was swept from the chest wall  using electrocautery.    The wound was inspected for hemostasis.  It was then packed open with a saline soaked gauze.  The procedure was turned over to Dr. Álvarez for immediate reconstruction.   My assistant provided  retraction and exposure for me throughout the case.      Estimated Blood Loss:   20cc    Specimens:    ID Type Source Tests Collected by Time Destination   1 : LEFT BREAST - stitch lateral Tissue Breast, Left SURGICAL PATHOLOGY EXAM Audrey Newman MD 10/7/2021  8:14 AM    2 : LEFT AXILLARY SENTINEL LYMPH NODES  BIOPSY - frozen Biopsy Lymph Node(s), Axillary, Left SURGICAL PATHOLOGY EXAM Audrey Newman MD 10/7/2021  8:16 AM    3 : RIGHT BREAST STITCH LATERAL Tissue Breast, Right SURGICAL PATHOLOGY EXAM Audrey Newman MD 10/7/2021  9:06 AM            Drains:   Closed/Suction Drain 1 Right Breast Bulb 15 Samoan (Active)       Closed/Suction Drain 1 Left Breast Bulb 15 Samoan (Active)       Complications:    None    Audrey Newman MD     Date: 10/7/2021  Time: 9:37 AM

## 2021-10-07 NOTE — ANESTHESIA CARE TRANSFER NOTE
Patient: Veronica Nieves    Procedure: Procedure(s):  Bilateral mastectomies,  left sentinel lymph node biopsy  BILATERAL BREAST RECONSTRUCTION WITH IMPLANTS       Diagnosis: Malignant neoplasm of upper-outer quadrant of left breast in female, estrogen receptor positive (H) [C50.412, Z17.0]  Status post bilateral mastectomy [Z90.13]  Personal history of malignant neoplasm of breast [Z85.3]  Diagnosis Additional Information: No value filed.    Anesthesia Type:   General     Note:    Oropharynx: oropharynx clear of all foreign objects and spontaneously breathing  Level of Consciousness: awake  Oxygen Supplementation: face mask  Level of Supplemental Oxygen (L/min / FiO2): 8  Independent Airway: airway patency satisfactory and stable  Dentition: dentition unchanged  Vital Signs Stable: post-procedure vital signs reviewed and stable  Report to RN Given: handoff report given  Patient transferred to: PACU    Handoff Report: Identifed the Patient, Identified the Reponsible Provider, Reviewed the pertinent medical history, Discussed the surgical course, Reviewed Intra-OP anesthesia mangement and issues during anesthesia, Set expectations for post-procedure period and Allowed opportunity for questions and acknowledgement of understanding      Vitals:  Vitals Value Taken Time   /60    Temp 97.2    Pulse 80    Resp 12    SpO2 100        Electronically Signed By: ROSEMARY Flannery CRNA  October 7, 2021  9:42 AM

## 2021-10-22 ENCOUNTER — INFUSION THERAPY VISIT (OUTPATIENT)
Dept: INFUSION THERAPY | Facility: HOSPITAL | Age: 31
End: 2021-10-22
Attending: INTERNAL MEDICINE
Payer: COMMERCIAL

## 2021-10-22 VITALS
HEART RATE: 64 BPM | TEMPERATURE: 98.8 F | OXYGEN SATURATION: 98 % | RESPIRATION RATE: 16 BRPM | SYSTOLIC BLOOD PRESSURE: 128 MMHG | DIASTOLIC BLOOD PRESSURE: 66 MMHG

## 2021-10-22 DIAGNOSIS — C50.412 MALIGNANT NEOPLASM OF UPPER-OUTER QUADRANT OF LEFT BREAST IN FEMALE, ESTROGEN RECEPTOR POSITIVE (H): Primary | ICD-10-CM

## 2021-10-22 DIAGNOSIS — Z17.0 MALIGNANT NEOPLASM OF UPPER-OUTER QUADRANT OF LEFT BREAST IN FEMALE, ESTROGEN RECEPTOR POSITIVE (H): Primary | ICD-10-CM

## 2021-10-22 PROCEDURE — 258N000003 HC RX IP 258 OP 636: Performed by: INTERNAL MEDICINE

## 2021-10-22 PROCEDURE — 250N000011 HC RX IP 250 OP 636: Performed by: INTERNAL MEDICINE

## 2021-10-22 PROCEDURE — 96367 TX/PROPH/DG ADDL SEQ IV INF: CPT

## 2021-10-22 PROCEDURE — 96413 CHEMO IV INFUSION 1 HR: CPT

## 2021-10-22 RX ORDER — HEPARIN SODIUM (PORCINE) LOCK FLUSH IV SOLN 100 UNIT/ML 100 UNIT/ML
5 SOLUTION INTRAVENOUS
Status: DISCONTINUED | OUTPATIENT
Start: 2021-10-22 | End: 2021-10-22 | Stop reason: HOSPADM

## 2021-10-22 RX ORDER — ALBUTEROL SULFATE 90 UG/1
1-2 AEROSOL, METERED RESPIRATORY (INHALATION)
Status: DISCONTINUED | OUTPATIENT
Start: 2021-10-22 | End: 2021-10-22 | Stop reason: HOSPADM

## 2021-10-22 RX ORDER — ALBUTEROL SULFATE 0.83 MG/ML
2.5 SOLUTION RESPIRATORY (INHALATION)
Status: DISCONTINUED | OUTPATIENT
Start: 2021-10-22 | End: 2021-10-22 | Stop reason: HOSPADM

## 2021-10-22 RX ORDER — MEPERIDINE HYDROCHLORIDE 25 MG/ML
25 INJECTION INTRAMUSCULAR; INTRAVENOUS; SUBCUTANEOUS EVERY 30 MIN PRN
Status: DISCONTINUED | OUTPATIENT
Start: 2021-10-22 | End: 2021-10-22 | Stop reason: HOSPADM

## 2021-10-22 RX ORDER — DIPHENHYDRAMINE HYDROCHLORIDE 50 MG/ML
50 INJECTION INTRAMUSCULAR; INTRAVENOUS
Status: DISCONTINUED | OUTPATIENT
Start: 2021-10-22 | End: 2021-10-22 | Stop reason: HOSPADM

## 2021-10-22 RX ORDER — NALOXONE HYDROCHLORIDE 0.4 MG/ML
0.2 INJECTION, SOLUTION INTRAMUSCULAR; INTRAVENOUS; SUBCUTANEOUS
Status: DISCONTINUED | OUTPATIENT
Start: 2021-10-22 | End: 2021-10-22 | Stop reason: HOSPADM

## 2021-10-22 RX ORDER — EPINEPHRINE 1 MG/ML
0.3 INJECTION, SOLUTION INTRAMUSCULAR; SUBCUTANEOUS EVERY 5 MIN PRN
Status: DISCONTINUED | OUTPATIENT
Start: 2021-10-22 | End: 2021-10-22 | Stop reason: HOSPADM

## 2021-10-22 RX ORDER — METHYLPREDNISOLONE SODIUM SUCCINATE 125 MG/2ML
125 INJECTION, POWDER, LYOPHILIZED, FOR SOLUTION INTRAMUSCULAR; INTRAVENOUS
Status: DISCONTINUED | OUTPATIENT
Start: 2021-10-22 | End: 2021-10-22 | Stop reason: HOSPADM

## 2021-10-22 RX ADMIN — SODIUM CHLORIDE 250 ML: 9 INJECTION, SOLUTION INTRAVENOUS at 08:15

## 2021-10-22 RX ADMIN — PERTUZUMAB 420 MG: 30 INJECTION, SOLUTION, CONCENTRATE INTRAVENOUS at 08:43

## 2021-10-22 RX ADMIN — SODIUM CHLORIDE 508 MG: 9 INJECTION, SOLUTION INTRAVENOUS at 09:24

## 2021-10-22 RX ADMIN — HEPARIN 5 ML: 100 SYRINGE at 09:58

## 2021-10-22 ASSESSMENT — PAIN SCALES - GENERAL: PAINLEVEL: NO PAIN (0)

## 2021-10-26 LAB
PATH REPORT.COMMENTS IMP SPEC: ABNORMAL
PATH REPORT.COMMENTS IMP SPEC: YES
PATH REPORT.FINAL DX SPEC: ABNORMAL
PATH REPORT.GROSS SPEC: ABNORMAL
PATH REPORT.INTRAOP OBS SPEC DOC: ABNORMAL
PATH REPORT.MICROSCOPIC SPEC OTHER STN: ABNORMAL
PATH REPORT.RELEVANT HX SPEC: ABNORMAL
PATHOLOGY SYNOPTIC REPORT: ABNORMAL
PHOTO IMAGE: ABNORMAL

## 2021-11-23 ENCOUNTER — ONCOLOGY VISIT (OUTPATIENT)
Dept: ONCOLOGY | Facility: HOSPITAL | Age: 31
End: 2021-11-23
Attending: INTERNAL MEDICINE
Payer: COMMERCIAL

## 2021-11-23 ENCOUNTER — MYC MEDICAL ADVICE (OUTPATIENT)
Dept: ONCOLOGY | Facility: HOSPITAL | Age: 31
End: 2021-11-23

## 2021-11-23 VITALS
TEMPERATURE: 98.1 F | SYSTOLIC BLOOD PRESSURE: 122 MMHG | OXYGEN SATURATION: 96 % | HEART RATE: 74 BPM | WEIGHT: 182 LBS | BODY MASS INDEX: 28.94 KG/M2 | DIASTOLIC BLOOD PRESSURE: 66 MMHG | RESPIRATION RATE: 12 BRPM

## 2021-11-23 DIAGNOSIS — Z17.0 MALIGNANT NEOPLASM OF UPPER-OUTER QUADRANT OF LEFT BREAST IN FEMALE, ESTROGEN RECEPTOR POSITIVE (H): Primary | ICD-10-CM

## 2021-11-23 DIAGNOSIS — C50.412 MALIGNANT NEOPLASM OF UPPER-OUTER QUADRANT OF LEFT BREAST IN FEMALE, ESTROGEN RECEPTOR POSITIVE (H): Primary | ICD-10-CM

## 2021-11-23 PROCEDURE — G0463 HOSPITAL OUTPT CLINIC VISIT: HCPCS | Mod: 25

## 2021-11-23 PROCEDURE — 99215 OFFICE O/P EST HI 40 MIN: CPT | Performed by: INTERNAL MEDICINE

## 2021-11-23 RX ORDER — LORAZEPAM 2 MG/ML
0.5 INJECTION INTRAMUSCULAR EVERY 4 HOURS PRN
Status: CANCELLED | OUTPATIENT
Start: 2021-11-23

## 2021-11-23 RX ORDER — DIPHENHYDRAMINE HYDROCHLORIDE 50 MG/ML
50 INJECTION INTRAMUSCULAR; INTRAVENOUS
Status: CANCELLED
Start: 2021-12-14

## 2021-11-23 RX ORDER — HEPARIN SODIUM,PORCINE 10 UNIT/ML
5 VIAL (ML) INTRAVENOUS
Status: CANCELLED | OUTPATIENT
Start: 2021-12-14

## 2021-11-23 RX ORDER — TAMOXIFEN CITRATE 20 MG/1
20 TABLET ORAL DAILY
Qty: 30 TABLET | Refills: 1 | Status: SHIPPED | OUTPATIENT
Start: 2021-11-23 | End: 2021-12-20

## 2021-11-23 RX ORDER — METHYLPREDNISOLONE SODIUM SUCCINATE 125 MG/2ML
125 INJECTION, POWDER, LYOPHILIZED, FOR SOLUTION INTRAMUSCULAR; INTRAVENOUS
Status: CANCELLED
Start: 2021-11-23

## 2021-11-23 RX ORDER — HEPARIN SODIUM (PORCINE) LOCK FLUSH IV SOLN 100 UNIT/ML 100 UNIT/ML
5 SOLUTION INTRAVENOUS
Status: CANCELLED | OUTPATIENT
Start: 2021-12-14

## 2021-11-23 RX ORDER — ALBUTEROL SULFATE 0.83 MG/ML
2.5 SOLUTION RESPIRATORY (INHALATION)
Status: CANCELLED | OUTPATIENT
Start: 2021-11-23

## 2021-11-23 RX ORDER — EPINEPHRINE 1 MG/ML
0.3 INJECTION, SOLUTION INTRAMUSCULAR; SUBCUTANEOUS EVERY 5 MIN PRN
Status: CANCELLED | OUTPATIENT
Start: 2021-12-14

## 2021-11-23 RX ORDER — DIPHENHYDRAMINE HYDROCHLORIDE 50 MG/ML
50 INJECTION INTRAMUSCULAR; INTRAVENOUS
Status: CANCELLED
Start: 2021-11-23

## 2021-11-23 RX ORDER — ALBUTEROL SULFATE 90 UG/1
1-2 AEROSOL, METERED RESPIRATORY (INHALATION)
Status: CANCELLED
Start: 2021-12-14

## 2021-11-23 RX ORDER — ALBUTEROL SULFATE 0.83 MG/ML
2.5 SOLUTION RESPIRATORY (INHALATION)
Status: CANCELLED | OUTPATIENT
Start: 2021-12-14

## 2021-11-23 RX ORDER — HEPARIN SODIUM (PORCINE) LOCK FLUSH IV SOLN 100 UNIT/ML 100 UNIT/ML
5 SOLUTION INTRAVENOUS
Status: CANCELLED | OUTPATIENT
Start: 2021-11-23

## 2021-11-23 RX ORDER — ALBUTEROL SULFATE 90 UG/1
1-2 AEROSOL, METERED RESPIRATORY (INHALATION)
Status: CANCELLED
Start: 2021-11-23

## 2021-11-23 RX ORDER — EPINEPHRINE 1 MG/ML
0.3 INJECTION, SOLUTION INTRAMUSCULAR; SUBCUTANEOUS EVERY 5 MIN PRN
Status: CANCELLED | OUTPATIENT
Start: 2021-11-23

## 2021-11-23 RX ORDER — NALOXONE HYDROCHLORIDE 0.4 MG/ML
0.2 INJECTION, SOLUTION INTRAMUSCULAR; INTRAVENOUS; SUBCUTANEOUS
Status: CANCELLED | OUTPATIENT
Start: 2021-12-14

## 2021-11-23 RX ORDER — METHYLPREDNISOLONE SODIUM SUCCINATE 125 MG/2ML
125 INJECTION, POWDER, LYOPHILIZED, FOR SOLUTION INTRAMUSCULAR; INTRAVENOUS
Status: CANCELLED
Start: 2021-12-14

## 2021-11-23 RX ORDER — MEPERIDINE HYDROCHLORIDE 25 MG/ML
25 INJECTION INTRAMUSCULAR; INTRAVENOUS; SUBCUTANEOUS EVERY 30 MIN PRN
Status: CANCELLED | OUTPATIENT
Start: 2021-12-14

## 2021-11-23 RX ORDER — MEPERIDINE HYDROCHLORIDE 25 MG/ML
25 INJECTION INTRAMUSCULAR; INTRAVENOUS; SUBCUTANEOUS EVERY 30 MIN PRN
Status: CANCELLED | OUTPATIENT
Start: 2021-11-23

## 2021-11-23 RX ORDER — LORAZEPAM 2 MG/ML
0.5 INJECTION INTRAMUSCULAR EVERY 4 HOURS PRN
Status: CANCELLED | OUTPATIENT
Start: 2021-12-14

## 2021-11-23 RX ORDER — NALOXONE HYDROCHLORIDE 0.4 MG/ML
0.2 INJECTION, SOLUTION INTRAMUSCULAR; INTRAVENOUS; SUBCUTANEOUS
Status: CANCELLED | OUTPATIENT
Start: 2021-11-23

## 2021-11-23 RX ORDER — HEPARIN SODIUM,PORCINE 10 UNIT/ML
5 VIAL (ML) INTRAVENOUS
Status: CANCELLED | OUTPATIENT
Start: 2021-11-23

## 2021-11-23 ASSESSMENT — PAIN SCALES - GENERAL: PAINLEVEL: NO PAIN (0)

## 2021-11-23 NOTE — PROGRESS NOTES
"Oncology Rooming Note    November 23, 2021 8:35 AM   Veronica Nieves is a 31 year old female who presents for:    Chief Complaint   Patient presents with     Oncology Clinic Visit     Malignant neoplasm of upper-outer quadrant of left breast in female, estrogen receptor positive (H)     Initial Vitals: /66 (BP Location: Left arm, Patient Position: Sitting, Cuff Size: Adult Regular)   Pulse 74   Temp 98.1  F (36.7  C) (Oral)   Resp 12   Wt 82.6 kg (182 lb)   SpO2 96%   BMI 28.94 kg/m   Estimated body mass index is 28.94 kg/m  as calculated from the following:    Height as of 10/5/21: 1.689 m (5' 6.5\").    Weight as of this encounter: 82.6 kg (182 lb). Body surface area is 1.97 meters squared.  No Pain (0) Comment: Data Unavailable   No LMP recorded. Patient has had an implant.  Allergies reviewed: Yes  Medications reviewed: Yes    Medications: Medication refills not needed today.  Pharmacy name entered into [employer]: [id number] [place] [address]    Clinical concerns:  Malignant neoplasm of upper-outer quadrant of left breast in female, estrogen receptor positive (H)      Shanda Lema, JERRI            "

## 2021-11-23 NOTE — PATIENT INSTRUCTIONS
Patient Education     Ado-Trastuzumab emtansine Solution for injection  What is this medicine?  Ado-trastuzumab emtansine (ADD apolonia CLEMENTS [facility] mab em TAN zine) is a monoclonal antibody combined with chemotherapy. It targets a protein called HER2. This protein is found in some stomach and breast cancers. This medicine can stop cancer cell growth.  This medicine may be used for other purposes; ask your health care provider or pharmacist if you have questions.  What should I tell my health care provider before I take this medicine?  They need to know if you have any of these conditions:    heart disease    heart failure    infection (especially a virus infection such as chickenpox, cold sores, or herpes)    liver disease    lung or breathing disease, like asthma    an unusual or allergic reaction to ado-trastuzumab emtansine, other medications, foods, dyes, or preservatives    pregnant or trying to get pregnant    breast-feeding  How should I use this medicine?  This medicine is for infusion into a vein. It is given by a health care professional in a hospital or clinic setting.  Talk to your pediatrician regarding the use of this medicine in children. Special care may be needed.  Overdosage: If you think you've taken too much of this medicine contact a poison control center or emergency room at once.  NOTE: This medicine is only for you. Do not share this medicine with others.  What if I miss a dose?  It is important not to miss your dose. Call your doctor or health care professional if you are unable to keep an appointment.  What may interact with this medicine?  This medicine may also interact with the following medications:    atazanavir    boceprevir    clarithromycin    delavirdine    indinavir    dalfopristin; quinupristin    isoniazid, INH    itraconazole    ketoconazole    nefazodone    nelfinavir    ritonavir    telaprevir    telithromycin    tipranavir    voriconazole  This list may not describe all possible  interactions. Give your health care provider a list of all the medicines, herbs, non-prescription drugs, or dietary supplements you use. Also tell them if you smoke, drink alcohol, or use illegal drugs. Some items may interact with your medicine.  What should I watch for while using this medicine?  Visit your doctor for checks on your progress. This drug may make you feel generally unwell. This is not uncommon, as chemotherapy can affect healthy cells as well as cancer cells. Report any side effects. Continue your course of treatment even though you feel ill unless your doctor tells you to stop.  Call your doctor or health care professional for advice if you get a fever, chills or sore throat, or other symptoms of a cold or flu. Do not treat yourself. This drug decreases your body's ability to fight infections. Try to avoid being around people who are sick.  Be careful brushing and flossing your teeth or using a toothpick because you may get an infection or bleed more easily. If you have any dental work done, tell your dentist you are receiving this medicine.  Avoid taking products that contain aspirin, acetaminophen, ibuprofen, naproxen, or ketoprofen unless instructed by your doctor. These medicines may hide a fever.  Do not become pregnant while taking this medicine or for 7 months after stopping it. Women should inform their doctor if they wish to become pregnant or think they might be pregnant. There is a potential for serious side effects to an unborn child. [Men should inform their doctors if they wish to father a child. This medicine may lower sperm counts.] Talk to your health care professional or pharmacist for more information. Do not breast-feed an infant while taking this medicine.  You may need blood work done while you are taking this medicine.  What side effects may I notice from receiving this medicine?  Side effects that you should report to your doctor or health care professional as soon as  possible:    allergic reactions like skin rash, itching or hives, swelling of the face, lips, or tongue    breathing problems    chest pain or palpitations    fever or chills, sore throat    general ill feeling or flu-like symptoms    light-colored stools    nausea, vomiting    pain, tingling, numbness in the hands or feet    signs and symptoms of bleeding such as bloody or black, tarry stools; red or dark-brown urine; spitting up blood or brown material that looks like coffee grounds; red spots on the skin; unusual bruising or bleeding from the eye, gums, or nose    swelling of the legs or ankles    yellowing of the eyes or skin  Side effects that usually do not require medical attention (Report these to your doctor or health care professional if they continue or are bothersome.):    changes in taste    constipation    dizziness    headache    joint pain    muscle pain    trouble sleeping    unusually weak or tired  This list may not describe all possible side effects. Call your doctor for medical advice about side effects. You may report side effects to FDA at 5-799-FDA-0679.  Where should I keep my medicine?  This drug is given in a hospital or clinic and will not be stored at home.  NOTE: This sheet is a summary. It may not cover all possible information. If you have questions about this medicine, talk to your doctor, pharmacist, or health care provider.  NOTE:This sheet is a summary. It may not cover all possible information. If you have questions about this medicine, talk to your doctor, pharmacist, or health care provider. Copyright  2016 Gold Standard      Patient Education     Goserelin Acetate  What is this medicine?  GOSERELIN (GOE se rel in) is similar to a hormone found in the body. It lowers the amount of sex hormones that the body makes. Men will have lower testosterone levels and women will have lower estrogen levels while taking this medicine. In men, this medicine is used to treat prostate cancer;  the injection is either given once per month or once every 12 weeks. A once per month injection (only) is used to treat women with endometriosis, dysfunctional uterine bleeding, or  breast cancer.  This medicine may be used for other purposes; ask your health care provider or pharmacist if you have questions.  What should I tell my health care provider before I take this medicine?  They need to know if you have any of these conditions (some only apply to women):    diabetes    heart disease or previous heart attack    high blood pressure    high cholesterol    kidney disease    osteoporosis or low bone density    problems passing urine    spinal cord injury    stroke    tobacco smoker    an unusual or allergic reaction to goserelin, hormone therapy, other medicines, foods, dyes, or preservatives    pregnant or trying to get pregnant    breast-feeding  How should I use this medicine?  This medicine is for injection under the skin. It is given by a health care professional in a hospital or clinic setting. Men receive this injection once every 4 weeks or once every 12 weeks. Women will only receive the once every 4 weeks injection.  Talk to your pediatrician regarding the use of this medicine in children. Special care may be needed.  Overdosage: If you think you have taken too much of this medicine contact a poison control center or emergency room at once.  NOTE: This medicine is only for you. Do not share this medicine with others.  What if I miss a dose?  It is important not to miss your dose. Call your doctor or health care professional if you are unable to keep an appointment.  What may interact with this medicine?    female hormones like estrogen    herbal or dietary supplements like black cohosh, chasteberry, or DHEA    male hormones like testosterone    prasterone  This list may not describe all possible interactions. Give your health care provider a list of all the medicines, herbs, non-prescription drugs, or  dietary supplements you use. Also tell them if you smoke, drink alcohol, or use illegal drugs. Some items may interact with your medicine.  What should I watch for while using this medicine?  Visit your doctor or health care professional for regular checks on your progress. Your symptoms may appear to get worse during the first weeks of this therapy. Tell your doctor or healthcare professional if your symptoms do not start to get better or if they get worse after this time.  Your bones may get weaker if you take this medicine for a long time. If you smoke or frequently drink alcohol you may increase your risk of bone loss. A family history of osteoporosis, chronic use of drugs for seizures (convulsions), or corticosteroids can also increase your risk of bone loss. Talk to your doctor about how to keep your bones strong.  This medicine should stop regular monthly menstration in women. Tell your doctor if you continue to menstrate.  Women should not become pregnant while taking this medicine or for 12 weeks after stopping this medicine. Women should inform their doctor if they wish to become pregnant or think they might be pregnant. There is a potential for serious side effects to an unborn child. Talk to your health care professional or pharmacist for more information. Do not breast-feed an infant while taking this medicine.  Men should inform their doctors if they wish to father a child. This medicine may lower sperm counts. Talk to your health care professional or pharmacist for more information.  What side effects may I notice from receiving this medicine?  Side effects that you should report to your doctor or health care professional as soon as possible:    allergic reactions like skin rash, itching or hives, swelling of the face, lips, or tongue    bone pain    breathing problems    changes in vision    chest pain    feeling faint or lightheaded, falls    fever, chills    pain, swelling, warmth in the  leg    pain, tingling, numbness in the hands or feet    signs and symptoms of low blood pressure like dizziness; feeling faint or lightheaded, falls; unusually weak or tired    stomach pain    swelling of the ankles, feet, hands    trouble passing urine or change in the amount of urine    unusually high or low blood pressure    unusually weak or tired  Side effects that usually do not require medical attention (report to your doctor or health care professional if they continue or are bothersome):    change in sex drive or performance    changes in breast size in both males and females    changes in emotions or moods    headache    hot flashes    irritation at site where injected    loss of appetite    skin problems like acne, dry skin    vaginal dryness  This list may not describe all possible side effects. Call your doctor for medical advice about side effects. You may report side effects to FDA at 7-869-FDA-4880.  Where should I keep my medicine?  This drug is given in a hospital or clinic and will not be stored at home.  NOTE:This sheet is a summary. It may not cover all possible information. If you have questions about this medicine, talk to your doctor, pharmacist, or health care provider. Copyright  2016 Gold Standard      Tamoxifen    What is this medicine?  TAMOXIFEN (ta MOX i fen) blocks the effects of estrogen. It is commonly used to treat breast cancer. It is also used to decrease the chance of breast cancer coming back in women who have received treatment for the disease. It may also help prevent breast cancer in women who have a high risk of developing breast cancer.  This medicine may be used for other purposes; ask your health care provider or pharmacist if you have questions.  What should I tell my health care provider before I take this medicine?  They need to know if you have any of these conditions:    blood clots    blood disease    cataracts or impaired eyesight    endometriosis    high calcium  levels    high cholesterol    irregular menstrual cycles    liver disease    stroke    uterine fibroids    an unusual or allergic reaction to tamoxifen, other medicines, foods, dyes, or preservatives    pregnant or trying to get pregnant    breast-feeding  How should I use this medicine?  Take this medicine by mouth with a glass of water. Follow the directions on the prescription label. You can take it with or without food. Take your medicine at regular intervals. Do not take your medicine more often than directed. Do not stop taking except on your doctor's advice.  A special MedGuide will be given to you by the pharmacist with each prescription and refill. Be sure to read this information carefully each time.  Talk to your pediatrician regarding the use of this medicine in children. While this drug may be prescribed for selected conditions, precautions do apply.  Overdosage: If you think you have taken too much of this medicine contact a poison control center or emergency room at once.  NOTE: This medicine is only for you. Do not share this medicine with others.  What if I miss a dose?  If you miss a dose, take it as soon as you can. If it is almost time for your next dose, take only that dose. Do not take double or extra doses.  What may interact with this medicine?    aminoglutethimide    bromocriptine    chemotherapy drugs    female hormones, like estrogens and birth control pills    letrozole    medroxyprogesterone    phenobarbital    rifampin    warfarin  This list may not describe all possible interactions. Give your health care provider a list of all the medicines, herbs, non-prescription drugs, or dietary supplements you use. Also tell them if you smoke, drink alcohol, or use illegal drugs. Some items may interact with your medicine.  What should I watch for while using this medicine?  Visit your doctor or health care professional for regular checks on your progress. You will need regular pelvic exams,  breast exams, and mammograms. If you are taking this medicine to reduce your risk of getting breast cancer, you should know that this medicine does not prevent all types of breast cancer. If breast cancer or other problems occur, there is no guarantee that it will be found at an early stage.  Do not become pregnant while taking this medicine or for 2 months after stopping this medicine. Stop taking this medicine if you get pregnant or think you are pregnant and contact your doctor. This medicine may harm your unborn baby. Women who can possibly become pregnant should use birth control methods that do not use hormones during tamoxifen treatment and for 2 months after therapy has stopped. Talk with your health care provider for birth control advice.  Do not breast feed while taking this medicine.  What side effects may I notice from receiving this medicine?  Side effects that you should report to your doctor or health care professional as soon as possible:    allergic reactions like skin rash, itching or hives, swelling of the face, lips, or tongue    breathing problems    changes in vision    changes in your menstrual cycle    difficulty walking or talking    new breast lumps    numbness    pelvic pain or pressure    redness, blistering, peeling or loosening of the skin, including inside the mouth    sudden chest pain    swelling, pain or tenderness in your calf or leg    unusual bruising or bleeding    vaginal discharge that is bloody, brown, or rust    weakness    yellowing of the whites of the eyes or skin  Side effects that usually do not require medical attention (report to your doctor or health care professional if they continue or are bothersome):    fatigue    hair loss, although uncommon and is usually mild    headache    hot flashes    impotence (in men)    nausea, vomiting (mild)    vaginal discharge (white or clear)  This list may not describe all possible side effects. Call your doctor for medical advice  about side effects. You may report side effects to FDA at 6-411-FDA-5398.  Where should I keep my medicine?  Keep out of the reach of children.  Store in the original package at room temperature between 20 and 25 degrees C (68 and 77 degrees F). Do not store above 25 degrees C (77 degrees F). DO NOT freeze or refrigerate. Protect from light. Keep container tightly closed. Use within 3 months of opening. Throw away any unused medicine after the expiration date.  NOTE:This sheet is a summary. It may not cover all possible information. If you have questions about this medicine, talk to your doctor, pharmacist, or health care provider. Copyright  2016 Gold Standard

## 2021-11-23 NOTE — PROGRESS NOTES
[facility] Hematology and Oncology Progress Note    Patient: Veronica Nieves  MRN: [id number]  Date of Service: Nov 23, 2021        Assessment and Plan:    Cancer Staging  Malignant neoplasm of upper-outer quadrant of left breast in female, estrogen receptor positive (H)  Staging form: Breast, AJCC 8th Edition  - Clinical stage from 5/19/2021: Stage IB (cT2, cN0, cM0, G2, ER+, KY+, HER2+) - Signed by Jefferson Garcia MD on 5/19/2021     1.  Invasive ductal carcinoma of the left breast: She had surgery on October 7.  Bilateral mastectomy and sentinel node biopsy.  We reviewed her pathology report.  She had a small amount of residual disease.  9 mm in the breast.  Negative nodes.  Reviewed with her that this is not uncommon in ER positive disease.  We noted that she had a good response to neoadjuvant therapy based on her initial tumor sizes from her initial ultrasound.  We discussed the plan going forward.  Since she had some residual disease we would consider switching her to HER-2 targeted therapy to Kadcyla.  I reviewed the data from the JIMMY trial.  It is noted that the majority of the patients in the trial did not have dual HER-2 targeted therapy for neoadjuvant and adjuvant therapy as she did.  It is therefore unknown if those trial data holds in her case.  We discussed some of the more common side effects of Kadcyla which include fatigue, diarrhea, cytopenias, and nausea.  After some discussion she decided to try Kadcyla and switch back to Herceptin and Perjeta if she has side effects.    She will also start adjuvant endocrine therapy.  I am going to use tamoxifen.  We will also add Zoladex injections monthly.  If she has too many adverse side effects we can stop the Zoladex as the overall benefit is relatively small.  Side effects of these drugs were also reviewed.  She was given written information on her new medications to take home and review.  Multiple questions were answered.  I also spent  some time explaining why we do not do surveillance imaging after bilateral mastectomy.    I spent 50 minutes in the care of this patient today, which included time necessary for preparation for the visit, face to face time with the patient, communication of recommendations to the care team, and documentation time.    ECOG Performance  1    Diagnosis:    1.  Invasive ductal carcinoma of the left breast: Diagnosed May, 2021.  Initial imaging suggested 3 masses.  Biopsy of the largest mass showed an invasive ductal carcinoma, grade 2.  ER/WV positive, HER-2 positive.  Evaluation of the axilla was negative.    Treatment:    Neoadjuvant chemotherapy with TCHP initiated on May 27, 2021.  Cycle 6 was given on September 10.  Bilateral mastectomy performed on October 7.  Pathology showed a 9 mm focus of invasive ductal carcinoma of the left breast.  Lymph nodes are negative.    Interim History:    Abbey presents today for a follow-up visit.  She had her surgery a little over a month ago.  Recovering well.  No significant pain.  No wound healing issues.  No acute complaints today.    Review of Systems:    As above in the history.     Review of Systems otherwise Negative for:  General: chills, fever or night sweats  Psychological: anxiety or depression  Ophthalmic: blurry vision, double vision or loss of vision, vision change  ENT: epistaxis, oral lesions, hearing changes  Hematological and Lymphatic: bleeding, bruising, jaundice, swollen lymph nodes  Endocrine: hot flashes, unexpected weight changes  Respiratory: cough, hemoptysis, orthopnea or shortness of breath/SHAHID  Cardiovascular: chest pain, edema, palpitations or PND  Gastrointestinal: abdominal pain, blood in stools, change in bowel habits, constipation, diarrhea or nausea/vomiting  Genito-Urinary: change in urinary stream, incontinence, frequency/urgency  Musculoskeletal: joint pain, stiffness, swelling, muscle pain  Neurological: dizziness, headaches,  numbness/tingling  Dermatological: lumps and rash    Past History:    Past Medical History:   Diagnosis Date     Abnormal Pap smear of cervix     age 13, subsequent pap smears normal     Anxiety      Breast cancer (H) 05/04/2021    Left     Depression      Physical Exam:    There were no vitals taken for this visit.    General: patient appears stated age of 31 year old. Nontoxic and in no distress.   HEENT: Head: atraumatic, normocephalic. Sclerae anicteric.  Chest:  Normal respiratory effort  Cardiac:  No edema.   Abdomen: abdomen is non-distended  Extremities: normal tone and muscle bulk.  Skin: no lesions or rash on visible skin. Warm and dry.   CNS: alert and oriented. Grossly non-focal.   Psychiatric: normal mood and affect.     Lab Results:    No results found for this or any previous visit (from the past 24 hour(s)).    Imaging:      Signed by: Jefferson Garcia MD

## 2021-11-23 NOTE — LETTER
11/23/2021         RE: Veronica Nieves  [address]  [place] [address]        Dear Colleague,    Thank you for referring your patient, Veronica Nieves, to the [facility]. Please see a copy of my visit note below.    [facility] Hematology and Oncology Progress Note    Patient: Veronica Nieves  MRN: [id number]  Date of Service: Nov 23, 2021        Assessment and Plan:    Cancer Staging  Malignant neoplasm of upper-outer quadrant of left breast in female, estrogen receptor positive (H)  Staging form: Breast, AJCC 8th Edition  - Clinical stage from 5/19/2021: Stage IB (cT2, cN0, cM0, G2, ER+, NV+, HER2+) - Signed by Jefferson Garcia MD on 5/19/2021     1.  Invasive ductal carcinoma of the left breast: She had surgery on October 7.  Bilateral mastectomy and sentinel node biopsy.  We reviewed her pathology report.  She had a small amount of residual disease.  9 mm in the breast.  Negative nodes.  Reviewed with her that this is not uncommon in ER positive disease.  We noted that she had a good response to neoadjuvant therapy based on her initial tumor sizes from her initial ultrasound.  We discussed the plan going forward.  Since she had some residual disease we would consider switching her to HER-2 targeted therapy to Kadcyla.  I reviewed the data from the JIMMY trial.  It is noted that the majority of the patients in the trial did not have dual HER-2 targeted therapy for neoadjuvant and adjuvant therapy as she did.  It is therefore unknown if those trial data holds in her case.  We discussed some of the more common side effects of Kadcyla which include fatigue, diarrhea, cytopenias, and nausea.  After some discussion she decided to try Kadcyla and switch back to Herceptin and Perjeta if she has side effects.    She will also start adjuvant endocrine therapy.  I am going to use tamoxifen.  We will also add Zoladex injections monthly.  If she has too many adverse side  effects we can stop the Zoladex as the overall benefit is relatively small.  Side effects of these drugs were also reviewed.  She was given written information on her new medications to take home and review.  Multiple questions were answered.  I also spent some time explaining why we do not do surveillance imaging after bilateral mastectomy.    I spent 50 minutes in the care of this patient today, which included time necessary for preparation for the visit, face to face time with the patient, communication of recommendations to the care team, and documentation time.    ECOG Performance  1    Diagnosis:    1.  Invasive ductal carcinoma of the left breast: Diagnosed May, 2021.  Initial imaging suggested 3 masses.  Biopsy of the largest mass showed an invasive ductal carcinoma, grade 2.  ER/ND positive, HER-2 positive.  Evaluation of the axilla was negative.    Treatment:    Neoadjuvant chemotherapy with TCHP initiated on May 27, 2021.  Cycle 6 was given on September 10.  Bilateral mastectomy performed on October 7.  Pathology showed a 9 mm focus of invasive ductal carcinoma of the left breast.  Lymph nodes are negative.    Interim History:    Abbey presents today for a follow-up visit.  She had her surgery a little over a month ago.  Recovering well.  No significant pain.  No wound healing issues.  No acute complaints today.    Review of Systems:    As above in the history.     Review of Systems otherwise Negative for:  General: chills, fever or night sweats  Psychological: anxiety or depression  Ophthalmic: blurry vision, double vision or loss of vision, vision change  ENT: epistaxis, oral lesions, hearing changes  Hematological and Lymphatic: bleeding, bruising, jaundice, swollen lymph nodes  Endocrine: hot flashes, unexpected weight changes  Respiratory: cough, hemoptysis, orthopnea or shortness of breath/SHAHID  Cardiovascular: chest pain, edema, palpitations or PND  Gastrointestinal: abdominal pain, blood in stools,  "change in bowel habits, constipation, diarrhea or nausea/vomiting  Genito-Urinary: change in urinary stream, incontinence, frequency/urgency  Musculoskeletal: joint pain, stiffness, swelling, muscle pain  Neurological: dizziness, headaches, numbness/tingling  Dermatological: lumps and rash    Past History:    Past Medical History:   Diagnosis Date     Abnormal Pap smear of cervix     age 13, subsequent pap smears normal     Anxiety      Breast cancer (H) 05/04/2021    Left     Depression      Physical Exam:    There were no vitals taken for this visit.    General: patient appears stated age of 31 year old. Nontoxic and in no distress.   HEENT: Head: atraumatic, normocephalic. Sclerae anicteric.  Chest:  Normal respiratory effort  Cardiac:  No edema.   Abdomen: abdomen is non-distended  Extremities: normal tone and muscle bulk.  Skin: no lesions or rash on visible skin. Warm and dry.   CNS: alert and oriented. Grossly non-focal.   Psychiatric: normal mood and affect.     Lab Results:    No results found for this or any previous visit (from the past 24 hour(s)).    Imaging:      Signed by: Jefferson Garcia MD      Oncology Rooming Note    November 23, 2021 8:35 AM   Veronica Nieves is a 31 year old female who presents for:    Chief Complaint   Patient presents with     Oncology Clinic Visit     Malignant neoplasm of upper-outer quadrant of left breast in female, estrogen receptor positive (H)     Initial Vitals: /66 (BP Location: Left arm, Patient Position: Sitting, Cuff Size: Adult Regular)   Pulse 74   Temp 98.1  F (36.7  C) (Oral)   Resp 12   Wt 82.6 kg (182 lb)   SpO2 96%   BMI 28.94 kg/m   Estimated body mass index is 28.94 kg/m  as calculated from the following:    Height as of 10/5/21: 1.689 m (5' 6.5\").    Weight as of this encounter: 82.6 kg (182 lb). Body surface area is 1.97 meters squared.  No Pain (0) Comment: Data Unavailable   No LMP recorded. Patient has had an implant.  Allergies reviewed: " Yes  Medications reviewed: Yes    Medications: Medication refills not needed today.  Pharmacy name entered into EPIC: [id number] [place] [address]    Clinical concerns:  Malignant neoplasm of upper-outer quadrant of left breast in female, estrogen receptor positive (H)      Shanda Lema [facility]                Again, thank you for allowing me to participate in the care of your patient.        Sincerely,        Jefferson Garcia MD

## 2021-11-29 ENCOUNTER — INFUSION THERAPY VISIT (OUTPATIENT)
Dept: INFUSION THERAPY | Facility: HOSPITAL | Age: 31
End: 2021-11-29
Attending: INTERNAL MEDICINE
Payer: COMMERCIAL

## 2021-11-29 VITALS
DIASTOLIC BLOOD PRESSURE: 61 MMHG | SYSTOLIC BLOOD PRESSURE: 121 MMHG | HEART RATE: 57 BPM | TEMPERATURE: 98.5 F | OXYGEN SATURATION: 98 % | RESPIRATION RATE: 16 BRPM

## 2021-11-29 DIAGNOSIS — Z17.0 MALIGNANT NEOPLASM OF UPPER-OUTER QUADRANT OF LEFT BREAST IN FEMALE, ESTROGEN RECEPTOR POSITIVE (H): Primary | ICD-10-CM

## 2021-11-29 DIAGNOSIS — C50.412 MALIGNANT NEOPLASM OF UPPER-OUTER QUADRANT OF LEFT BREAST IN FEMALE, ESTROGEN RECEPTOR POSITIVE (H): Primary | ICD-10-CM

## 2021-11-29 LAB
ALBUMIN SERPL-MCNC: 4.2 G/DL (ref 3.5–5)
ALP SERPL-CCNC: 66 U/L (ref 45–120)
ALT SERPL W P-5'-P-CCNC: 41 U/L (ref 0–45)
ANION GAP SERPL CALCULATED.3IONS-SCNC: 7 MMOL/L (ref 5–18)
AST SERPL W P-5'-P-CCNC: 36 U/L (ref 0–40)
BASOPHILS # BLD AUTO: 0 10E3/UL (ref 0–0.2)
BASOPHILS NFR BLD AUTO: 0 %
BILIRUB SERPL-MCNC: 0.3 MG/DL (ref 0–1)
BUN SERPL-MCNC: 13 MG/DL (ref 8–22)
CALCIUM SERPL-MCNC: 9.4 MG/DL (ref 8.5–10.5)
CHLORIDE BLD-SCNC: 104 MMOL/L (ref 98–107)
CO2 SERPL-SCNC: 26 MMOL/L (ref 22–31)
CREAT SERPL-MCNC: 0.94 MG/DL (ref 0.6–1.1)
EOSINOPHIL # BLD AUTO: 0.2 10E3/UL (ref 0–0.7)
EOSINOPHIL NFR BLD AUTO: 4 %
ERYTHROCYTE [DISTWIDTH] IN BLOOD BY AUTOMATED COUNT: 11.4 % (ref 10–15)
GFR SERPL CREATININE-BSD FRML MDRD: 81 ML/MIN/1.73M2
GLUCOSE BLD-MCNC: 95 MG/DL (ref 70–125)
HCT VFR BLD AUTO: 36.8 % (ref 35–47)
HGB BLD-MCNC: 12 G/DL (ref 11.7–15.7)
IMM GRANULOCYTES # BLD: 0 10E3/UL
IMM GRANULOCYTES NFR BLD: 0 %
LYMPHOCYTES # BLD AUTO: 1.1 10E3/UL (ref 0.8–5.3)
LYMPHOCYTES NFR BLD AUTO: 24 %
MCH RBC QN AUTO: 30.2 PG (ref 26.5–33)
MCHC RBC AUTO-ENTMCNC: 32.6 G/DL (ref 31.5–36.5)
MCV RBC AUTO: 93 FL (ref 78–100)
MONOCYTES # BLD AUTO: 0.4 10E3/UL (ref 0–1.3)
MONOCYTES NFR BLD AUTO: 9 %
NEUTROPHILS # BLD AUTO: 3 10E3/UL (ref 1.6–8.3)
NEUTROPHILS NFR BLD AUTO: 63 %
NRBC # BLD AUTO: 0 10E3/UL
NRBC BLD AUTO-RTO: 0 /100
PLATELET # BLD AUTO: 215 10E3/UL (ref 150–450)
POTASSIUM BLD-SCNC: 4 MMOL/L (ref 3.5–5)
PROT SERPL-MCNC: 6.8 G/DL (ref 6–8)
RBC # BLD AUTO: 3.98 10E6/UL (ref 3.8–5.2)
SODIUM SERPL-SCNC: 137 MMOL/L (ref 136–145)
WBC # BLD AUTO: 4.8 10E3/UL (ref 4–11)

## 2021-11-29 PROCEDURE — 250N000011 HC RX IP 250 OP 636: Performed by: INTERNAL MEDICINE

## 2021-11-29 PROCEDURE — 80053 COMPREHEN METABOLIC PANEL: CPT | Performed by: INTERNAL MEDICINE

## 2021-11-29 PROCEDURE — 96413 CHEMO IV INFUSION 1 HR: CPT

## 2021-11-29 PROCEDURE — 85025 COMPLETE CBC W/AUTO DIFF WBC: CPT | Performed by: INTERNAL MEDICINE

## 2021-11-29 PROCEDURE — 258N000003 HC RX IP 258 OP 636: Performed by: INTERNAL MEDICINE

## 2021-11-29 PROCEDURE — 96402 CHEMO HORMON ANTINEOPL SQ/IM: CPT

## 2021-11-29 PROCEDURE — 96415 CHEMO IV INFUSION ADDL HR: CPT

## 2021-11-29 RX ORDER — METHYLPREDNISOLONE SODIUM SUCCINATE 125 MG/2ML
125 INJECTION, POWDER, LYOPHILIZED, FOR SOLUTION INTRAMUSCULAR; INTRAVENOUS
Status: DISCONTINUED | OUTPATIENT
Start: 2021-11-29 | End: 2021-11-29 | Stop reason: HOSPADM

## 2021-11-29 RX ORDER — LORAZEPAM 2 MG/ML
0.5 INJECTION INTRAMUSCULAR EVERY 4 HOURS PRN
Status: DISCONTINUED | OUTPATIENT
Start: 2021-11-29 | End: 2021-11-29 | Stop reason: HOSPADM

## 2021-11-29 RX ORDER — HEPARIN SODIUM (PORCINE) LOCK FLUSH IV SOLN 100 UNIT/ML 100 UNIT/ML
5 SOLUTION INTRAVENOUS
Status: DISCONTINUED | OUTPATIENT
Start: 2021-11-29 | End: 2021-11-29 | Stop reason: HOSPADM

## 2021-11-29 RX ORDER — DIPHENHYDRAMINE HYDROCHLORIDE 50 MG/ML
50 INJECTION INTRAMUSCULAR; INTRAVENOUS
Status: DISCONTINUED | OUTPATIENT
Start: 2021-11-29 | End: 2021-11-29 | Stop reason: HOSPADM

## 2021-11-29 RX ADMIN — SODIUM CHLORIDE 250 ML: 9 INJECTION, SOLUTION INTRAVENOUS at 13:01

## 2021-11-29 RX ADMIN — ADO-TRASTUZUMAB EMTANSINE 298 MG: 20 INJECTION, POWDER, LYOPHILIZED, FOR SOLUTION INTRAVENOUS at 13:03

## 2021-11-29 RX ADMIN — HEPARIN SODIUM (PORCINE) LOCK FLUSH IV SOLN 100 UNIT/ML 5 ML: 100 SOLUTION at 16:08

## 2021-11-29 RX ADMIN — GOSERELIN ACETATE 3.6 MG: 3.6 IMPLANT SUBCUTANEOUS at 15:52

## 2021-11-29 NOTE — PROGRESS NOTES
Pt arrived ambulatory to clinic for Cycle # 1 Day # 1 of her chemotherapy regimen.  Dr. Garcia reviewed medications, potential side effects, obtained consent, and provided written information on each medications.  Port was accessed using aseptic technique without difficulties with excellent blood return.  Labs were reviewed, pt ok for treatment.  Administered Kadcyla and subcutaneous Zoladex per MD order.  Pt tolerated medications well, no s/s of infusion reaction.  Pt was observed for 90 mins following her 90 min Kadcyla infusion.  Port was flushed with NS and Heparin then de-accessed using 2x2 and papertape.  Pt verbalized understanding of plan of care and return to clinic.

## 2021-12-06 ENCOUNTER — MYC MEDICAL ADVICE (OUTPATIENT)
Dept: ONCOLOGY | Facility: HOSPITAL | Age: 31
End: 2021-12-06
Payer: COMMERCIAL

## 2021-12-17 ENCOUNTER — LAB (OUTPATIENT)
Dept: INFUSION THERAPY | Facility: HOSPITAL | Age: 31
End: 2021-12-17
Attending: INTERNAL MEDICINE
Payer: COMMERCIAL

## 2021-12-17 VITALS
HEART RATE: 74 BPM | BODY MASS INDEX: 28.46 KG/M2 | SYSTOLIC BLOOD PRESSURE: 139 MMHG | OXYGEN SATURATION: 96 % | RESPIRATION RATE: 18 BRPM | TEMPERATURE: 98.8 F | DIASTOLIC BLOOD PRESSURE: 74 MMHG | WEIGHT: 179 LBS

## 2021-12-17 DIAGNOSIS — Z17.0 MALIGNANT NEOPLASM OF UPPER-OUTER QUADRANT OF LEFT BREAST IN FEMALE, ESTROGEN RECEPTOR POSITIVE (H): Primary | ICD-10-CM

## 2021-12-17 DIAGNOSIS — C50.412 MALIGNANT NEOPLASM OF UPPER-OUTER QUADRANT OF LEFT BREAST IN FEMALE, ESTROGEN RECEPTOR POSITIVE (H): Primary | ICD-10-CM

## 2021-12-17 LAB
ALBUMIN SERPL-MCNC: 4.1 G/DL (ref 3.5–5)
ALP SERPL-CCNC: 62 U/L (ref 45–120)
ALT SERPL W P-5'-P-CCNC: 30 U/L (ref 0–45)
ANION GAP SERPL CALCULATED.3IONS-SCNC: 8 MMOL/L (ref 5–18)
AST SERPL W P-5'-P-CCNC: 30 U/L (ref 0–40)
BASOPHILS # BLD AUTO: 0 10E3/UL (ref 0–0.2)
BASOPHILS NFR BLD AUTO: 1 %
BILIRUB SERPL-MCNC: 0.3 MG/DL (ref 0–1)
BUN SERPL-MCNC: 14 MG/DL (ref 8–22)
CALCIUM SERPL-MCNC: 9.4 MG/DL (ref 8.5–10.5)
CHLORIDE BLD-SCNC: 106 MMOL/L (ref 98–107)
CO2 SERPL-SCNC: 28 MMOL/L (ref 22–31)
CREAT SERPL-MCNC: 1.07 MG/DL (ref 0.6–1.1)
EOSINOPHIL # BLD AUTO: 0.3 10E3/UL (ref 0–0.7)
EOSINOPHIL NFR BLD AUTO: 7 %
ERYTHROCYTE [DISTWIDTH] IN BLOOD BY AUTOMATED COUNT: 11.7 % (ref 10–15)
GFR SERPL CREATININE-BSD FRML MDRD: 69 ML/MIN/1.73M2
GLUCOSE BLD-MCNC: 108 MG/DL (ref 70–125)
HCT VFR BLD AUTO: 36.9 % (ref 35–47)
HGB BLD-MCNC: 12.3 G/DL (ref 11.7–15.7)
IMM GRANULOCYTES # BLD: 0 10E3/UL
IMM GRANULOCYTES NFR BLD: 0 %
LYMPHOCYTES # BLD AUTO: 1.4 10E3/UL (ref 0.8–5.3)
LYMPHOCYTES NFR BLD AUTO: 34 %
MCH RBC QN AUTO: 29.8 PG (ref 26.5–33)
MCHC RBC AUTO-ENTMCNC: 33.3 G/DL (ref 31.5–36.5)
MCV RBC AUTO: 89 FL (ref 78–100)
MONOCYTES # BLD AUTO: 0.4 10E3/UL (ref 0–1.3)
MONOCYTES NFR BLD AUTO: 9 %
NEUTROPHILS # BLD AUTO: 2 10E3/UL (ref 1.6–8.3)
NEUTROPHILS NFR BLD AUTO: 49 %
NRBC # BLD AUTO: 0 10E3/UL
NRBC BLD AUTO-RTO: 0 /100
PLATELET # BLD AUTO: 249 10E3/UL (ref 150–450)
POTASSIUM BLD-SCNC: 3.7 MMOL/L (ref 3.5–5)
PROT SERPL-MCNC: 7 G/DL (ref 6–8)
RBC # BLD AUTO: 4.13 10E6/UL (ref 3.8–5.2)
SODIUM SERPL-SCNC: 142 MMOL/L (ref 136–145)
WBC # BLD AUTO: 4 10E3/UL (ref 4–11)

## 2021-12-17 PROCEDURE — 96413 CHEMO IV INFUSION 1 HR: CPT

## 2021-12-17 PROCEDURE — 250N000011 HC RX IP 250 OP 636: Performed by: INTERNAL MEDICINE

## 2021-12-17 PROCEDURE — 85025 COMPLETE CBC W/AUTO DIFF WBC: CPT | Performed by: INTERNAL MEDICINE

## 2021-12-17 PROCEDURE — 82040 ASSAY OF SERUM ALBUMIN: CPT | Performed by: INTERNAL MEDICINE

## 2021-12-17 PROCEDURE — 258N000003 HC RX IP 258 OP 636: Performed by: INTERNAL MEDICINE

## 2021-12-17 RX ORDER — HEPARIN SODIUM (PORCINE) LOCK FLUSH IV SOLN 100 UNIT/ML 100 UNIT/ML
5 SOLUTION INTRAVENOUS
Status: DISCONTINUED | OUTPATIENT
Start: 2021-12-17 | End: 2021-12-20 | Stop reason: HOSPADM

## 2021-12-17 RX ADMIN — HEPARIN 5 ML: 100 SYRINGE at 10:19

## 2021-12-17 RX ADMIN — ADO-TRASTUZUMAB EMTANSINE 298 MG: 20 INJECTION, POWDER, LYOPHILIZED, FOR SOLUTION INTRAVENOUS at 09:24

## 2021-12-17 NOTE — PROGRESS NOTES
PT here ambulatory for txt. Dr Garcia approved zoladex to be given today instead of having to come back next week. Port accessed and dressing placed over site. Labs drawn and results approved for txt. Pt sking bright red under port dressing. Immediately removed dressing and put gauze with tape over port site.Pt states has been tolerating txt with no problems or side effects. Txt administered and pt tolerated without any problems. txt completed and port flushed/deaccessed with 2x2 to site.Pt observed for 30 minutes post infusion with no problems. Follow up reviewed and pt dc'd steady gait

## 2021-12-18 DIAGNOSIS — Z17.0 MALIGNANT NEOPLASM OF UPPER-OUTER QUADRANT OF LEFT BREAST IN FEMALE, ESTROGEN RECEPTOR POSITIVE (H): ICD-10-CM

## 2021-12-18 DIAGNOSIS — C50.412 MALIGNANT NEOPLASM OF UPPER-OUTER QUADRANT OF LEFT BREAST IN FEMALE, ESTROGEN RECEPTOR POSITIVE (H): ICD-10-CM

## 2021-12-20 RX ORDER — TAMOXIFEN CITRATE 20 MG/1
TABLET ORAL
Qty: 30 TABLET | Refills: 1 | Status: SHIPPED | OUTPATIENT
Start: 2021-12-20 | End: 2022-01-07

## 2021-12-27 ENCOUNTER — INFUSION THERAPY VISIT (OUTPATIENT)
Dept: INFUSION THERAPY | Facility: HOSPITAL | Age: 31
End: 2021-12-27
Attending: INTERNAL MEDICINE
Payer: COMMERCIAL

## 2021-12-27 DIAGNOSIS — Z17.0 MALIGNANT NEOPLASM OF UPPER-OUTER QUADRANT OF LEFT BREAST IN FEMALE, ESTROGEN RECEPTOR POSITIVE (H): Primary | ICD-10-CM

## 2021-12-27 DIAGNOSIS — C50.412 MALIGNANT NEOPLASM OF UPPER-OUTER QUADRANT OF LEFT BREAST IN FEMALE, ESTROGEN RECEPTOR POSITIVE (H): Primary | ICD-10-CM

## 2021-12-27 PROCEDURE — 96402 CHEMO HORMON ANTINEOPL SQ/IM: CPT

## 2021-12-27 PROCEDURE — 250N000011 HC RX IP 250 OP 636: Performed by: NURSE PRACTITIONER

## 2021-12-27 RX ADMIN — GOSERELIN ACETATE 3.6 MG: 3.6 IMPLANT SUBCUTANEOUS at 10:26

## 2021-12-28 ENCOUNTER — NURSE TRIAGE (OUTPATIENT)
Dept: NURSING | Facility: CLINIC | Age: 31
End: 2021-12-28
Payer: COMMERCIAL

## 2021-12-28 NOTE — TELEPHONE ENCOUNTER
"Slipped and fell on ice a short time ago, swelling lateral on right ankle and slightly up shin on outside    Reason for Disposition    Can't stand (bear weight) or walk (e.g., 4 steps)    Additional Information    Negative: Major bleeding (actively dripping or spurting) that can't be stopped    Negative: Amputation or bone sticking through the skin    Negative: Looks like a dislocated joint (crooked or deformed)    Negative: Serious injury with multiple fractures (broken bones)    Negative: Sounds like a life-threatening emergency to the triager    Negative: Wound looks infected    Negative: Caused by an animal bite    Negative: Puncture wound of foot    Negative: Toe injury is the main symptom    Negative: Cast problems or questions    Negative: Bullet, stabbed by knife or other serious penetrating wound    Answer Assessment - Initial Assessment Questions  1. MECHANISM: \"How did the injury happen?\" (e.g., twisting injury, direct blow)       Twisted, slipped on ice  2. ONSET: \"When did the injury happen?\" (Minutes or hours ago)       1p  3. LOCATION: \"Where is the injury located?\"       Lateral ankle right  4. APPEARANCE of INJURY: \"What does the injury look like?\"       swollen  5. WEIGHT-BEARING: \"Can you put weight on that foot?\" \"Can you walk (four steps or more)?\"        Unable to bear weight  6. SIZE: For cuts, bruises, or swelling, ask: \"How large is it?\" (e.g., inches or centimeters;  entire joint)       no  7. PAIN: \"Is there pain?\" If so, ask: \"How bad is the pain?\"    (e.g., Scale 1-10; or mild, moderate, severe)      Yes, moderate  8. TETANUS: For any breaks in the skin, ask: \"When was the last tetanus booster?\"      n/a  9. OTHER SYMPTOMS: \"Do you have any other symptoms?\"       no  10. PREGNANCY: \"Is there any chance you are pregnant?\" \"When was your last menstrual period?\"        no    Protocols used: ANKLE AND FOOT INJURY-A-OH      "

## 2022-01-07 ENCOUNTER — INFUSION THERAPY VISIT (OUTPATIENT)
Dept: INFUSION THERAPY | Facility: HOSPITAL | Age: 32
End: 2022-01-07
Attending: INTERNAL MEDICINE
Payer: COMMERCIAL

## 2022-01-07 ENCOUNTER — ONCOLOGY VISIT (OUTPATIENT)
Dept: ONCOLOGY | Facility: HOSPITAL | Age: 32
End: 2022-01-07
Attending: INTERNAL MEDICINE
Payer: COMMERCIAL

## 2022-01-07 VITALS
HEART RATE: 69 BPM | SYSTOLIC BLOOD PRESSURE: 123 MMHG | OXYGEN SATURATION: 98 % | HEIGHT: 66 IN | WEIGHT: 180 LBS | TEMPERATURE: 99 F | DIASTOLIC BLOOD PRESSURE: 74 MMHG | BODY MASS INDEX: 28.93 KG/M2 | RESPIRATION RATE: 16 BRPM

## 2022-01-07 DIAGNOSIS — C50.412 MALIGNANT NEOPLASM OF UPPER-OUTER QUADRANT OF LEFT BREAST IN FEMALE, ESTROGEN RECEPTOR POSITIVE (H): Primary | ICD-10-CM

## 2022-01-07 DIAGNOSIS — Z17.0 MALIGNANT NEOPLASM OF UPPER-OUTER QUADRANT OF LEFT BREAST IN FEMALE, ESTROGEN RECEPTOR POSITIVE (H): Primary | ICD-10-CM

## 2022-01-07 DIAGNOSIS — G62.0 CHEMOTHERAPY-INDUCED PERIPHERAL NEUROPATHY (H): ICD-10-CM

## 2022-01-07 DIAGNOSIS — T45.1X5A CHEMOTHERAPY-INDUCED PERIPHERAL NEUROPATHY (H): ICD-10-CM

## 2022-01-07 LAB
ALBUMIN SERPL-MCNC: 3.9 G/DL (ref 3.5–5)
ALP SERPL-CCNC: 63 U/L (ref 45–120)
ALT SERPL W P-5'-P-CCNC: 27 U/L (ref 0–45)
ANION GAP SERPL CALCULATED.3IONS-SCNC: 10 MMOL/L (ref 5–18)
AST SERPL W P-5'-P-CCNC: 29 U/L (ref 0–40)
BASOPHILS # BLD AUTO: 0 10E3/UL (ref 0–0.2)
BASOPHILS NFR BLD AUTO: 0 %
BILIRUB SERPL-MCNC: 0.3 MG/DL (ref 0–1)
BUN SERPL-MCNC: 14 MG/DL (ref 8–22)
CALCIUM SERPL-MCNC: 9.4 MG/DL (ref 8.5–10.5)
CHLORIDE BLD-SCNC: 105 MMOL/L (ref 98–107)
CO2 SERPL-SCNC: 28 MMOL/L (ref 22–31)
CREAT SERPL-MCNC: 1.03 MG/DL (ref 0.6–1.1)
EOSINOPHIL # BLD AUTO: 0.5 10E3/UL (ref 0–0.7)
EOSINOPHIL NFR BLD AUTO: 11 %
ERYTHROCYTE [DISTWIDTH] IN BLOOD BY AUTOMATED COUNT: 12.2 % (ref 10–15)
GFR SERPL CREATININE-BSD FRML MDRD: 74 ML/MIN/1.73M2
GLUCOSE BLD-MCNC: 120 MG/DL (ref 70–125)
HCT VFR BLD AUTO: 36.9 % (ref 35–47)
HGB BLD-MCNC: 12.3 G/DL (ref 11.7–15.7)
IMM GRANULOCYTES # BLD: 0 10E3/UL
IMM GRANULOCYTES NFR BLD: 0 %
LYMPHOCYTES # BLD AUTO: 1.3 10E3/UL (ref 0.8–5.3)
LYMPHOCYTES NFR BLD AUTO: 26 %
MCH RBC QN AUTO: 29.2 PG (ref 26.5–33)
MCHC RBC AUTO-ENTMCNC: 33.3 G/DL (ref 31.5–36.5)
MCV RBC AUTO: 88 FL (ref 78–100)
MONOCYTES # BLD AUTO: 0.5 10E3/UL (ref 0–1.3)
MONOCYTES NFR BLD AUTO: 9 %
NEUTROPHILS # BLD AUTO: 2.7 10E3/UL (ref 1.6–8.3)
NEUTROPHILS NFR BLD AUTO: 54 %
NRBC # BLD AUTO: 0 10E3/UL
NRBC BLD AUTO-RTO: 0 /100
PLATELET # BLD AUTO: 273 10E3/UL (ref 150–450)
POTASSIUM BLD-SCNC: 3.6 MMOL/L (ref 3.5–5)
PROT SERPL-MCNC: 7.1 G/DL (ref 6–8)
RBC # BLD AUTO: 4.21 10E6/UL (ref 3.8–5.2)
SODIUM SERPL-SCNC: 143 MMOL/L (ref 136–145)
WBC # BLD AUTO: 5 10E3/UL (ref 4–11)

## 2022-01-07 PROCEDURE — 250N000011 HC RX IP 250 OP 636: Performed by: INTERNAL MEDICINE

## 2022-01-07 PROCEDURE — 80053 COMPREHEN METABOLIC PANEL: CPT | Performed by: INTERNAL MEDICINE

## 2022-01-07 PROCEDURE — 258N000003 HC RX IP 258 OP 636: Performed by: INTERNAL MEDICINE

## 2022-01-07 PROCEDURE — 99214 OFFICE O/P EST MOD 30 MIN: CPT | Performed by: INTERNAL MEDICINE

## 2022-01-07 PROCEDURE — G0463 HOSPITAL OUTPT CLINIC VISIT: HCPCS | Mod: 25

## 2022-01-07 PROCEDURE — 85025 COMPLETE CBC W/AUTO DIFF WBC: CPT | Performed by: INTERNAL MEDICINE

## 2022-01-07 PROCEDURE — 96413 CHEMO IV INFUSION 1 HR: CPT

## 2022-01-07 RX ORDER — ALBUTEROL SULFATE 90 UG/1
1-2 AEROSOL, METERED RESPIRATORY (INHALATION)
Status: CANCELLED
Start: 2022-01-28

## 2022-01-07 RX ORDER — MEPERIDINE HYDROCHLORIDE 25 MG/ML
25 INJECTION INTRAMUSCULAR; INTRAVENOUS; SUBCUTANEOUS EVERY 30 MIN PRN
Status: CANCELLED | OUTPATIENT
Start: 2022-01-07

## 2022-01-07 RX ORDER — TAMOXIFEN CITRATE 20 MG/1
20 TABLET ORAL DAILY
Qty: 90 TABLET | Refills: 1 | Status: SHIPPED | OUTPATIENT
Start: 2022-01-07 | End: 2022-06-22

## 2022-01-07 RX ORDER — METHYLPREDNISOLONE SODIUM SUCCINATE 125 MG/2ML
125 INJECTION, POWDER, LYOPHILIZED, FOR SOLUTION INTRAMUSCULAR; INTRAVENOUS
Status: CANCELLED
Start: 2022-01-07

## 2022-01-07 RX ORDER — ALBUTEROL SULFATE 0.83 MG/ML
2.5 SOLUTION RESPIRATORY (INHALATION)
Status: CANCELLED | OUTPATIENT
Start: 2022-01-07

## 2022-01-07 RX ORDER — EPINEPHRINE 1 MG/ML
0.3 INJECTION, SOLUTION INTRAMUSCULAR; SUBCUTANEOUS EVERY 5 MIN PRN
Status: DISCONTINUED | OUTPATIENT
Start: 2022-01-07 | End: 2022-01-07 | Stop reason: HOSPADM

## 2022-01-07 RX ORDER — HEPARIN SODIUM (PORCINE) LOCK FLUSH IV SOLN 100 UNIT/ML 100 UNIT/ML
5 SOLUTION INTRAVENOUS
Status: DISCONTINUED | OUTPATIENT
Start: 2022-01-07 | End: 2022-01-07 | Stop reason: HOSPADM

## 2022-01-07 RX ORDER — MEPERIDINE HYDROCHLORIDE 25 MG/ML
25 INJECTION INTRAMUSCULAR; INTRAVENOUS; SUBCUTANEOUS EVERY 30 MIN PRN
Status: CANCELLED | OUTPATIENT
Start: 2022-01-28

## 2022-01-07 RX ORDER — HEPARIN SODIUM,PORCINE 10 UNIT/ML
5 VIAL (ML) INTRAVENOUS
Status: CANCELLED | OUTPATIENT
Start: 2022-01-28

## 2022-01-07 RX ORDER — DIPHENHYDRAMINE HYDROCHLORIDE 50 MG/ML
50 INJECTION INTRAMUSCULAR; INTRAVENOUS
Status: CANCELLED
Start: 2022-01-07

## 2022-01-07 RX ORDER — EPINEPHRINE 1 MG/ML
0.3 INJECTION, SOLUTION INTRAMUSCULAR; SUBCUTANEOUS EVERY 5 MIN PRN
Status: CANCELLED | OUTPATIENT
Start: 2022-01-28

## 2022-01-07 RX ORDER — METHYLPREDNISOLONE SODIUM SUCCINATE 125 MG/2ML
125 INJECTION, POWDER, LYOPHILIZED, FOR SOLUTION INTRAMUSCULAR; INTRAVENOUS
Status: DISCONTINUED | OUTPATIENT
Start: 2022-01-07 | End: 2022-01-07 | Stop reason: HOSPADM

## 2022-01-07 RX ORDER — ALBUTEROL SULFATE 90 UG/1
1-2 AEROSOL, METERED RESPIRATORY (INHALATION)
Status: CANCELLED
Start: 2022-01-07

## 2022-01-07 RX ORDER — HEPARIN SODIUM (PORCINE) LOCK FLUSH IV SOLN 100 UNIT/ML 100 UNIT/ML
5 SOLUTION INTRAVENOUS
Status: CANCELLED | OUTPATIENT
Start: 2022-01-07

## 2022-01-07 RX ORDER — LORAZEPAM 2 MG/ML
0.5 INJECTION INTRAMUSCULAR EVERY 4 HOURS PRN
Status: CANCELLED | OUTPATIENT
Start: 2022-01-07

## 2022-01-07 RX ORDER — NALOXONE HYDROCHLORIDE 0.4 MG/ML
0.2 INJECTION, SOLUTION INTRAMUSCULAR; INTRAVENOUS; SUBCUTANEOUS
Status: CANCELLED | OUTPATIENT
Start: 2022-01-28

## 2022-01-07 RX ORDER — HEPARIN SODIUM (PORCINE) LOCK FLUSH IV SOLN 100 UNIT/ML 100 UNIT/ML
5 SOLUTION INTRAVENOUS
Status: CANCELLED | OUTPATIENT
Start: 2022-01-28

## 2022-01-07 RX ORDER — NALOXONE HYDROCHLORIDE 0.4 MG/ML
0.2 INJECTION, SOLUTION INTRAMUSCULAR; INTRAVENOUS; SUBCUTANEOUS
Status: CANCELLED | OUTPATIENT
Start: 2022-01-07

## 2022-01-07 RX ORDER — DIPHENHYDRAMINE HYDROCHLORIDE 50 MG/ML
50 INJECTION INTRAMUSCULAR; INTRAVENOUS
Status: DISCONTINUED | OUTPATIENT
Start: 2022-01-07 | End: 2022-01-07 | Stop reason: HOSPADM

## 2022-01-07 RX ORDER — ALBUTEROL SULFATE 0.83 MG/ML
2.5 SOLUTION RESPIRATORY (INHALATION)
Status: CANCELLED | OUTPATIENT
Start: 2022-01-28

## 2022-01-07 RX ORDER — HEPARIN SODIUM,PORCINE 10 UNIT/ML
5 VIAL (ML) INTRAVENOUS
Status: CANCELLED | OUTPATIENT
Start: 2022-01-07

## 2022-01-07 RX ORDER — EPINEPHRINE 1 MG/ML
0.3 INJECTION, SOLUTION INTRAMUSCULAR; SUBCUTANEOUS EVERY 5 MIN PRN
Status: CANCELLED | OUTPATIENT
Start: 2022-01-07

## 2022-01-07 RX ORDER — NALOXONE HYDROCHLORIDE 0.4 MG/ML
0.2 INJECTION, SOLUTION INTRAMUSCULAR; INTRAVENOUS; SUBCUTANEOUS
Status: DISCONTINUED | OUTPATIENT
Start: 2022-01-07 | End: 2022-01-07 | Stop reason: HOSPADM

## 2022-01-07 RX ORDER — DIPHENHYDRAMINE HYDROCHLORIDE 50 MG/ML
50 INJECTION INTRAMUSCULAR; INTRAVENOUS
Status: CANCELLED
Start: 2022-01-28

## 2022-01-07 RX ORDER — METHYLPREDNISOLONE SODIUM SUCCINATE 125 MG/2ML
125 INJECTION, POWDER, LYOPHILIZED, FOR SOLUTION INTRAMUSCULAR; INTRAVENOUS
Status: CANCELLED
Start: 2022-01-28

## 2022-01-07 RX ORDER — LORAZEPAM 2 MG/ML
0.5 INJECTION INTRAMUSCULAR EVERY 4 HOURS PRN
Status: CANCELLED | OUTPATIENT
Start: 2022-01-28

## 2022-01-07 RX ORDER — MEPERIDINE HYDROCHLORIDE 25 MG/ML
25 INJECTION INTRAMUSCULAR; INTRAVENOUS; SUBCUTANEOUS EVERY 30 MIN PRN
Status: DISCONTINUED | OUTPATIENT
Start: 2022-01-07 | End: 2022-01-07 | Stop reason: HOSPADM

## 2022-01-07 RX ORDER — ALBUTEROL SULFATE 0.83 MG/ML
2.5 SOLUTION RESPIRATORY (INHALATION)
Status: DISCONTINUED | OUTPATIENT
Start: 2022-01-07 | End: 2022-01-07 | Stop reason: HOSPADM

## 2022-01-07 RX ORDER — ALBUTEROL SULFATE 90 UG/1
1-2 AEROSOL, METERED RESPIRATORY (INHALATION)
Status: DISCONTINUED | OUTPATIENT
Start: 2022-01-07 | End: 2022-01-07 | Stop reason: HOSPADM

## 2022-01-07 RX ADMIN — HEPARIN 5 ML: 100 SYRINGE at 10:12

## 2022-01-07 RX ADMIN — ADO-TRASTUZUMAB EMTANSINE 298 MG: 20 INJECTION, POWDER, LYOPHILIZED, FOR SOLUTION INTRAVENOUS at 09:35

## 2022-01-07 RX ADMIN — SODIUM CHLORIDE 250 ML: 9 INJECTION, SOLUTION INTRAVENOUS at 09:16

## 2022-01-07 ASSESSMENT — PAIN SCALES - GENERAL: PAINLEVEL: NO PAIN (0)

## 2022-01-07 ASSESSMENT — MIFFLIN-ST. JEOR: SCORE: 1551.09

## 2022-01-07 NOTE — PROGRESS NOTES
Infusion Nursing Note:  Veronica Nieves presents today for cycle 3 day 1 treatment with Ado-Trastuzumab.    Patient seen by provider today: Yes: Dr Garcia   present during visit today: Not Applicable.    Note:She received treatment as ordered. She has been well educated on the drug and her treatment plan and this was discussed again today.       Intravenous Access:  Implanted Port.    Treatment Conditions:  Results reviewed, labs MET treatment parameters, ok to proceed with treatment.      Post Infusion Assessment:  Patient tolerated infusion without incident.       Discharge Plan:   Patient discharged in stable condition accompanied by: self.      Meryl Roth RN

## 2022-01-07 NOTE — PROGRESS NOTES
[facility] Hematology and Oncology Progress Note    Patient: Veronica Nieves  MRN: [id number]  Date of Service: Jan 7, 2022        Assessment and Plan:    Cancer Staging  Malignant neoplasm of upper-outer quadrant of left breast in female, estrogen receptor positive (H)  Staging form: Breast, AJCC 8th Edition  - Clinical stage from 5/19/2021: Stage IB (cT2, cN0, cM0, G2, ER+, TX+, HER2+) - Signed by Jefferson Garcia MD on 5/19/2021     1.  Invasive ductal carcinoma of the left breast: She is now on Zoladex, tamoxifen, and Kadcyla.  She is tolerating this regimen quite well with minimal side effects.  We will continue moving forward at the same dose and schedule of these medications.  We will see her back in clinic in 6 weeks.  The tamoxifen was refilled today.    2.  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  Grade 1.  Monitor clinically.  She is on Cymbalta.    ECOG Performance  0    Diagnosis:    1.  Invasive ductal carcinoma of the left breast: Diagnosed May, 2021.  Initial imaging suggested 3 masses.  Biopsy of the largest mass showed an invasive ductal carcinoma, grade 2.  ER/TX positive, HER-2 positive.  Evaluation of the axilla was negative.    Treatment:    Neoadjuvant chemotherapy with TCHP initiated on May 27, 2021.  Cycle 6 was given on September 10.  Bilateral mastectomy performed on October 7.  Pathology showed a 9 mm focus of invasive ductal carcinoma of the left breast.  Lymph nodes are negative.    Kadcyla initiated November 29, 2021  Zoladex and tamoxifen initiated November 29, 2021    Interim History:    Abbey presents today for a follow-up visit.  Overall she is doing quite well.  She is not having any problems in starting her new treatment regimen.  She denies hot flashes or sweats.  Her energy is good.  No skin rash or change in her bowel habits.  Still has a mild chemotherapy-induced peripheral neuropathy in the fingertips.    Review of Systems:    As above in  "the history.     Review of Systems otherwise Negative for:  General: chills, fever or night sweats  Psychological: anxiety or depression  Ophthalmic: blurry vision, double vision or loss of vision, vision change  ENT: epistaxis, oral lesions, hearing changes  Hematological and Lymphatic: bleeding, bruising, jaundice, swollen lymph nodes  Endocrine: hot flashes, unexpected weight changes  Respiratory: cough, hemoptysis, orthopnea or shortness of breath/SHAHID  Cardiovascular: chest pain, edema, palpitations or PND  Gastrointestinal: abdominal pain, blood in stools, change in bowel habits, constipation, diarrhea or nausea/vomiting  Genito-Urinary: change in urinary stream, incontinence, frequency/urgency  Musculoskeletal: joint pain, stiffness, swelling, muscle pain  Neurological: dizziness, headaches  Dermatological: lumps and rash    Past History:    Past Medical History:   Diagnosis Date     Abnormal Pap smear of cervix     age 13, subsequent pap smears normal     Anxiety      Breast cancer (H) 05/04/2021    Left     Depression      Physical Exam:    /74 (BP Location: Left arm, Patient Position: Sitting, Cuff Size: Adult Regular)   Pulse 69   Temp 99  F (37.2  C) (Oral)   Resp 16   Ht 1.689 m (5' 6.5\")   Wt 81.6 kg (180 lb)   SpO2 98%   BMI 28.62 kg/m      General: patient appears stated age of 31 year old. Nontoxic and in no distress.   HEENT: Head: atraumatic, normocephalic. Sclerae anicteric.  Chest:  Normal respiratory effort  Cardiac:  No edema.   Abdomen: abdomen is non-distended  Extremities: normal tone and muscle bulk.  Skin: no lesions or rash on visible skin. Warm and dry.   CNS: alert and oriented. Grossly non-focal.   Psychiatric: normal mood and affect.     Lab Results:    Recent Results (from the past 24 hour(s))   CBC with platelets and differential    Collection Time: 01/07/22  8:08 AM   Result Value Ref Range    WBC Count 5.0 4.0 - 11.0 10e3/uL    RBC Count 4.21 3.80 - 5.20 10e6/uL    " Hemoglobin 12.3 11.7 - 15.7 g/dL    Hematocrit 36.9 35.0 - 47.0 %    MCV 88 78 - 100 fL    MCH 29.2 26.5 - 33.0 pg    MCHC 33.3 31.5 - 36.5 g/dL    RDW 12.2 10.0 - 15.0 %    Platelet Count 273 150 - 450 10e3/uL    % Neutrophils 54 %    % Lymphocytes 26 %    % Monocytes 9 %    % Eosinophils 11 %    % Basophils 0 %    % Immature Granulocytes 0 %    NRBCs per 100 WBC 0 <1 /100    Absolute Neutrophils 2.7 1.6 - 8.3 10e3/uL    Absolute Lymphocytes 1.3 0.8 - 5.3 10e3/uL    Absolute Monocytes 0.5 0.0 - 1.3 10e3/uL    Absolute Eosinophils 0.5 0.0 - 0.7 10e3/uL    Absolute Basophils 0.0 0.0 - 0.2 10e3/uL    Absolute Immature Granulocytes 0.0 <=0.4 10e3/uL    Absolute NRBCs 0.0 10e3/uL     Imaging:      Signed by: Jefferson Garcia MD

## 2022-01-07 NOTE — PROGRESS NOTES
"Oncology Rooming Note    January 7, 2022 8:24 AM   Veronica Nieves is a 31 year old female who presents for:    Chief Complaint   Patient presents with     Oncology Clinic Visit     6 week return Malignant neoplasm of upper-outer quadrant of left breast in female, estrogen receptor positive, review labs & Treatment plan     Initial Vitals: /74 (BP Location: Left arm, Patient Position: Sitting, Cuff Size: Adult Regular)   Pulse 69   Temp 99  F (37.2  C) (Oral)   Resp 16   Ht 1.689 m (5' 6.5\")   Wt 81.6 kg (180 lb)   SpO2 98%   BMI 28.62 kg/m   Estimated body mass index is 28.62 kg/m  as calculated from the following:    Height as of this encounter: 1.689 m (5' 6.5\").    Weight as of this encounter: 81.6 kg (180 lb). Body surface area is 1.96 meters squared.  No Pain (0) Comment: Data Unavailable   No LMP recorded. Patient has had an implant.  Allergies reviewed: Yes  Medications reviewed: Yes    Medications: MEDICATION REFILLS NEEDED TODAY. Provider was notified.  Pharm[facility] entered into OMsignal[id number] [place] [address] [address]    Clinical concerns: 6 week return Malignant neoplasm of upper-outer quadrant of left breast in female, estrogen receptor positive, review labs & Treatment plan.       Angie Henriquez [facility]              "

## 2022-01-07 NOTE — LETTER
"    1/7/2022         RE: Veronica Nieves  [address]  [place] [facility] [address]        Dear Colleague,    Thank you for referring your patient, Veronica Nieves, to the [facility]. Please see a copy of my visit note below.    Oncology Rooming Note    January 7, 2022 8:24 AM   Veronica Nieves is a 31 year old female who presents for:    Chief Complaint   Patient presents with     Oncology Clinic Visit     6 week return Malignant neoplasm of upper-outer quadrant of left breast in female, estrogen receptor positive, review labs & Treatment plan     Initial Vitals: /74 (BP Location: Left arm, Patient Position: Sitting, Cuff Size: Adult Regular)   Pulse 69   Temp 99  F (37.2  C) (Oral)   Resp 16   Ht 1.689 m (5' 6.5\")   Wt 81.6 kg (180 lb)   SpO2 98%   BMI 28.62 kg/m   Estimated body mass index is 28.62 kg/m  as calculated from the following:    Height as of this encounter: 1.689 m (5' 6.5\").    Weight as of this encounter: 81.6 kg (180 lb). Body surface area is 1.96 meters squared.  No Pain (0) Comment: Data Unavailable   No LMP recorded. Patient has had an implant.  Allergies reviewed: Yes  Medications reviewed: Yes    Medications: MEDICATION REFILLS NEEDED TODAY. Provider was notified.  Pharmacy name entered into [id number] [place] [address]    Clinical concerns: 6 week return Malignant neoplasm of upper-outer quadrant of left breast in female, estrogen receptor positive, review labs & Treatment plan.       Angie Henriquez [facility] Hematology and Oncology Progress Note    Patient: Veronica Nieves  MRN: [id number]  Date of Service: Jan 7, 2022        Assessment and Plan:    Cancer Staging  Malignant neoplasm of upper-outer quadrant of left breast in female, estrogen receptor positive (H)  Staging form: Breast, AJCC 8th Edition  - Clinical stage from 5/19/2021: Stage IB (cT2, cN0, cM0, G2, ER+, HI+, HER2+) - Signed " by Jefferson Garcia MD on 5/19/2021     1.  Invasive ductal carcinoma of the left breast: She is now on Zoladex, tamoxifen, and Kadcyla.  She is tolerating this regimen quite well with minimal side effects.  We will continue moving forward at the same dose and schedule of these medications.  We will see her back in clinic in 6 weeks.  The tamoxifen was refilled today.    2.  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  Grade 1.  Monitor clinically.  She is on Cymbalta.    ECOG Performance  0    Diagnosis:    1.  Invasive ductal carcinoma of the left breast: Diagnosed May, 2021.  Initial imaging suggested 3 masses.  Biopsy of the largest mass showed an invasive ductal carcinoma, grade 2.  ER/NY positive, HER-2 positive.  Evaluation of the axilla was negative.    Treatment:    Neoadjuvant chemotherapy with TCHP initiated on May 27, 2021.  Cycle 6 was given on September 10.  Bilateral mastectomy performed on October 7.  Pathology showed a 9 mm focus of invasive ductal carcinoma of the left breast.  Lymph nodes are negative.    Kadcyla initiated November 29, 2021  Zoladex and tamoxifen initiated November 29, 2021    Interim History:    Abbey presents today for a follow-up visit.  Overall she is doing quite well.  She is not having any problems in starting her new treatment regimen.  She denies hot flashes or sweats.  Her energy is good.  No skin rash or change in her bowel habits.  Still has a mild chemotherapy-induced peripheral neuropathy in the fingertips.    Review of Systems:    As above in the history.     Review of Systems otherwise Negative for:  General: chills, fever or night sweats  Psychological: anxiety or depression  Ophthalmic: blurry vision, double vision or loss of vision, vision change  ENT: epistaxis, oral lesions, hearing changes  Hematological and Lymphatic: bleeding, bruising, jaundice, swollen lymph nodes  Endocrine: hot flashes, unexpected weight changes  Respiratory:  "cough, hemoptysis, orthopnea or shortness of breath/SHAHID  Cardiovascular: chest pain, edema, palpitations or PND  Gastrointestinal: abdominal pain, blood in stools, change in bowel habits, constipation, diarrhea or nausea/vomiting  Genito-Urinary: change in urinary stream, incontinence, frequency/urgency  Musculoskeletal: joint pain, stiffness, swelling, muscle pain  Neurological: dizziness, headaches  Dermatological: lumps and rash    Past History:    Past Medical History:   Diagnosis Date     Abnormal Pap smear of cervix     age 13, subsequent pap smears normal     Anxiety      Breast cancer (H) 05/04/2021    Left     Depression      Physical Exam:    /74 (BP Location: Left arm, Patient Position: Sitting, Cuff Size: Adult Regular)   Pulse 69   Temp 99  F (37.2  C) (Oral)   Resp 16   Ht 1.689 m (5' 6.5\")   Wt 81.6 kg (180 lb)   SpO2 98%   BMI 28.62 kg/m      General: patient appears stated age of 31 year old. Nontoxic and in no distress.   HEENT: Head: atraumatic, normocephalic. Sclerae anicteric.  Chest:  Normal respiratory effort  Cardiac:  No edema.   Abdomen: abdomen is non-distended  Extremities: normal tone and muscle bulk.  Skin: no lesions or rash on visible skin. Warm and dry.   CNS: alert and oriented. Grossly non-focal.   Psychiatric: normal mood and affect.     Lab Results:    Recent Results (from the past 24 hour(s))   CBC with platelets and differential    Collection Time: 01/07/22  8:08 AM   Result Value Ref Range    WBC Count 5.0 4.0 - 11.0 10e3/uL    RBC Count 4.21 3.80 - 5.20 10e6/uL    Hemoglobin 12.3 11.7 - 15.7 g/dL    Hematocrit 36.9 35.0 - 47.0 %    MCV 88 78 - 100 fL    MCH 29.2 26.5 - 33.0 pg    MCHC 33.3 31.5 - 36.5 g/dL    RDW 12.2 10.0 - 15.0 %    Platelet Count 273 150 - 450 10e3/uL    % Neutrophils 54 %    % Lymphocytes 26 %    % Monocytes 9 %    % Eosinophils 11 %    % Basophils 0 %    % Immature Granulocytes 0 %    NRBCs per 100 WBC 0 <1 /100    Absolute Neutrophils 2.7 " 1.6 - 8.3 10e3/uL    Absolute Lymphocytes 1.3 0.8 - 5.3 10e3/uL    Absolute Monocytes 0.5 0.0 - 1.3 10e3/uL    Absolute Eosinophils 0.5 0.0 - 0.7 10e3/uL    Absolute Basophils 0.0 0.0 - 0.2 10e3/uL    Absolute Immature Granulocytes 0.0 <=0.4 10e3/uL    Absolute NRBCs 0.0 10e3/uL     Imaging:      Signed by: Jefferson Garcia MD        Again, thank you for allowing me to participate in the care of your patient.        Sincerely,        Jefferson Garcia MD

## 2022-01-12 VITALS — WEIGHT: 186 LBS | HEIGHT: 66 IN | BODY MASS INDEX: 29.89 KG/M2

## 2022-01-18 VITALS
HEART RATE: 72 BPM | TEMPERATURE: 98.2 F | SYSTOLIC BLOOD PRESSURE: 140 MMHG | DIASTOLIC BLOOD PRESSURE: 82 MMHG | HEIGHT: 66 IN | OXYGEN SATURATION: 96 % | WEIGHT: 188.1 LBS | BODY MASS INDEX: 30.23 KG/M2

## 2022-01-18 VITALS
BODY MASS INDEX: 29.8 KG/M2 | SYSTOLIC BLOOD PRESSURE: 122 MMHG | WEIGHT: 186 LBS | OXYGEN SATURATION: 96 % | TEMPERATURE: 98.5 F | DIASTOLIC BLOOD PRESSURE: 71 MMHG | HEART RATE: 66 BPM

## 2022-01-18 VITALS
WEIGHT: 190 LBS | TEMPERATURE: 98.1 F | OXYGEN SATURATION: 97 % | DIASTOLIC BLOOD PRESSURE: 68 MMHG | BODY MASS INDEX: 30.44 KG/M2 | HEART RATE: 85 BPM | SYSTOLIC BLOOD PRESSURE: 132 MMHG

## 2022-01-18 VITALS
OXYGEN SATURATION: 98 % | HEIGHT: 67 IN | DIASTOLIC BLOOD PRESSURE: 62 MMHG | SYSTOLIC BLOOD PRESSURE: 96 MMHG | HEART RATE: 78 BPM | WEIGHT: 181 LBS | BODY MASS INDEX: 28.41 KG/M2

## 2022-01-18 VITALS
DIASTOLIC BLOOD PRESSURE: 82 MMHG | HEART RATE: 94 BPM | SYSTOLIC BLOOD PRESSURE: 125 MMHG | WEIGHT: 179.9 LBS | BODY MASS INDEX: 28.82 KG/M2 | OXYGEN SATURATION: 98 % | TEMPERATURE: 97.9 F

## 2022-01-18 VITALS
BODY MASS INDEX: 30.04 KG/M2 | OXYGEN SATURATION: 98 % | TEMPERATURE: 98.4 F | HEART RATE: 65 BPM | HEIGHT: 66 IN | SYSTOLIC BLOOD PRESSURE: 115 MMHG | DIASTOLIC BLOOD PRESSURE: 73 MMHG | WEIGHT: 186.9 LBS

## 2022-01-18 VITALS
DIASTOLIC BLOOD PRESSURE: 64 MMHG | WEIGHT: 168 LBS | BODY MASS INDEX: 27.12 KG/M2 | SYSTOLIC BLOOD PRESSURE: 120 MMHG | HEART RATE: 70 BPM

## 2022-01-18 ASSESSMENT — PATIENT HEALTH QUESTIONNAIRE - PHQ9
SUM OF ALL RESPONSES TO PHQ QUESTIONS 1-9: 2
SUM OF ALL RESPONSES TO PHQ QUESTIONS 1-9: 1
SUM OF ALL RESPONSES TO PHQ QUESTIONS 1-9: 1
SUM OF ALL RESPONSES TO PHQ QUESTIONS 1-9: 3
SUM OF ALL RESPONSES TO PHQ QUESTIONS 1-9: 5

## 2022-01-21 ENCOUNTER — TELEPHONE (OUTPATIENT)
Dept: ONCOLOGY | Facility: HOSPITAL | Age: 32
End: 2022-01-21
Payer: COMMERCIAL

## 2022-01-21 NOTE — TELEPHONE ENCOUNTER
Pt returned call.   Writer explained the caution noted by pharmacy:    There is a potential that duloxetine will decrease serum concentrations of tamoxifen active metabolites. Usually recommend alternative when able to to be conservative. Venlafaxine could be an alternate suggestion if her duloxetine is being used for neuropathy (mentioned in note).     Explained that Dr. Garcia will discuss this more thoroughly at her appointment on 2/18/22. And that he will forward the note to her PCP, so she may discuss with them as well. She states she has been on duloxetine for several years for depression and anxiety.      She verbalized understanding and appreciation for the call.

## 2022-01-21 NOTE — TELEPHONE ENCOUNTER
RN Cancer Care Coordinator called patient at request of Dr. Jefferson Garcia. Left message for her to please return our call, not urgent.   Will discuss with her about potential interaction between duloxetine and tamoxifen.     Will follow up next week if she doesn't call back today.

## 2022-01-24 ENCOUNTER — INFUSION THERAPY VISIT (OUTPATIENT)
Dept: INFUSION THERAPY | Facility: HOSPITAL | Age: 32
End: 2022-01-24
Attending: INTERNAL MEDICINE
Payer: COMMERCIAL

## 2022-01-24 VITALS
HEART RATE: 74 BPM | TEMPERATURE: 97.8 F | DIASTOLIC BLOOD PRESSURE: 68 MMHG | OXYGEN SATURATION: 97 % | SYSTOLIC BLOOD PRESSURE: 114 MMHG

## 2022-01-24 DIAGNOSIS — C50.412 MALIGNANT NEOPLASM OF UPPER-OUTER QUADRANT OF LEFT BREAST IN FEMALE, ESTROGEN RECEPTOR POSITIVE (H): Primary | ICD-10-CM

## 2022-01-24 DIAGNOSIS — Z17.0 MALIGNANT NEOPLASM OF UPPER-OUTER QUADRANT OF LEFT BREAST IN FEMALE, ESTROGEN RECEPTOR POSITIVE (H): Primary | ICD-10-CM

## 2022-01-24 PROCEDURE — 250N000011 HC RX IP 250 OP 636: Performed by: NURSE PRACTITIONER

## 2022-01-24 PROCEDURE — 96402 CHEMO HORMON ANTINEOPL SQ/IM: CPT

## 2022-01-24 RX ADMIN — GOSERELIN ACETATE 3.6 MG: 3.6 IMPLANT SUBCUTANEOUS at 09:13

## 2022-01-24 ASSESSMENT — PAIN SCALES - GENERAL: PAINLEVEL: NO PAIN (0)

## 2022-01-24 NOTE — PROGRESS NOTES
Pt arrived for Zoladex injection.  Abdominal area iced beforehand and then injection given into her left abdomen and bandaid applied. Pt left ambulatory.

## 2022-01-28 ENCOUNTER — LAB (OUTPATIENT)
Dept: INFUSION THERAPY | Facility: HOSPITAL | Age: 32
End: 2022-01-28
Attending: INTERNAL MEDICINE
Payer: COMMERCIAL

## 2022-01-28 VITALS
HEART RATE: 68 BPM | DIASTOLIC BLOOD PRESSURE: 63 MMHG | SYSTOLIC BLOOD PRESSURE: 119 MMHG | OXYGEN SATURATION: 96 % | TEMPERATURE: 98.8 F | RESPIRATION RATE: 16 BRPM

## 2022-01-28 DIAGNOSIS — C50.412 MALIGNANT NEOPLASM OF UPPER-OUTER QUADRANT OF LEFT BREAST IN FEMALE, ESTROGEN RECEPTOR POSITIVE (H): Primary | ICD-10-CM

## 2022-01-28 DIAGNOSIS — Z17.0 MALIGNANT NEOPLASM OF UPPER-OUTER QUADRANT OF LEFT BREAST IN FEMALE, ESTROGEN RECEPTOR POSITIVE (H): Primary | ICD-10-CM

## 2022-01-28 LAB
ALBUMIN SERPL-MCNC: 3.8 G/DL (ref 3.5–5)
ALP SERPL-CCNC: 70 U/L (ref 45–120)
ALT SERPL W P-5'-P-CCNC: 26 U/L (ref 0–45)
ANION GAP SERPL CALCULATED.3IONS-SCNC: 8 MMOL/L (ref 5–18)
AST SERPL W P-5'-P-CCNC: 28 U/L (ref 0–40)
BASOPHILS # BLD AUTO: 0 10E3/UL (ref 0–0.2)
BASOPHILS NFR BLD AUTO: 1 %
BILIRUB SERPL-MCNC: 0.3 MG/DL (ref 0–1)
BUN SERPL-MCNC: 12 MG/DL (ref 8–22)
CALCIUM SERPL-MCNC: 9.4 MG/DL (ref 8.5–10.5)
CHLORIDE BLD-SCNC: 106 MMOL/L (ref 98–107)
CO2 SERPL-SCNC: 27 MMOL/L (ref 22–31)
CREAT SERPL-MCNC: 0.95 MG/DL (ref 0.6–1.1)
EOSINOPHIL # BLD AUTO: 0.6 10E3/UL (ref 0–0.7)
EOSINOPHIL NFR BLD AUTO: 12 %
ERYTHROCYTE [DISTWIDTH] IN BLOOD BY AUTOMATED COUNT: 12.9 % (ref 10–15)
GFR SERPL CREATININE-BSD FRML MDRD: 81 ML/MIN/1.73M2
GLUCOSE BLD-MCNC: 93 MG/DL (ref 70–125)
HCT VFR BLD AUTO: 36.7 % (ref 35–47)
HGB BLD-MCNC: 12.2 G/DL (ref 11.7–15.7)
IMM GRANULOCYTES # BLD: 0 10E3/UL
IMM GRANULOCYTES NFR BLD: 0 %
LYMPHOCYTES # BLD AUTO: 1.6 10E3/UL (ref 0.8–5.3)
LYMPHOCYTES NFR BLD AUTO: 30 %
MCH RBC QN AUTO: 29 PG (ref 26.5–33)
MCHC RBC AUTO-ENTMCNC: 33.2 G/DL (ref 31.5–36.5)
MCV RBC AUTO: 87 FL (ref 78–100)
MONOCYTES # BLD AUTO: 0.6 10E3/UL (ref 0–1.3)
MONOCYTES NFR BLD AUTO: 12 %
NEUTROPHILS # BLD AUTO: 2.3 10E3/UL (ref 1.6–8.3)
NEUTROPHILS NFR BLD AUTO: 45 %
NRBC # BLD AUTO: 0 10E3/UL
NRBC BLD AUTO-RTO: 0 /100
PLATELET # BLD AUTO: 263 10E3/UL (ref 150–450)
POTASSIUM BLD-SCNC: 4.1 MMOL/L (ref 3.5–5)
PROT SERPL-MCNC: 7.1 G/DL (ref 6–8)
RBC # BLD AUTO: 4.21 10E6/UL (ref 3.8–5.2)
SODIUM SERPL-SCNC: 141 MMOL/L (ref 136–145)
WBC # BLD AUTO: 5.1 10E3/UL (ref 4–11)

## 2022-01-28 PROCEDURE — 96413 CHEMO IV INFUSION 1 HR: CPT

## 2022-01-28 PROCEDURE — 250N000011 HC RX IP 250 OP 636: Performed by: INTERNAL MEDICINE

## 2022-01-28 PROCEDURE — 85025 COMPLETE CBC W/AUTO DIFF WBC: CPT | Performed by: INTERNAL MEDICINE

## 2022-01-28 PROCEDURE — 258N000003 HC RX IP 258 OP 636: Performed by: INTERNAL MEDICINE

## 2022-01-28 PROCEDURE — 80053 COMPREHEN METABOLIC PANEL: CPT | Performed by: INTERNAL MEDICINE

## 2022-01-28 PROCEDURE — 82040 ASSAY OF SERUM ALBUMIN: CPT | Performed by: INTERNAL MEDICINE

## 2022-01-28 RX ORDER — EPINEPHRINE 1 MG/ML
0.3 INJECTION, SOLUTION INTRAMUSCULAR; SUBCUTANEOUS EVERY 5 MIN PRN
Status: DISCONTINUED | OUTPATIENT
Start: 2022-01-28 | End: 2022-01-28 | Stop reason: HOSPADM

## 2022-01-28 RX ORDER — NALOXONE HYDROCHLORIDE 0.4 MG/ML
0.2 INJECTION, SOLUTION INTRAMUSCULAR; INTRAVENOUS; SUBCUTANEOUS
Status: DISCONTINUED | OUTPATIENT
Start: 2022-01-28 | End: 2022-01-28 | Stop reason: HOSPADM

## 2022-01-28 RX ORDER — ALBUTEROL SULFATE 0.83 MG/ML
2.5 SOLUTION RESPIRATORY (INHALATION)
Status: DISCONTINUED | OUTPATIENT
Start: 2022-01-28 | End: 2022-01-28 | Stop reason: HOSPADM

## 2022-01-28 RX ORDER — DIPHENHYDRAMINE HYDROCHLORIDE 50 MG/ML
50 INJECTION INTRAMUSCULAR; INTRAVENOUS
Status: DISCONTINUED | OUTPATIENT
Start: 2022-01-28 | End: 2022-01-28 | Stop reason: HOSPADM

## 2022-01-28 RX ORDER — HEPARIN SODIUM (PORCINE) LOCK FLUSH IV SOLN 100 UNIT/ML 100 UNIT/ML
5 SOLUTION INTRAVENOUS
Status: DISCONTINUED | OUTPATIENT
Start: 2022-01-28 | End: 2022-01-28 | Stop reason: HOSPADM

## 2022-01-28 RX ORDER — METHYLPREDNISOLONE SODIUM SUCCINATE 125 MG/2ML
125 INJECTION, POWDER, LYOPHILIZED, FOR SOLUTION INTRAMUSCULAR; INTRAVENOUS
Status: DISCONTINUED | OUTPATIENT
Start: 2022-01-28 | End: 2022-01-28 | Stop reason: HOSPADM

## 2022-01-28 RX ORDER — MEPERIDINE HYDROCHLORIDE 25 MG/ML
25 INJECTION INTRAMUSCULAR; INTRAVENOUS; SUBCUTANEOUS EVERY 30 MIN PRN
Status: DISCONTINUED | OUTPATIENT
Start: 2022-01-28 | End: 2022-01-28 | Stop reason: HOSPADM

## 2022-01-28 RX ORDER — ALBUTEROL SULFATE 90 UG/1
1-2 AEROSOL, METERED RESPIRATORY (INHALATION)
Status: DISCONTINUED | OUTPATIENT
Start: 2022-01-28 | End: 2022-01-28 | Stop reason: HOSPADM

## 2022-01-28 RX ADMIN — SODIUM CHLORIDE 250 ML: 9 INJECTION, SOLUTION INTRAVENOUS at 08:53

## 2022-01-28 RX ADMIN — ADO-TRASTUZUMAB EMTANSINE 298 MG: 20 INJECTION, POWDER, LYOPHILIZED, FOR SOLUTION INTRAVENOUS at 09:26

## 2022-01-28 RX ADMIN — HEPARIN SODIUM (PORCINE) LOCK FLUSH IV SOLN 100 UNIT/ML 5 ML: 100 SOLUTION at 10:03

## 2022-01-28 NOTE — PROGRESS NOTES
Infusion Nursing Note:  Veronica Nieves presents today for cycle 4 day 1 treatment using Kadcyla.    Patient seen by provider today: No   present during visit today: Not Applicable.    Note: VSS.  Pt assessed.  She received treatment as ordered.      Intravenous Access:  Implanted Port.    Treatment Conditions:  Results reviewed, labs MET treatment parameters, ok to proceed with treatment.      Post Infusion Assessment:  Patient tolerated infusion without incident.  Blood return noted pre and post infusion.  Site patent and intact, free from redness, edema or discomfort.  No evidence of extravasations.  Access discontinued per protocol.       Discharge Plan:   Patient discharged in stable condition accompanied by: self.      Meryl Roth RN

## 2022-02-16 ENCOUNTER — INFUSION THERAPY VISIT (OUTPATIENT)
Dept: INFUSION THERAPY | Facility: HOSPITAL | Age: 32
End: 2022-02-16
Attending: INTERNAL MEDICINE
Payer: COMMERCIAL

## 2022-02-16 ENCOUNTER — ONCOLOGY VISIT (OUTPATIENT)
Dept: ONCOLOGY | Facility: HOSPITAL | Age: 32
End: 2022-02-16
Attending: INTERNAL MEDICINE
Payer: COMMERCIAL

## 2022-02-16 VITALS
DIASTOLIC BLOOD PRESSURE: 68 MMHG | SYSTOLIC BLOOD PRESSURE: 136 MMHG | RESPIRATION RATE: 16 BRPM | WEIGHT: 175 LBS | BODY MASS INDEX: 27.83 KG/M2 | TEMPERATURE: 98.6 F | OXYGEN SATURATION: 97 % | HEART RATE: 70 BPM

## 2022-02-16 DIAGNOSIS — C50.412 MALIGNANT NEOPLASM OF UPPER-OUTER QUADRANT OF LEFT BREAST IN FEMALE, ESTROGEN RECEPTOR POSITIVE (H): Primary | ICD-10-CM

## 2022-02-16 DIAGNOSIS — Z17.0 MALIGNANT NEOPLASM OF UPPER-OUTER QUADRANT OF LEFT BREAST IN FEMALE, ESTROGEN RECEPTOR POSITIVE (H): Primary | ICD-10-CM

## 2022-02-16 DIAGNOSIS — F32.A MILD DEPRESSION: ICD-10-CM

## 2022-02-16 LAB
ALBUMIN SERPL-MCNC: 4 G/DL (ref 3.5–5)
ALP SERPL-CCNC: 60 U/L (ref 45–120)
ALT SERPL W P-5'-P-CCNC: 35 U/L (ref 0–45)
ANION GAP SERPL CALCULATED.3IONS-SCNC: 8 MMOL/L (ref 5–18)
AST SERPL W P-5'-P-CCNC: 36 U/L (ref 0–40)
BASOPHILS # BLD AUTO: 0 10E3/UL (ref 0–0.2)
BASOPHILS NFR BLD AUTO: 0 %
BILIRUB SERPL-MCNC: 0.3 MG/DL (ref 0–1)
BUN SERPL-MCNC: 12 MG/DL (ref 8–22)
CALCIUM SERPL-MCNC: 9.2 MG/DL (ref 8.5–10.5)
CHLORIDE BLD-SCNC: 106 MMOL/L (ref 98–107)
CO2 SERPL-SCNC: 26 MMOL/L (ref 22–31)
CREAT SERPL-MCNC: 0.94 MG/DL (ref 0.6–1.1)
EOSINOPHIL # BLD AUTO: 0.4 10E3/UL (ref 0–0.7)
EOSINOPHIL NFR BLD AUTO: 7 %
ERYTHROCYTE [DISTWIDTH] IN BLOOD BY AUTOMATED COUNT: 13.2 % (ref 10–15)
GFR SERPL CREATININE-BSD FRML MDRD: 82 ML/MIN/1.73M2
GLUCOSE BLD-MCNC: 80 MG/DL (ref 70–125)
HCT VFR BLD AUTO: 36.6 % (ref 35–47)
HGB BLD-MCNC: 12 G/DL (ref 11.7–15.7)
IMM GRANULOCYTES # BLD: 0 10E3/UL
IMM GRANULOCYTES NFR BLD: 0 %
LYMPHOCYTES # BLD AUTO: 1.6 10E3/UL (ref 0.8–5.3)
LYMPHOCYTES NFR BLD AUTO: 32 %
MCH RBC QN AUTO: 28.8 PG (ref 26.5–33)
MCHC RBC AUTO-ENTMCNC: 32.8 G/DL (ref 31.5–36.5)
MCV RBC AUTO: 88 FL (ref 78–100)
MONOCYTES # BLD AUTO: 0.6 10E3/UL (ref 0–1.3)
MONOCYTES NFR BLD AUTO: 11 %
NEUTROPHILS # BLD AUTO: 2.5 10E3/UL (ref 1.6–8.3)
NEUTROPHILS NFR BLD AUTO: 50 %
NRBC # BLD AUTO: 0 10E3/UL
NRBC BLD AUTO-RTO: 0 /100
PLATELET # BLD AUTO: 255 10E3/UL (ref 150–450)
POTASSIUM BLD-SCNC: 3.9 MMOL/L (ref 3.5–5)
PROT SERPL-MCNC: 7.2 G/DL (ref 6–8)
RBC # BLD AUTO: 4.17 10E6/UL (ref 3.8–5.2)
SODIUM SERPL-SCNC: 140 MMOL/L (ref 136–145)
WBC # BLD AUTO: 5 10E3/UL (ref 4–11)

## 2022-02-16 PROCEDURE — 250N000011 HC RX IP 250 OP 636: Performed by: INTERNAL MEDICINE

## 2022-02-16 PROCEDURE — 99214 OFFICE O/P EST MOD 30 MIN: CPT | Performed by: INTERNAL MEDICINE

## 2022-02-16 PROCEDURE — 85025 COMPLETE CBC W/AUTO DIFF WBC: CPT | Performed by: INTERNAL MEDICINE

## 2022-02-16 PROCEDURE — 80053 COMPREHEN METABOLIC PANEL: CPT | Performed by: INTERNAL MEDICINE

## 2022-02-16 PROCEDURE — 258N000003 HC RX IP 258 OP 636: Performed by: INTERNAL MEDICINE

## 2022-02-16 PROCEDURE — G0463 HOSPITAL OUTPT CLINIC VISIT: HCPCS | Mod: 25

## 2022-02-16 PROCEDURE — 96413 CHEMO IV INFUSION 1 HR: CPT

## 2022-02-16 RX ORDER — ALBUTEROL SULFATE 90 UG/1
1-2 AEROSOL, METERED RESPIRATORY (INHALATION)
Status: CANCELLED
Start: 2022-02-16

## 2022-02-16 RX ORDER — EPINEPHRINE 1 MG/ML
0.3 INJECTION, SOLUTION INTRAMUSCULAR; SUBCUTANEOUS EVERY 5 MIN PRN
Status: CANCELLED | OUTPATIENT
Start: 2022-02-16

## 2022-02-16 RX ORDER — HEPARIN SODIUM (PORCINE) LOCK FLUSH IV SOLN 100 UNIT/ML 100 UNIT/ML
5 SOLUTION INTRAVENOUS
Status: CANCELLED | OUTPATIENT
Start: 2022-02-16

## 2022-02-16 RX ORDER — HEPARIN SODIUM (PORCINE) LOCK FLUSH IV SOLN 100 UNIT/ML 100 UNIT/ML
5 SOLUTION INTRAVENOUS
Status: DISCONTINUED | OUTPATIENT
Start: 2022-02-16 | End: 2022-02-16 | Stop reason: HOSPADM

## 2022-02-16 RX ORDER — MEPERIDINE HYDROCHLORIDE 25 MG/ML
25 INJECTION INTRAMUSCULAR; INTRAVENOUS; SUBCUTANEOUS EVERY 30 MIN PRN
Status: CANCELLED | OUTPATIENT
Start: 2022-02-16

## 2022-02-16 RX ORDER — HEPARIN SODIUM,PORCINE 10 UNIT/ML
5 VIAL (ML) INTRAVENOUS
Status: CANCELLED | OUTPATIENT
Start: 2022-02-16

## 2022-02-16 RX ORDER — LORAZEPAM 2 MG/ML
0.5 INJECTION INTRAMUSCULAR EVERY 4 HOURS PRN
Status: CANCELLED | OUTPATIENT
Start: 2022-02-16

## 2022-02-16 RX ORDER — NALOXONE HYDROCHLORIDE 0.4 MG/ML
0.2 INJECTION, SOLUTION INTRAMUSCULAR; INTRAVENOUS; SUBCUTANEOUS
Status: CANCELLED | OUTPATIENT
Start: 2022-02-16

## 2022-02-16 RX ORDER — DIPHENHYDRAMINE HYDROCHLORIDE 50 MG/ML
50 INJECTION INTRAMUSCULAR; INTRAVENOUS
Status: CANCELLED
Start: 2022-02-16

## 2022-02-16 RX ORDER — ALBUTEROL SULFATE 0.83 MG/ML
2.5 SOLUTION RESPIRATORY (INHALATION)
Status: CANCELLED | OUTPATIENT
Start: 2022-02-16

## 2022-02-16 RX ORDER — CITALOPRAM HYDROBROMIDE 20 MG/1
20 TABLET ORAL EVERY EVENING
Qty: 45 TABLET | Refills: 1 | Status: SHIPPED | OUTPATIENT
Start: 2022-02-16 | End: 2022-04-01

## 2022-02-16 RX ORDER — METHYLPREDNISOLONE SODIUM SUCCINATE 125 MG/2ML
125 INJECTION, POWDER, LYOPHILIZED, FOR SOLUTION INTRAMUSCULAR; INTRAVENOUS
Status: CANCELLED
Start: 2022-02-16

## 2022-02-16 RX ADMIN — ADO-TRASTUZUMAB EMTANSINE 298 MG: 20 INJECTION, POWDER, LYOPHILIZED, FOR SOLUTION INTRAVENOUS at 12:47

## 2022-02-16 RX ADMIN — HEPARIN SODIUM (PORCINE) LOCK FLUSH IV SOLN 100 UNIT/ML 5 ML: 100 SOLUTION at 13:20

## 2022-02-16 ASSESSMENT — PAIN SCALES - GENERAL: PAINLEVEL: NO PAIN (0)

## 2022-02-16 NOTE — PROGRESS NOTES
Pt arrived ambulatory to clinic for Cycle # 5 Day # 1 of her chemotherapy regimen.  Port was accessed using aseptic technique without difficulties with excellent blood return.  Administered Kadcyla per MD order.  Pt tolerated medication well, no s/s of infusion reaction.  Port was flushed with NS and Heparin then de-accessed using 2x2 and papertape.  Pt verbalized understanding of plan of care and return to clinic.

## 2022-02-16 NOTE — LETTER
"    2/16/2022         RE: Veronica Nieves  [address]  [facility] [address]        Dear Colleague,    Thank you for referring your patient, Veronica Nieves, to the [facility]. Please see a copy of my visit note below.    Oncology Rooming Note    February 16, 2022 9:42 AM   Veronica Nieves is a 31 year old female who presents for:    Chief Complaint   Patient presents with     Oncology Clinic Visit     Initial Vitals: /68   Pulse 70   Temp 98.6  F (37  C)   Resp 16   Wt 79.4 kg (175 lb)   SpO2 97%   BMI 27.83 kg/m   Estimated body mass index is 27.83 kg/m  as calculated from the following:    Height as of 1/7/22: 1.689 m (5' 6.5\").    Weight as of this encounter: 79.4 kg (175 lb). Body surface area is 1.93 meters squared.  No Pain (0) Comment: Data Unavailable   No LMP recorded. Patient has had an implant.  Allergies reviewed: Yes  Medications reviewed: Yes    Medications: Medication refills not needed today.  Pharmacy name entered into [employer] [id number] [place] [id number] [address]    Clinical concerns: Next Scan Due?      Ellen Ace RN              [facility] Hematology and Oncology Progress Note    Patient: Veronica Nieves  MRN: [id number]  Date of Service: Feb 16, 2022        Assessment and Plan:    Cancer Staging  Malignant neoplasm of upper-outer quadrant of left breast in female, estrogen receptor positive (H)  Staging form: Breast, AJCC 8th Edition  - Clinical stage from 5/19/2021: Stage IB (cT2, cN0, cM0, G2, ER+, AK+, HER2+) - Signed by Jefferson Garcia MD on 5/19/2021     1.  Invasive ductal carcinoma of the left breast: She is now on Zoladex, tamoxifen, and Kadcyla.  Continues to do well with this regimen.  We will not make any changes in dose adjustments.     2.  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  Grade 1.  Monitor clinically.  She is on Cymbalta.    3.  Anxiety and depression: She has been on " duloxetine which  can interfere with the tamoxifen.  I talked to Abbey about switching from tamoxifen to Arimidex.  We talked about some of the common side effects of Arimidex and compared and contrasted to those of tamoxifen.  She would prefer to stay on tamoxifen.  In terms of her anxiety we will change her over to citalopram 20 mg daily.  We can increase up to 40 mg daily as needed.  We will titrate off of the duloxetine.    ECOG Performance  0    Diagnosis:    1.  Invasive ductal carcinoma of the left breast: Diagnosed May, 2021.  Initial imaging suggested 3 masses.  Biopsy of the largest mass showed an invasive ductal carcinoma, grade 2.  ER/KS positive, HER-2 positive.  Evaluation of the axilla was negative.    Treatment:    Neoadjuvant chemotherapy with TCHP initiated on May 27, 2021.  Cycle 6 was given on September 10.    Bilateral mastectomy performed on October 7.  Pathology showed a 9 mm focus of invasive ductal carcinoma of the left breast.  Lymph nodes are negative.    Kadcyla initiated November 29, 2021  Zoladex and tamoxifen initiated November 29, 2021    Interim History:    Abbey presents today for a follow-up visit.  Overall she is doing well.  Occasional headaches.  Some dry mouth.  Occasional hot flashes and night sweats.  Bowels are okay.  No significant skin rash.    Review of Systems:    As above in the history.     Review of Systems otherwise Negative for:  General: chills, fever or night sweats  Psychological: anxiety or depression  Ophthalmic: blurry vision, double vision or loss of vision, vision change  ENT: epistaxis, oral lesions, hearing changes  Hematological and Lymphatic: bleeding, bruising, jaundice, swollen lymph nodes  Endocrine: hot flashes, unexpected weight changes  Respiratory: cough, hemoptysis, orthopnea or shortness of breath/SHAHID  Cardiovascular: chest pain, edema, palpitations or PND  Gastrointestinal: abdominal pain, blood in stools, change in bowel habits, constipation,  diarrhea or nausea/vomiting  Genito-Urinary: change in urinary stream, incontinence, frequency/urgency  Musculoskeletal: joint pain, stiffness, swelling, muscle pain  Neurological: dizziness, headaches  Dermatological: lumps and rash    Past History:    Past Medical History:   Diagnosis Date     Abnormal Pap smear of cervix     age 13, subsequent pap smears normal     Anxiety      Breast cancer (H) 05/04/2021    Left     Depression      Physical Exam:    /68   Pulse 70   Temp 98.6  F (37  C)   Resp 16   Wt 79.4 kg (175 lb)   SpO2 97%   BMI 27.83 kg/m      General: patient appears stated age of 31 year old. Nontoxic and in no distress.   HEENT: Head: atraumatic, normocephalic. Sclerae anicteric.  Chest:  Normal respiratory effort  Cardiac:  No edema.   Abdomen: abdomen is non-distended  Extremities: normal tone and muscle bulk.  Skin: no lesions or rash on visible skin. Warm and dry.   CNS: alert and oriented. Grossly non-focal.   Psychiatric: normal mood and affect.     Lab Results:    Recent Results (from the past 24 hour(s))   Comprehensive metabolic panel    Collection Time: 02/16/22  9:09 AM   Result Value Ref Range    Sodium 140 136 - 145 mmol/L    Potassium 3.9 3.5 - 5.0 mmol/L    Chloride 106 98 - 107 mmol/L    Carbon Dioxide (CO2) 26 22 - 31 mmol/L    Anion Gap 8 5 - 18 mmol/L    Urea Nitrogen 12 8 - 22 mg/dL    Creatinine 0.94 0.60 - 1.10 mg/dL    Calcium 9.2 8.5 - 10.5 mg/dL    Glucose 80 70 - 125 mg/dL    Alkaline Phosphatase 60 45 - 120 U/L    AST 36 0 - 40 U/L    ALT 35 0 - 45 U/L    Protein Total 7.2 6.0 - 8.0 g/dL    Albumin 4.0 3.5 - 5.0 g/dL    Bilirubin Total 0.3 0.0 - 1.0 mg/dL    GFR Estimate 82 >60 mL/min/1.73m2   CBC with platelets and differential    Collection Time: 02/16/22  9:09 AM   Result Value Ref Range    WBC Count 5.0 4.0 - 11.0 10e3/uL    RBC Count 4.17 3.80 - 5.20 10e6/uL    Hemoglobin 12.0 11.7 - 15.7 g/dL    Hematocrit 36.6 35.0 - 47.0 %    MCV 88 78 - 100 fL    MCH  28.8 26.5 - 33.0 pg    MCHC 32.8 31.5 - 36.5 g/dL    RDW 13.2 10.0 - 15.0 %    Platelet Count 255 150 - 450 10e3/uL    % Neutrophils 50 %    % Lymphocytes 32 %    % Monocytes 11 %    % Eosinophils 7 %    % Basophils 0 %    % Immature Granulocytes 0 %    NRBCs per 100 WBC 0 <1 /100    Absolute Neutrophils 2.5 1.6 - 8.3 10e3/uL    Absolute Lymphocytes 1.6 0.8 - 5.3 10e3/uL    Absolute Monocytes 0.6 0.0 - 1.3 10e3/uL    Absolute Eosinophils 0.4 0.0 - 0.7 10e3/uL    Absolute Basophils 0.0 0.0 - 0.2 10e3/uL    Absolute Immature Granulocytes 0.0 <=0.4 10e3/uL    Absolute NRBCs 0.0 10e3/uL       Signed by: Jefferson Garcia MD          Again, thank you for allowing me to participate in the care of your patient.        Sincerely,        Jefferson Garcia MD

## 2022-02-16 NOTE — PROGRESS NOTES
"Oncology Rooming Note    February 16, 2022 9:42 AM   Veronica Nieves is a 31 year old female who presents for:    Chief Complaint   Patient presents with     Oncology Clinic Visit     Initial Vitals: /68   Pulse 70   Temp 98.6  F (37  C)   Resp 16   Wt 79.4 kg (175 lb)   SpO2 97%   BMI 27.83 kg/m   Estimated body mass index is 27.83 kg/m  as calculated from the following:    Height as of 1/7/22: 1.689 m (5' 6.5\").    Weight as of this encounter: 79.4 kg (175 lb). Body surface area is 1.93 meters squared.  No Pain (0) Comment: Data Unavailable   No LMP recorded. Patient has had an implant.  Allergies reviewed: Yes  Medications reviewed: Yes    Medications: Medication refills not needed today.  Pharmacy name entered into [employer]: [facility] [id number] [place] [id number] [employer]Apperc[facility]    Clinical concerns: Next Scan Due?      Ellen Ace RN            "

## 2022-02-22 ENCOUNTER — INFUSION THERAPY VISIT (OUTPATIENT)
Dept: INFUSION THERAPY | Facility: HOSPITAL | Age: 32
End: 2022-02-22
Attending: NURSE PRACTITIONER
Payer: COMMERCIAL

## 2022-02-22 VITALS
RESPIRATION RATE: 16 BRPM | SYSTOLIC BLOOD PRESSURE: 138 MMHG | OXYGEN SATURATION: 98 % | DIASTOLIC BLOOD PRESSURE: 63 MMHG | HEART RATE: 75 BPM | TEMPERATURE: 98.4 F

## 2022-02-22 DIAGNOSIS — C50.412 MALIGNANT NEOPLASM OF UPPER-OUTER QUADRANT OF LEFT BREAST IN FEMALE, ESTROGEN RECEPTOR POSITIVE (H): Primary | ICD-10-CM

## 2022-02-22 DIAGNOSIS — Z17.0 MALIGNANT NEOPLASM OF UPPER-OUTER QUADRANT OF LEFT BREAST IN FEMALE, ESTROGEN RECEPTOR POSITIVE (H): Primary | ICD-10-CM

## 2022-02-22 PROCEDURE — 96402 CHEMO HORMON ANTINEOPL SQ/IM: CPT

## 2022-02-22 PROCEDURE — 250N000011 HC RX IP 250 OP 636: Performed by: NURSE PRACTITIONER

## 2022-02-22 RX ADMIN — GOSERELIN ACETATE 3.6 MG: 3.6 IMPLANT SUBCUTANEOUS at 09:04

## 2022-02-22 NOTE — PROGRESS NOTES
Pt here for zoladex injection which was given in R lower abdomen without incident. Pt d.c ambulatory to lobby alone and is aware of treatment plan.

## 2022-03-11 ENCOUNTER — INFUSION THERAPY VISIT (OUTPATIENT)
Dept: INFUSION THERAPY | Facility: HOSPITAL | Age: 32
End: 2022-03-11
Attending: INTERNAL MEDICINE
Payer: COMMERCIAL

## 2022-03-11 ENCOUNTER — TELEPHONE (OUTPATIENT)
Dept: ONCOLOGY | Facility: HOSPITAL | Age: 32
End: 2022-03-11

## 2022-03-11 VITALS
WEIGHT: 173 LBS | OXYGEN SATURATION: 98 % | RESPIRATION RATE: 16 BRPM | BODY MASS INDEX: 27.51 KG/M2 | SYSTOLIC BLOOD PRESSURE: 109 MMHG | TEMPERATURE: 98.5 F | HEART RATE: 71 BPM | DIASTOLIC BLOOD PRESSURE: 60 MMHG

## 2022-03-11 DIAGNOSIS — C50.412 MALIGNANT NEOPLASM OF UPPER-OUTER QUADRANT OF LEFT BREAST IN FEMALE, ESTROGEN RECEPTOR POSITIVE (H): Primary | ICD-10-CM

## 2022-03-11 DIAGNOSIS — Z17.0 MALIGNANT NEOPLASM OF UPPER-OUTER QUADRANT OF LEFT BREAST IN FEMALE, ESTROGEN RECEPTOR POSITIVE (H): Primary | ICD-10-CM

## 2022-03-11 DIAGNOSIS — I89.0 LYMPHEDEMA: ICD-10-CM

## 2022-03-11 LAB
ALBUMIN SERPL-MCNC: 3.8 G/DL (ref 3.5–5)
ALP SERPL-CCNC: 60 U/L (ref 45–120)
ALT SERPL W P-5'-P-CCNC: 21 U/L (ref 0–45)
ANION GAP SERPL CALCULATED.3IONS-SCNC: 8 MMOL/L (ref 5–18)
AST SERPL W P-5'-P-CCNC: 23 U/L (ref 0–40)
BASOPHILS # BLD AUTO: 0 10E3/UL (ref 0–0.2)
BASOPHILS NFR BLD AUTO: 0 %
BILIRUB SERPL-MCNC: 0.4 MG/DL (ref 0–1)
BUN SERPL-MCNC: 12 MG/DL (ref 8–22)
CALCIUM SERPL-MCNC: 9.4 MG/DL (ref 8.5–10.5)
CHLORIDE BLD-SCNC: 104 MMOL/L (ref 98–107)
CO2 SERPL-SCNC: 26 MMOL/L (ref 22–31)
CREAT SERPL-MCNC: 1.04 MG/DL (ref 0.6–1.1)
EOSINOPHIL # BLD AUTO: 0.3 10E3/UL (ref 0–0.7)
EOSINOPHIL NFR BLD AUTO: 3 %
ERYTHROCYTE [DISTWIDTH] IN BLOOD BY AUTOMATED COUNT: 12.9 % (ref 10–15)
GFR SERPL CREATININE-BSD FRML MDRD: 73 ML/MIN/1.73M2
GLUCOSE BLD-MCNC: 129 MG/DL (ref 70–125)
HCT VFR BLD AUTO: 36 % (ref 35–47)
HGB BLD-MCNC: 11.7 G/DL (ref 11.7–15.7)
IMM GRANULOCYTES # BLD: 0 10E3/UL
IMM GRANULOCYTES NFR BLD: 0 %
LYMPHOCYTES # BLD AUTO: 1.5 10E3/UL (ref 0.8–5.3)
LYMPHOCYTES NFR BLD AUTO: 18 %
MCH RBC QN AUTO: 28.9 PG (ref 26.5–33)
MCHC RBC AUTO-ENTMCNC: 32.5 G/DL (ref 31.5–36.5)
MCV RBC AUTO: 89 FL (ref 78–100)
MONOCYTES # BLD AUTO: 0.8 10E3/UL (ref 0–1.3)
MONOCYTES NFR BLD AUTO: 9 %
NEUTROPHILS # BLD AUTO: 5.8 10E3/UL (ref 1.6–8.3)
NEUTROPHILS NFR BLD AUTO: 70 %
NRBC # BLD AUTO: 0 10E3/UL
NRBC BLD AUTO-RTO: 0 /100
PLATELET # BLD AUTO: 268 10E3/UL (ref 150–450)
POTASSIUM BLD-SCNC: 3.9 MMOL/L (ref 3.5–5)
PROT SERPL-MCNC: 7.7 G/DL (ref 6–8)
RBC # BLD AUTO: 4.05 10E6/UL (ref 3.8–5.2)
SODIUM SERPL-SCNC: 138 MMOL/L (ref 136–145)
WBC # BLD AUTO: 8.4 10E3/UL (ref 4–11)

## 2022-03-11 PROCEDURE — 250N000011 HC RX IP 250 OP 636: Performed by: NURSE PRACTITIONER

## 2022-03-11 PROCEDURE — 258N000003 HC RX IP 258 OP 636: Performed by: NURSE PRACTITIONER

## 2022-03-11 PROCEDURE — 80053 COMPREHEN METABOLIC PANEL: CPT | Performed by: INTERNAL MEDICINE

## 2022-03-11 PROCEDURE — 85025 COMPLETE CBC W/AUTO DIFF WBC: CPT | Performed by: INTERNAL MEDICINE

## 2022-03-11 PROCEDURE — 96413 CHEMO IV INFUSION 1 HR: CPT

## 2022-03-11 RX ORDER — DIPHENHYDRAMINE HYDROCHLORIDE 50 MG/ML
50 INJECTION INTRAMUSCULAR; INTRAVENOUS
Status: CANCELLED
Start: 2022-03-11

## 2022-03-11 RX ORDER — HEPARIN SODIUM,PORCINE 10 UNIT/ML
5 VIAL (ML) INTRAVENOUS
Status: CANCELLED | OUTPATIENT
Start: 2022-03-30

## 2022-03-11 RX ORDER — EPINEPHRINE 1 MG/ML
0.3 INJECTION, SOLUTION INTRAMUSCULAR; SUBCUTANEOUS EVERY 5 MIN PRN
Status: CANCELLED | OUTPATIENT
Start: 2022-03-11

## 2022-03-11 RX ORDER — EPINEPHRINE 1 MG/ML
0.3 INJECTION, SOLUTION INTRAMUSCULAR; SUBCUTANEOUS EVERY 5 MIN PRN
Status: CANCELLED | OUTPATIENT
Start: 2022-03-30

## 2022-03-11 RX ORDER — HEPARIN SODIUM (PORCINE) LOCK FLUSH IV SOLN 100 UNIT/ML 100 UNIT/ML
5 SOLUTION INTRAVENOUS
Status: DISCONTINUED | OUTPATIENT
Start: 2022-03-11 | End: 2022-03-11 | Stop reason: HOSPADM

## 2022-03-11 RX ORDER — HEPARIN SODIUM,PORCINE 10 UNIT/ML
5 VIAL (ML) INTRAVENOUS
Status: CANCELLED | OUTPATIENT
Start: 2022-03-11

## 2022-03-11 RX ORDER — ALBUTEROL SULFATE 0.83 MG/ML
2.5 SOLUTION RESPIRATORY (INHALATION)
Status: CANCELLED | OUTPATIENT
Start: 2022-03-11

## 2022-03-11 RX ORDER — NALOXONE HYDROCHLORIDE 0.4 MG/ML
0.2 INJECTION, SOLUTION INTRAMUSCULAR; INTRAVENOUS; SUBCUTANEOUS
Status: CANCELLED | OUTPATIENT
Start: 2022-03-30

## 2022-03-11 RX ORDER — METHYLPREDNISOLONE SODIUM SUCCINATE 125 MG/2ML
125 INJECTION, POWDER, LYOPHILIZED, FOR SOLUTION INTRAMUSCULAR; INTRAVENOUS
Status: CANCELLED
Start: 2022-03-11

## 2022-03-11 RX ORDER — LORAZEPAM 2 MG/ML
0.5 INJECTION INTRAMUSCULAR EVERY 4 HOURS PRN
Status: CANCELLED | OUTPATIENT
Start: 2022-03-30

## 2022-03-11 RX ORDER — ALBUTEROL SULFATE 90 UG/1
1-2 AEROSOL, METERED RESPIRATORY (INHALATION)
Status: CANCELLED
Start: 2022-03-30

## 2022-03-11 RX ORDER — LORAZEPAM 2 MG/ML
0.5 INJECTION INTRAMUSCULAR EVERY 4 HOURS PRN
Status: CANCELLED | OUTPATIENT
Start: 2022-03-11

## 2022-03-11 RX ORDER — ALBUTEROL SULFATE 90 UG/1
1-2 AEROSOL, METERED RESPIRATORY (INHALATION)
Status: CANCELLED
Start: 2022-03-11

## 2022-03-11 RX ORDER — ALBUTEROL SULFATE 0.83 MG/ML
2.5 SOLUTION RESPIRATORY (INHALATION)
Status: CANCELLED | OUTPATIENT
Start: 2022-03-30

## 2022-03-11 RX ORDER — DIPHENHYDRAMINE HYDROCHLORIDE 50 MG/ML
50 INJECTION INTRAMUSCULAR; INTRAVENOUS
Status: CANCELLED
Start: 2022-03-30

## 2022-03-11 RX ORDER — NALOXONE HYDROCHLORIDE 0.4 MG/ML
0.2 INJECTION, SOLUTION INTRAMUSCULAR; INTRAVENOUS; SUBCUTANEOUS
Status: CANCELLED | OUTPATIENT
Start: 2022-03-11

## 2022-03-11 RX ORDER — MEPERIDINE HYDROCHLORIDE 25 MG/ML
25 INJECTION INTRAMUSCULAR; INTRAVENOUS; SUBCUTANEOUS EVERY 30 MIN PRN
Status: CANCELLED | OUTPATIENT
Start: 2022-03-11

## 2022-03-11 RX ORDER — METHYLPREDNISOLONE SODIUM SUCCINATE 125 MG/2ML
125 INJECTION, POWDER, LYOPHILIZED, FOR SOLUTION INTRAMUSCULAR; INTRAVENOUS
Status: CANCELLED
Start: 2022-03-30

## 2022-03-11 RX ORDER — HEPARIN SODIUM (PORCINE) LOCK FLUSH IV SOLN 100 UNIT/ML 100 UNIT/ML
5 SOLUTION INTRAVENOUS
Status: CANCELLED | OUTPATIENT
Start: 2022-03-30

## 2022-03-11 RX ORDER — MEPERIDINE HYDROCHLORIDE 25 MG/ML
25 INJECTION INTRAMUSCULAR; INTRAVENOUS; SUBCUTANEOUS EVERY 30 MIN PRN
Status: CANCELLED | OUTPATIENT
Start: 2022-03-30

## 2022-03-11 RX ADMIN — ADO-TRASTUZUMAB EMTANSINE 298 MG: 20 INJECTION, POWDER, LYOPHILIZED, FOR SOLUTION INTRAVENOUS at 10:28

## 2022-03-11 RX ADMIN — HEPARIN SODIUM (PORCINE) LOCK FLUSH IV SOLN 100 UNIT/ML 5 ML: 100 SOLUTION at 11:10

## 2022-03-11 RX ADMIN — SODIUM CHLORIDE 250 ML: 9 INJECTION, SOLUTION INTRAVENOUS at 10:28

## 2022-03-11 NOTE — PROGRESS NOTES
"Infusion Nursing Note:  Veronica Nieves presents today for D1,C6  Patient seen by provider today: No   present during visit today: Not Applicable.    Note: Pt reports \"cording\" under her left breast/arm pit and would like an order for PT. Message sent to Marleen requesting this.       Intravenous Access:  Implanted Port.    Treatment Conditions:  Results reviewed, labs MET treatment parameters, ok to proceed with treatment.       Post Infusion Assessment:  Patient tolerated infusion without incident.       Discharge Plan:   Patient discharged in stable condition accompanied by: self.  Departure Mode: Ambulatory.      Ellen Ace RN                      "

## 2022-03-11 NOTE — TELEPHONE ENCOUNTER
"Called pt and left a message informing her that PT referral has been placed and instructions to schedule.       ----- Message from ROSEMARY White CNP sent at 3/11/2022  1:39 PM CST -----  Order placed  k  ----- Message -----  From: Ellen Ace RN  Sent: 3/11/2022  12:54 PM CST  To: ROSEMARY White CNP, #    Hi MarleenJohnnie Potter reported \"cording\" under her left breast armpit and was asking for a PT referral. If you think this is something we need to look at prior to ordering she is seeing Dr. Garcia in April and is ok waiting until then. I did tell her I would ask though.   ThanksEllen        "

## 2022-03-15 DIAGNOSIS — C50.412 MALIGNANT NEOPLASM OF UPPER-OUTER QUADRANT OF LEFT BREAST IN FEMALE, ESTROGEN RECEPTOR POSITIVE (H): Primary | ICD-10-CM

## 2022-03-15 DIAGNOSIS — Z17.0 MALIGNANT NEOPLASM OF UPPER-OUTER QUADRANT OF LEFT BREAST IN FEMALE, ESTROGEN RECEPTOR POSITIVE (H): Primary | ICD-10-CM

## 2022-03-21 ENCOUNTER — INFUSION THERAPY VISIT (OUTPATIENT)
Dept: INFUSION THERAPY | Facility: HOSPITAL | Age: 32
End: 2022-03-21
Attending: INTERNAL MEDICINE
Payer: COMMERCIAL

## 2022-03-21 ENCOUNTER — TELEPHONE (OUTPATIENT)
Dept: ONCOLOGY | Facility: HOSPITAL | Age: 32
End: 2022-03-21

## 2022-03-21 VITALS
TEMPERATURE: 98.5 F | HEART RATE: 67 BPM | SYSTOLIC BLOOD PRESSURE: 141 MMHG | DIASTOLIC BLOOD PRESSURE: 74 MMHG | OXYGEN SATURATION: 100 % | RESPIRATION RATE: 16 BRPM

## 2022-03-21 DIAGNOSIS — Z17.0 MALIGNANT NEOPLASM OF UPPER-OUTER QUADRANT OF LEFT BREAST IN FEMALE, ESTROGEN RECEPTOR POSITIVE (H): Primary | ICD-10-CM

## 2022-03-21 DIAGNOSIS — C50.412 MALIGNANT NEOPLASM OF UPPER-OUTER QUADRANT OF LEFT BREAST IN FEMALE, ESTROGEN RECEPTOR POSITIVE (H): Primary | ICD-10-CM

## 2022-03-21 PROCEDURE — 250N000011 HC RX IP 250 OP 636: Performed by: NURSE PRACTITIONER

## 2022-03-21 PROCEDURE — 96402 CHEMO HORMON ANTINEOPL SQ/IM: CPT

## 2022-03-21 RX ADMIN — GOSERELIN ACETATE 3.6 MG: 3.6 IMPLANT SUBCUTANEOUS at 08:36

## 2022-03-21 NOTE — PROGRESS NOTES
Infusion Nursing Note:  Veronica Nieves presents today for Zoladex.    Patient seen by provider today: No   present during visit today: Not Applicable.    Note: [place] [facility] [place] [facility]    Intravenous Access:  No Intravenous access/labs at this visit.    Treatment Conditions:  Not Applicable.    Post Infusion Assessment:  Pt iced planned inj site. Patient tolerated injection without incident in left lower abdomen, and covered.    Discharge Plan:   Patient discharged in stable condition accompanied by: self.  Departure Mode: Ambulatory.      Vandana Dillon RN

## 2022-03-22 ENCOUNTER — HOSPITAL ENCOUNTER (OUTPATIENT)
Dept: CT IMAGING | Facility: HOSPITAL | Age: 32
Discharge: HOME | End: 2022-03-22
Attending: INTERNAL MEDICINE | Admitting: INTERNAL MEDICINE
Payer: COMMERCIAL

## 2022-03-22 DIAGNOSIS — C50.412 MALIGNANT NEOPLASM OF UPPER-OUTER QUADRANT OF LEFT BREAST IN FEMALE, ESTROGEN RECEPTOR POSITIVE (H): ICD-10-CM

## 2022-03-22 DIAGNOSIS — Z17.0 MALIGNANT NEOPLASM OF UPPER-OUTER QUADRANT OF LEFT BREAST IN FEMALE, ESTROGEN RECEPTOR POSITIVE (H): ICD-10-CM

## 2022-03-22 PROCEDURE — 71250 CT THORAX DX C-: CPT

## 2022-04-01 ENCOUNTER — ONCOLOGY VISIT (OUTPATIENT)
Dept: ONCOLOGY | Facility: HOSPITAL | Age: 32
End: 2022-04-01
Attending: INTERNAL MEDICINE
Payer: COMMERCIAL

## 2022-04-01 ENCOUNTER — INFUSION THERAPY VISIT (OUTPATIENT)
Dept: INFUSION THERAPY | Facility: HOSPITAL | Age: 32
End: 2022-04-01
Attending: INTERNAL MEDICINE
Payer: COMMERCIAL

## 2022-04-01 VITALS
SYSTOLIC BLOOD PRESSURE: 121 MMHG | DIASTOLIC BLOOD PRESSURE: 75 MMHG | BODY MASS INDEX: 27.95 KG/M2 | TEMPERATURE: 98.5 F | RESPIRATION RATE: 20 BRPM | WEIGHT: 173.9 LBS | HEIGHT: 66 IN | OXYGEN SATURATION: 97 % | HEART RATE: 74 BPM

## 2022-04-01 DIAGNOSIS — C50.412 MALIGNANT NEOPLASM OF UPPER-OUTER QUADRANT OF LEFT BREAST IN FEMALE, ESTROGEN RECEPTOR POSITIVE (H): Primary | ICD-10-CM

## 2022-04-01 DIAGNOSIS — Z17.0 MALIGNANT NEOPLASM OF UPPER-OUTER QUADRANT OF LEFT BREAST IN FEMALE, ESTROGEN RECEPTOR POSITIVE (H): Primary | ICD-10-CM

## 2022-04-01 DIAGNOSIS — R79.89 LFT ELEVATION: ICD-10-CM

## 2022-04-01 DIAGNOSIS — R91.8 LUNG INFILTRATE: ICD-10-CM

## 2022-04-01 DIAGNOSIS — F32.A MILD DEPRESSION: Primary | ICD-10-CM

## 2022-04-01 DIAGNOSIS — M94.0 COSTOCHONDRITIS: ICD-10-CM

## 2022-04-01 DIAGNOSIS — F32.A MILD DEPRESSION: ICD-10-CM

## 2022-04-01 LAB
ALBUMIN SERPL-MCNC: 3.8 G/DL (ref 3.5–5)
ALP SERPL-CCNC: 52 U/L (ref 45–120)
ALT SERPL W P-5'-P-CCNC: 87 U/L (ref 0–45)
ANION GAP SERPL CALCULATED.3IONS-SCNC: 9 MMOL/L (ref 5–18)
AST SERPL W P-5'-P-CCNC: 76 U/L (ref 0–40)
BASOPHILS # BLD AUTO: 0 10E3/UL (ref 0–0.2)
BASOPHILS NFR BLD AUTO: 0 %
BILIRUB SERPL-MCNC: 0.3 MG/DL (ref 0–1)
BUN SERPL-MCNC: 13 MG/DL (ref 8–22)
CALCIUM SERPL-MCNC: 8.7 MG/DL (ref 8.5–10.5)
CHLORIDE BLD-SCNC: 104 MMOL/L (ref 98–107)
CO2 SERPL-SCNC: 24 MMOL/L (ref 22–31)
CREAT SERPL-MCNC: 0.98 MG/DL (ref 0.6–1.1)
EOSINOPHIL # BLD AUTO: 0.1 10E3/UL (ref 0–0.7)
EOSINOPHIL NFR BLD AUTO: 2 %
ERYTHROCYTE [DISTWIDTH] IN BLOOD BY AUTOMATED COUNT: 12.7 % (ref 10–15)
GFR SERPL CREATININE-BSD FRML MDRD: 78 ML/MIN/1.73M2
GLUCOSE BLD-MCNC: 93 MG/DL (ref 70–125)
HCT VFR BLD AUTO: 36.4 % (ref 35–47)
HGB BLD-MCNC: 11.8 G/DL (ref 11.7–15.7)
IMM GRANULOCYTES # BLD: 0 10E3/UL
IMM GRANULOCYTES NFR BLD: 0 %
LYMPHOCYTES # BLD AUTO: 1.2 10E3/UL (ref 0.8–5.3)
LYMPHOCYTES NFR BLD AUTO: 28 %
MCH RBC QN AUTO: 28.5 PG (ref 26.5–33)
MCHC RBC AUTO-ENTMCNC: 32.4 G/DL (ref 31.5–36.5)
MCV RBC AUTO: 88 FL (ref 78–100)
MONOCYTES # BLD AUTO: 0.6 10E3/UL (ref 0–1.3)
MONOCYTES NFR BLD AUTO: 12 %
NEUTROPHILS # BLD AUTO: 2.6 10E3/UL (ref 1.6–8.3)
NEUTROPHILS NFR BLD AUTO: 58 %
NRBC # BLD AUTO: 0 10E3/UL
NRBC BLD AUTO-RTO: 0 /100
PLATELET # BLD AUTO: 242 10E3/UL (ref 150–450)
POTASSIUM BLD-SCNC: 3.7 MMOL/L (ref 3.5–5)
PROT SERPL-MCNC: 7.2 G/DL (ref 6–8)
RBC # BLD AUTO: 4.14 10E6/UL (ref 3.8–5.2)
SODIUM SERPL-SCNC: 137 MMOL/L (ref 136–145)
WBC # BLD AUTO: 4.5 10E3/UL (ref 4–11)

## 2022-04-01 PROCEDURE — 258N000003 HC RX IP 258 OP 636: Performed by: NURSE PRACTITIONER

## 2022-04-01 PROCEDURE — G0463 HOSPITAL OUTPT CLINIC VISIT: HCPCS | Mod: 25

## 2022-04-01 PROCEDURE — 80053 COMPREHEN METABOLIC PANEL: CPT | Performed by: NURSE PRACTITIONER

## 2022-04-01 PROCEDURE — 96413 CHEMO IV INFUSION 1 HR: CPT

## 2022-04-01 PROCEDURE — 250N000011 HC RX IP 250 OP 636: Performed by: NURSE PRACTITIONER

## 2022-04-01 PROCEDURE — 99215 OFFICE O/P EST HI 40 MIN: CPT | Performed by: NURSE PRACTITIONER

## 2022-04-01 PROCEDURE — 85025 COMPLETE CBC W/AUTO DIFF WBC: CPT | Performed by: NURSE PRACTITIONER

## 2022-04-01 RX ORDER — NALOXONE HYDROCHLORIDE 0.4 MG/ML
0.2 INJECTION, SOLUTION INTRAMUSCULAR; INTRAVENOUS; SUBCUTANEOUS
Status: DISCONTINUED | OUTPATIENT
Start: 2022-04-01 | End: 2022-04-01 | Stop reason: HOSPADM

## 2022-04-01 RX ORDER — LORAZEPAM 2 MG/ML
0.5 INJECTION INTRAMUSCULAR EVERY 4 HOURS PRN
Status: CANCELLED | OUTPATIENT
Start: 2022-04-20

## 2022-04-01 RX ORDER — HEPARIN SODIUM (PORCINE) LOCK FLUSH IV SOLN 100 UNIT/ML 100 UNIT/ML
5 SOLUTION INTRAVENOUS
Status: CANCELLED | OUTPATIENT
Start: 2022-04-20

## 2022-04-01 RX ORDER — HEPARIN SODIUM,PORCINE 10 UNIT/ML
5 VIAL (ML) INTRAVENOUS
Status: CANCELLED | OUTPATIENT
Start: 2022-04-20

## 2022-04-01 RX ORDER — CITALOPRAM HYDROBROMIDE 40 MG/1
40 TABLET ORAL DAILY
Qty: 30 TABLET | Refills: 3 | Status: SHIPPED | OUTPATIENT
Start: 2022-04-01 | End: 2022-05-13

## 2022-04-01 RX ORDER — MEPERIDINE HYDROCHLORIDE 25 MG/ML
25 INJECTION INTRAMUSCULAR; INTRAVENOUS; SUBCUTANEOUS EVERY 30 MIN PRN
Status: DISCONTINUED | OUTPATIENT
Start: 2022-04-01 | End: 2022-04-01 | Stop reason: HOSPADM

## 2022-04-01 RX ORDER — METHYLPREDNISOLONE SODIUM SUCCINATE 125 MG/2ML
125 INJECTION, POWDER, LYOPHILIZED, FOR SOLUTION INTRAMUSCULAR; INTRAVENOUS
Status: CANCELLED
Start: 2022-04-20

## 2022-04-01 RX ORDER — CITALOPRAM HYDROBROMIDE 20 MG/1
40 TABLET ORAL EVERY EVENING
Qty: 30 TABLET | Refills: 3 | Status: SHIPPED | OUTPATIENT
Start: 2022-04-01 | End: 2022-04-04

## 2022-04-01 RX ORDER — ALBUTEROL SULFATE 0.83 MG/ML
2.5 SOLUTION RESPIRATORY (INHALATION)
Status: CANCELLED | OUTPATIENT
Start: 2022-04-20

## 2022-04-01 RX ORDER — ALBUTEROL SULFATE 90 UG/1
1-2 AEROSOL, METERED RESPIRATORY (INHALATION)
Status: CANCELLED
Start: 2022-04-20

## 2022-04-01 RX ORDER — EPINEPHRINE 1 MG/ML
0.3 INJECTION, SOLUTION INTRAMUSCULAR; SUBCUTANEOUS EVERY 5 MIN PRN
Status: CANCELLED | OUTPATIENT
Start: 2022-04-20

## 2022-04-01 RX ORDER — HEPARIN SODIUM,PORCINE 10 UNIT/ML
5 VIAL (ML) INTRAVENOUS
Status: CANCELLED | OUTPATIENT
Start: 2022-04-01

## 2022-04-01 RX ORDER — DIPHENHYDRAMINE HYDROCHLORIDE 50 MG/ML
50 INJECTION INTRAMUSCULAR; INTRAVENOUS
Status: CANCELLED
Start: 2022-04-20

## 2022-04-01 RX ORDER — HEPARIN SODIUM (PORCINE) LOCK FLUSH IV SOLN 100 UNIT/ML 100 UNIT/ML
5 SOLUTION INTRAVENOUS
Status: DISCONTINUED | OUTPATIENT
Start: 2022-04-01 | End: 2022-04-01 | Stop reason: HOSPADM

## 2022-04-01 RX ORDER — EPINEPHRINE 1 MG/ML
0.3 INJECTION, SOLUTION INTRAMUSCULAR; SUBCUTANEOUS EVERY 5 MIN PRN
Status: DISCONTINUED | OUTPATIENT
Start: 2022-04-01 | End: 2022-04-01 | Stop reason: HOSPADM

## 2022-04-01 RX ORDER — ALBUTEROL SULFATE 90 UG/1
1-2 AEROSOL, METERED RESPIRATORY (INHALATION)
Status: DISCONTINUED | OUTPATIENT
Start: 2022-04-01 | End: 2022-04-01 | Stop reason: HOSPADM

## 2022-04-01 RX ORDER — ALBUTEROL SULFATE 0.83 MG/ML
2.5 SOLUTION RESPIRATORY (INHALATION)
Status: DISCONTINUED | OUTPATIENT
Start: 2022-04-01 | End: 2022-04-01 | Stop reason: HOSPADM

## 2022-04-01 RX ORDER — METHYLPREDNISOLONE SODIUM SUCCINATE 125 MG/2ML
125 INJECTION, POWDER, LYOPHILIZED, FOR SOLUTION INTRAMUSCULAR; INTRAVENOUS
Status: DISCONTINUED | OUTPATIENT
Start: 2022-04-01 | End: 2022-04-01 | Stop reason: HOSPADM

## 2022-04-01 RX ORDER — MEPERIDINE HYDROCHLORIDE 25 MG/ML
25 INJECTION INTRAMUSCULAR; INTRAVENOUS; SUBCUTANEOUS EVERY 30 MIN PRN
Status: CANCELLED | OUTPATIENT
Start: 2022-04-20

## 2022-04-01 RX ORDER — DIPHENHYDRAMINE HYDROCHLORIDE 50 MG/ML
50 INJECTION INTRAMUSCULAR; INTRAVENOUS
Status: DISCONTINUED | OUTPATIENT
Start: 2022-04-01 | End: 2022-04-01 | Stop reason: HOSPADM

## 2022-04-01 RX ORDER — NALOXONE HYDROCHLORIDE 0.4 MG/ML
0.2 INJECTION, SOLUTION INTRAMUSCULAR; INTRAVENOUS; SUBCUTANEOUS
Status: CANCELLED | OUTPATIENT
Start: 2022-04-20

## 2022-04-01 RX ORDER — HEPARIN SODIUM (PORCINE) LOCK FLUSH IV SOLN 100 UNIT/ML 100 UNIT/ML
5 SOLUTION INTRAVENOUS
Status: CANCELLED | OUTPATIENT
Start: 2022-04-01

## 2022-04-01 RX ADMIN — ADO-TRASTUZUMAB EMTANSINE 298 MG: 20 INJECTION, POWDER, LYOPHILIZED, FOR SOLUTION INTRAVENOUS at 12:31

## 2022-04-01 RX ADMIN — SODIUM CHLORIDE 1000 ML: 9 INJECTION, SOLUTION INTRAVENOUS at 12:11

## 2022-04-01 RX ADMIN — HEPARIN SODIUM (PORCINE) LOCK FLUSH IV SOLN 100 UNIT/ML 5 ML: 100 SOLUTION at 13:45

## 2022-04-01 ASSESSMENT — PAIN SCALES - GENERAL: PAINLEVEL: NO PAIN (0)

## 2022-04-01 NOTE — PROGRESS NOTES
"Oncology Rooming Note    April 1, 2022 11:07 AM   Veronica Nieves is a 31 year old female who presents for:    Chief Complaint   Patient presents with     Oncology Clinic Visit     6 week return Malignant neoplasm of upper-outer quadrant of left breast in female, estrogen receptor positive (     Initial Vitals: /75 (BP Location: Right arm, Patient Position: Sitting, Cuff Size: Adult Regular)   Pulse 74   Temp 98.5  F (36.9  C) (Oral)   Resp 20   Ht 1.676 m (5' 6\")   Wt 78.9 kg (173 lb 14.4 oz)   SpO2 97%   BMI 28.07 kg/m   Estimated body mass index is 28.07 kg/m  as calculated from the following:    Height as of this encounter: 1.676 m (5' 6\").    Weight as of this encounter: 78.9 kg (173 lb 14.4 oz). Body surface area is 1.92 meters squared.  No Pain (0) Comment: Data Unavailable   No LMP recorded. Patient has had an implant.  Allergies reviewed: Yes  Medications reviewed: Yes    Medications: Medication refills not needed today.  Pharm[facility] [employer] [id number] [place] [id number] [facility]    Clinical concerns: 6 week return Malignant neoplasm of upper-outer quadrant of left breast in female, estrogen receptor positive.       Angie Henriquez CMA              "

## 2022-04-01 NOTE — PROGRESS NOTES
[facility] Hematology and Oncology Progress Note ([place])    Patient: Veronica Nieves  MRN: [id number]  Date of Service: Apr 1, 2022        Assessment and Plan:    Cancer Staging  Malignant neoplasm of upper-outer quadrant of left breast in female, estrogen receptor positive (H)  Staging form: Breast, AJCC 8th Edition  - Clinical stage from 5/19/2021: Stage IB (cT2, cN0, cM0, G2, ER+, GA+, HER2+) - Signed by Jefferson Garcia MD on 5/19/2021     1.  Invasive ductal carcinoma of the left breast:   Currently on Zoladex (monthly), tamoxifen, and Kadcyla every 3 weeks.  Continues to do well with this regimen.    Continue with cycle 7 without dose modification. Grade 1 LFT elevation, not requiring dose-reduction. Will follow.  Kadcyla every 3 weeks with return to see provider in 6 weeks.    2.  Chemotherapy-induced peripheral neuropathy:   Involves the fingertips.  Grade 1.  Getting better with time. She came off duloxetine due to drug:drug interaction with tamoxifen and has not worsened.    3.  Anxiety and depression:   Ally was on duloxetine but due to drug:drug interaction with tamoxifen, she titrated off this and started citalopram 20 mg about 5-6 weeks ago.Tolerating this well. Still feeling a bit emotional, will titrate up to 40 mg.    4.  Left axillary discomfort/tightness:  CT chest negative for mets/recurrence. Referral to PT in process.    5.  Right lung GGOs  6.  Anterior chest wall discomfort (lateral to sternum), likely costochondritis/muscular  Had viral respiratory infection with significant dry cough 4 weeks ago, at onset of the pain. Her infectious symtpoms resolved without intervention. She still has persistent but stable reproducible anterior rib/chest wall pain just left of sternum. CT shows no bone mets; does show right lung infiltrates likely reflective of her recent respiratory vitus.     She can take ibuprofen on occasion for the discomfort.   Will repeat chest CT in 4 months to ensure  improvement/resolution of the GGOs.    7.   Gastroenteritis, resolved  Had 24-48 hr nausea/vomiting and diarrhea earlier in the week. Resolved. Looks well today. Will give 1 L IVF with treatment today.    8.   LFT elevation, gr 1  Suspect reactive with recent GI illness +/- side effect of Kadcyla. Takes tylenol sparingly. No recent alcohol use.   No need for dose modification, will follow trend.     9.  Birth control  She has had Mirena IUD for 5 years and will need exchanged soon. I suggested talking to Gyne about alternate hormone-free options to try to minimize her risks given her hormone+ breast cancer.    ECOG Performance  0    Diagnosis:    1.  Invasive ductal carcinoma of the left breast: Diagnosed May, 2021.  Initial imaging suggested 3 masses.  Biopsy of the largest mass showed an invasive ductal carcinoma, grade 2.  ER/NJ positive, HER-2 positive.  Evaluation of the axilla was negative.    Treatment:    Neoadjuvant chemotherapy with TCHP initiated on May 27, 2021.  Cycle 6 was given on September 10.    Bilateral mastectomy performed on October 7.  Pathology showed a 9 mm focus of invasive ductal carcinoma of the left breast.  Lymph nodes are negative.    Kadcyla initiated November 29, 2021  Zoladex and tamoxifen initiated November 29, 2021    Interim History:    Abbey presents today for a 6-week follow-up visit on Kadcyla, Zoladex and tamoxifen. Tolerating this well.   She also had an interval chest CT done for anterior/sternum chest discomfort x 4 weeks that started around time of a viral respiratory illness she had at the time in which she was coughing a lot. Covid neg. Recovered from her cold, with occasional lingering dry cough. The chest wall pain is mild, only hurts with palpation and stable.     She had 24-48 hr of vomiting and diarrhea earlier this week, now resolved. Eating and drinking well. She wonders if it was food poisoning.    For her anxiety, she was transitioned from duloxetine to Celexa 20  "mg 6 weeks ago for drug interaction risk. She is doing fairly well with this, but still feels a bit more emotional than she was on duloxetine.    Her finger peripheral neuropathy is better.     Past History:    Past Medical History:   Diagnosis Date     Abnormal Pap smear of cervix     age 13, subsequent pap smears normal     Anxiety      Breast cancer (H) 05/04/2021    Left     Depression      Physical Exam:    /75 (BP Location: Right arm, Patient Position: Sitting, Cuff Size: Adult Regular)   Pulse 74   Temp 98.5  F (36.9  C) (Oral)   Resp 20   Ht 1.676 m (5' 6\")   Wt 78.9 kg (173 lb 14.4 oz)   SpO2 97%   BMI 28.07 kg/m      General: patient appears stated age of 31 year old. Nontoxic and in no distress.   HEENT: Head: atraumatic, normocephalic. Sclerae anicteric.  Lymph: No palpable cervical nor axillary adenopathy.   Breasts: Prior mastectomies with implants. No palpable chest wall abnormality. Tightness through left axilla.  Chest:  Normal respiratory effort. Clear lungs bilaterally. Reproducible pain anterior chest, just left of sternum.   Cardiac:  No edema.   Abdomen: abdomen is non-distended  Extremities: normal tone and muscle bulk.  Skin: no lesions or rash on visible skin. Warm and dry.   CNS: alert and oriented. Grossly non-focal.   Psychiatric: normal mood and affect.     Lab Results:    Recent Results (from the past 24 hour(s))   Comprehensive metabolic panel    Collection Time: 04/01/22 10:29 AM   Result Value Ref Range    Sodium 137 136 - 145 mmol/L    Potassium 3.7 3.5 - 5.0 mmol/L    Chloride 104 98 - 107 mmol/L    Carbon Dioxide (CO2) 24 22 - 31 mmol/L    Anion Gap 9 5 - 18 mmol/L    Urea Nitrogen 13 8 - 22 mg/dL    Creatinine 0.98 0.60 - 1.10 mg/dL    Calcium 8.7 8.5 - 10.5 mg/dL    Glucose 93 70 - 125 mg/dL    Alkaline Phosphatase 52 45 - 120 U/L    AST 76 (H) 0 - 40 U/L    ALT 87 (H) 0 - 45 U/L    Protein Total 7.2 6.0 - 8.0 g/dL    Albumin 3.8 3.5 - 5.0 g/dL    Bilirubin Total " 0.3 0.0 - 1.0 mg/dL    GFR Estimate 78 >60 mL/min/1.73m2   CBC with platelets and differential    Collection Time: 04/01/22 10:29 AM   Result Value Ref Range    WBC Count 4.5 4.0 - 11.0 10e3/uL    RBC Count 4.14 3.80 - 5.20 10e6/uL    Hemoglobin 11.8 11.7 - 15.7 g/dL    Hematocrit 36.4 35.0 - 47.0 %    MCV 88 78 - 100 fL    MCH 28.5 26.5 - 33.0 pg    MCHC 32.4 31.5 - 36.5 g/dL    RDW 12.7 10.0 - 15.0 %    Platelet Count 242 150 - 450 10e3/uL    % Neutrophils 58 %    % Lymphocytes 28 %    % Monocytes 12 %    % Eosinophils 2 %    % Basophils 0 %    % Immature Granulocytes 0 %    NRBCs per 100 WBC 0 <1 /100    Absolute Neutrophils 2.6 1.6 - 8.3 10e3/uL    Absolute Lymphocytes 1.2 0.8 - 5.3 10e3/uL    Absolute Monocytes 0.6 0.0 - 1.3 10e3/uL    Absolute Eosinophils 0.1 0.0 - 0.7 10e3/uL    Absolute Basophils 0.0 0.0 - 0.2 10e3/uL    Absolute Immature Granulocytes 0.0 <=0.4 10e3/uL    Absolute NRBCs 0.0 10e3/uL     Total time 40 minutes, to include face to face visit, review of EMR, ordering, documentation and coordination of care      Signed by: Marge Coon NP

## 2022-04-01 NOTE — LETTER
4/1/2022         RE: Veronica Nieves  [address]  [place] [address]        Dear Colleague,    Thank you for referring your patient, Veronica Nieves, to the [facility]. Please see a copy of my visit note below.    [facility] Hematology and Oncology Progress Note ([place])    Patient: Veronica Nieves  MRN: [id number]  Date of Service: Apr 1, 2022        Assessment and Plan:    Cancer Staging  Malignant neoplasm of upper-outer quadrant of left breast in female, estrogen receptor positive (H)  Staging form: Breast, AJCC 8th Edition  - Clinical stage from 5/19/2021: Stage IB (cT2, cN0, cM0, G2, ER+, OK+, HER2+) - Signed by Jefferson Garcia MD on 5/19/2021     1.  Invasive ductal carcinoma of the left breast:   Currently on Zoladex (monthly), tamoxifen, and Kadcyla every 3 weeks.  Continues to do well with this regimen.    Continue with cycle 7 without dose modification. Grade 1 LFT elevation, not requiring dose-reduction. Will follow.  Kadcyla every 3 weeks with return to see provider in 6 weeks.    2.  Chemotherapy-induced peripheral neuropathy:   Involves the fingertips.  Grade 1.  Getting better with time. She came off duloxetine due to drug:drug interaction with tamoxifen and has not worsened.    3.  Anxiety and depression:   Ally was on duloxetine but due to drug:drug interaction with tamoxifen, she titrated off this and started citalopram 20 mg about 5-6 weeks ago.Tolerating this well. Still feeling a bit emotional, will titrate up to 40 mg.    4.  Left axillary discomfort/tightness:  CT chest negative for mets/recurrence. Referral to PT in process.    5.  Right lung GGOs  6.  Anterior chest wall discomfort (lateral to sternum), likely costochondritis/muscular  Had viral respiratory infection with significant dry cough 4 weeks ago, at onset of the pain. Her infectious symtpoms resolved without intervention. She still has persistent but stable reproducible anterior  rib/chest wall pain just left of sternum. CT shows no bone mets; does show right lung infiltrates likely reflective of her recent respiratory vitus.     She can take ibuprofen on occasion for the discomfort.   Will repeat chest CT in 4 months to ensure improvement/resolution of the GGOs.    7.   Gastroenteritis, resolved  Had 24-48 hr nausea/vomiting and diarrhea earlier in the week. Resolved. Looks well today. Will give 1 L IVF with treatment today.    8.   LFT elevation, gr 1  Suspect reactive with recent GI illness +/- side effect of Kadcyla. Takes tylenol sparingly. No recent alcohol use.   No need for dose modification, will follow trend.     9.  Birth control  She has had Mirena IUD for 5 years and will need exchanged soon. I suggested talking to Gyne about alternate hormone-free options to try to minimize her risks given her hormone+ breast cancer.    ECOG Performance  0    Diagnosis:    1.  Invasive ductal carcinoma of the left breast: Diagnosed May, 2021.  Initial imaging suggested 3 masses.  Biopsy of the largest mass showed an invasive ductal carcinoma, grade 2.  ER/TN positive, HER-2 positive.  Evaluation of the axilla was negative.    Treatment:    Neoadjuvant chemotherapy with TCHP initiated on May 27, 2021.  Cycle 6 was given on September 10.    Bilateral mastectomy performed on October 7.  Pathology showed a 9 mm focus of invasive ductal carcinoma of the left breast.  Lymph nodes are negative.    Kadcyla initiated November 29, 2021  Zoladex and tamoxifen initiated November 29, 2021    Interim History:    Abbey presents today for a 6-week follow-up visit on Kadcyla, Zoladex and tamoxifen. Tolerating this well.   She also had an interval chest CT done for anterior/sternum chest discomfort x 4 weeks that started around time of a viral respiratory illness she had at the time in which she was coughing a lot. Covid neg. Recovered from her cold, with occasional lingering dry cough. The chest wall pain is  "mild, only hurts with palpation and stable.     She had 24-48 hr of vomiting and diarrhea earlier this week, now resolved. Eating and drinking well. She wonders if it was food poisoning.    For her anxiety, she was transitioned from duloxetine to Celexa 20 mg 6 weeks ago for drug interaction risk. She is doing fairly well with this, but still feels a bit more emotional than she was on duloxetine.    Her finger peripheral neuropathy is better.     Past History:    Past Medical History:   Diagnosis Date     Abnormal Pap smear of cervix     age 13, subsequent pap smears normal     Anxiety      Breast cancer (H) 05/04/2021    Left     Depression      Physical Exam:    /75 (BP Location: Right arm, Patient Position: Sitting, Cuff Size: Adult Regular)   Pulse 74   Temp 98.5  F (36.9  C) (Oral)   Resp 20   Ht 1.676 m (5' 6\")   Wt 78.9 kg (173 lb 14.4 oz)   SpO2 97%   BMI 28.07 kg/m      General: patient appears stated age of 31 year old. Nontoxic and in no distress.   HEENT: Head: atraumatic, normocephalic. Sclerae anicteric.  Lymph: No palpable cervical nor axillary adenopathy.   Breasts: Prior mastectomies with implants. No palpable chest wall abnormality. Tightness through left axilla.  Chest:  Normal respiratory effort. Clear lungs bilaterally. Reproducible pain anterior chest, just left of sternum.   Cardiac:  No edema.   Abdomen: abdomen is non-distended  Extremities: normal tone and muscle bulk.  Skin: no lesions or rash on visible skin. Warm and dry.   CNS: alert and oriented. Grossly non-focal.   Psychiatric: normal mood and affect.     Lab Results:    Recent Results (from the past 24 hour(s))   Comprehensive metabolic panel    Collection Time: 04/01/22 10:29 AM   Result Value Ref Range    Sodium 137 136 - 145 mmol/L    Potassium 3.7 3.5 - 5.0 mmol/L    Chloride 104 98 - 107 mmol/L    Carbon Dioxide (CO2) 24 22 - 31 mmol/L    Anion Gap 9 5 - 18 mmol/L    Urea Nitrogen 13 8 - 22 mg/dL    Creatinine 0.98 " "0.60 - 1.10 mg/dL    Calcium 8.7 8.5 - 10.5 mg/dL    Glucose 93 70 - 125 mg/dL    Alkaline Phosphatase 52 45 - 120 U/L    AST 76 (H) 0 - 40 U/L    ALT 87 (H) 0 - 45 U/L    Protein Total 7.2 6.0 - 8.0 g/dL    Albumin 3.8 3.5 - 5.0 g/dL    Bilirubin Total 0.3 0.0 - 1.0 mg/dL    GFR Estimate 78 >60 mL/min/1.73m2   CBC with platelets and differential    Collection Time: 04/01/22 10:29 AM   Result Value Ref Range    WBC Count 4.5 4.0 - 11.0 10e3/uL    RBC Count 4.14 3.80 - 5.20 10e6/uL    Hemoglobin 11.8 11.7 - 15.7 g/dL    Hematocrit 36.4 35.0 - 47.0 %    MCV 88 78 - 100 fL    MCH 28.5 26.5 - 33.0 pg    MCHC 32.4 31.5 - 36.5 g/dL    RDW 12.7 10.0 - 15.0 %    Platelet Count 242 150 - 450 10e3/uL    % Neutrophils 58 %    % Lymphocytes 28 %    % Monocytes 12 %    % Eosinophils 2 %    % Basophils 0 %    % Immature Granulocytes 0 %    NRBCs per 100 WBC 0 <1 /100    Absolute Neutrophils 2.6 1.6 - 8.3 10e3/uL    Absolute Lymphocytes 1.2 0.8 - 5.3 10e3/uL    Absolute Monocytes 0.6 0.0 - 1.3 10e3/uL    Absolute Eosinophils 0.1 0.0 - 0.7 10e3/uL    Absolute Basophils 0.0 0.0 - 0.2 10e3/uL    Absolute Immature Granulocytes 0.0 <=0.4 10e3/uL    Absolute NRBCs 0.0 10e3/uL     Total time 40 minutes, to include face to face visit, review of EMR, ordering, documentation and coordination of care      Signed by: Marge Coon NP        Oncology Rooming Note    April 1, 2022 11:07 AM   Veronica Nieves is a 31 year old female who presents for:    Chief Complaint   Patient presents with     Oncology Clinic Visit     6 week return Malignant neoplasm of upper-outer quadrant of left breast in female, estrogen receptor positive (     Initial Vitals: /75 (BP Location: Right arm, Patient Position: Sitting, Cuff Size: Adult Regular)   Pulse 74   Temp 98.5  F (36.9  C) (Oral)   Resp 20   Ht 1.676 m (5' 6\")   Wt 78.9 kg (173 lb 14.4 oz)   SpO2 97%   BMI 28.07 kg/m   Estimated body mass index is 28.07 kg/m  as calculated from the " "following:    Height as of this encounter: 1.676 m (5' 6\").    Weight as of this encounter: 78.9 kg (173 lb 14.4 oz). Body surface area is 1.92 meters squared.  No Pain (0) Comment: Data Unavailable   No LMP recorded. Patient has had an implant.  Allergies reviewed: Yes  Medications reviewed: Yes    Medications: Medication refills not needed today.  Pharm[facility] entered into [id number] [place] [address]    Clinical concerns: 6 week return Malignant neoplasm of upper-outer quadrant of left breast in female, estrogen receptor positive.       Angie Henriquez [facility]                  Again, thank you for allowing me to participate in the care of your patient.        Sincerely,        Marge Coon NP    "

## 2022-04-01 NOTE — TELEPHONE ENCOUNTER
[facility]'s [facility] for Citalopram from 2 tablets 20 mg daily to 1 tablet 40 mg daily.  Resent prescription to the pharmacy with 40 mg tablets per Marge's request.

## 2022-04-01 NOTE — PROGRESS NOTES
Infusion Nursing Note:  Veronica Nieves presents today for cycle 7 day 1 treatment with Kadcyla as well as IV hydration.    Patient seen by provider today: Yes: Marge Coon CNP   present during visit today: Not Applicable.    Note: She was educated on her plan of care for today and drug reviewed prior to administration. She received treatment and hydration as ordered.      Intravenous Access:  Implanted Port.    Treatment Conditions:  Results reviewed, labs MET treatment parameters, ok to proceed with treatment.      Post Infusion Assessment:  Patient tolerated infusion without incident.  Blood return noted pre and post infusion.  Site patent and intact, free from redness, edema or discomfort.  No evidence of extravasations.  Access discontinued per protocol.       Discharge Plan:   Patient discharged in stable condition accompanied by: self.      Meryl Roth RN

## 2022-04-02 DIAGNOSIS — F32.A MILD DEPRESSION: ICD-10-CM

## 2022-04-04 RX ORDER — CITALOPRAM HYDROBROMIDE 20 MG/1
TABLET ORAL
Qty: 45 TABLET | Refills: 1 | Status: SHIPPED | OUTPATIENT
Start: 2022-04-04 | End: 2022-06-30

## 2022-04-11 ENCOUNTER — HOSPITAL ENCOUNTER (OUTPATIENT)
Dept: PHYSICAL THERAPY | Facility: REHABILITATION | Age: 32
Discharge: HOME | End: 2022-04-11
Attending: NURSE PRACTITIONER
Payer: COMMERCIAL

## 2022-04-11 DIAGNOSIS — C50.412 MALIGNANT NEOPLASM OF UPPER-OUTER QUADRANT OF LEFT BREAST IN FEMALE, ESTROGEN RECEPTOR POSITIVE (H): ICD-10-CM

## 2022-04-11 DIAGNOSIS — M25.612 DECREASED ROM OF LEFT SHOULDER: Primary | ICD-10-CM

## 2022-04-11 DIAGNOSIS — I89.0 LYMPHEDEMA: ICD-10-CM

## 2022-04-11 DIAGNOSIS — Z17.0 MALIGNANT NEOPLASM OF UPPER-OUTER QUADRANT OF LEFT BREAST IN FEMALE, ESTROGEN RECEPTOR POSITIVE (H): ICD-10-CM

## 2022-04-11 PROCEDURE — 97140 MANUAL THERAPY 1/> REGIONS: CPT | Mod: GP | Performed by: PHYSICAL THERAPIST

## 2022-04-11 PROCEDURE — 97535 SELF CARE MNGMENT TRAINING: CPT | Mod: GP | Performed by: PHYSICAL THERAPIST

## 2022-04-11 PROCEDURE — 97110 THERAPEUTIC EXERCISES: CPT | Mod: GP | Performed by: PHYSICAL THERAPIST

## 2022-04-11 PROCEDURE — 97161 PT EVAL LOW COMPLEX 20 MIN: CPT | Mod: GP | Performed by: PHYSICAL THERAPIST

## 2022-04-11 NOTE — PROGRESS NOTES
04/11/22 0700   Rehab Discipline   Discipline PT   Type of Visit   Type of visit Initial Edema Evaluation   General Information   Start of care 04/11/22   Referring physician Marleen Shukla   Orders Evaluate and treat as indicated   Order date 03/11/22   Medical diagnosis Malignant neoplasm of upper-outer quadrant of left breast in female, estrogen receptor positive, Lymphedema   Onset of illness / date of surgery 10/07/21   Affected body parts LUE   Fall Risk Screen   Fall screen completed by PT   Have you fallen 2 or more times in the past year? No   Have you fallen and had an injury in the past year? No   Is patient a fall risk? No   Abuse Screen (yes response referral indicated)   Feels Unsafe at Home or Work/School no   Feels Threatened by Someone no   Does Anyone Try to Keep You From Having Contact with Others or Doing Things Outside Your Home? no   Physical Signs of Abuse Present no   Patient needs abuse support services and resources No   System Outcome Measures   Outcome Measures Lymphedema   Lymphedema Life Impact Scale (score range 0-72). A higher score indicates greater impairment. 7   Subjective Report   Patient report of symptoms Pt is s/p left breast cancer, neoadjuvant chemotherapy in 2021, B mastectomy with implants placed on 10/7/21 (with residual disease present after surgery), 3 left axillary lymph nodes removed (negative), followed by IV chemotherapy (which is continuing now until August 2022). No radiation. She hasn t had swelling post-operatively. But she feels that she has cording - under the left breast and into the left axilla - feels tightness into the breast and into the mid upper arm.   Patient / Family Goals   Patient / family goals statement To remove the cording, to regain full ROM, prevent this from worsening   Pain   Patient currently in pain Yes   Pain location left breast/axilla   Pain rating at rest 0/10, with movement/stretching to a 2/10   Edema Exam / Assessment   Skin  condition Axillary web cording   Skin condition comments left axilla/breast   Stemmer sign Negative   Ulceration No   Range of Motion   ROM Other   ROM comments R shoulder: flx 180 deg, abd 182 deg, IR T10. L shoulder flx: 161 deg (with pain/tightness starting at 135 deg), abd 170 deg with pain starting at 40 deg (into forearm)   Planned Edema Interventions   Planned edema interventions Manual lymph drainage;Exercises;Precautions to prevent infection / exacerbation;Education;Manual therapy;Skin care / precautions;Scar mobilization;Soft tissue mobilization;Home management program development   Clinical Impression   Criteria for skilled therapeutic intervention met Yes   Therapy diagnosis decreased L shoulder ROM   Influenced by the following impairments / conditions Axillary Web Syndrome   Functional limitations due to impairments / conditions decreased L shoulder ROM, pain   Clinical Presentation Stable/Uncomplicated   Clinical Decision Making (Complexity) Low complexity   Treatment Frequency 1x/week   Treatment duration 4-5 visits   Patient / family and/or staff in agreement with plan of care Yes   Risks and benefits of therapy have been explained Yes   Clinical impression comments Pt is a 31 year-old woman with chief complaint left axillary cording s/p left breast cancer, mastectomy with implants and 3 lymph nodes removed from axilla (surgery on 10/7/21). 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 She demonstrates slightly decreased L shoulder AROM with mild pain as a result. Initiated HEP and manual techniques today and pt will likely need short course of therapy to finalize HEP.   Goals   Edema Eval Goals 1;2   Goal 1   Goal identifier Pain   Goal description Pt will report no left shoulder pain with AROM to return to PLOF   Target date 05/23/22   Goal 2   Goal identifier HEP   Goal description Pt will be indep with hEP to maximize  clinical gains.   Target date 05/23/22   Total Evaluation Time   PT Eval, Low Complexity Minutes (32350) 18     Lara Galvez, PT, DPT, CLT  4/11/2022

## 2022-04-18 ENCOUNTER — INFUSION THERAPY VISIT (OUTPATIENT)
Dept: INFUSION THERAPY | Facility: HOSPITAL | Age: 32
End: 2022-04-18
Attending: INTERNAL MEDICINE
Payer: COMMERCIAL

## 2022-04-18 VITALS
SYSTOLIC BLOOD PRESSURE: 140 MMHG | RESPIRATION RATE: 12 BRPM | OXYGEN SATURATION: 96 % | HEART RATE: 87 BPM | DIASTOLIC BLOOD PRESSURE: 71 MMHG | TEMPERATURE: 98.8 F

## 2022-04-18 DIAGNOSIS — C50.412 MALIGNANT NEOPLASM OF UPPER-OUTER QUADRANT OF LEFT BREAST IN FEMALE, ESTROGEN RECEPTOR POSITIVE (H): Primary | ICD-10-CM

## 2022-04-18 DIAGNOSIS — Z17.0 MALIGNANT NEOPLASM OF UPPER-OUTER QUADRANT OF LEFT BREAST IN FEMALE, ESTROGEN RECEPTOR POSITIVE (H): Primary | ICD-10-CM

## 2022-04-18 PROCEDURE — 96402 CHEMO HORMON ANTINEOPL SQ/IM: CPT

## 2022-04-18 PROCEDURE — 250N000011 HC RX IP 250 OP 636: Performed by: NURSE PRACTITIONER

## 2022-04-18 RX ADMIN — GOSERELIN ACETATE 3.6 MG: 3.6 IMPLANT SUBCUTANEOUS at 08:37

## 2022-04-18 ASSESSMENT — PAIN SCALES - GENERAL: PAINLEVEL: NO PAIN (0)

## 2022-04-18 NOTE — PROGRESS NOTES
Pt arrived for Zoladex injection.  Ice applied to right abdomen and then injection given to the right lower abdomen. 4x4 and tegaderm applied. Pt left ambulatory.

## 2022-04-22 ENCOUNTER — LAB (OUTPATIENT)
Dept: INFUSION THERAPY | Facility: HOSPITAL | Age: 32
End: 2022-04-22
Attending: INTERNAL MEDICINE
Payer: COMMERCIAL

## 2022-04-22 VITALS
RESPIRATION RATE: 16 BRPM | DIASTOLIC BLOOD PRESSURE: 59 MMHG | HEART RATE: 65 BPM | TEMPERATURE: 98.4 F | OXYGEN SATURATION: 97 % | SYSTOLIC BLOOD PRESSURE: 127 MMHG

## 2022-04-22 DIAGNOSIS — Z17.0 MALIGNANT NEOPLASM OF UPPER-OUTER QUADRANT OF LEFT BREAST IN FEMALE, ESTROGEN RECEPTOR POSITIVE (H): Primary | ICD-10-CM

## 2022-04-22 DIAGNOSIS — C50.412 MALIGNANT NEOPLASM OF UPPER-OUTER QUADRANT OF LEFT BREAST IN FEMALE, ESTROGEN RECEPTOR POSITIVE (H): Primary | ICD-10-CM

## 2022-04-22 LAB
ALBUMIN SERPL-MCNC: 3.7 G/DL (ref 3.5–5)
ALP SERPL-CCNC: 62 U/L (ref 45–120)
ALT SERPL W P-5'-P-CCNC: 38 U/L (ref 0–45)
ANION GAP SERPL CALCULATED.3IONS-SCNC: 10 MMOL/L (ref 5–18)
AST SERPL W P-5'-P-CCNC: 34 U/L (ref 0–40)
BASOPHILS # BLD AUTO: 0 10E3/UL (ref 0–0.2)
BASOPHILS NFR BLD AUTO: 1 %
BILIRUB SERPL-MCNC: 0.3 MG/DL (ref 0–1)
BUN SERPL-MCNC: 20 MG/DL (ref 8–22)
CALCIUM SERPL-MCNC: 9.3 MG/DL (ref 8.5–10.5)
CHLORIDE BLD-SCNC: 107 MMOL/L (ref 98–107)
CO2 SERPL-SCNC: 23 MMOL/L (ref 22–31)
CREAT SERPL-MCNC: 1.15 MG/DL (ref 0.6–1.1)
EOSINOPHIL # BLD AUTO: 0.1 10E3/UL (ref 0–0.7)
EOSINOPHIL NFR BLD AUTO: 3 %
ERYTHROCYTE [DISTWIDTH] IN BLOOD BY AUTOMATED COUNT: 13.2 % (ref 10–15)
GFR SERPL CREATININE-BSD FRML MDRD: 65 ML/MIN/1.73M2
GLUCOSE BLD-MCNC: 109 MG/DL (ref 70–125)
HCT VFR BLD AUTO: 37 % (ref 35–47)
HGB BLD-MCNC: 11.8 G/DL (ref 11.7–15.7)
IMM GRANULOCYTES # BLD: 0 10E3/UL
IMM GRANULOCYTES NFR BLD: 0 %
LYMPHOCYTES # BLD AUTO: 1.8 10E3/UL (ref 0.8–5.3)
LYMPHOCYTES NFR BLD AUTO: 41 %
MCH RBC QN AUTO: 28.4 PG (ref 26.5–33)
MCHC RBC AUTO-ENTMCNC: 31.9 G/DL (ref 31.5–36.5)
MCV RBC AUTO: 89 FL (ref 78–100)
MONOCYTES # BLD AUTO: 0.4 10E3/UL (ref 0–1.3)
MONOCYTES NFR BLD AUTO: 8 %
NEUTROPHILS # BLD AUTO: 2.1 10E3/UL (ref 1.6–8.3)
NEUTROPHILS NFR BLD AUTO: 47 %
NRBC # BLD AUTO: 0 10E3/UL
NRBC BLD AUTO-RTO: 0 /100
PLAT MORPH BLD: NORMAL
PLATELET # BLD AUTO: 246 10E3/UL (ref 150–450)
POTASSIUM BLD-SCNC: 4 MMOL/L (ref 3.5–5)
PROT SERPL-MCNC: 7.4 G/DL (ref 6–8)
RBC # BLD AUTO: 4.16 10E6/UL (ref 3.8–5.2)
RBC MORPH BLD: NORMAL
SODIUM SERPL-SCNC: 140 MMOL/L (ref 136–145)
WBC # BLD AUTO: 4.4 10E3/UL (ref 4–11)

## 2022-04-22 PROCEDURE — 250N000011 HC RX IP 250 OP 636: Performed by: NURSE PRACTITIONER

## 2022-04-22 PROCEDURE — 96413 CHEMO IV INFUSION 1 HR: CPT

## 2022-04-22 PROCEDURE — 258N000003 HC RX IP 258 OP 636: Performed by: NURSE PRACTITIONER

## 2022-04-22 PROCEDURE — 80053 COMPREHEN METABOLIC PANEL: CPT | Performed by: NURSE PRACTITIONER

## 2022-04-22 PROCEDURE — 85025 COMPLETE CBC W/AUTO DIFF WBC: CPT | Performed by: NURSE PRACTITIONER

## 2022-04-22 RX ORDER — HEPARIN SODIUM (PORCINE) LOCK FLUSH IV SOLN 100 UNIT/ML 100 UNIT/ML
5 SOLUTION INTRAVENOUS
Status: DISCONTINUED | OUTPATIENT
Start: 2022-04-22 | End: 2022-04-22 | Stop reason: HOSPADM

## 2022-04-22 RX ADMIN — SODIUM CHLORIDE 250 ML: 9 INJECTION, SOLUTION INTRAVENOUS at 10:58

## 2022-04-22 RX ADMIN — ADO-TRASTUZUMAB EMTANSINE 298 MG: 20 INJECTION, POWDER, LYOPHILIZED, FOR SOLUTION INTRAVENOUS at 11:01

## 2022-04-22 RX ADMIN — Medication 5 ML: at 11:36

## 2022-04-22 ASSESSMENT — PAIN SCALES - GENERAL: PAINLEVEL: NO PAIN (0)

## 2022-04-22 NOTE — PROGRESS NOTES
Infusion Nursing Note:  Veronica Nieves presents today for D8C1.    Patient seen by provider today: No   present during visit today: Not Applicable.    Note: N/A.      Intravenous Access:  Implanted Port.    Treatment Conditions:  Lab Results   Component Value Date    HGB 11.8 04/22/2022    WBC 4.4 04/22/2022    ANEU 15.5 (H) 08/09/2021    ANEUTAUTO 2.6 04/01/2022     04/22/2022      Lab Results   Component Value Date     04/01/2022    POTASSIUM 3.7 04/01/2022    CR 0.98 04/01/2022    BURT 8.7 04/01/2022    BILITOTAL 0.3 04/01/2022    ALBUMIN 3.8 04/01/2022    ALT 87 (H) 04/01/2022    AST 76 (H) 04/01/2022     Results reviewed, labs MET treatment parameters, ok to proceed with treatment.      Post Infusion Assessment:  Patient tolerated infusion without incident.       Discharge Plan:   Patient discharged in stable condition accompanied by: self.  Departure Mode: Ambulatory.      Ellen Ace RN

## 2022-05-13 ENCOUNTER — INFUSION THERAPY VISIT (OUTPATIENT)
Dept: INFUSION THERAPY | Facility: HOSPITAL | Age: 32
End: 2022-05-13
Attending: INTERNAL MEDICINE
Payer: COMMERCIAL

## 2022-05-13 ENCOUNTER — ONCOLOGY VISIT (OUTPATIENT)
Dept: ONCOLOGY | Facility: HOSPITAL | Age: 32
End: 2022-05-13
Attending: INTERNAL MEDICINE
Payer: COMMERCIAL

## 2022-05-13 VITALS
HEART RATE: 60 BPM | RESPIRATION RATE: 14 BRPM | HEIGHT: 66 IN | OXYGEN SATURATION: 99 % | WEIGHT: 185.8 LBS | TEMPERATURE: 98.6 F | SYSTOLIC BLOOD PRESSURE: 128 MMHG | DIASTOLIC BLOOD PRESSURE: 71 MMHG | BODY MASS INDEX: 29.86 KG/M2

## 2022-05-13 DIAGNOSIS — R41.840 POOR CONCENTRATION: ICD-10-CM

## 2022-05-13 DIAGNOSIS — Z17.0 MALIGNANT NEOPLASM OF UPPER-OUTER QUADRANT OF LEFT BREAST IN FEMALE, ESTROGEN RECEPTOR POSITIVE (H): Primary | ICD-10-CM

## 2022-05-13 DIAGNOSIS — C50.412 MALIGNANT NEOPLASM OF UPPER-OUTER QUADRANT OF LEFT BREAST IN FEMALE, ESTROGEN RECEPTOR POSITIVE (H): Primary | ICD-10-CM

## 2022-05-13 DIAGNOSIS — F32.A MILD DEPRESSION: ICD-10-CM

## 2022-05-13 LAB
ALBUMIN SERPL-MCNC: 3.6 G/DL (ref 3.5–5)
ALP SERPL-CCNC: 69 U/L (ref 45–120)
ALT SERPL W P-5'-P-CCNC: 36 U/L (ref 0–45)
ANION GAP SERPL CALCULATED.3IONS-SCNC: 7 MMOL/L (ref 5–18)
AST SERPL W P-5'-P-CCNC: 37 U/L (ref 0–40)
BASOPHILS # BLD AUTO: 0 10E3/UL (ref 0–0.2)
BASOPHILS NFR BLD AUTO: 0 %
BILIRUB SERPL-MCNC: 0.3 MG/DL (ref 0–1)
BUN SERPL-MCNC: 19 MG/DL (ref 8–22)
CALCIUM SERPL-MCNC: 9.3 MG/DL (ref 8.5–10.5)
CHLORIDE BLD-SCNC: 105 MMOL/L (ref 98–107)
CO2 SERPL-SCNC: 28 MMOL/L (ref 22–31)
CREAT SERPL-MCNC: 1.06 MG/DL (ref 0.6–1.1)
EOSINOPHIL # BLD AUTO: 0.2 10E3/UL (ref 0–0.7)
EOSINOPHIL NFR BLD AUTO: 3 %
ERYTHROCYTE [DISTWIDTH] IN BLOOD BY AUTOMATED COUNT: 13.3 % (ref 10–15)
GFR SERPL CREATININE-BSD FRML MDRD: 71 ML/MIN/1.73M2
GLUCOSE BLD-MCNC: 93 MG/DL (ref 70–125)
HCT VFR BLD AUTO: 38.2 % (ref 35–47)
HGB BLD-MCNC: 12.4 G/DL (ref 11.7–15.7)
IMM GRANULOCYTES # BLD: 0 10E3/UL
IMM GRANULOCYTES NFR BLD: 0 %
LYMPHOCYTES # BLD AUTO: 1.4 10E3/UL (ref 0.8–5.3)
LYMPHOCYTES NFR BLD AUTO: 27 %
MCH RBC QN AUTO: 28.4 PG (ref 26.5–33)
MCHC RBC AUTO-ENTMCNC: 32.5 G/DL (ref 31.5–36.5)
MCV RBC AUTO: 87 FL (ref 78–100)
MONOCYTES # BLD AUTO: 0.5 10E3/UL (ref 0–1.3)
MONOCYTES NFR BLD AUTO: 10 %
NEUTROPHILS # BLD AUTO: 3.1 10E3/UL (ref 1.6–8.3)
NEUTROPHILS NFR BLD AUTO: 60 %
NRBC # BLD AUTO: 0 10E3/UL
NRBC BLD AUTO-RTO: 0 /100
PLATELET # BLD AUTO: 261 10E3/UL (ref 150–450)
POTASSIUM BLD-SCNC: 4.2 MMOL/L (ref 3.5–5)
PROT SERPL-MCNC: 7.6 G/DL (ref 6–8)
RBC # BLD AUTO: 4.37 10E6/UL (ref 3.8–5.2)
SODIUM SERPL-SCNC: 140 MMOL/L (ref 136–145)
WBC # BLD AUTO: 5.3 10E3/UL (ref 4–11)

## 2022-05-13 PROCEDURE — 85025 COMPLETE CBC W/AUTO DIFF WBC: CPT | Performed by: NURSE PRACTITIONER

## 2022-05-13 PROCEDURE — 96402 CHEMO HORMON ANTINEOPL SQ/IM: CPT

## 2022-05-13 PROCEDURE — 80053 COMPREHEN METABOLIC PANEL: CPT | Performed by: NURSE PRACTITIONER

## 2022-05-13 PROCEDURE — 250N000011 HC RX IP 250 OP 636: Performed by: INTERNAL MEDICINE

## 2022-05-13 PROCEDURE — G0463 HOSPITAL OUTPT CLINIC VISIT: HCPCS | Mod: 25

## 2022-05-13 PROCEDURE — 96413 CHEMO IV INFUSION 1 HR: CPT

## 2022-05-13 PROCEDURE — 258N000003 HC RX IP 258 OP 636: Performed by: INTERNAL MEDICINE

## 2022-05-13 PROCEDURE — 99214 OFFICE O/P EST MOD 30 MIN: CPT | Performed by: INTERNAL MEDICINE

## 2022-05-13 PROCEDURE — 82040 ASSAY OF SERUM ALBUMIN: CPT | Performed by: NURSE PRACTITIONER

## 2022-05-13 RX ORDER — DIPHENHYDRAMINE HYDROCHLORIDE 50 MG/ML
50 INJECTION INTRAMUSCULAR; INTRAVENOUS
Status: CANCELLED
Start: 2022-06-01

## 2022-05-13 RX ORDER — METHYLPREDNISOLONE SODIUM SUCCINATE 125 MG/2ML
125 INJECTION, POWDER, LYOPHILIZED, FOR SOLUTION INTRAMUSCULAR; INTRAVENOUS
Status: CANCELLED
Start: 2022-06-01

## 2022-05-13 RX ORDER — MEPERIDINE HYDROCHLORIDE 25 MG/ML
25 INJECTION INTRAMUSCULAR; INTRAVENOUS; SUBCUTANEOUS EVERY 30 MIN PRN
Status: DISCONTINUED | OUTPATIENT
Start: 2022-05-13 | End: 2022-05-13 | Stop reason: HOSPADM

## 2022-05-13 RX ORDER — NALOXONE HYDROCHLORIDE 0.4 MG/ML
0.2 INJECTION, SOLUTION INTRAMUSCULAR; INTRAVENOUS; SUBCUTANEOUS
Status: DISCONTINUED | OUTPATIENT
Start: 2022-05-13 | End: 2022-05-13 | Stop reason: HOSPADM

## 2022-05-13 RX ORDER — NALOXONE HYDROCHLORIDE 0.4 MG/ML
0.2 INJECTION, SOLUTION INTRAMUSCULAR; INTRAVENOUS; SUBCUTANEOUS
Status: CANCELLED | OUTPATIENT
Start: 2022-06-22

## 2022-05-13 RX ORDER — METHYLPREDNISOLONE SODIUM SUCCINATE 125 MG/2ML
125 INJECTION, POWDER, LYOPHILIZED, FOR SOLUTION INTRAMUSCULAR; INTRAVENOUS
Status: CANCELLED
Start: 2022-06-22

## 2022-05-13 RX ORDER — NALOXONE HYDROCHLORIDE 0.4 MG/ML
0.2 INJECTION, SOLUTION INTRAMUSCULAR; INTRAVENOUS; SUBCUTANEOUS
Status: CANCELLED | OUTPATIENT
Start: 2022-06-01

## 2022-05-13 RX ORDER — EPINEPHRINE 1 MG/ML
0.3 INJECTION, SOLUTION INTRAMUSCULAR; SUBCUTANEOUS EVERY 5 MIN PRN
Status: CANCELLED | OUTPATIENT
Start: 2022-06-01

## 2022-05-13 RX ORDER — MEPERIDINE HYDROCHLORIDE 25 MG/ML
25 INJECTION INTRAMUSCULAR; INTRAVENOUS; SUBCUTANEOUS EVERY 30 MIN PRN
Status: CANCELLED | OUTPATIENT
Start: 2022-06-22

## 2022-05-13 RX ORDER — HEPARIN SODIUM (PORCINE) LOCK FLUSH IV SOLN 100 UNIT/ML 100 UNIT/ML
5 SOLUTION INTRAVENOUS
Status: DISCONTINUED | OUTPATIENT
Start: 2022-05-13 | End: 2022-05-13 | Stop reason: HOSPADM

## 2022-05-13 RX ORDER — LORAZEPAM 2 MG/ML
0.5 INJECTION INTRAMUSCULAR EVERY 4 HOURS PRN
Status: CANCELLED | OUTPATIENT
Start: 2022-06-01

## 2022-05-13 RX ORDER — HEPARIN SODIUM,PORCINE 10 UNIT/ML
5 VIAL (ML) INTRAVENOUS
Status: CANCELLED | OUTPATIENT
Start: 2022-06-22

## 2022-05-13 RX ORDER — LORAZEPAM 2 MG/ML
0.5 INJECTION INTRAMUSCULAR EVERY 4 HOURS PRN
Status: CANCELLED | OUTPATIENT
Start: 2022-05-13

## 2022-05-13 RX ORDER — ALBUTEROL SULFATE 90 UG/1
1-2 AEROSOL, METERED RESPIRATORY (INHALATION)
Status: DISCONTINUED | OUTPATIENT
Start: 2022-05-13 | End: 2022-05-13 | Stop reason: HOSPADM

## 2022-05-13 RX ORDER — ALBUTEROL SULFATE 90 UG/1
1-2 AEROSOL, METERED RESPIRATORY (INHALATION)
Status: CANCELLED
Start: 2022-06-01

## 2022-05-13 RX ORDER — HEPARIN SODIUM,PORCINE 10 UNIT/ML
5 VIAL (ML) INTRAVENOUS
Status: CANCELLED | OUTPATIENT
Start: 2022-06-01

## 2022-05-13 RX ORDER — MEPERIDINE HYDROCHLORIDE 25 MG/ML
25 INJECTION INTRAMUSCULAR; INTRAVENOUS; SUBCUTANEOUS EVERY 30 MIN PRN
Status: CANCELLED | OUTPATIENT
Start: 2022-06-01

## 2022-05-13 RX ORDER — DIPHENHYDRAMINE HYDROCHLORIDE 50 MG/ML
50 INJECTION INTRAMUSCULAR; INTRAVENOUS
Status: DISCONTINUED | OUTPATIENT
Start: 2022-05-13 | End: 2022-05-13 | Stop reason: HOSPADM

## 2022-05-13 RX ORDER — DIPHENHYDRAMINE HYDROCHLORIDE 50 MG/ML
50 INJECTION INTRAMUSCULAR; INTRAVENOUS
Status: CANCELLED
Start: 2022-06-22

## 2022-05-13 RX ORDER — ALBUTEROL SULFATE 0.83 MG/ML
2.5 SOLUTION RESPIRATORY (INHALATION)
Status: CANCELLED | OUTPATIENT
Start: 2022-06-22

## 2022-05-13 RX ORDER — HEPARIN SODIUM,PORCINE 10 UNIT/ML
5 VIAL (ML) INTRAVENOUS
Status: CANCELLED | OUTPATIENT
Start: 2022-05-13

## 2022-05-13 RX ORDER — METHYLPREDNISOLONE SODIUM SUCCINATE 125 MG/2ML
125 INJECTION, POWDER, LYOPHILIZED, FOR SOLUTION INTRAMUSCULAR; INTRAVENOUS
Status: DISCONTINUED | OUTPATIENT
Start: 2022-05-13 | End: 2022-05-13 | Stop reason: HOSPADM

## 2022-05-13 RX ORDER — ALBUTEROL SULFATE 90 UG/1
1-2 AEROSOL, METERED RESPIRATORY (INHALATION)
Status: CANCELLED
Start: 2022-06-22

## 2022-05-13 RX ORDER — CITALOPRAM HYDROBROMIDE 40 MG/1
40 TABLET ORAL DAILY
Qty: 30 TABLET | Refills: 3 | Status: SHIPPED | OUTPATIENT
Start: 2022-05-13 | End: 2022-06-20

## 2022-05-13 RX ORDER — ALBUTEROL SULFATE 0.83 MG/ML
2.5 SOLUTION RESPIRATORY (INHALATION)
Status: CANCELLED | OUTPATIENT
Start: 2022-06-01

## 2022-05-13 RX ORDER — EPINEPHRINE 1 MG/ML
0.3 INJECTION, SOLUTION INTRAMUSCULAR; SUBCUTANEOUS EVERY 5 MIN PRN
Status: DISCONTINUED | OUTPATIENT
Start: 2022-05-13 | End: 2022-05-13 | Stop reason: HOSPADM

## 2022-05-13 RX ORDER — EPINEPHRINE 1 MG/ML
0.3 INJECTION, SOLUTION INTRAMUSCULAR; SUBCUTANEOUS EVERY 5 MIN PRN
Status: CANCELLED | OUTPATIENT
Start: 2022-06-22

## 2022-05-13 RX ORDER — ALBUTEROL SULFATE 0.83 MG/ML
2.5 SOLUTION RESPIRATORY (INHALATION)
Status: DISCONTINUED | OUTPATIENT
Start: 2022-05-13 | End: 2022-05-13 | Stop reason: HOSPADM

## 2022-05-13 RX ORDER — HEPARIN SODIUM (PORCINE) LOCK FLUSH IV SOLN 100 UNIT/ML 100 UNIT/ML
5 SOLUTION INTRAVENOUS
Status: CANCELLED | OUTPATIENT
Start: 2022-06-01

## 2022-05-13 RX ORDER — LORAZEPAM 2 MG/ML
0.5 INJECTION INTRAMUSCULAR EVERY 4 HOURS PRN
Status: CANCELLED | OUTPATIENT
Start: 2022-06-22

## 2022-05-13 RX ORDER — HEPARIN SODIUM (PORCINE) LOCK FLUSH IV SOLN 100 UNIT/ML 100 UNIT/ML
5 SOLUTION INTRAVENOUS
Status: CANCELLED | OUTPATIENT
Start: 2022-06-22

## 2022-05-13 RX ADMIN — HEPARIN 5 ML: 100 SYRINGE at 13:05

## 2022-05-13 RX ADMIN — ADO-TRASTUZUMAB EMTANSINE 298 MG: 20 INJECTION, POWDER, LYOPHILIZED, FOR SOLUTION INTRAVENOUS at 12:32

## 2022-05-13 RX ADMIN — GOSERELIN ACETATE 3.6 MG: 3.6 IMPLANT SUBCUTANEOUS at 12:41

## 2022-05-13 ASSESSMENT — PAIN SCALES - GENERAL: PAINLEVEL: NO PAIN (0)

## 2022-05-13 NOTE — LETTER
"    5/13/2022         RE: Veronica Nieves  [address]  [facility] [address]        Dear Colleague,    Thank you for referring your patient, Veronica Nieves, to the [facility]. Please see a copy of my visit note below.    Oncology Rooming Note    May 13, 2022 10:42 AM   Veronica Nieves is a 31 year old female who presents for:    Chief Complaint   Patient presents with     Oncology Clinic Visit     Return visit for 3 week follow up with labs and infusion related to Malignant neoplasm of upper-outer quadrant of left breast in female, estrogen receptor positive (H)     Initial Vitals: /71 (BP Location: Right arm, Patient Position: Sitting, Cuff Size: Adult Regular)   Pulse 60   Temp 98.6  F (37  C) (Oral)   Resp 14   Ht 1.676 m (5' 5.98\")   Wt 84.3 kg (185 lb 12.8 oz)   SpO2 99%   BMI 30.00 kg/m   Estimated body mass index is 30 kg/m  as calculated from the following:    Height as of this encounter: 1.676 m (5' 5.98\").    Weight as of this encounter: 84.3 kg (185 lb 12.8 oz). Body surface area is 1.98 meters squared.  No Pain (0) Comment: Data Unavailable   No LMP recorded. Patient has had an implant.  Allergies reviewed: Yes  Medications reviewed: Yes    Medications: MEDICATION REFILLS NEEDED TODAY. Provider was notified.  Pharmacy name entered into [employer]:    [id number] [place], [place] - [address] [place] [address]  [facility] - [address], [address]    Clinical concerns: Return visit for 3 week follow up with labs and infusion related to Malignant neoplasm of upper-outer quadrant of left breast in female, estrogen receptor positive (H)      LISA CARRILLO MA              [facility] Hematology and Oncology Progress Note    Patient: Veronica Nieves  MRN: [id number]  Date of Service: May 13, 2022        Assessment and Plan:    Cancer Staging  Malignant neoplasm of upper-outer quadrant of left breast in female, estrogen receptor " positive (H)  Staging form: Breast, AJCC 8th Edition  - Clinical stage from 5/19/2021: Stage IB (cT2, cN0, cM0, G2, ER+, DC+, HER2+) - Signed by Jefferson Garcia MD on 5/19/2021     1.  Invasive ductal carcinoma of the left breast: She is now on Zoladex, tamoxifen, and Kadcyla.  Generally doing well with the Kadcyla.  She has occasional hot flashes and sweats.  Kadcyla will be complete in August.    2.  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  This has improved from the past few months.  Grade 1. She is on Cymbalta.    3.  Anxiety and depression: We changed from duloxetine to citalopram in February of this year.  She seems to be tolerating it okay.  States that her mood is generally adequate.  Can titrate up to 40 mg daily if needed.    4.  Lack of concentration/chemo brain: We talked about this for some time.  So affecting her work and daily life.  Would like to try low-dose of methylphenidate twice a day to see if this helps.  5 mg twice daily.  I told her we can titrate up as needed.       I spent 35 minutes in the care of this patient today, which included time necessary for preparation for the visit, face to face time with the patient, communication of recommendations to the care team, and documentation time.    ECOG Performance  0    Diagnosis:    1.  Invasive ductal carcinoma of the left breast: Diagnosed May, 2021.  Initial imaging suggested 3 masses.  Biopsy of the largest mass showed an invasive ductal carcinoma, grade 2.  ER/DC positive, HER-2 positive.  Evaluation of the axilla was negative.    Treatment:    Neoadjuvant chemotherapy with TCHP initiated on May 27, 2021.  Cycle 6 was given on September 10.    Bilateral mastectomy performed on October 7.  Pathology showed a 9 mm focus of invasive ductal carcinoma of the left breast.  Lymph nodes are negative.    Kadcyla initiated November 29, 2021  Zoladex and tamoxifen initiated November 29, 2021    Interim History:    Abbey  "presents today for a follow-up visit.  Overall she is doing well.  Occasional headaches.  Some dry mouth.  Occasional hot flashes and night sweats.  Bowels are okay.  No significant skin rash.    Review of Systems:    As above in the history.     Review of Systems otherwise Negative for:  General: chills, fever or night sweats  Psychological: anxiety or depression  Ophthalmic: blurry vision, double vision or loss of vision, vision change  ENT: epistaxis, oral lesions, hearing changes  Hematological and Lymphatic: bleeding, bruising, jaundice, swollen lymph nodes  Endocrine: hot flashes, unexpected weight changes  Respiratory: cough, hemoptysis, orthopnea or shortness of breath/SHAHID  Cardiovascular: chest pain, edema, palpitations or PND  Gastrointestinal: abdominal pain, blood in stools, change in bowel habits, constipation, diarrhea or nausea/vomiting  Genito-Urinary: change in urinary stream, incontinence, frequency/urgency  Musculoskeletal: joint pain, stiffness, swelling, muscle pain  Neurological: dizziness, headaches  Dermatological: lumps and rash    Past History:    Past Medical History:   Diagnosis Date     Abnormal Pap smear of cervix     age 13, subsequent pap smears normal     Anxiety      Breast cancer (H) 05/04/2021    Left     Depression      Physical Exam:    /71 (BP Location: Right arm, Patient Position: Sitting, Cuff Size: Adult Regular)   Pulse 60   Temp 98.6  F (37  C) (Oral)   Resp 14   Ht 1.676 m (5' 5.98\")   Wt 84.3 kg (185 lb 12.8 oz)   SpO2 99%   BMI 30.00 kg/m      General: patient appears stated age of 31 year old. Nontoxic and in no distress.   HEENT: Head: atraumatic, normocephalic. Sclerae anicteric.  Chest:  Normal respiratory effort  Cardiac:  No edema.   Abdomen: abdomen is non-distended  Extremities: normal tone and muscle bulk.  Skin: no lesions or rash on visible skin. Warm and dry.   CNS: alert and oriented. Grossly non-focal.   Psychiatric: normal mood and affect. "     Lab Results:    No results found for this or any previous visit (from the past 24 hour(s)).    Signed by: Jefferson Garcia MD          Again, thank you for allowing me to participate in the care of your patient.        Sincerely,        Jefferson Garcia MD

## 2022-05-13 NOTE — PROGRESS NOTES
Pt arrived ambulatory to clinic for Cycle # 9 Day # 1 of her chemotherapy regimen.  Port was accessed using aseptic technique without difficulties with excellent blood return.  Administered Kadcyla and subcutaneous Zoladex per MD order.  Pt tolerated medications well, no s/s of infusion reaction  Port was flushed with NS and Heparin then de-accessed using 2x2 and papertape.  Pt verbalized understanding of plan of care and return to clinic.

## 2022-05-13 NOTE — PROGRESS NOTES
"Oncology Rooming Note    May 13, 2022 10:42 AM   Veronica Nieves is a 31 year old female who presents for:    Chief Complaint   Patient presents with     Oncology Clinic Visit     Return visit for 3 week follow up with labs and infusion related to Malignant neoplasm of upper-outer quadrant of left breast in female, estrogen receptor positive (H)     Initial Vitals: /71 (BP Location: Right arm, Patient Position: Sitting, Cuff Size: Adult Regular)   Pulse 60   Temp 98.6  F (37  C) (Oral)   Resp 14   Ht 1.676 m (5' 5.98\")   Wt 84.3 kg (185 lb 12.8 oz)   SpO2 99%   BMI 30.00 kg/m   Estimated body mass index is 30 kg/m  as calculated from the following:    Height as of this encounter: 1.676 m (5' 5.98\").    Weight as of this encounter: 84.3 kg (185 lb 12.8 oz). Body surface area is 1.98 meters squared.  No Pain (0) Comment: Data Unavailable   No LMP recorded. Patient has had an implant.  Allergies reviewed: Yes  Medications reviewed: Yes    Medications: MEDICATION REFILLS NEEDED TODAY. Provider was notified.  Pharmacy name entered into [employer]:    [id number] [place], [place] - [address] [facility]  [facility] - [address], [address]    Clinical concerns: Return visit for 3 week follow up with labs and infusion related to Malignant neoplasm of upper-outer quadrant of left breast in female, estrogen receptor positive (H)      LISA CARRILLO MA            "

## 2022-05-16 RX ORDER — METHYLPHENIDATE HYDROCHLORIDE 5 MG/1
5 TABLET ORAL 2 TIMES DAILY
Qty: 60 TABLET | Refills: 0 | Status: SHIPPED | OUTPATIENT
Start: 2022-05-16 | End: 2022-05-31

## 2022-05-27 ENCOUNTER — OFFICE VISIT (OUTPATIENT)
Dept: OBGYN | Facility: CLINIC | Age: 32
End: 2022-05-27
Payer: COMMERCIAL

## 2022-05-27 VITALS
BODY MASS INDEX: 29.97 KG/M2 | HEART RATE: 66 BPM | WEIGHT: 185.6 LBS | OXYGEN SATURATION: 99 % | SYSTOLIC BLOOD PRESSURE: 120 MMHG | DIASTOLIC BLOOD PRESSURE: 74 MMHG

## 2022-05-27 DIAGNOSIS — Z30.430 ENCOUNTER FOR IUD INSERTION: ICD-10-CM

## 2022-05-27 DIAGNOSIS — Z30.432 ENCOUNTER FOR IUD REMOVAL: Primary | ICD-10-CM

## 2022-05-27 PROCEDURE — 58301 REMOVE INTRAUTERINE DEVICE: CPT | Performed by: OBSTETRICS & GYNECOLOGY

## 2022-05-27 PROCEDURE — 58300 INSERT INTRAUTERINE DEVICE: CPT | Performed by: OBSTETRICS & GYNECOLOGY

## 2022-05-27 RX ORDER — COPPER 313.4 MG/1
1 INTRAUTERINE DEVICE INTRAUTERINE ONCE
Status: COMPLETED
Start: 2022-05-27 | End: 2022-05-27

## 2022-05-27 RX ADMIN — COPPER 1 EACH: 313.4 INTRAUTERINE DEVICE INTRAUTERINE at 15:41

## 2022-05-27 NOTE — PATIENT INSTRUCTIONS
To Schedule an Appointment 24/7  Call: [phone]    If you have any questions regarding your visit, Please contact your care team.  [facility]: [phone]  [facility] HOURS TELEPHONE NUMBER   Cephas Agbeh, M.D.      Arianne Santamaria-Surgery Scheduler  Erin-Surgery Scheduler         Monday - [place]:    8:00 am-4:45 pm  Tuesday - [place]:   8:00 am-4:45 pm  Friday-[place]:       8:00 am-4:45 pm  Typical Surgery Day:  Wednesday [facility]   [address]   [place] [address] [phone]   Fax [phone]    Imaging Scheduling all locations  [phone]      [facility] Labor and Delivery   [address] [place] [address] Dr.   [place], [place] [address] [phone]    [facility]  [address] [facility] [address]  [phone]  Fax [phone]     Urgent Care locations:  [facility] Monday-Friday                               10 am - 8 pm  Saturday and Sunday                      9 am - 5 pm  Monday-Friday                              10 am- 8 pm  Saturday and Sunday                      9 am - 5 pm    [phone]   **Surgeries** Our Surgery Schedulers will contact you to schedule. If you do not receive a call within 3 business days, please call [phone].  If you need a medication refill, please contact your pharmacy. Please allow 3 business days for your refill to be completed.  As always, Thank you for trusting us with your healthcare needs!  see additional instructions from your care team below

## 2022-05-27 NOTE — PROGRESS NOTES
SUBJECTIVE:     Veronica Nieves is a 32 year old requests removal of an Mirena IUD.   She is  interested in conceivingNo.   Reports adverse side effect from the IUD, No  Symptoms include without any bleeding;Pain No, and/or increased vaginal discharge Yes.       Request alternative contraception Yes. Insertion of Paragard IUD.      OBJECTIVE    PROCEDURE 1  External genitalia: normal without lesions.  Vagina: normal mucosa and rugae, without  discharge.  Cervix: normal without lesion.    String is noted at the os, ringed forceps used to remove IUD.      PROCEDURE:  Type of IUD: ParaGard    The patient has taken 600-800mg of Ibuprofen prior to the procedure.   She is placed in a dorsal lithotomy potion and a pelvic exam is performed to determine the position of the uterus.  The cervix is identified and cleaned with betadine.  A single tooth tenaculum is applied to the anterior lip of the cervix for stabilization.   The uterus sounded to 6.5 cm. (Target sound depth is 6.5 cm to 8.5 cm.)  The IUD insertion tube is prepared to manufacturers recommendations and inserted into the uterus under sterile conditions in the usual fashion.  The IUD string is then cut to 3.5 cm.    NURSE PRESENT FOR PROCEDURES.  The patient tolerated this procedure without immediate complication.  The patient is to return or call immediately for any unexplained fever, abdominal or pelvic pain, excessive bleeding, possibility of pregnancy, foul-smelling discharge, sense that the IUD has been expelled.  All questions were answered.    ICD-10-CM    1. Encounter for IUD removal  Z30.432 REMOVE INTRAUTERINE DEVICE     REMOVE INTRAUTERINE DEVICE   2. Encounter for IUD insertion  Z30.430 INSERTION OF IUD FOR THERAPEUTIC PURPOSES     paragard intrauterine copper IUD device 1 each       Return to clinic in 1 month for IUD check    Cephas Mawuena Agbeh, MD

## 2022-05-31 ENCOUNTER — MYC REFILL (OUTPATIENT)
Dept: ONCOLOGY | Facility: HOSPITAL | Age: 32
End: 2022-05-31
Payer: COMMERCIAL

## 2022-05-31 DIAGNOSIS — Z17.0 MALIGNANT NEOPLASM OF UPPER-OUTER QUADRANT OF LEFT BREAST IN FEMALE, ESTROGEN RECEPTOR POSITIVE (H): ICD-10-CM

## 2022-05-31 DIAGNOSIS — C50.412 MALIGNANT NEOPLASM OF UPPER-OUTER QUADRANT OF LEFT BREAST IN FEMALE, ESTROGEN RECEPTOR POSITIVE (H): ICD-10-CM

## 2022-05-31 RX ORDER — METHYLPHENIDATE HYDROCHLORIDE 5 MG/1
5 TABLET ORAL 2 TIMES DAILY
Qty: 60 TABLET | Refills: 0 | Status: SHIPPED | OUTPATIENT
Start: 2022-05-31 | End: 2022-06-22

## 2022-06-03 ENCOUNTER — INFUSION THERAPY VISIT (OUTPATIENT)
Dept: INFUSION THERAPY | Facility: HOSPITAL | Age: 32
End: 2022-06-03
Attending: INTERNAL MEDICINE
Payer: COMMERCIAL

## 2022-06-03 VITALS
DIASTOLIC BLOOD PRESSURE: 70 MMHG | SYSTOLIC BLOOD PRESSURE: 120 MMHG | OXYGEN SATURATION: 100 % | RESPIRATION RATE: 16 BRPM | TEMPERATURE: 98.5 F | HEART RATE: 60 BPM

## 2022-06-03 DIAGNOSIS — Z17.0 MALIGNANT NEOPLASM OF UPPER-OUTER QUADRANT OF LEFT BREAST IN FEMALE, ESTROGEN RECEPTOR POSITIVE (H): Primary | ICD-10-CM

## 2022-06-03 DIAGNOSIS — C50.412 MALIGNANT NEOPLASM OF UPPER-OUTER QUADRANT OF LEFT BREAST IN FEMALE, ESTROGEN RECEPTOR POSITIVE (H): Primary | ICD-10-CM

## 2022-06-03 LAB
ALBUMIN SERPL-MCNC: 4 G/DL (ref 3.5–5)
ALP SERPL-CCNC: 63 U/L (ref 45–120)
ALT SERPL W P-5'-P-CCNC: 45 U/L (ref 0–45)
ANION GAP SERPL CALCULATED.3IONS-SCNC: 7 MMOL/L (ref 5–18)
AST SERPL W P-5'-P-CCNC: 35 U/L (ref 0–40)
BASOPHILS # BLD AUTO: 0 10E3/UL (ref 0–0.2)
BASOPHILS NFR BLD AUTO: 1 %
BILIRUB SERPL-MCNC: 0.3 MG/DL (ref 0–1)
BUN SERPL-MCNC: 8 MG/DL (ref 8–22)
CALCIUM SERPL-MCNC: 9.2 MG/DL (ref 8.5–10.5)
CHLORIDE BLD-SCNC: 106 MMOL/L (ref 98–107)
CO2 SERPL-SCNC: 27 MMOL/L (ref 22–31)
CREAT SERPL-MCNC: 1.03 MG/DL (ref 0.6–1.1)
EOSINOPHIL # BLD AUTO: 0.1 10E3/UL (ref 0–0.7)
EOSINOPHIL NFR BLD AUTO: 2 %
ERYTHROCYTE [DISTWIDTH] IN BLOOD BY AUTOMATED COUNT: 12.7 % (ref 10–15)
GFR SERPL CREATININE-BSD FRML MDRD: 73 ML/MIN/1.73M2
GLUCOSE BLD-MCNC: 92 MG/DL (ref 70–125)
HCT VFR BLD AUTO: 37.4 % (ref 35–47)
HGB BLD-MCNC: 12.2 G/DL (ref 11.7–15.7)
IMM GRANULOCYTES # BLD: 0 10E3/UL
IMM GRANULOCYTES NFR BLD: 0 %
LYMPHOCYTES # BLD AUTO: 1.4 10E3/UL (ref 0.8–5.3)
LYMPHOCYTES NFR BLD AUTO: 35 %
MCH RBC QN AUTO: 28.4 PG (ref 26.5–33)
MCHC RBC AUTO-ENTMCNC: 32.6 G/DL (ref 31.5–36.5)
MCV RBC AUTO: 87 FL (ref 78–100)
MONOCYTES # BLD AUTO: 0.4 10E3/UL (ref 0–1.3)
MONOCYTES NFR BLD AUTO: 10 %
NEUTROPHILS # BLD AUTO: 2.1 10E3/UL (ref 1.6–8.3)
NEUTROPHILS NFR BLD AUTO: 52 %
NRBC # BLD AUTO: 0 10E3/UL
NRBC BLD AUTO-RTO: 0 /100
PLATELET # BLD AUTO: 245 10E3/UL (ref 150–450)
POTASSIUM BLD-SCNC: 3.7 MMOL/L (ref 3.5–5)
PROT SERPL-MCNC: 7.3 G/DL (ref 6–8)
RBC # BLD AUTO: 4.29 10E6/UL (ref 3.8–5.2)
SODIUM SERPL-SCNC: 140 MMOL/L (ref 136–145)
WBC # BLD AUTO: 4.1 10E3/UL (ref 4–11)

## 2022-06-03 PROCEDURE — 85014 HEMATOCRIT: CPT | Performed by: INTERNAL MEDICINE

## 2022-06-03 PROCEDURE — 80053 COMPREHEN METABOLIC PANEL: CPT | Performed by: INTERNAL MEDICINE

## 2022-06-03 PROCEDURE — 96413 CHEMO IV INFUSION 1 HR: CPT

## 2022-06-03 PROCEDURE — 258N000003 HC RX IP 258 OP 636: Performed by: INTERNAL MEDICINE

## 2022-06-03 PROCEDURE — 250N000011 HC RX IP 250 OP 636: Performed by: INTERNAL MEDICINE

## 2022-06-03 RX ORDER — HEPARIN SODIUM (PORCINE) LOCK FLUSH IV SOLN 100 UNIT/ML 100 UNIT/ML
5 SOLUTION INTRAVENOUS
Status: DISCONTINUED | OUTPATIENT
Start: 2022-06-03 | End: 2022-06-03 | Stop reason: HOSPADM

## 2022-06-03 RX ADMIN — ADO-TRASTUZUMAB EMTANSINE 298 MG: 20 INJECTION, POWDER, LYOPHILIZED, FOR SOLUTION INTRAVENOUS at 09:28

## 2022-06-03 RX ADMIN — Medication 5 ML: at 10:02

## 2022-06-03 NOTE — PROGRESS NOTES
Pt arrived ambulatory to clinic for Cycle # 10 Day # 1 of her chemotherapy regimen.  Port was accessed using aseptic technique without difficulties with excellent blood return.  Administered Kadcyla per MD order.  Pt tolerated medication well, no s/s of infusion reaction  Port was flushed with NS and Heparin then de-accessed using 2x2 and papertape.  Pt verbalized understanding of plan of care and return to clinic.

## 2022-06-05 ENCOUNTER — NURSE TRIAGE (OUTPATIENT)
Dept: NURSING | Facility: CLINIC | Age: 32
End: 2022-06-05
Payer: COMMERCIAL

## 2022-06-05 NOTE — TELEPHONE ENCOUNTER
"Patient was treated for breast cancer last year. Reconstruction surgery date was 8 months ago. Patient having some pain in her right breast. There is redness, swelling, it is warm to the touch. Patient also feeling \"unwell\" and some some chills last night. This morning states if she is running a fever it is low grade but has not checked temperature. The redness first noted yesterday. This morning it has spread to entire lower breast area.   Protocol recommends see HCP within 4 hours.   Care advice given. UC locations and hours given. Patient may go to the most convenient which is [facility] in [place].   Patient will call back with worsening symptoms.   Conchis Hampton RN   06/05/22 7:46 AM  [facility] Nurse Advisor    COVID 19 Nurse Triage Plan/Patient Instructions    Please be aware that novel coronavirus (COVID-19) may be circulating in the community. If you develop symptoms such as fever, cough, or SOB or if you have concerns about the presence of another infection including coronavirus (COVID-19), please contact your health care provider or visit [email or web address].     Disposition/Instructions    In-Person Visit with provider recommended. Reference Visit Selection Guide.    Thank you for taking steps to prevent the spread of this virus.  o Limit your contact with others.  o Wear a simple mask to cover your cough.  o Wash your hands well and often.    Resources    [facility]: About COVID-19: [email or web address]    CDC: What to Do If You're Sick: www.cdc.gov/coronavirus/2019-ncov/about/steps-when-sick.html    CDC: Ending Home Isolation: www.cdc.gov/coronavirus/2019-ncov/hcp/disposition-in-home-patients.html     CDC: Caring for Someone: www.cdc.gov/coronavirus/2019-ncov/if-you-are-sick/care-for-someone.html     [facility]: Interim Guidance for Hospital Discharge to Home: www.health.[facility].[place]/diseases/coronavirus/hcp/hospdischarge.pdf    [facility] clinical trials " (COVID-19 research studies): clinicalaffairs.[facility]-clinical-trials     Below are the COVID-19 hotlines at the [place] Department of Health ([facility]). Interpreters are available.   o For health questions: Call 456-954-9237 or 1-624.944.8435 (7 a.m. to 7 p.m.)  o For questions about schools and childcare: Call 664-503-0532 or 1-691.648.2860 (7 a.m. to 7 p.m.)                     Reason for Disposition    [1Breast looks infected (spreading redness, feels hot or painful to touch) AND [2] fever    Additional Information    Negative: Chest pain    Negative: Breastfeeding questions about baby    Negative: Breastfeeding questions about mother (breast symptoms or feeling sick)    Negative: Breastfeeding questions about mother's medicines and diet    Negative: Postpartum breast pain and swelling, not breastfeeding    Negative: Small spot, skin growth or mole    Negative: [1] SEVERE breast pain AND [2] fever > 103 F (39.4 C)    Negative: Patient sounds very sick or weak to the triager    Protocols used: BREAST SYMPTOMS-A-AH

## 2022-06-06 ENCOUNTER — TELEPHONE (OUTPATIENT)
Dept: SURGERY | Facility: CLINIC | Age: 32
End: 2022-06-06
Payer: COMMERCIAL

## 2022-06-06 NOTE — ADDENDUM NOTE
Encounter addended by: Lara Galvez, PT on: 6/6/2022 7:53 AM   Actions taken: Episode resolved, Clinical Note Signed

## 2022-06-06 NOTE — TELEPHONE ENCOUNTER
Patient sent a [employer] message to Dr. Newman regarding a new cellulitis in her breast.  Per Dr. Newman's request, called patient and told her she needs to call Dr. Álvarez's office right away to let them know what is going on and see someone right away.  Patient states she will call.  Support provided.

## 2022-06-06 NOTE — PROGRESS NOTES
[facility] Rehabilitation Service    Outpatient Physical Therapy Discharge Note  Patient: Veronica Nieves  : 1990    Beginning/End Dates of Reporting Period:  22 to 22    Referring Provider: Marleen Sherman Diagnosis: Decreased left shoulder ROM     Client Self Report: See eval    Objective Measurements:  See initial eval    Outcome Measures (most recent score):  See initial eval    Goals:  See initial eval    Plan:  Discharge from therapy.    Discharge:    Reason for Discharge: Patient has failed to schedule further appointments.        Lara Galvez, PT, DPT, CLT  2022

## 2022-06-09 ENCOUNTER — ANESTHESIA EVENT (OUTPATIENT)
Dept: SURGERY | Facility: HOSPITAL | Age: 32
End: 2022-06-09
Payer: COMMERCIAL

## 2022-06-10 ENCOUNTER — ANESTHESIA (OUTPATIENT)
Dept: SURGERY | Facility: HOSPITAL | Age: 32
End: 2022-06-10
Payer: COMMERCIAL

## 2022-06-10 ENCOUNTER — HOSPITAL ENCOUNTER (OUTPATIENT)
Facility: HOSPITAL | Age: 32
Discharge: HOME | End: 2022-06-10
Attending: PLASTIC SURGERY | Admitting: PLASTIC SURGERY
Payer: COMMERCIAL

## 2022-06-10 VITALS
TEMPERATURE: 97.8 F | HEART RATE: 61 BPM | OXYGEN SATURATION: 97 % | WEIGHT: 176.3 LBS | BODY MASS INDEX: 28.47 KG/M2 | SYSTOLIC BLOOD PRESSURE: 123 MMHG | RESPIRATION RATE: 20 BRPM | DIASTOLIC BLOOD PRESSURE: 71 MMHG

## 2022-06-10 DIAGNOSIS — Z17.0 MALIGNANT NEOPLASM OF UPPER-OUTER QUADRANT OF LEFT BREAST IN FEMALE, ESTROGEN RECEPTOR POSITIVE (H): Primary | ICD-10-CM

## 2022-06-10 DIAGNOSIS — C50.412 MALIGNANT NEOPLASM OF UPPER-OUTER QUADRANT OF LEFT BREAST IN FEMALE, ESTROGEN RECEPTOR POSITIVE (H): Primary | ICD-10-CM

## 2022-06-10 LAB
GRAM STAIN RESULT: NORMAL
GRAM STAIN RESULT: NORMAL

## 2022-06-10 PROCEDURE — 999N000141 HC STATISTIC PRE-PROCEDURE NURSING ASSESSMENT: Performed by: PLASTIC SURGERY

## 2022-06-10 PROCEDURE — 710N000009 HC RECOVERY PHASE 1, LEVEL 1, PER MIN: Performed by: PLASTIC SURGERY

## 2022-06-10 PROCEDURE — 272N000001 HC OR GENERAL SUPPLY STERILE: Performed by: PLASTIC SURGERY

## 2022-06-10 PROCEDURE — 250N000009 HC RX 250: Performed by: PLASTIC SURGERY

## 2022-06-10 PROCEDURE — 360N000076 HC SURGERY LEVEL 3, PER MIN: Performed by: PLASTIC SURGERY

## 2022-06-10 PROCEDURE — 250N000011 HC RX IP 250 OP 636: Performed by: ANESTHESIOLOGY

## 2022-06-10 PROCEDURE — 250N000025 HC SEVOFLURANE, PER MIN: Performed by: PLASTIC SURGERY

## 2022-06-10 PROCEDURE — 250N000013 HC RX MED GY IP 250 OP 250 PS 637: Performed by: ANESTHESIOLOGY

## 2022-06-10 PROCEDURE — 370N000017 HC ANESTHESIA TECHNICAL FEE, PER MIN: Performed by: PLASTIC SURGERY

## 2022-06-10 PROCEDURE — 258N000003 HC RX IP 258 OP 636: Performed by: NURSE ANESTHETIST, CERTIFIED REGISTERED

## 2022-06-10 PROCEDURE — 250N000009 HC RX 250: Performed by: NURSE ANESTHETIST, CERTIFIED REGISTERED

## 2022-06-10 PROCEDURE — 87070 CULTURE OTHR SPECIMN AEROBIC: CPT | Performed by: PLASTIC SURGERY

## 2022-06-10 PROCEDURE — 87205 SMEAR GRAM STAIN: CPT | Performed by: PLASTIC SURGERY

## 2022-06-10 PROCEDURE — 250N000011 HC RX IP 250 OP 636: Performed by: PLASTIC SURGERY

## 2022-06-10 PROCEDURE — 258N000003 HC RX IP 258 OP 636: Performed by: ANESTHESIOLOGY

## 2022-06-10 PROCEDURE — 710N000012 HC RECOVERY PHASE 2, PER MINUTE: Performed by: PLASTIC SURGERY

## 2022-06-10 PROCEDURE — 87075 CULTR BACTERIA EXCEPT BLOOD: CPT | Performed by: PLASTIC SURGERY

## 2022-06-10 PROCEDURE — 250N000011 HC RX IP 250 OP 636: Performed by: NURSE ANESTHETIST, CERTIFIED REGISTERED

## 2022-06-10 RX ORDER — ONDANSETRON 2 MG/ML
4 INJECTION INTRAMUSCULAR; INTRAVENOUS EVERY 30 MIN PRN
Status: DISCONTINUED | OUTPATIENT
Start: 2022-06-10 | End: 2022-06-10 | Stop reason: HOSPADM

## 2022-06-10 RX ORDER — DEXAMETHASONE SODIUM PHOSPHATE 10 MG/ML
INJECTION, SOLUTION INTRAMUSCULAR; INTRAVENOUS PRN
Status: DISCONTINUED | OUTPATIENT
Start: 2022-06-10 | End: 2022-06-10

## 2022-06-10 RX ORDER — FENTANYL CITRATE 50 UG/ML
50 INJECTION, SOLUTION INTRAMUSCULAR; INTRAVENOUS EVERY 5 MIN PRN
Status: DISCONTINUED | OUTPATIENT
Start: 2022-06-10 | End: 2022-06-10 | Stop reason: HOSPADM

## 2022-06-10 RX ORDER — CEFAZOLIN SODIUM/WATER 2 G/20 ML
SYRINGE (ML) INTRAVENOUS
Status: DISCONTINUED
Start: 2022-06-10 | End: 2022-06-10 | Stop reason: HOSPADM

## 2022-06-10 RX ORDER — HYDROMORPHONE HYDROCHLORIDE 1 MG/ML
0.4 INJECTION, SOLUTION INTRAMUSCULAR; INTRAVENOUS; SUBCUTANEOUS EVERY 5 MIN PRN
Status: DISCONTINUED | OUTPATIENT
Start: 2022-06-10 | End: 2022-06-10 | Stop reason: HOSPADM

## 2022-06-10 RX ORDER — OXYCODONE HYDROCHLORIDE 5 MG/1
5-10 TABLET ORAL EVERY 6 HOURS PRN
Qty: 10 TABLET | Refills: 0 | Status: SHIPPED | OUTPATIENT
Start: 2022-06-10 | End: 2022-09-22

## 2022-06-10 RX ORDER — OXYCODONE HYDROCHLORIDE 5 MG/1
5 TABLET ORAL EVERY 4 HOURS PRN
Status: DISCONTINUED | OUTPATIENT
Start: 2022-06-10 | End: 2022-06-10 | Stop reason: HOSPADM

## 2022-06-10 RX ORDER — FENTANYL CITRATE 50 UG/ML
INJECTION, SOLUTION INTRAMUSCULAR; INTRAVENOUS PRN
Status: DISCONTINUED | OUTPATIENT
Start: 2022-06-10 | End: 2022-06-10

## 2022-06-10 RX ORDER — ACETAMINOPHEN 325 MG/1
975 TABLET ORAL ONCE
Status: COMPLETED | OUTPATIENT
Start: 2022-06-10 | End: 2022-06-10

## 2022-06-10 RX ORDER — SODIUM CHLORIDE, SODIUM LACTATE, POTASSIUM CHLORIDE, CALCIUM CHLORIDE 600; 310; 30; 20 MG/100ML; MG/100ML; MG/100ML; MG/100ML
INJECTION, SOLUTION INTRAVENOUS CONTINUOUS
Status: DISCONTINUED | OUTPATIENT
Start: 2022-06-10 | End: 2022-06-10 | Stop reason: HOSPADM

## 2022-06-10 RX ORDER — GLYCOPYRROLATE 0.2 MG/ML
INJECTION, SOLUTION INTRAMUSCULAR; INTRAVENOUS PRN
Status: DISCONTINUED | OUTPATIENT
Start: 2022-06-10 | End: 2022-06-10

## 2022-06-10 RX ORDER — NALOXONE HYDROCHLORIDE 1 MG/ML
0.2 INJECTION INTRAMUSCULAR; INTRAVENOUS; SUBCUTANEOUS
Status: DISCONTINUED | OUTPATIENT
Start: 2022-06-10 | End: 2022-06-10 | Stop reason: HOSPADM

## 2022-06-10 RX ORDER — MEPERIDINE HYDROCHLORIDE 25 MG/ML
12.5 INJECTION INTRAMUSCULAR; INTRAVENOUS; SUBCUTANEOUS
Status: DISCONTINUED | OUTPATIENT
Start: 2022-06-10 | End: 2022-06-10 | Stop reason: HOSPADM

## 2022-06-10 RX ORDER — ONDANSETRON 4 MG/1
4 TABLET, ORALLY DISINTEGRATING ORAL EVERY 30 MIN PRN
Status: DISCONTINUED | OUTPATIENT
Start: 2022-06-10 | End: 2022-06-10 | Stop reason: HOSPADM

## 2022-06-10 RX ORDER — HALOPERIDOL 5 MG/ML
1 INJECTION INTRAMUSCULAR
Status: DISCONTINUED | OUTPATIENT
Start: 2022-06-10 | End: 2022-06-10 | Stop reason: HOSPADM

## 2022-06-10 RX ORDER — LIDOCAINE 40 MG/G
CREAM TOPICAL
Status: DISCONTINUED | OUTPATIENT
Start: 2022-06-10 | End: 2022-06-10 | Stop reason: HOSPADM

## 2022-06-10 RX ORDER — ONDANSETRON 2 MG/ML
INJECTION INTRAMUSCULAR; INTRAVENOUS PRN
Status: DISCONTINUED | OUTPATIENT
Start: 2022-06-10 | End: 2022-06-10

## 2022-06-10 RX ORDER — FENTANYL CITRATE 50 UG/ML
25 INJECTION, SOLUTION INTRAMUSCULAR; INTRAVENOUS
Status: DISCONTINUED | OUTPATIENT
Start: 2022-06-10 | End: 2022-06-10 | Stop reason: HOSPADM

## 2022-06-10 RX ORDER — PROPOFOL 10 MG/ML
INJECTION, EMULSION INTRAVENOUS PRN
Status: DISCONTINUED | OUTPATIENT
Start: 2022-06-10 | End: 2022-06-10

## 2022-06-10 RX ORDER — DIAZEPAM 10 MG/2ML
2.5 INJECTION, SOLUTION INTRAMUSCULAR; INTRAVENOUS
Status: DISCONTINUED | OUTPATIENT
Start: 2022-06-10 | End: 2022-06-10 | Stop reason: HOSPADM

## 2022-06-10 RX ORDER — NALOXONE HYDROCHLORIDE 1 MG/ML
0.4 INJECTION INTRAMUSCULAR; INTRAVENOUS; SUBCUTANEOUS
Status: DISCONTINUED | OUTPATIENT
Start: 2022-06-10 | End: 2022-06-10 | Stop reason: HOSPADM

## 2022-06-10 RX ORDER — KETOROLAC TROMETHAMINE 30 MG/ML
INJECTION, SOLUTION INTRAMUSCULAR; INTRAVENOUS PRN
Status: DISCONTINUED | OUTPATIENT
Start: 2022-06-10 | End: 2022-06-10

## 2022-06-10 RX ORDER — LIDOCAINE HYDROCHLORIDE 10 MG/ML
INJECTION, SOLUTION INFILTRATION; PERINEURAL PRN
Status: DISCONTINUED | OUTPATIENT
Start: 2022-06-10 | End: 2022-06-10

## 2022-06-10 RX ADMIN — SODIUM CHLORIDE, POTASSIUM CHLORIDE, SODIUM LACTATE AND CALCIUM CHLORIDE: 600; 310; 30; 20 INJECTION, SOLUTION INTRAVENOUS at 08:35

## 2022-06-10 RX ADMIN — PROPOFOL 20 MG: 10 INJECTION, EMULSION INTRAVENOUS at 07:30

## 2022-06-10 RX ADMIN — SODIUM CHLORIDE, POTASSIUM CHLORIDE, SODIUM LACTATE AND CALCIUM CHLORIDE: 600; 310; 30; 20 INJECTION, SOLUTION INTRAVENOUS at 06:14

## 2022-06-10 RX ADMIN — GLYCOPYRROLATE 0.2 MG: 0.2 INJECTION, SOLUTION INTRAMUSCULAR; INTRAVENOUS at 07:33

## 2022-06-10 RX ADMIN — OXYCODONE HYDROCHLORIDE 5 MG: 5 TABLET ORAL at 08:59

## 2022-06-10 RX ADMIN — PHENYLEPHRINE HYDROCHLORIDE 100 MCG: 10 INJECTION INTRAVENOUS at 07:38

## 2022-06-10 RX ADMIN — FENTANYL CITRATE 50 MCG: 50 INJECTION, SOLUTION INTRAMUSCULAR; INTRAVENOUS at 08:26

## 2022-06-10 RX ADMIN — ONDANSETRON 4 MG: 2 INJECTION INTRAMUSCULAR; INTRAVENOUS at 07:30

## 2022-06-10 RX ADMIN — KETOROLAC TROMETHAMINE 15 MG: 30 INJECTION, SOLUTION INTRAMUSCULAR at 07:55

## 2022-06-10 RX ADMIN — MIDAZOLAM 2 MG: 1 INJECTION INTRAMUSCULAR; INTRAVENOUS at 07:17

## 2022-06-10 RX ADMIN — FENTANYL CITRATE 100 MCG: 50 INJECTION, SOLUTION INTRAMUSCULAR; INTRAVENOUS at 07:22

## 2022-06-10 RX ADMIN — PROPOFOL 200 MG: 10 INJECTION, EMULSION INTRAVENOUS at 07:22

## 2022-06-10 RX ADMIN — PHENYLEPHRINE HYDROCHLORIDE 100 MCG: 10 INJECTION INTRAVENOUS at 07:29

## 2022-06-10 RX ADMIN — PHENYLEPHRINE HYDROCHLORIDE 100 MCG: 10 INJECTION INTRAVENOUS at 07:35

## 2022-06-10 RX ADMIN — LIDOCAINE HYDROCHLORIDE 30 MG: 10 INJECTION, SOLUTION INFILTRATION; PERINEURAL at 07:22

## 2022-06-10 RX ADMIN — ACETAMINOPHEN 975 MG: 325 TABLET, FILM COATED ORAL at 06:21

## 2022-06-10 RX ADMIN — DEXAMETHASONE SODIUM PHOSPHATE 4 MG: 10 INJECTION, SOLUTION INTRAMUSCULAR; INTRAVENOUS at 07:30

## 2022-06-10 RX ADMIN — GENTAMICIN SULFATE 2 ML: 40 INJECTION, SOLUTION INTRAMUSCULAR; INTRAVENOUS at 07:22

## 2022-06-10 RX ADMIN — PHENYLEPHRINE HYDROCHLORIDE 100 MCG: 10 INJECTION INTRAVENOUS at 07:32

## 2022-06-10 NOTE — BRIEF OP NOTE
[facility]    Brief Operative Note    Pre-operative diagnosis: Malignant neoplasm of breast (female) (H) [C50.919]  Post-operative diagnosis Right breast infection    Procedure: Procedure(s):  REMOVAL MAMMARY IMPLANT RIGHT  Surgeon: Surgeon(s) and Role:     * Shanice Álvarez MD - Primary      * Logan Mcghee PA-C - Assisting  Anesthesia: General   Estimated Blood Loss: 5 mL    Drains:  Right breast Rafael Peraza drain  Specimens:   ID Type Source Tests Collected by Time Destination   A : RIGHT BREAST FLUID CULTURE  Body fluid, unsp Breast, Right ANAEROBIC BACTERIAL CULTURE ROUTINE, GRAM STAIN, AEROBIC BACTERIAL CULTURE ROUTINE Shanice Álvarez MD 6/10/2022  7:50 AM    Right breast implant removed    Findings:   Cloudy fluid right breast.  Complications: None  Implants: * No implants in log *

## 2022-06-10 NOTE — INTERVAL H&P NOTE
"I have reviewed the surgical (or preoperative) H&P that is linked to this encounter, and examined the patient. There are no significant changes    Clinical Conditions Present on Arrival:  Clinically Significant Risk Factors Present on Admission                   # Overweight: Estimated body mass index is 28.47 kg/m  as calculated from the following:    Height as of 5/13/22: 1.676 m (5' 5.98\").    Weight as of this encounter: 80 kg (176 lb 4.8 oz).       "

## 2022-06-10 NOTE — ANESTHESIA POSTPROCEDURE EVALUATION
Patient: Veronica Nieves    Procedure: Procedure(s):  REMOVAL MAMMARY IMPLANT RIGHT       Anesthesia Type:  General    Note:  Disposition: Outpatient   Postop Pain Control: Uneventful            Sign Out: Well controlled pain   PONV: No   Neuro/Psych: Uneventful            Sign Out: Acceptable/Baseline neuro status   Airway/Respiratory: Uneventful            Sign Out: Acceptable/Baseline resp. status   CV/Hemodynamics: Uneventful            Sign Out: Acceptable CV status; No obvious hypovolemia; No obvious fluid overload   Other NRE: NONE   DID A NON-ROUTINE EVENT OCCUR? No           Last vitals:  Vitals Value Taken Time   /64 06/10/22 0900   Temp 36.6  C (97.8  F) 06/10/22 0900   Pulse 63 06/10/22 0909   Resp 20 06/10/22 0909   SpO2 96 % 06/10/22 0909   Vitals shown include unvalidated device data.    Electronically Signed By: Jet Yee MD  Tiana 10, 2022  9:40 AM

## 2022-06-10 NOTE — ANESTHESIA CARE TRANSFER NOTE
Patient: Veronica Nieves    Procedure: Procedure(s):  REMOVAL MAMMARY IMPLANT RIGHT       Diagnosis: Malignant neoplasm of breast (female) (H) [C50.919]  Diagnosis Additional Information: No value filed.    Anesthesia Type:   General     Note:    Oropharynx: oropharynx clear of all foreign objects  Level of Consciousness: awake  Oxygen Supplementation: face mask  Level of Supplemental Oxygen (L/min / FiO2): 10  Independent Airway: airway patency satisfactory and stable  Dentition: dentition unchanged  Vital Signs Stable: post-procedure vital signs reviewed and stable  Report to RN Given: handoff report given  Patient transferred to: PACU    Handoff Report: Identifed the Patient, Identified the Reponsible Provider, Reviewed the pertinent medical history, Discussed the surgical course, Reviewed Intra-OP anesthesia mangement and issues during anesthesia, Set expectations for post-procedure period and Allowed opportunity for questions and acknowledgement of understanding      Vitals:  Vitals Value Taken Time   BP     Temp     Pulse 83 06/10/22 0805   Resp 20 06/10/22 0805   SpO2 100 % 06/10/22 0805   Vitals shown include unvalidated device data.    Electronically Signed By: ROSEMARY JON CRNA  Tiana 10, 2022  8:07 AM

## 2022-06-10 NOTE — OP NOTE
Procedure Date: 06/10/2022    PREOPERATIVE DIAGNOSIS:  Infected right breast implant.    POSTOPERATIVE DIAGNOSIS:  Infected right breast implant.    INDICATIONS FOR PROCEDURE:  This is a 32-year-old female who had bilateral mastectomies, reconstruction with implants.  Unfortunately, she is still on chemotherapy.  Following an infusion, she developed redness and pain and fever in her right breast consistent with an infection.  She has failed outpatient antibiotic management.  We discussed removing the implant.  Risks and benefits have been reviewed.  She understands and agrees and wishes to proceed.    OPERATING SURGEON:  Shanice Álvarez MD     FIRST ASSISTANT:  MARITZA Cardoso; Darlene assisted in retraction sutures to decrease and facilitate intraoperative time.  There were no residents available.    PROCEDURE PERFORMED:  Removal of right breast implant.    DESCRIPTION OF PROCEDURE:  The patient was identified and marked in the preoperative area, taken to the operating room.  After an adequate level of anesthesia was obtained, the patient was prepped and draped in sterile fashion.  We opened the previous incision, dissection carried down.  We identified that there was a substantial amount of slightly murky looking fluid within the space.  We irrigated out the pocket.  The AlloDerm was not incorporated.  We did excise the AlloDerm, irrigated it copiously our with antibiotic irrigation.  Hemostasis was obtained. We proceeded to place a 15 round drain.  We closed with 3-0 PDS deep tissue, 3-0 Monocryl deep dermal, and a 4-0 subcuticular.  Prineo, a soft dressing, and a bra were placed.  The patient was awakened and taken to recovery room.      ESTIMATED BLOOD LOSS:  5 mL.    BLOOD PRODUCTS GIVEN:  None.    DRAINS AND PACKS:  Correct.    Shanice Álvarez MD        D: 06/10/2022   T: 06/10/2022   MT: choco    Name:     TONIO MOJICA  MRN:      [id number]        Account:        [id number]   :       1990           Procedure Date: 06/10/2022     Document: [id number]

## 2022-06-10 NOTE — ANESTHESIA PREPROCEDURE EVALUATION
Anesthesia Pre-Procedure Evaluation    Patient: Veronica Nieves   MRN: [id number] : 1990        Procedure : Procedure(s):  REMOVAL MAMMARY IMPLANT RIGHT          Past Medical History:   Diagnosis Date     Abnormal Pap smear of cervix     age 13, subsequent pap smears normal     Anxiety      Breast cancer (H) 2021    Left     Depression       Past Surgical History:   Procedure Laterality Date     BIOPSY NODE SENTINEL Left 10/7/2021    Procedure: left sentinel lymph node biopsy;  Surgeon: Audrey Newman MD;  Location: [facility] [place]     MASTECTOMY MODIFIED RADICAL Bilateral 10/7/2021    Procedure: Bilateral mastectomies,;  Surgeon: Audrey Newman MD;  Location: [facility] [place]     OTHER SURGICAL HISTORY      no surgical history     SD INSJ PRPH CTR VAD W/SUBQ PORT AGE 5 YR/> N/A 2021    Procedure: Port Placement;  Surgeon: Audrey Newman MD;  Location: [facility];  Service: General     RECONSTRUCT BREAST, IMPLANT PROSTHESIS, COMBINED Bilateral 10/7/2021    Procedure: BILATERAL BREAST RECONSTRUCTION WITH IMPLANTS;  Surgeon: Shanice Álvarez MD;  Location: [facility] BREAST CORE BIOPSY LEFT Left 2021     WISDOM TOOTH EXTRACTION        No Known Allergies   Social History     Tobacco Use     Smoking status: Never Smoker     Smokeless tobacco: Never Used   Substance Use Topics     Alcohol use: Yes     Alcohol/week: 6.0 - 7.0 standard drinks      Wt Readings from Last 1 Encounters:   06/10/22 80 kg (176 lb 4.8 oz)        Anesthesia Evaluation   Pt has had prior anesthetic.         ROS/MED HX  ENT/Pulmonary:  - neg pulmonary ROS     Neurologic:  - neg neurologic ROS     Cardiovascular:  - neg cardiovascular ROS     METS/Exercise Tolerance:     Hematologic:  - neg hematologic  ROS     Musculoskeletal:  - neg musculoskeletal ROS     GI/Hepatic:  - neg GI/hepatic ROS     Renal/Genitourinary:  - neg Renal ROS     Endo:  - neg endo ROS   (+) Obesity,      Psychiatric/Substance Use:  - neg psychiatric ROS     Infectious Disease:  - neg infectious disease ROS     Malignancy:   (+) Malignancy,     Other:  - neg other ROS          Physical Exam    Airway  airway exam normal      Mallampati: I   TM distance: > 3 FB   Neck ROM: full   Mouth opening: > 3 cm    Respiratory Devices and Support         Dental  no notable dental history         Cardiovascular   cardiovascular exam normal       Rhythm and rate: regular and normal     Pulmonary   pulmonary exam normal        breath sounds clear to auscultation           OUTSIDE LABS:  CBC:   Lab Results   Component Value Date    WBC 4.1 06/03/2022    WBC 5.3 05/13/2022    HGB 12.2 06/03/2022    HGB 12.4 05/13/2022    HCT 37.4 06/03/2022    HCT 38.2 05/13/2022     06/03/2022     05/13/2022     BMP:   Lab Results   Component Value Date     06/03/2022     05/13/2022    POTASSIUM 3.7 06/03/2022    POTASSIUM 4.2 05/13/2022    CHLORIDE 106 06/03/2022    CHLORIDE 105 05/13/2022    CO2 27 06/03/2022    CO2 28 05/13/2022    BUN 8 06/03/2022    BUN 19 05/13/2022    CR 1.03 06/03/2022    CR 1.06 05/13/2022    GLC 92 06/03/2022    GLC 93 05/13/2022     COAGS: No results found for: PTT, INR, FIBR  POC: No results found for: BGM, HCG, HCGS  HEPATIC:   Lab Results   Component Value Date    ALBUMIN 4.0 06/03/2022    PROTTOTAL 7.3 06/03/2022    ALT 45 06/03/2022    AST 35 06/03/2022    ALKPHOS 63 06/03/2022    BILITOTAL 0.3 06/03/2022     OTHER:   Lab Results   Component Value Date    BURT 9.2 06/03/2022       Anesthesia Plan    ASA Status:  2   NPO Status:  NPO Appropriate    Anesthesia Type: General.     - Airway: LMA   Induction: Intravenous, Propofol.   Maintenance: Balanced.        Consents    Anesthesia Plan(s) and associated risks, benefits, and realistic alternatives discussed. Questions answered and patient/representative(s) expressed understanding.    - Discussed:     - Discussed with:  Patient      - Extended  Intubation/Ventilatory Support Discussed: Yes.      - Patient is DNR/DNI Status: No    Use of blood products discussed: Yes.     - Discussed with: Patient.     Postoperative Care    Pain management: Multi-modal analgesia.   PONV prophylaxis: Ondansetron (or other 5HT-3), Dexamethasone or Solumedrol     Comments:                Jet Yee MD

## 2022-06-10 NOTE — ANESTHESIA PROCEDURE NOTES
Airway    Staff -        CRNA: Rosmery Spivey APRN CRNA       Performed By: CRNAIndications and Patient Condition       Indications for airway management: prashant-procedural       Induction type:intravenous       Mask difficulty assessment: 1 - vent by mask    Final Airway Details       Final airway type: supraglottic airway    Supraglottic Airway Details        Type: LMA       Brand: Ambu AuraGain       LMA size: 4    Post intubation assessment        Placement verified by: capnometry, equal breath sounds and chest rise        Number of attempts at approach: 2       Number of other approaches attempted: 0       Secured with: silk tape       Ease of procedure: easy       Dentition: Intact and Unchanged

## 2022-06-10 NOTE — DISCHARGE INSTRUCTIONS
Dr. Álvarez s Instructions    Medication:  -Antibiotic - take doxycycline and rifampin by mouth as prescribed  -Pain Pill - Oxycodone as needed for pain.    1.Do not drink alcohol or drive while taking pain pills.    2.Make sure you have some food in your stomach before you take the pain pill.  Taking the pill on an empty stomach can cause nausea.    3.Take only for severe pain    4.Ibuprofen 400-600 mg staggered with Tylenol (acetaminophen) 500 mg every 6 hours for mild pain     Activity:  -Ice to incision and breast as tolerated for the first 24 hours.  -Walking and stairs are ok immediately.  -No Driving while taking pain pills.  -No Lifting more than five-ten pounds for the three weeks on surgical side.  -Resume exercise gently at three weeks and more vigorously at four weeks  -Wear a support/compression bra all the time except for showers    Bathing:  -You may shower in 48 hours, gently wash your breast and pat dry avoid prolonged soaking.  -No baths for about 4 weeks or until incisions are healed.    Expectations:  -You will have swelling and bruising especially under your arms and above the breasts   -You will have drainage tubes.     Drains:  -Strip drains, empty and record the drain output twice daily.  -Call the clinic when drain output less than 30cc in 24 hours to have a nurse remove the drain.    Follow-up:  -Your follow-up appointment should be already made - see the appointment reminder on your discharge instructions    Questions:  -Call our office for any questions: [place] [phone], [place] [phone]  -If it is after hours, call the Nurse CareLine [phone]  -Call immediately if you have any of the followin.Fever > 101.5  2.Breast that is swelling much larger than the other  3.Redness extending from your incision  4.Pain that is not relieved by medications

## 2022-06-10 NOTE — BRIEF OP NOTE
[facility]    Brief Operative Note    Pre-operative diagnosis: Malignant neoplasm of breast (female) (H) [C50.919]  Post-operative diagnosis Same as pre-operative diagnosis    Procedure: Procedure(s):  REMOVAL MAMMARY IMPLANT RIGHT  Surgeon: Surgeon(s) and Role:     * Shanice Álvarez MD - Primary  Anesthesia: General   Estimated Blood Loss: Less than 10 ml    Drains: Rafael-Peraza  Specimens: * No specimens in log *  Findings:   Evidence of infection: deep space infection.  Complications: None.  Implants: * No implants in log *

## 2022-06-15 LAB — BACTERIA FLD CULT: NO GROWTH

## 2022-06-17 ENCOUNTER — INFUSION THERAPY VISIT (OUTPATIENT)
Dept: INFUSION THERAPY | Facility: HOSPITAL | Age: 32
End: 2022-06-17
Attending: INTERNAL MEDICINE
Payer: COMMERCIAL

## 2022-06-17 VITALS
RESPIRATION RATE: 16 BRPM | OXYGEN SATURATION: 97 % | TEMPERATURE: 98.5 F | DIASTOLIC BLOOD PRESSURE: 75 MMHG | HEART RATE: 72 BPM | SYSTOLIC BLOOD PRESSURE: 109 MMHG

## 2022-06-17 DIAGNOSIS — Z17.0 MALIGNANT NEOPLASM OF UPPER-OUTER QUADRANT OF LEFT BREAST IN FEMALE, ESTROGEN RECEPTOR POSITIVE (H): Primary | ICD-10-CM

## 2022-06-17 DIAGNOSIS — C50.412 MALIGNANT NEOPLASM OF UPPER-OUTER QUADRANT OF LEFT BREAST IN FEMALE, ESTROGEN RECEPTOR POSITIVE (H): Primary | ICD-10-CM

## 2022-06-17 LAB — BACTERIA FLD CULT: NORMAL

## 2022-06-17 PROCEDURE — 96402 CHEMO HORMON ANTINEOPL SQ/IM: CPT

## 2022-06-17 PROCEDURE — 250N000011 HC RX IP 250 OP 636: Performed by: INTERNAL MEDICINE

## 2022-06-17 RX ADMIN — GOSERELIN ACETATE 3.6 MG: 3.6 IMPLANT SUBCUTANEOUS at 09:17

## 2022-06-17 NOTE — PROGRESS NOTES
Pt arrived ambulatory to clinic for subcutaneous Zoladex injection.  Pt applied ice to site to help with pain during injection.  Administered subcutaneous Zoladex injection into RLQ of ABD per MD order.  Pt tolerated procedure well, no s/s of bleeding or swelling at site.  Pt verbalized understanding of plan of care and return to clinic.

## 2022-06-19 DIAGNOSIS — F32.A MILD DEPRESSION: ICD-10-CM

## 2022-06-20 RX ORDER — CITALOPRAM HYDROBROMIDE 40 MG/1
TABLET ORAL
Qty: 30 TABLET | Refills: 11 | Status: SHIPPED | OUTPATIENT
Start: 2022-06-20 | End: 2023-03-27

## 2022-06-22 DIAGNOSIS — Z17.0 MALIGNANT NEOPLASM OF UPPER-OUTER QUADRANT OF LEFT BREAST IN FEMALE, ESTROGEN RECEPTOR POSITIVE (H): ICD-10-CM

## 2022-06-22 DIAGNOSIS — C50.412 MALIGNANT NEOPLASM OF UPPER-OUTER QUADRANT OF LEFT BREAST IN FEMALE, ESTROGEN RECEPTOR POSITIVE (H): ICD-10-CM

## 2022-06-22 RX ORDER — TAMOXIFEN CITRATE 20 MG/1
20 TABLET ORAL DAILY
Qty: 90 TABLET | Refills: 1 | Status: SHIPPED | OUTPATIENT
Start: 2022-06-22 | End: 2022-07-13

## 2022-06-23 ENCOUNTER — MYC MEDICAL ADVICE (OUTPATIENT)
Dept: ONCOLOGY | Facility: HOSPITAL | Age: 32
End: 2022-06-23

## 2022-06-24 ENCOUNTER — LAB (OUTPATIENT)
Dept: INFUSION THERAPY | Facility: HOSPITAL | Age: 32
End: 2022-06-24
Attending: INTERNAL MEDICINE
Payer: COMMERCIAL

## 2022-06-24 VITALS
DIASTOLIC BLOOD PRESSURE: 72 MMHG | HEART RATE: 76 BPM | TEMPERATURE: 98.8 F | SYSTOLIC BLOOD PRESSURE: 124 MMHG | RESPIRATION RATE: 16 BRPM | OXYGEN SATURATION: 98 %

## 2022-06-24 DIAGNOSIS — C50.412 MALIGNANT NEOPLASM OF UPPER-OUTER QUADRANT OF LEFT BREAST IN FEMALE, ESTROGEN RECEPTOR POSITIVE (H): Primary | ICD-10-CM

## 2022-06-24 DIAGNOSIS — Z17.0 MALIGNANT NEOPLASM OF UPPER-OUTER QUADRANT OF LEFT BREAST IN FEMALE, ESTROGEN RECEPTOR POSITIVE (H): Primary | ICD-10-CM

## 2022-06-24 LAB
ALBUMIN SERPL-MCNC: 3.7 G/DL (ref 3.5–5)
ALP SERPL-CCNC: 61 U/L (ref 45–120)
ALT SERPL W P-5'-P-CCNC: 24 U/L (ref 0–45)
ANION GAP SERPL CALCULATED.3IONS-SCNC: 8 MMOL/L (ref 5–18)
AST SERPL W P-5'-P-CCNC: 29 U/L (ref 0–40)
BASOPHILS # BLD AUTO: 0 10E3/UL (ref 0–0.2)
BASOPHILS NFR BLD AUTO: 0 %
BILIRUB SERPL-MCNC: 0.2 MG/DL (ref 0–1)
BUN SERPL-MCNC: 15 MG/DL (ref 8–22)
CALCIUM SERPL-MCNC: 9.1 MG/DL (ref 8.5–10.5)
CHLORIDE BLD-SCNC: 105 MMOL/L (ref 98–107)
CO2 SERPL-SCNC: 27 MMOL/L (ref 22–31)
CREAT SERPL-MCNC: 1 MG/DL (ref 0.6–1.1)
EOSINOPHIL # BLD AUTO: 0.2 10E3/UL (ref 0–0.7)
EOSINOPHIL NFR BLD AUTO: 3 %
ERYTHROCYTE [DISTWIDTH] IN BLOOD BY AUTOMATED COUNT: 13.5 % (ref 10–15)
GFR SERPL CREATININE-BSD FRML MDRD: 76 ML/MIN/1.73M2
GLUCOSE BLD-MCNC: 87 MG/DL (ref 70–125)
HCT VFR BLD AUTO: 36.8 % (ref 35–47)
HGB BLD-MCNC: 11.8 G/DL (ref 11.7–15.7)
IMM GRANULOCYTES # BLD: 0 10E3/UL
IMM GRANULOCYTES NFR BLD: 0 %
LYMPHOCYTES # BLD AUTO: 1.7 10E3/UL (ref 0.8–5.3)
LYMPHOCYTES NFR BLD AUTO: 31 %
MCH RBC QN AUTO: 27.8 PG (ref 26.5–33)
MCHC RBC AUTO-ENTMCNC: 32.1 G/DL (ref 31.5–36.5)
MCV RBC AUTO: 87 FL (ref 78–100)
MONOCYTES # BLD AUTO: 0.5 10E3/UL (ref 0–1.3)
MONOCYTES NFR BLD AUTO: 10 %
NEUTROPHILS # BLD AUTO: 3 10E3/UL (ref 1.6–8.3)
NEUTROPHILS NFR BLD AUTO: 56 %
NRBC # BLD AUTO: 0 10E3/UL
NRBC BLD AUTO-RTO: 0 /100
PLATELET # BLD AUTO: 321 10E3/UL (ref 150–450)
POTASSIUM BLD-SCNC: 3.7 MMOL/L (ref 3.5–5)
PROT SERPL-MCNC: 7.4 G/DL (ref 6–8)
RBC # BLD AUTO: 4.24 10E6/UL (ref 3.8–5.2)
SODIUM SERPL-SCNC: 140 MMOL/L (ref 136–145)
WBC # BLD AUTO: 5.4 10E3/UL (ref 4–11)

## 2022-06-24 PROCEDURE — 96413 CHEMO IV INFUSION 1 HR: CPT

## 2022-06-24 PROCEDURE — 80053 COMPREHEN METABOLIC PANEL: CPT | Performed by: INTERNAL MEDICINE

## 2022-06-24 PROCEDURE — 85025 COMPLETE CBC W/AUTO DIFF WBC: CPT | Performed by: INTERNAL MEDICINE

## 2022-06-24 PROCEDURE — 258N000003 HC RX IP 258 OP 636: Performed by: INTERNAL MEDICINE

## 2022-06-24 PROCEDURE — 250N000011 HC RX IP 250 OP 636: Performed by: INTERNAL MEDICINE

## 2022-06-24 RX ORDER — HEPARIN SODIUM (PORCINE) LOCK FLUSH IV SOLN 100 UNIT/ML 100 UNIT/ML
5 SOLUTION INTRAVENOUS
Status: DISCONTINUED | OUTPATIENT
Start: 2022-06-24 | End: 2022-06-24 | Stop reason: HOSPADM

## 2022-06-24 RX ADMIN — Medication 5 ML: at 10:37

## 2022-06-24 RX ADMIN — ADO-TRASTUZUMAB EMTANSINE 298 MG: 20 INJECTION, POWDER, LYOPHILIZED, FOR SOLUTION INTRAVENOUS at 10:04

## 2022-06-24 NOTE — PROGRESS NOTES
Pt arrived ambulatory to clinic for Cycle # 11 Day # 1 of her chemotherapy regimen.  Port was accessed using aseptic technique without difficulties with excellent blood return.  Administered Kadcyla per MD order.  Pt tolerated medication well, no s/s of infusion reaction  Port was flushed with NS and Heparin then de-accessed using 2x2 and papertape.  Pt verbalized understanding of plan of care and return to clinic.

## 2022-07-01 NOTE — PROGRESS NOTES
[facility] Progress [facility]    Patient: Veronica Nieves  MRN: [id number]  Date of Service: May 13, 2022        Assessment and Plan:    Cancer Staging  Malignant neoplasm of upper-outer quadrant of left breast in female, estrogen receptor positive (H)  Staging form: Breast, AJCC 8th Edition  - Clinical stage from 5/19/2021: Stage IB (cT2, cN0, cM0, G2, ER+, MA+, HER2+) - Signed by Jefferson Garcia MD on 5/19/2021     1.  Invasive ductal carcinoma of the left breast: She is now on Zoladex, tamoxifen, and Kadcyla.  Generally doing well with the Kadcyla.  She has occasional hot flashes and sweats.  Kadcyla will be complete in August.    2.  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  This has improved from the past few months.  Grade 1. She is on Cymbalta.    3.  Anxiety and depression: We changed from duloxetine to citalopram in February of this year.  She seems to be tolerating it okay.  States that her mood is generally adequate.  Can titrate up to 40 mg daily if needed.    4.  Lack of concentration/chemo brain: We talked about this for some time.  So affecting her work and daily life.  Would like to try low-dose of methylphenidate twice a day to see if this helps.  5 mg twice daily.  I told her we can titrate up as needed.       I spent 35 minutes in the care of this patient today, which included time necessary for preparation for the visit, face to face time with the patient, communication of recommendations to the care team, and documentation time.    ECOG Performance  0    Diagnosis:    1.  Invasive ductal carcinoma of the left breast: Diagnosed May, 2021.  Initial imaging suggested 3 masses.  Biopsy of the largest mass showed an invasive ductal carcinoma, grade 2.  ER/MA positive, HER-2 positive.  Evaluation of the axilla was negative.    Treatment:    Neoadjuvant chemotherapy with TCHP initiated on May 27, 2021.  Cycle 6 was given on September  "10.    Bilateral mastectomy performed on October 7.  Pathology showed a 9 mm focus of invasive ductal carcinoma of the left breast.  Lymph nodes are negative.    Kadcyla initiated November 29, 2021  Zoladex and tamoxifen initiated November 29, 2021    Interim History:    Abbey presents today for a follow-up visit.  Overall she is doing well.  Occasional headaches.  Some dry mouth.  Occasional hot flashes and night sweats.  Bowels are okay.  No significant skin rash.    Review of Systems:    As above in the history.     Review of Systems otherwise Negative for:  General: chills, fever or night sweats  Psychological: anxiety or depression  Ophthalmic: blurry vision, double vision or loss of vision, vision change  ENT: epistaxis, oral lesions, hearing changes  Hematological and Lymphatic: bleeding, bruising, jaundice, swollen lymph nodes  Endocrine: hot flashes, unexpected weight changes  Respiratory: cough, hemoptysis, orthopnea or shortness of breath/SHAHID  Cardiovascular: chest pain, edema, palpitations or PND  Gastrointestinal: abdominal pain, blood in stools, change in bowel habits, constipation, diarrhea or nausea/vomiting  Genito-Urinary: change in urinary stream, incontinence, frequency/urgency  Musculoskeletal: joint pain, stiffness, swelling, muscle pain  Neurological: dizziness, headaches  Dermatological: lumps and rash    Past History:    Past Medical History:   Diagnosis Date     Abnormal Pap smear of cervix     age 13, subsequent pap smears normal     Anxiety      Breast cancer (H) 05/04/2021    Left     Depression      Physical Exam:    /71 (BP Location: Right arm, Patient Position: Sitting, Cuff Size: Adult Regular)   Pulse 60   Temp 98.6  F (37  C) (Oral)   Resp 14   Ht 1.676 m (5' 5.98\")   Wt 84.3 kg (185 lb 12.8 oz)   SpO2 99%   BMI 30.00 kg/m      General: patient appears stated age of 31 year old. Nontoxic and in no distress.   HEENT: Head: atraumatic, normocephalic. Sclerae " anicteric.  Chest:  Normal respiratory effort  Cardiac:  No edema.   Abdomen: abdomen is non-distended  Extremities: normal tone and muscle bulk.  Skin: no lesions or rash on visible skin. Warm and dry.   CNS: alert and oriented. Grossly non-focal.   Psychiatric: normal mood and affect.     Lab Results:    No results found for this or any previous visit (from the past 24 hour(s)).    Signed by: Jefferson Garcia MD

## 2022-07-13 DIAGNOSIS — Z17.0 MALIGNANT NEOPLASM OF UPPER-OUTER QUADRANT OF LEFT BREAST IN FEMALE, ESTROGEN RECEPTOR POSITIVE (H): ICD-10-CM

## 2022-07-13 DIAGNOSIS — C50.412 MALIGNANT NEOPLASM OF UPPER-OUTER QUADRANT OF LEFT BREAST IN FEMALE, ESTROGEN RECEPTOR POSITIVE (H): ICD-10-CM

## 2022-07-13 RX ORDER — TAMOXIFEN CITRATE 20 MG/1
TABLET ORAL
Qty: 90 TABLET | Refills: 1 | Status: SHIPPED | OUTPATIENT
Start: 2022-07-13 | End: 2022-11-28

## 2022-07-15 ENCOUNTER — ONCOLOGY VISIT (OUTPATIENT)
Dept: ONCOLOGY | Facility: HOSPITAL | Age: 32
End: 2022-07-15
Attending: INTERNAL MEDICINE
Payer: COMMERCIAL

## 2022-07-15 ENCOUNTER — INFUSION THERAPY VISIT (OUTPATIENT)
Dept: INFUSION THERAPY | Facility: HOSPITAL | Age: 32
End: 2022-07-15
Attending: INTERNAL MEDICINE
Payer: COMMERCIAL

## 2022-07-15 VITALS
SYSTOLIC BLOOD PRESSURE: 128 MMHG | WEIGHT: 180 LBS | TEMPERATURE: 98.9 F | OXYGEN SATURATION: 99 % | HEIGHT: 66 IN | DIASTOLIC BLOOD PRESSURE: 70 MMHG | BODY MASS INDEX: 28.93 KG/M2 | RESPIRATION RATE: 16 BRPM | HEART RATE: 71 BPM

## 2022-07-15 DIAGNOSIS — Z17.0 MALIGNANT NEOPLASM OF UPPER-OUTER QUADRANT OF LEFT BREAST IN FEMALE, ESTROGEN RECEPTOR POSITIVE (H): Primary | ICD-10-CM

## 2022-07-15 DIAGNOSIS — C50.412 MALIGNANT NEOPLASM OF UPPER-OUTER QUADRANT OF LEFT BREAST IN FEMALE, ESTROGEN RECEPTOR POSITIVE (H): Primary | ICD-10-CM

## 2022-07-15 LAB
ALBUMIN SERPL-MCNC: 3.7 G/DL (ref 3.5–5)
ALP SERPL-CCNC: 65 U/L (ref 45–120)
ALT SERPL W P-5'-P-CCNC: 40 U/L (ref 0–45)
ANION GAP SERPL CALCULATED.3IONS-SCNC: 5 MMOL/L (ref 5–18)
AST SERPL W P-5'-P-CCNC: 43 U/L (ref 0–40)
BASOPHILS # BLD AUTO: 0 10E3/UL (ref 0–0.2)
BASOPHILS NFR BLD AUTO: 1 %
BILIRUB SERPL-MCNC: 0.4 MG/DL (ref 0–1)
BUN SERPL-MCNC: 15 MG/DL (ref 8–22)
CALCIUM SERPL-MCNC: 9.2 MG/DL (ref 8.5–10.5)
CHLORIDE BLD-SCNC: 106 MMOL/L (ref 98–107)
CO2 SERPL-SCNC: 28 MMOL/L (ref 22–31)
CREAT SERPL-MCNC: 1.02 MG/DL (ref 0.6–1.1)
EOSINOPHIL # BLD AUTO: 0.2 10E3/UL (ref 0–0.7)
EOSINOPHIL NFR BLD AUTO: 4 %
ERYTHROCYTE [DISTWIDTH] IN BLOOD BY AUTOMATED COUNT: 13.8 % (ref 10–15)
GFR SERPL CREATININE-BSD FRML MDRD: 74 ML/MIN/1.73M2
GLUCOSE BLD-MCNC: 108 MG/DL (ref 70–125)
HCT VFR BLD AUTO: 37.2 % (ref 35–47)
HGB BLD-MCNC: 12.1 G/DL (ref 11.7–15.7)
IMM GRANULOCYTES # BLD: 0 10E3/UL
IMM GRANULOCYTES NFR BLD: 1 %
LYMPHOCYTES # BLD AUTO: 1.4 10E3/UL (ref 0.8–5.3)
LYMPHOCYTES NFR BLD AUTO: 31 %
MCH RBC QN AUTO: 28.5 PG (ref 26.5–33)
MCHC RBC AUTO-ENTMCNC: 32.5 G/DL (ref 31.5–36.5)
MCV RBC AUTO: 88 FL (ref 78–100)
MONOCYTES # BLD AUTO: 0.4 10E3/UL (ref 0–1.3)
MONOCYTES NFR BLD AUTO: 9 %
NEUTROPHILS # BLD AUTO: 2.4 10E3/UL (ref 1.6–8.3)
NEUTROPHILS NFR BLD AUTO: 54 %
NRBC # BLD AUTO: 0 10E3/UL
NRBC BLD AUTO-RTO: 0 /100
PLATELET # BLD AUTO: 238 10E3/UL (ref 150–450)
POTASSIUM BLD-SCNC: 3.5 MMOL/L (ref 3.5–5)
PROT SERPL-MCNC: 7.2 G/DL (ref 6–8)
RBC # BLD AUTO: 4.25 10E6/UL (ref 3.8–5.2)
SODIUM SERPL-SCNC: 139 MMOL/L (ref 136–145)
WBC # BLD AUTO: 4.4 10E3/UL (ref 4–11)

## 2022-07-15 PROCEDURE — G0463 HOSPITAL OUTPT CLINIC VISIT: HCPCS | Mod: 25

## 2022-07-15 PROCEDURE — 96413 CHEMO IV INFUSION 1 HR: CPT

## 2022-07-15 PROCEDURE — 250N000011 HC RX IP 250 OP 636: Performed by: INTERNAL MEDICINE

## 2022-07-15 PROCEDURE — 99214 OFFICE O/P EST MOD 30 MIN: CPT | Performed by: INTERNAL MEDICINE

## 2022-07-15 PROCEDURE — 80053 COMPREHEN METABOLIC PANEL: CPT | Performed by: INTERNAL MEDICINE

## 2022-07-15 PROCEDURE — 85004 AUTOMATED DIFF WBC COUNT: CPT | Performed by: INTERNAL MEDICINE

## 2022-07-15 PROCEDURE — 258N000003 HC RX IP 258 OP 636: Performed by: INTERNAL MEDICINE

## 2022-07-15 PROCEDURE — 96402 CHEMO HORMON ANTINEOPL SQ/IM: CPT

## 2022-07-15 RX ORDER — LORAZEPAM 2 MG/ML
0.5 INJECTION INTRAMUSCULAR EVERY 4 HOURS PRN
Status: CANCELLED | OUTPATIENT
Start: 2022-07-15

## 2022-07-15 RX ORDER — ALBUTEROL SULFATE 0.83 MG/ML
2.5 SOLUTION RESPIRATORY (INHALATION)
Status: CANCELLED | OUTPATIENT
Start: 2022-08-05

## 2022-07-15 RX ORDER — HEPARIN SODIUM (PORCINE) LOCK FLUSH IV SOLN 100 UNIT/ML 100 UNIT/ML
5 SOLUTION INTRAVENOUS
Status: CANCELLED | OUTPATIENT
Start: 2022-08-05

## 2022-07-15 RX ORDER — EPINEPHRINE 1 MG/ML
0.3 INJECTION, SOLUTION INTRAMUSCULAR; SUBCUTANEOUS EVERY 5 MIN PRN
Status: CANCELLED | OUTPATIENT
Start: 2022-08-05

## 2022-07-15 RX ORDER — NALOXONE HYDROCHLORIDE 0.4 MG/ML
0.2 INJECTION, SOLUTION INTRAMUSCULAR; INTRAVENOUS; SUBCUTANEOUS
Status: CANCELLED | OUTPATIENT
Start: 2022-08-05

## 2022-07-15 RX ORDER — EPINEPHRINE 1 MG/ML
0.3 INJECTION, SOLUTION INTRAMUSCULAR; SUBCUTANEOUS EVERY 5 MIN PRN
Status: CANCELLED | OUTPATIENT
Start: 2022-07-15

## 2022-07-15 RX ORDER — ALBUTEROL SULFATE 90 UG/1
1-2 AEROSOL, METERED RESPIRATORY (INHALATION)
Status: CANCELLED
Start: 2022-07-15

## 2022-07-15 RX ORDER — COPPER 313.4 MG/1
1 INTRAUTERINE DEVICE INTRAUTERINE ONCE
COMMUNITY
Start: 2022-05-27 | End: 2024-03-13

## 2022-07-15 RX ORDER — MEPERIDINE HYDROCHLORIDE 25 MG/ML
25 INJECTION INTRAMUSCULAR; INTRAVENOUS; SUBCUTANEOUS EVERY 30 MIN PRN
Status: CANCELLED | OUTPATIENT
Start: 2022-07-15

## 2022-07-15 RX ORDER — DIPHENHYDRAMINE HYDROCHLORIDE 50 MG/ML
50 INJECTION INTRAMUSCULAR; INTRAVENOUS
Status: CANCELLED
Start: 2022-07-15

## 2022-07-15 RX ORDER — METHYLPREDNISOLONE SODIUM SUCCINATE 125 MG/2ML
125 INJECTION, POWDER, LYOPHILIZED, FOR SOLUTION INTRAMUSCULAR; INTRAVENOUS
Status: CANCELLED
Start: 2022-08-26

## 2022-07-15 RX ORDER — LORAZEPAM 2 MG/ML
0.5 INJECTION INTRAMUSCULAR EVERY 4 HOURS PRN
Status: CANCELLED | OUTPATIENT
Start: 2022-08-26

## 2022-07-15 RX ORDER — HEPARIN SODIUM (PORCINE) LOCK FLUSH IV SOLN 100 UNIT/ML 100 UNIT/ML
5 SOLUTION INTRAVENOUS
Status: CANCELLED | OUTPATIENT
Start: 2022-08-26

## 2022-07-15 RX ORDER — ALBUTEROL SULFATE 90 UG/1
1-2 AEROSOL, METERED RESPIRATORY (INHALATION)
Status: CANCELLED
Start: 2022-08-05

## 2022-07-15 RX ORDER — HEPARIN SODIUM (PORCINE) LOCK FLUSH IV SOLN 100 UNIT/ML 100 UNIT/ML
5 SOLUTION INTRAVENOUS
Status: DISCONTINUED | OUTPATIENT
Start: 2022-07-15 | End: 2022-07-15 | Stop reason: HOSPADM

## 2022-07-15 RX ORDER — ALBUTEROL SULFATE 0.83 MG/ML
2.5 SOLUTION RESPIRATORY (INHALATION)
Status: CANCELLED | OUTPATIENT
Start: 2022-08-26

## 2022-07-15 RX ORDER — HEPARIN SODIUM,PORCINE 10 UNIT/ML
5 VIAL (ML) INTRAVENOUS
Status: CANCELLED | OUTPATIENT
Start: 2022-08-05

## 2022-07-15 RX ORDER — NALOXONE HYDROCHLORIDE 0.4 MG/ML
0.2 INJECTION, SOLUTION INTRAMUSCULAR; INTRAVENOUS; SUBCUTANEOUS
Status: CANCELLED | OUTPATIENT
Start: 2022-07-15

## 2022-07-15 RX ORDER — MEPERIDINE HYDROCHLORIDE 25 MG/ML
25 INJECTION INTRAMUSCULAR; INTRAVENOUS; SUBCUTANEOUS EVERY 30 MIN PRN
Status: CANCELLED | OUTPATIENT
Start: 2022-08-05

## 2022-07-15 RX ORDER — DIPHENHYDRAMINE HYDROCHLORIDE 50 MG/ML
50 INJECTION INTRAMUSCULAR; INTRAVENOUS
Status: CANCELLED
Start: 2022-08-05

## 2022-07-15 RX ORDER — MEPERIDINE HYDROCHLORIDE 25 MG/ML
25 INJECTION INTRAMUSCULAR; INTRAVENOUS; SUBCUTANEOUS EVERY 30 MIN PRN
Status: CANCELLED | OUTPATIENT
Start: 2022-08-26

## 2022-07-15 RX ORDER — DIPHENHYDRAMINE HYDROCHLORIDE 50 MG/ML
50 INJECTION INTRAMUSCULAR; INTRAVENOUS
Status: CANCELLED
Start: 2022-08-26

## 2022-07-15 RX ORDER — HEPARIN SODIUM,PORCINE 10 UNIT/ML
5 VIAL (ML) INTRAVENOUS
Status: CANCELLED | OUTPATIENT
Start: 2022-08-26

## 2022-07-15 RX ORDER — METHYLPREDNISOLONE SODIUM SUCCINATE 125 MG/2ML
125 INJECTION, POWDER, LYOPHILIZED, FOR SOLUTION INTRAMUSCULAR; INTRAVENOUS
Status: CANCELLED
Start: 2022-07-15

## 2022-07-15 RX ORDER — METHYLPREDNISOLONE SODIUM SUCCINATE 125 MG/2ML
125 INJECTION, POWDER, LYOPHILIZED, FOR SOLUTION INTRAMUSCULAR; INTRAVENOUS
Status: CANCELLED
Start: 2022-08-05

## 2022-07-15 RX ORDER — ALBUTEROL SULFATE 90 UG/1
1-2 AEROSOL, METERED RESPIRATORY (INHALATION)
Status: CANCELLED
Start: 2022-08-26

## 2022-07-15 RX ORDER — NALOXONE HYDROCHLORIDE 0.4 MG/ML
0.2 INJECTION, SOLUTION INTRAMUSCULAR; INTRAVENOUS; SUBCUTANEOUS
Status: CANCELLED | OUTPATIENT
Start: 2022-08-26

## 2022-07-15 RX ORDER — LORAZEPAM 2 MG/ML
0.5 INJECTION INTRAMUSCULAR EVERY 4 HOURS PRN
Status: CANCELLED | OUTPATIENT
Start: 2022-08-05

## 2022-07-15 RX ORDER — HEPARIN SODIUM,PORCINE 10 UNIT/ML
5 VIAL (ML) INTRAVENOUS
Status: CANCELLED | OUTPATIENT
Start: 2022-07-15

## 2022-07-15 RX ORDER — HEPARIN SODIUM (PORCINE) LOCK FLUSH IV SOLN 100 UNIT/ML 100 UNIT/ML
5 SOLUTION INTRAVENOUS
Status: CANCELLED | OUTPATIENT
Start: 2022-07-15

## 2022-07-15 RX ORDER — EPINEPHRINE 1 MG/ML
0.3 INJECTION, SOLUTION INTRAMUSCULAR; SUBCUTANEOUS EVERY 5 MIN PRN
Status: CANCELLED | OUTPATIENT
Start: 2022-08-26

## 2022-07-15 RX ORDER — ALBUTEROL SULFATE 0.83 MG/ML
2.5 SOLUTION RESPIRATORY (INHALATION)
Status: CANCELLED | OUTPATIENT
Start: 2022-07-15

## 2022-07-15 RX ADMIN — HEPARIN 5 ML: 100 SYRINGE at 11:31

## 2022-07-15 RX ADMIN — ADO-TRASTUZUMAB EMTANSINE 298 MG: 20 INJECTION, POWDER, LYOPHILIZED, FOR SOLUTION INTRAVENOUS at 11:00

## 2022-07-15 RX ADMIN — GOSERELIN ACETATE 3.6 MG: 3.6 IMPLANT SUBCUTANEOUS at 11:11

## 2022-07-15 ASSESSMENT — PAIN SCALES - GENERAL: PAINLEVEL: NO PAIN (0)

## 2022-07-15 NOTE — PROGRESS NOTES
"Oncology Rooming Note    July 15, 2022 9:33 AM   Veronica Nieves is a 32 year old female who presents for:    Chief Complaint   Patient presents with     Oncology Clinic Visit     13 week return Malignant neoplasm of upper-outer quadrant of left breast in female, estrogen receptor positive, review labs      Initial Vitals: /70 (BP Location: Left arm, Patient Position: Sitting, Cuff Size: Adult Regular)   Pulse 71   Temp 98.9  F (37.2  C) (Oral)   Resp 16   Ht 1.676 m (5' 5.98\")   Wt 81.6 kg (180 lb)   SpO2 99%   BMI 29.07 kg/m   Estimated body mass index is 29.07 kg/m  as calculated from the following:    Height as of this encounter: 1.676 m (5' 5.98\").    Weight as of this encounter: 81.6 kg (180 lb). Body surface area is 1.95 meters squared.  No Pain (0) Comment: Data Unavailable   No LMP recorded. (Menstrual status: IUD).  Allergies reviewed: Yes  Medications reviewed: Yes    Medications: Medication refills not needed today.  Pharmacy name entered into tidy: [facility]  ([facility]) - [place], [place] - [address]    Clinical concerns: 13 week return Malignant neoplasm of upper-outer quadrant of left breast in female, estrogen receptor positive, review labs.      Angie Henriquez CMA              "

## 2022-07-15 NOTE — LETTER
"    7/15/2022         RE: Veronica Nieves  [address]  [place] [address]        Dear Colleague,    Thank you for referring your patient, Veronica Nieves, to the [facility]. Please see a copy of my visit note below.    Oncology Rooming Note    July 15, 2022 9:33 AM   Veronica Nieves is a 32 year old female who presents for:    Chief Complaint   Patient presents with     Oncology Clinic Visit     13 week return Malignant neoplasm of upper-outer quadrant of left breast in female, estrogen receptor positive, review labs      Initial Vitals: /70 (BP Location: Left arm, Patient Position: Sitting, Cuff Size: Adult Regular)   Pulse 71   Temp 98.9  F (37.2  C) (Oral)   Resp 16   Ht 1.676 m (5' 5.98\")   Wt 81.6 kg (180 lb)   SpO2 99%   BMI 29.07 kg/m   Estimated body mass index is 29.07 kg/m  as calculated from the following:    Height as of this encounter: 1.676 m (5' 5.98\").    Weight as of this encounter: 81.6 kg (180 lb). Body surface area is 1.95 meters squared.  No Pain (0) Comment: Data Unavailable   No LMP recorded. (Menstrual status: IUD).  Allergies reviewed: Yes  Medications reviewed: Yes    Medications: Medication refills not needed today.  Pharmacy name entered into [employer]: [facility]  ([facility]) - [place] [address]    Clinical concerns: 13 week return Malignant neoplasm of upper-outer quadrant of left breast in female, estrogen receptor positive, review labs.      Angie Henriquez [facility] Hematology and Oncology Progress Note    Patient: Veronica Nieves  MRN: [id number]  Date of Service: Jul 15, 2022        Assessment and Plan:    Cancer Staging  Malignant neoplasm of upper-outer quadrant of left breast in female, estrogen receptor positive (H)  Staging form: Breast, AJCC 8th Edition  - Clinical stage from 5/19/2021: Stage IB (cT2, cN0, cM0, G2, ER+, WA+, HER2+) - Signed by Jefferson Garcia MD " on 5/19/2021     1.  Invasive ductal carcinoma of the left breast: Continues on Zoladex, tamoxifen, and Kadcyla.  Kadcyla will be complete in a few months.  Continues to tolerate it okay.  Occasional hot flashes and sweats from the tamoxifen.  Overall she is feeling well.  Bilateral mastectomy so no mammograms are needed.  We will likely keep her on adjuvant endocrine therapy for total of 10 years.  Return to clinic August 5.  After her Kadcyla is complete we can see her every 3 months.    2.  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  Continues to improve.  She is no longer on Cymbalta.     3.  Anxiety and depression: We changed from duloxetine to citalopram in February of this year.  She is on 40 mg daily.  She believes her mood is stable and good.  Continue at this dose.     4.  Lack of concentration/chemo brain: She continues on methylphenidate 10 mg twice a day.  This seems to be helping her concentration.  We will continue for now.  Consider titrating down in the near future.      I spent 34 minutes in the care of this patient today, which included time necessary for preparation for the visit, face to face time with the patient, communication of recommendations to the care team, and documentation time.    ECOG Performance  0    Diagnosis:    1.  Invasive ductal carcinoma of the left breast: Diagnosed May, 2021.  Initial imaging suggested 3 masses.  Biopsy of the largest mass showed an invasive ductal carcinoma, grade 2.  ER/KY positive, HER-2 positive.  Evaluation of the axilla was negative.    Treatment:    Neoadjuvant chemotherapy with TCHP initiated on May 27, 2021.  Cycle 6 was given on September 10.    Bilateral mastectomy performed on October 7.  Pathology showed a 9 mm focus of invasive ductal carcinoma of the left breast.  Lymph nodes are negative.    Kadcyla initiated November 29, 2021  Zoladex and tamoxifen initiated November 29, 2021    She had her right implant removed  "secondary to infection on Tiana 10, 2022.    Interim History:    Abbey presents today for a follow-up visit.  Overall doing okay.  Still has some numbness in the fingertips but it is improving.  Her hands get sore after treatment and she has occasional headaches after treatment.    Review of Systems:    As above in the history.     Review of Systems otherwise Negative for:  General: chills, fever or night sweats  Psychological: anxiety or depression  Ophthalmic: blurry vision, double vision or loss of vision, vision change  ENT: epistaxis, oral lesions, hearing changes  Hematological and Lymphatic: bleeding, bruising, jaundice, swollen lymph nodes  Endocrine: hot flashes, unexpected weight changes  Respiratory: cough, hemoptysis, orthopnea or shortness of breath/SHAHID  Cardiovascular: chest pain, edema, palpitations or PND  Gastrointestinal: abdominal pain, blood in stools, change in bowel habits, constipation, diarrhea or nausea/vomiting  Genito-Urinary: change in urinary stream, incontinence, frequency/urgency  Musculoskeletal: joint pain, stiffness, swelling, muscle pain  Neurological: dizziness, headaches  Dermatological: lumps and rash    Past History:    Past Medical History:   Diagnosis Date     Abnormal Pap smear of cervix     age 13, subsequent pap smears normal     Anxiety      Breast cancer (H) 05/04/2021    Left     Depression      Physical Exam:    /70 (BP Location: Left arm, Patient Position: Sitting, Cuff Size: Adult Regular)   Pulse 71   Temp 98.9  F (37.2  C) (Oral)   Resp 16   Ht 1.676 m (5' 5.98\")   Wt 81.6 kg (180 lb)   SpO2 99%   BMI 29.07 kg/m      General: patient appears stated age of 31 year old. Nontoxic and in no distress.   HEENT: Head: atraumatic, normocephalic. Sclerae anicteric.  Chest:  Normal respiratory effort  Cardiac:  No edema.   Abdomen: abdomen is non-distended  Extremities: normal tone and muscle bulk.  Skin: no lesions or rash on visible skin. Warm and dry.   CNS: " alert and oriented. Grossly non-focal.   Psychiatric: normal mood and affect.     Lab Results:    Recent Results (from the past 24 hour(s))   Comprehensive metabolic panel    Collection Time: 07/15/22  8:58 AM   Result Value Ref Range    Sodium 139 136 - 145 mmol/L    Potassium 3.5 3.5 - 5.0 mmol/L    Chloride 106 98 - 107 mmol/L    Carbon Dioxide (CO2) 28 22 - 31 mmol/L    Anion Gap 5 5 - 18 mmol/L    Urea Nitrogen 15 8 - 22 mg/dL    Creatinine 1.02 0.60 - 1.10 mg/dL    Calcium 9.2 8.5 - 10.5 mg/dL    Glucose 108 70 - 125 mg/dL    Alkaline Phosphatase 65 45 - 120 U/L    AST 43 (H) 0 - 40 U/L    ALT 40 0 - 45 U/L    Protein Total 7.2 6.0 - 8.0 g/dL    Albumin 3.7 3.5 - 5.0 g/dL    Bilirubin Total 0.4 0.0 - 1.0 mg/dL    GFR Estimate 74 >60 mL/min/1.73m2   CBC with platelets and differential    Collection Time: 07/15/22  8:58 AM   Result Value Ref Range    WBC Count 4.4 4.0 - 11.0 10e3/uL    RBC Count 4.25 3.80 - 5.20 10e6/uL    Hemoglobin 12.1 11.7 - 15.7 g/dL    Hematocrit 37.2 35.0 - 47.0 %    MCV 88 78 - 100 fL    MCH 28.5 26.5 - 33.0 pg    MCHC 32.5 31.5 - 36.5 g/dL    RDW 13.8 10.0 - 15.0 %    Platelet Count 238 150 - 450 10e3/uL    % Neutrophils 54 %    % Lymphocytes 31 %    % Monocytes 9 %    % Eosinophils 4 %    % Basophils 1 %    % Immature Granulocytes 1 %    NRBCs per 100 WBC 0 <1 /100    Absolute Neutrophils 2.4 1.6 - 8.3 10e3/uL    Absolute Lymphocytes 1.4 0.8 - 5.3 10e3/uL    Absolute Monocytes 0.4 0.0 - 1.3 10e3/uL    Absolute Eosinophils 0.2 0.0 - 0.7 10e3/uL    Absolute Basophils 0.0 0.0 - 0.2 10e3/uL    Absolute Immature Granulocytes 0.0 <=0.4 10e3/uL    Absolute NRBCs 0.0 10e3/uL       Signed by: Jefferson Garcia MD          Again, thank you for allowing me to participate in the care of your patient.        Sincerely,        Jefferson Garcia MD

## 2022-07-15 NOTE — PROGRESS NOTES
Pt arrived ambulatory to clinic for Cycle # 12 Day # 1 of her chemotherapy regimen.  Port was accessed using aseptic technique without difficulties with excellent blood return.  Administered Kadcyla and subcutaneous Zoladex per MD order.  Pt tolerated medications well, no s/s of infusion reaction  Port was flushed with NS and Heparin then de-accessed using 2x2 and papertape.  Pt verbalized understanding of plan of care and return to clinic.

## 2022-07-15 NOTE — PROGRESS NOTES
[facility] Hematology and Oncology Progress Note    Patient: Veronica Nieves  MRN: [id number]  Date of Service: Jul 15, 2022        Assessment and Plan:    Cancer Staging  Malignant neoplasm of upper-outer quadrant of left breast in female, estrogen receptor positive (H)  Staging form: Breast, AJCC 8th Edition  - Clinical stage from 5/19/2021: Stage IB (cT2, cN0, cM0, G2, ER+, KY+, HER2+) - Signed by Jefferson Garcia MD on 5/19/2021     1.  Invasive ductal carcinoma of the left breast: Continues on Zoladex, tamoxifen, and Kadcyla.  Kadcyla will be complete in a few months.  Continues to tolerate it okay.  Occasional hot flashes and sweats from the tamoxifen.  Overall she is feeling well.  Bilateral mastectomy so no mammograms are needed.  We will likely keep her on adjuvant endocrine therapy for total of 10 years.  Return to clinic August 5.  After her Kadcyla is complete we can see her every 3 months.    2.  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  Continues to improve.  She is no longer on Cymbalta.     3.  Anxiety and depression: We changed from duloxetine to citalopram in February of this year.  She is on 40 mg daily.  She believes her mood is stable and good.  Continue at this dose.     4.  Lack of concentration/chemo brain: She continues on methylphenidate 10 mg twice a day.  This seems to be helping her concentration.  We will continue for now.  Consider titrating down in the near future.      I spent 34 minutes in the care of this patient today, which included time necessary for preparation for the visit, face to face time with the patient, communication of recommendations to the care team, and documentation time.    ECOG Performance  0    Diagnosis:    1.  Invasive ductal carcinoma of the left breast: Diagnosed May, 2021.  Initial imaging suggested 3 masses.  Biopsy of the largest mass showed an invasive ductal carcinoma, grade 2.  ER/KY positive, HER-2 positive.   Evaluation of the axilla was negative.    Treatment:    Neoadjuvant chemotherapy with TCHP initiated on May 27, 2021.  Cycle 6 was given on September 10.    Bilateral mastectomy performed on October 7.  Pathology showed a 9 mm focus of invasive ductal carcinoma of the left breast.  Lymph nodes are negative.    Kadcyla initiated November 29, 2021  Zoladex and tamoxifen initiated November 29, 2021    She had her right implant removed secondary to infection on Tiana 10, 2022.    Interim History:    Abbey presents today for a follow-up visit.  Overall doing okay.  Still has some numbness in the fingertips but it is improving.  Her hands get sore after treatment and she has occasional headaches after treatment.    Review of Systems:    As above in the history.     Review of Systems otherwise Negative for:  General: chills, fever or night sweats  Psychological: anxiety or depression  Ophthalmic: blurry vision, double vision or loss of vision, vision change  ENT: epistaxis, oral lesions, hearing changes  Hematological and Lymphatic: bleeding, bruising, jaundice, swollen lymph nodes  Endocrine: hot flashes, unexpected weight changes  Respiratory: cough, hemoptysis, orthopnea or shortness of breath/SHAHID  Cardiovascular: chest pain, edema, palpitations or PND  Gastrointestinal: abdominal pain, blood in stools, change in bowel habits, constipation, diarrhea or nausea/vomiting  Genito-Urinary: change in urinary stream, incontinence, frequency/urgency  Musculoskeletal: joint pain, stiffness, swelling, muscle pain  Neurological: dizziness, headaches  Dermatological: lumps and rash    Past History:    Past Medical History:   Diagnosis Date     Abnormal Pap smear of cervix     age 13, subsequent pap smears normal     Anxiety      Breast cancer (H) 05/04/2021    Left     Depression      Physical Exam:    /70 (BP Location: Left arm, Patient Position: Sitting, Cuff Size: Adult Regular)   Pulse 71   Temp 98.9  F (37.2  C) (Oral)   " Resp 16   Ht 1.676 m (5' 5.98\")   Wt 81.6 kg (180 lb)   SpO2 99%   BMI 29.07 kg/m      General: patient appears stated age of 31 year old. Nontoxic and in no distress.   HEENT: Head: atraumatic, normocephalic. Sclerae anicteric.  Chest:  Normal respiratory effort  Cardiac:  No edema.   Abdomen: abdomen is non-distended  Extremities: normal tone and muscle bulk.  Skin: no lesions or rash on visible skin. Warm and dry.   CNS: alert and oriented. Grossly non-focal.   Psychiatric: normal mood and affect.     Lab Results:    Recent Results (from the past 24 hour(s))   Comprehensive metabolic panel    Collection Time: 07/15/22  8:58 AM   Result Value Ref Range    Sodium 139 136 - 145 mmol/L    Potassium 3.5 3.5 - 5.0 mmol/L    Chloride 106 98 - 107 mmol/L    Carbon Dioxide (CO2) 28 22 - 31 mmol/L    Anion Gap 5 5 - 18 mmol/L    Urea Nitrogen 15 8 - 22 mg/dL    Creatinine 1.02 0.60 - 1.10 mg/dL    Calcium 9.2 8.5 - 10.5 mg/dL    Glucose 108 70 - 125 mg/dL    Alkaline Phosphatase 65 45 - 120 U/L    AST 43 (H) 0 - 40 U/L    ALT 40 0 - 45 U/L    Protein Total 7.2 6.0 - 8.0 g/dL    Albumin 3.7 3.5 - 5.0 g/dL    Bilirubin Total 0.4 0.0 - 1.0 mg/dL    GFR Estimate 74 >60 mL/min/1.73m2   CBC with platelets and differential    Collection Time: 07/15/22  8:58 AM   Result Value Ref Range    WBC Count 4.4 4.0 - 11.0 10e3/uL    RBC Count 4.25 3.80 - 5.20 10e6/uL    Hemoglobin 12.1 11.7 - 15.7 g/dL    Hematocrit 37.2 35.0 - 47.0 %    MCV 88 78 - 100 fL    MCH 28.5 26.5 - 33.0 pg    MCHC 32.5 31.5 - 36.5 g/dL    RDW 13.8 10.0 - 15.0 %    Platelet Count 238 150 - 450 10e3/uL    % Neutrophils 54 %    % Lymphocytes 31 %    % Monocytes 9 %    % Eosinophils 4 %    % Basophils 1 %    % Immature Granulocytes 1 %    NRBCs per 100 WBC 0 <1 /100    Absolute Neutrophils 2.4 1.6 - 8.3 10e3/uL    Absolute Lymphocytes 1.4 0.8 - 5.3 10e3/uL    Absolute Monocytes 0.4 0.0 - 1.3 10e3/uL    Absolute Eosinophils 0.2 0.0 - 0.7 10e3/uL    Absolute " Basophils 0.0 0.0 - 0.2 10e3/uL    Absolute Immature Granulocytes 0.0 <=0.4 10e3/uL    Absolute NRBCs 0.0 10e3/uL       Signed by: Jefferson Garcia MD

## 2022-07-16 ENCOUNTER — HEALTH MAINTENANCE LETTER (OUTPATIENT)
Age: 32
End: 2022-07-16

## 2022-07-20 ENCOUNTER — HOSPITAL ENCOUNTER (OUTPATIENT)
Dept: CARDIOLOGY | Facility: CLINIC | Age: 32
Discharge: HOME | End: 2022-07-20
Attending: INTERNAL MEDICINE | Admitting: INTERNAL MEDICINE
Payer: COMMERCIAL

## 2022-07-20 DIAGNOSIS — C50.412 MALIGNANT NEOPLASM OF UPPER-OUTER QUADRANT OF LEFT BREAST IN FEMALE, ESTROGEN RECEPTOR POSITIVE (H): ICD-10-CM

## 2022-07-20 DIAGNOSIS — Z17.0 MALIGNANT NEOPLASM OF UPPER-OUTER QUADRANT OF LEFT BREAST IN FEMALE, ESTROGEN RECEPTOR POSITIVE (H): ICD-10-CM

## 2022-07-20 LAB
BI-PLANE LVEF ECHO: NORMAL
LVEF ECHO: NORMAL

## 2022-07-20 PROCEDURE — 93306 TTE W/DOPPLER COMPLETE: CPT

## 2022-07-20 PROCEDURE — 93306 TTE W/DOPPLER COMPLETE: CPT | Mod: 26 | Performed by: INTERNAL MEDICINE

## 2022-08-05 ENCOUNTER — LAB (OUTPATIENT)
Dept: INFUSION THERAPY | Facility: HOSPITAL | Age: 32
End: 2022-08-05
Payer: COMMERCIAL

## 2022-08-05 ENCOUNTER — ONCOLOGY VISIT (OUTPATIENT)
Dept: ONCOLOGY | Facility: HOSPITAL | Age: 32
End: 2022-08-05
Attending: INTERNAL MEDICINE
Payer: COMMERCIAL

## 2022-08-05 ENCOUNTER — INFUSION THERAPY VISIT (OUTPATIENT)
Dept: INFUSION THERAPY | Facility: HOSPITAL | Age: 32
End: 2022-08-05
Attending: INTERNAL MEDICINE
Payer: COMMERCIAL

## 2022-08-05 VITALS
HEIGHT: 66 IN | WEIGHT: 182.2 LBS | HEART RATE: 76 BPM | BODY MASS INDEX: 29.28 KG/M2 | DIASTOLIC BLOOD PRESSURE: 73 MMHG | SYSTOLIC BLOOD PRESSURE: 134 MMHG | OXYGEN SATURATION: 99 %

## 2022-08-05 DIAGNOSIS — J06.9 VIRAL UPPER RESPIRATORY TRACT INFECTION: ICD-10-CM

## 2022-08-05 DIAGNOSIS — R79.89 ELEVATED LFTS: ICD-10-CM

## 2022-08-05 DIAGNOSIS — C50.412 MALIGNANT NEOPLASM OF UPPER-OUTER QUADRANT OF LEFT BREAST IN FEMALE, ESTROGEN RECEPTOR POSITIVE (H): Primary | ICD-10-CM

## 2022-08-05 DIAGNOSIS — R79.89 ELEVATED SERUM CREATININE: ICD-10-CM

## 2022-08-05 DIAGNOSIS — Z17.0 MALIGNANT NEOPLASM OF UPPER-OUTER QUADRANT OF LEFT BREAST IN FEMALE, ESTROGEN RECEPTOR POSITIVE (H): Primary | ICD-10-CM

## 2022-08-05 DIAGNOSIS — T45.1X5A CHEMOTHERAPY-INDUCED PERIPHERAL NEUROPATHY (H): ICD-10-CM

## 2022-08-05 DIAGNOSIS — G62.0 CHEMOTHERAPY-INDUCED PERIPHERAL NEUROPATHY (H): ICD-10-CM

## 2022-08-05 LAB
ALBUMIN SERPL-MCNC: 3.6 G/DL (ref 3.5–5)
ALP SERPL-CCNC: 61 U/L (ref 45–120)
ALT SERPL W P-5'-P-CCNC: 67 U/L (ref 0–45)
ANION GAP SERPL CALCULATED.3IONS-SCNC: 10 MMOL/L (ref 5–18)
AST SERPL W P-5'-P-CCNC: 72 U/L (ref 0–40)
BASOPHILS # BLD AUTO: 0 10E3/UL (ref 0–0.2)
BASOPHILS NFR BLD AUTO: 0 %
BILIRUB SERPL-MCNC: 0.2 MG/DL (ref 0–1)
BUN SERPL-MCNC: 12 MG/DL (ref 8–22)
CALCIUM SERPL-MCNC: 9.3 MG/DL (ref 8.5–10.5)
CHLORIDE BLD-SCNC: 100 MMOL/L (ref 98–107)
CO2 SERPL-SCNC: 27 MMOL/L (ref 22–31)
CREAT SERPL-MCNC: 1.13 MG/DL (ref 0.6–1.1)
EOSINOPHIL # BLD AUTO: 0.1 10E3/UL (ref 0–0.7)
EOSINOPHIL NFR BLD AUTO: 1 %
ERYTHROCYTE [DISTWIDTH] IN BLOOD BY AUTOMATED COUNT: 13.7 % (ref 10–15)
GFR SERPL CREATININE-BSD FRML MDRD: 66 ML/MIN/1.73M2
GLUCOSE BLD-MCNC: 120 MG/DL (ref 70–125)
HCT VFR BLD AUTO: 38.8 % (ref 35–47)
HGB BLD-MCNC: 12.7 G/DL (ref 11.7–15.7)
IMM GRANULOCYTES # BLD: 0 10E3/UL
IMM GRANULOCYTES NFR BLD: 1 %
LYMPHOCYTES # BLD AUTO: 0.8 10E3/UL (ref 0.8–5.3)
LYMPHOCYTES NFR BLD AUTO: 20 %
MCH RBC QN AUTO: 28.6 PG (ref 26.5–33)
MCHC RBC AUTO-ENTMCNC: 32.7 G/DL (ref 31.5–36.5)
MCV RBC AUTO: 87 FL (ref 78–100)
MONOCYTES # BLD AUTO: 0.7 10E3/UL (ref 0–1.3)
MONOCYTES NFR BLD AUTO: 16 %
NEUTROPHILS # BLD AUTO: 2.6 10E3/UL (ref 1.6–8.3)
NEUTROPHILS NFR BLD AUTO: 62 %
NRBC # BLD AUTO: 0 10E3/UL
NRBC BLD AUTO-RTO: 0 /100
PLATELET # BLD AUTO: 208 10E3/UL (ref 150–450)
POTASSIUM BLD-SCNC: 3.9 MMOL/L (ref 3.5–5)
PROT SERPL-MCNC: 7.3 G/DL (ref 6–8)
RBC # BLD AUTO: 4.44 10E6/UL (ref 3.8–5.2)
SODIUM SERPL-SCNC: 137 MMOL/L (ref 136–145)
WBC # BLD AUTO: 4.2 10E3/UL (ref 4–11)

## 2022-08-05 PROCEDURE — 80053 COMPREHEN METABOLIC PANEL: CPT | Performed by: INTERNAL MEDICINE

## 2022-08-05 PROCEDURE — 258N000003 HC RX IP 258 OP 636: Performed by: INTERNAL MEDICINE

## 2022-08-05 PROCEDURE — G0463 HOSPITAL OUTPT CLINIC VISIT: HCPCS | Mod: 25

## 2022-08-05 PROCEDURE — G0463 HOSPITAL OUTPT CLINIC VISIT: HCPCS

## 2022-08-05 PROCEDURE — 99214 OFFICE O/P EST MOD 30 MIN: CPT | Performed by: NURSE PRACTITIONER

## 2022-08-05 PROCEDURE — 250N000011 HC RX IP 250 OP 636: Performed by: INTERNAL MEDICINE

## 2022-08-05 PROCEDURE — 96413 CHEMO IV INFUSION 1 HR: CPT

## 2022-08-05 PROCEDURE — 85025 COMPLETE CBC W/AUTO DIFF WBC: CPT | Performed by: INTERNAL MEDICINE

## 2022-08-05 RX ORDER — HEPARIN SODIUM (PORCINE) LOCK FLUSH IV SOLN 100 UNIT/ML 100 UNIT/ML
5 SOLUTION INTRAVENOUS
Status: DISCONTINUED | OUTPATIENT
Start: 2022-08-05 | End: 2022-08-05 | Stop reason: HOSPADM

## 2022-08-05 RX ADMIN — Medication 5 ML: at 11:50

## 2022-08-05 RX ADMIN — ADO-TRASTUZUMAB EMTANSINE 298 MG: 20 INJECTION, POWDER, LYOPHILIZED, FOR SOLUTION INTRAVENOUS at 11:19

## 2022-08-05 ASSESSMENT — PAIN SCALES - GENERAL: PAINLEVEL: NO PAIN (0)

## 2022-08-05 NOTE — LETTER
8/5/2022         RE: Veronica Nieves  [address]  [place] [address]        Dear Colleague,    Thank you for referring your patient, Veronica Nieves, to the [facility]. Please see a copy of my visit note below.    [facility] Hematology and Oncology Progress Note ([place])    Patient: Veronica Nieves  MRN: [id number]  Date of Service: Aug 5, 2022        Assessment and Plan:    Cancer Staging  Malignant neoplasm of upper-outer quadrant of left breast in female, estrogen receptor positive (H)  Staging form: Breast, AJCC 8th Edition  - Clinical stage from 5/19/2021: Stage IB (cT2, cN0, cM0, G2, ER+, NH+, HER2+) - Signed by Jefferson Garcia MD on 5/19/2021     1.  Invasive ductal carcinoma of the left breast (triple positive)   Currently on Zoladex (monthly), tamoxifen, and Kadcyla every 3 weeks.  Continues to do well with this regimen..    Grade 1 LFT elevation has been intermittent, not requiring dose-reduction. Will follow.    Plan:  -Continue with cycle 13 Kadcyla without dose modification.  -Planning to complete 14 cycles, pending tolerance  -Return with provider in 3 weeks ahead of her last cycle  -Continue Zoladex monthly, due next week. Could consider transitioning to every 3 months as we move into surveillance to reduce her appts frequency  -Continue Tamoxifen, planning up to 10 yrs  -Upon completion of Kadcyla, will see every 3 months  -No mammograms needed with bilateral mastectomies    2.  Chemotherapy-induced peripheral neuropathy:   Involves the fingertips.  Grade 1. A bit worse over last 6 weeks but not interfering with ADLs and not painful. She came off duloxetine due to drug:drug interaction with tamoxifen and has not worsened.    3.  Anxiety and depression:   Ally was on duloxetine but due to drug:drug interaction with tamoxifen, she changed to citalopram 40 mg. Doing well.    4.   LFT elevation, gr 1  Suspect reactive with respiratory illness +/- side  effect of Kadcyla. Takes tylenol sparingly. No recent alcohol use.   No need for dose modification, will follow trend.     5.  Mild creatinine elevation  Fluctuates between 0.9-1.15. Push fluids. Avoid NSAIDs    6.  Viral URI  Mild symptoms since Monday (5 days) are improving. No fevers. Covid negative. She feels well enough to proceed with chemo today. If progressive symptoms/fevers, she will need antibiotic and call for reassessment.    7.   Lack of concentration  Likely chemo side effect. Continues on methylphenidate 10 mg BID.     Plan:  -Consider titrating down as able when chemo completed     8.  Birth control  She has had Mirena IUD for 5 years and will need exchanged soon. I suggested talking to Gyne about alternate hormone-free options to try to minimize her risks given her hormone+ breast cancer.    ECOG Performance  0    Diagnosis:    1.  Invasive ductal carcinoma of the left breast: Diagnosed May, 2021.  Initial imaging suggested 3 masses.  Biopsy of the largest mass showed an invasive ductal carcinoma, grade 2.  ER/MO positive, HER-2 positive.  Evaluation of the axilla was negative.    Treatment:    Neoadjuvant chemotherapy with TCHP initiated on May 27, 2021.  Cycle 6 was given on September 10.    Bilateral mastectomy performed on October 7.  Pathology showed a 9 mm focus of invasive ductal carcinoma of the left breast.  Lymph nodes are negative.    Kadcyla initiated November 29, 2021  Zoladex and tamoxifen initiated November 29, 2021    Interim History:    Abbey presents today for a 4-week follow-up visit on Kadcyla, Zoladex and tamoxifen. Tolerating this well.    Overall doing okay.  Still has some numbness in the fingertips that's a bit more persistent the last month.  Not painful and no interference with ADLs/fine motor.     No n/v, diarrhea.    5 days ago developed mild headache, body aches, congestion and dry cough. No fever. Covid testing negative. She is improving now. Using Tylenol on occasion.  "No NSAIDs. Feels she is pushing fluids well.       Past History:    Past Medical History:   Diagnosis Date     Abnormal Pap smear of cervix     age 13, subsequent pap smears normal     Anxiety      Breast cancer (H) 05/04/2021    Left     Depression      Physical Exam:    /73 (BP Location: Right arm, Patient Position: Sitting, Cuff Size: Adult Regular)   Pulse 76   Ht 1.676 m (5' 6\")   Wt 82.6 kg (182 lb 3.2 oz)   SpO2 99%   BMI 29.41 kg/m      General: patient appears stated age of 32 year old. Nontoxic and in no distress.   HEENT: Head: atraumatic, normocephalic. Sclerae anicteric.  Lymph: No palpable cervical nor axillary adenopathy.   Breasts: Prior mastectomies with implants.   Chest:  Normal respiratory effort. Clear lungs bilaterally.   Cardiac:  No edema. RRR  Abdomen: abdomen is non-distended  Extremities: normal tone and muscle bulk.  Skin: no lesions or rash on visible skin. Warm and dry.   CNS: alert and oriented. Grossly non-focal.   Psychiatric: normal mood and affect.     Lab Results:    Recent Results (from the past 24 hour(s))   Comprehensive metabolic panel    Collection Time: 08/05/22  9:35 AM   Result Value Ref Range    Sodium 137 136 - 145 mmol/L    Potassium 3.9 3.5 - 5.0 mmol/L    Chloride 100 98 - 107 mmol/L    Carbon Dioxide (CO2) 27 22 - 31 mmol/L    Anion Gap 10 5 - 18 mmol/L    Urea Nitrogen 12 8 - 22 mg/dL    Creatinine 1.13 (H) 0.60 - 1.10 mg/dL    Calcium 9.3 8.5 - 10.5 mg/dL    Glucose 120 70 - 125 mg/dL    Alkaline Phosphatase 61 45 - 120 U/L    AST 72 (H) 0 - 40 U/L    ALT 67 (H) 0 - 45 U/L    Protein Total 7.3 6.0 - 8.0 g/dL    Albumin 3.6 3.5 - 5.0 g/dL    Bilirubin Total 0.2 0.0 - 1.0 mg/dL    GFR Estimate 66 >60 mL/min/1.73m2   CBC with platelets and differential    Collection Time: 08/05/22  9:35 AM   Result Value Ref Range    WBC Count 4.2 4.0 - 11.0 10e3/uL    RBC Count 4.44 3.80 - 5.20 10e6/uL    Hemoglobin 12.7 11.7 - 15.7 g/dL    Hematocrit 38.8 35.0 - 47.0 %    " "MCV 87 78 - 100 fL    MCH 28.6 26.5 - 33.0 pg    MCHC 32.7 31.5 - 36.5 g/dL    RDW 13.7 10.0 - 15.0 %    Platelet Count 208 150 - 450 10e3/uL    % Neutrophils 62 %    % Lymphocytes 20 %    % Monocytes 16 %    % Eosinophils 1 %    % Basophils 0 %    % Immature Granulocytes 1 %    NRBCs per 100 WBC 0 <1 /100    Absolute Neutrophils 2.6 1.6 - 8.3 10e3/uL    Absolute Lymphocytes 0.8 0.8 - 5.3 10e3/uL    Absolute Monocytes 0.7 0.0 - 1.3 10e3/uL    Absolute Eosinophils 0.1 0.0 - 0.7 10e3/uL    Absolute Basophils 0.0 0.0 - 0.2 10e3/uL    Absolute Immature Granulocytes 0.0 <=0.4 10e3/uL    Absolute NRBCs 0.0 10e3/uL        Total time 35 minutes, to include face to face visit, review of EMR, ordering, documentation and coordination of care      Signed by: Marge Coon NP        Oncology Rooming Note    August 5, 2022 9:55 AM   Veronica Nieves is a 32 year old female who presents for:    Chief Complaint   Patient presents with     Oncology Clinic Visit     Malignant neoplasm of upper-outer quadrant of left breast in female, estrogen receptor positive     Initial Vitals: /73 (BP Location: Right arm, Patient Position: Sitting, Cuff Size: Adult Regular)   Pulse 76   Ht 1.676 m (5' 6\")   Wt 82.6 kg (182 lb 3.2 oz)   SpO2 99%   BMI 29.41 kg/m   Estimated body mass index is 29.41 kg/m  as calculated from the following:    Height as of this encounter: 1.676 m (5' 6\").    Weight as of this encounter: 82.6 kg (182 lb 3.2 oz). Body surface area is 1.96 meters squared.  No Pain (0) Comment: Data Unavailable   No LMP recorded. (Menstrual status: IUD).  Allergies reviewed: Yes  Medications reviewed: Yes    Medications: Medication refills not needed today.  Pharmacy name entered into [employer]: [facility]  ([facility]) - [place], [place] - [address]    Clinical concerns: 2 week follow up       Ayesha Echavarria MA                Again, thank you for allowing me to participate in the care of your patient.  "       Sincerely,        Marge Coon, NP

## 2022-08-05 NOTE — PROGRESS NOTES
[facility] Hematology and Oncology Progress Note ([place])    Patient: Veronica Nieves  MRN: [id number]  Date of Service: Aug 5, 2022        Assessment and Plan:    Cancer Staging  Malignant neoplasm of upper-outer quadrant of left breast in female, estrogen receptor positive (H)  Staging form: Breast, AJCC 8th Edition  - Clinical stage from 5/19/2021: Stage IB (cT2, cN0, cM0, G2, ER+, ID+, HER2+) - Signed by Jefferson Garcia MD on 5/19/2021     1.  Invasive ductal carcinoma of the left breast (triple positive)   Currently on Zoladex (monthly), tamoxifen, and Kadcyla every 3 weeks.  Continues to do well with this regimen..    Grade 1 LFT elevation has been intermittent, not requiring dose-reduction. Will follow.    Plan:  -Continue with cycle 13 Kadcyla without dose modification.  -Planning to complete 14 cycles, pending tolerance  -Return with provider in 3 weeks ahead of her last cycle  -Continue Zoladex monthly, due next week. Could consider transitioning to every 3 months as we move into surveillance to reduce her appts frequency  -Continue Tamoxifen, planning up to 10 yrs  -Upon completion of Kadcyla, will see every 3 months  -No mammograms needed with bilateral mastectomies    2.  Chemotherapy-induced peripheral neuropathy:   Involves the fingertips.  Grade 1. A bit worse over last 6 weeks but not interfering with ADLs and not painful. She came off duloxetine due to drug:drug interaction with tamoxifen and has not worsened.    3.  Anxiety and depression:   Ally was on duloxetine but due to drug:drug interaction with tamoxifen, she changed to citalopram 40 mg. Doing well.    4.   LFT elevation, gr 1  Suspect reactive with respiratory illness +/- side effect of Kadcyla. Takes tylenol sparingly. No recent alcohol use.   No need for dose modification, will follow trend.     5.  Mild creatinine elevation  Fluctuates between 0.9-1.15. Push fluids. Avoid NSAIDs    6.  Viral URI  Mild symptoms since Monday  (5 days) are improving. No fevers. Covid negative. She feels well enough to proceed with chemo today. If progressive symptoms/fevers, she will need antibiotic and call for reassessment.    7.   Lack of concentration  Likely chemo side effect. Continues on methylphenidate 10 mg BID.     Plan:  -Consider titrating down as able when chemo completed     8.  Birth control  She has had Mirena IUD for 5 years and will need exchanged soon. I suggested talking to Gyne about alternate hormone-free options to try to minimize her risks given her hormone+ breast cancer.    ECOG Performance  0    Diagnosis:    1.  Invasive ductal carcinoma of the left breast: Diagnosed May, 2021.  Initial imaging suggested 3 masses.  Biopsy of the largest mass showed an invasive ductal carcinoma, grade 2.  ER/VA positive, HER-2 positive.  Evaluation of the axilla was negative.    Treatment:    Neoadjuvant chemotherapy with TCHP initiated on May 27, 2021.  Cycle 6 was given on September 10.    Bilateral mastectomy performed on October 7.  Pathology showed a 9 mm focus of invasive ductal carcinoma of the left breast.  Lymph nodes are negative.    Kadcyla initiated November 29, 2021  Zoladex and tamoxifen initiated November 29, 2021    Interim History:    Abbey presents today for a 4-week follow-up visit on Kadcyla, Zoladex and tamoxifen. Tolerating this well.    Overall doing okay.  Still has some numbness in the fingertips that's a bit more persistent the last month.  Not painful and no interference with ADLs/fine motor.     No n/v, diarrhea.    5 days ago developed mild headache, body aches, congestion and dry cough. No fever. Covid testing negative. She is improving now. Using Tylenol on occasion. No NSAIDs. Feels she is pushing fluids well.       Past History:    Past Medical History:   Diagnosis Date     Abnormal Pap smear of cervix     age 13, subsequent pap smears normal     Anxiety      Breast cancer (H) 05/04/2021    Left     Depression   "    Physical Exam:    /73 (BP Location: Right arm, Patient Position: Sitting, Cuff Size: Adult Regular)   Pulse 76   Ht 1.676 m (5' 6\")   Wt 82.6 kg (182 lb 3.2 oz)   SpO2 99%   BMI 29.41 kg/m      General: patient appears stated age of 32 year old. Nontoxic and in no distress.   HEENT: Head: atraumatic, normocephalic. Sclerae anicteric.  Lymph: No palpable cervical nor axillary adenopathy.   Breasts: Prior mastectomies with implants.   Chest:  Normal respiratory effort. Clear lungs bilaterally.   Cardiac:  No edema. RRR  Abdomen: abdomen is non-distended  Extremities: normal tone and muscle bulk.  Skin: no lesions or rash on visible skin. Warm and dry.   CNS: alert and oriented. Grossly non-focal.   Psychiatric: normal mood and affect.     Lab Results:    Recent Results (from the past 24 hour(s))   Comprehensive metabolic panel    Collection Time: 08/05/22  9:35 AM   Result Value Ref Range    Sodium 137 136 - 145 mmol/L    Potassium 3.9 3.5 - 5.0 mmol/L    Chloride 100 98 - 107 mmol/L    Carbon Dioxide (CO2) 27 22 - 31 mmol/L    Anion Gap 10 5 - 18 mmol/L    Urea Nitrogen 12 8 - 22 mg/dL    Creatinine 1.13 (H) 0.60 - 1.10 mg/dL    Calcium 9.3 8.5 - 10.5 mg/dL    Glucose 120 70 - 125 mg/dL    Alkaline Phosphatase 61 45 - 120 U/L    AST 72 (H) 0 - 40 U/L    ALT 67 (H) 0 - 45 U/L    Protein Total 7.3 6.0 - 8.0 g/dL    Albumin 3.6 3.5 - 5.0 g/dL    Bilirubin Total 0.2 0.0 - 1.0 mg/dL    GFR Estimate 66 >60 mL/min/1.73m2   CBC with platelets and differential    Collection Time: 08/05/22  9:35 AM   Result Value Ref Range    WBC Count 4.2 4.0 - 11.0 10e3/uL    RBC Count 4.44 3.80 - 5.20 10e6/uL    Hemoglobin 12.7 11.7 - 15.7 g/dL    Hematocrit 38.8 35.0 - 47.0 %    MCV 87 78 - 100 fL    MCH 28.6 26.5 - 33.0 pg    MCHC 32.7 31.5 - 36.5 g/dL    RDW 13.7 10.0 - 15.0 %    Platelet Count 208 150 - 450 10e3/uL    % Neutrophils 62 %    % Lymphocytes 20 %    % Monocytes 16 %    % Eosinophils 1 %    % Basophils 0 %    % " Immature Granulocytes 1 %    NRBCs per 100 WBC 0 <1 /100    Absolute Neutrophils 2.6 1.6 - 8.3 10e3/uL    Absolute Lymphocytes 0.8 0.8 - 5.3 10e3/uL    Absolute Monocytes 0.7 0.0 - 1.3 10e3/uL    Absolute Eosinophils 0.1 0.0 - 0.7 10e3/uL    Absolute Basophils 0.0 0.0 - 0.2 10e3/uL    Absolute Immature Granulocytes 0.0 <=0.4 10e3/uL    Absolute NRBCs 0.0 10e3/uL        Total time 35 minutes, to include face to face visit, review of EMR, ordering, documentation and coordination of care      Signed by: Marge Coon, NP

## 2022-08-05 NOTE — PROGRESS NOTES
"Oncology Rooming Note    August 5, 2022 9:55 AM   Veronica Nieves is a 32 year old female who presents for:    Chief Complaint   Patient presents with     Oncology Clinic Visit     Malignant neoplasm of upper-outer quadrant of left breast in female, estrogen receptor positive     Initial Vitals: /73 (BP Location: Right arm, Patient Position: Sitting, Cuff Size: Adult Regular)   Pulse 76   Ht 1.676 m (5' 6\")   Wt 82.6 kg (182 lb 3.2 oz)   SpO2 99%   BMI 29.41 kg/m   Estimated body mass index is 29.41 kg/m  as calculated from the following:    Height as of this encounter: 1.676 m (5' 6\").    Weight as of this encounter: 82.6 kg (182 lb 3.2 oz). Body surface area is 1.96 meters squared.  No Pain (0) Comment: Data Unavailable   No LMP recorded. (Menstrual status: IUD).  Allergies reviewed: Yes  Medications reviewed: Yes    Medications: Medication refills not needed today.  Pharmacy name entered into [employer]: [facility]  ([facility]) - [place], [place] - [address]    Clinical concerns: 2 week follow up       Ayesha Echavarria MA            "

## 2022-08-05 NOTE — PROGRESS NOTES
Pt arrived ambulatory to clinic for Cycle # 13 Day # 1 of her chemotherapy regimen.  Port was accessed using aseptic technique without difficulties with excellent blood return.  Administered Kadcyla per MD order.  Pt tolerated medication well, no s/s of infusion reaction  Port was flushed with NS and Heparin then de-accessed using 2x2 and papertape.  Pt verbalized understanding of plan of care and return to clinic.

## 2022-08-05 NOTE — PATIENT INSTRUCTIONS
Esteban today    Zoladex due next week. Requesting 8/9 AM after her dietician appt    3 weeks - PACO/Esteban Garcia (last cycle planned)

## 2022-08-09 ENCOUNTER — TELEPHONE (OUTPATIENT)
Dept: ONCOLOGY | Facility: HOSPITAL | Age: 32
End: 2022-08-09

## 2022-08-16 ENCOUNTER — ALLIED HEALTH/NURSE VISIT (OUTPATIENT)
Dept: ONCOLOGY | Facility: HOSPITAL | Age: 32
End: 2022-08-16
Attending: INTERNAL MEDICINE
Payer: COMMERCIAL

## 2022-08-16 DIAGNOSIS — C50.412 MALIGNANT NEOPLASM OF UPPER-OUTER QUADRANT OF LEFT BREAST IN FEMALE, ESTROGEN RECEPTOR POSITIVE (H): Primary | ICD-10-CM

## 2022-08-16 DIAGNOSIS — Z17.0 MALIGNANT NEOPLASM OF UPPER-OUTER QUADRANT OF LEFT BREAST IN FEMALE, ESTROGEN RECEPTOR POSITIVE (H): Primary | ICD-10-CM

## 2022-08-16 PROCEDURE — 97802 MEDICAL NUTRITION INDIV IN: CPT | Performed by: DIETITIAN, REGISTERED

## 2022-08-16 NOTE — PROGRESS NOTES
"CLINICAL NUTRITION SERVICES - ASSESSMENT NOTE    Veronica Nieves 32 year old referred for MNT related to breast cancer    Time Spent: 45 minutes  Visit Type: initial  Pt accompanied by: self  Referring Physician: Marge Coon    NUTRITION HISTORY  Factors affecting nutrition intake include:none currently  Current diet/appetite: regular  Chemotherapy: completed  Radiation: N/A  Surgery: completed and pending    Diet Recall  Ry reports eating 3 meals/ day and occasional snacks. Her weight has recently been fluctuating. She has successfully lost weight in the past through healthy eating however gained back what she lost.  Breakfast Oatmeal with berries or scrambled eggs   Lunch Chicken or meatloaf with potato and vegetable   Dinner Recently has been eating out more   Snacks Hard boiled egg or fruit   Beverages Water, 1-2 cups coffee with oatmilk, 1-2 glasses of red wine 2-3 times/ week     ANTHROPOMETRICS  Height:   Ht Readings from Last 1 Encounters:   08/05/22 1.676 m (5' 6\")     Weight:   Wt Readings from Last 1 Encounters:   08/05/22 82.6 kg (182 lb 3.2 oz)     BMI: 29.4 kg/m2  Weight Status:  Overweight BMI 25-29.9  IBW: 130 lbs/ 59.1 kg (140%)  Weight History:   Wt Readings from Last 10 Encounters:   08/05/22 82.6 kg (182 lb 3.2 oz)   07/15/22 81.6 kg (180 lb)   06/10/22 80 kg (176 lb 4.8 oz)   05/27/22 84.2 kg (185 lb 9.6 oz)   05/13/22 84.3 kg (185 lb 12.8 oz)   04/01/22 78.9 kg (173 lb 14.4 oz)   03/11/22 78.5 kg (173 lb)   02/16/22 79.4 kg (175 lb)   01/07/22 81.6 kg (180 lb)   12/17/21 81.2 kg (179 lb)       Dosing Weight: 65 kg (adj for >120% IBW)    Medications/vitamins/minerals/herbals:   Reviewed    Labs:   Labs reviewed    ASSESSED NUTRITION NEEDS:  Estimated Energy Needs: 6521-9445 kcals (25-30 Kcal/Kg)  Justification: maintenance (aim toward lower end for weight loss)  Estimated Protein Needs: 65-78 grams protein (1-1.2 g pro/Kg)  Justification: maintenance  Estimated Fluid Needs: 7136-1946 mL "   Justification: maintenance    MALNUTRITION:  % Weight Loss:  None noted  % Intake:  No decreased intake noted  Subcutaneous Fat Loss:  None observed  Muscle Loss:  None observed  Fluid Retention:  None noted    Malnutrition Diagnosis: Patient does not meet two of the above criteria necessary for diagnosing malnutrition    NUTRITION DIAGNOSIS:  Food and nutrition-related knowledge deficit related to no previous diet educaton as evidenced by patient with questions about weight loss strategies.    INTERVENTIONS  Provided written & verbal education:     - Reviewed nutrition needs. Reviewed calorie goal for weight loss versus weight maintenance.  - Reviewed weight loss strategies and recommended nutrition for cancer prevention. Encouraged a healthy diet with emphasis on plant foods. Encouraged regular physical activity.    Provided pt with corresponding education materials/handouts on:  AICR Cancer Recommendations, 1500 Calorie Meal Plans, 1800 Calorie Meal Plans, Mindful Eating, Weight Loss Strategies    Pt verbalize understanding of materials provided during consult.   Patient Understanding: Excellent  Expected patient engagement: Excellent     Goals  1. Patient will walk 30 minutes/ day and go to the gym 1 time/ week.  2. Patient will focus on eating more vegetables (approx half the plate for lunch and dinner).  3. Patient will take time to eat meals (at least 15-20 minutes).    Follow-Up Plans: Pt has RD contact information for questions.      MONITORING AND EVALUATION:  -Food/beverage intake  -Weight trends    Coreen Bruce, MS, RD, LD

## 2022-08-19 ENCOUNTER — INFUSION THERAPY VISIT (OUTPATIENT)
Dept: INFUSION THERAPY | Facility: HOSPITAL | Age: 32
End: 2022-08-19
Attending: INTERNAL MEDICINE
Payer: COMMERCIAL

## 2022-08-19 VITALS
DIASTOLIC BLOOD PRESSURE: 75 MMHG | OXYGEN SATURATION: 96 % | TEMPERATURE: 98.9 F | RESPIRATION RATE: 18 BRPM | HEART RATE: 87 BPM | SYSTOLIC BLOOD PRESSURE: 138 MMHG

## 2022-08-19 DIAGNOSIS — C50.412 MALIGNANT NEOPLASM OF UPPER-OUTER QUADRANT OF LEFT BREAST IN FEMALE, ESTROGEN RECEPTOR POSITIVE (H): Primary | ICD-10-CM

## 2022-08-19 DIAGNOSIS — Z17.0 MALIGNANT NEOPLASM OF UPPER-OUTER QUADRANT OF LEFT BREAST IN FEMALE, ESTROGEN RECEPTOR POSITIVE (H): Primary | ICD-10-CM

## 2022-08-19 PROCEDURE — 96402 CHEMO HORMON ANTINEOPL SQ/IM: CPT

## 2022-08-19 PROCEDURE — 250N000011 HC RX IP 250 OP 636: Performed by: INTERNAL MEDICINE

## 2022-08-19 RX ADMIN — GOSERELIN ACETATE 3.6 MG: 3.6 IMPLANT SUBCUTANEOUS at 08:15

## 2022-08-19 NOTE — PROGRESS NOTES
Infusion Nursing Note:  Veronica Nieves presents today for her next dose of Zoledex.    Patient seen by provider today: No   present during visit today: Not Applicable.    Note: VSS.  Pt assessed and is feeling well.  She received Zoledex into her right lower quadrant of her abdomen.  Area was prepped with a ice pack prior and covered with gauze and tegaderm after.      Intravenous Access:  No Intravenous access/labs at this visit.    Treatment Conditions:  Not Applicable.    Post Infusion Assessment:  Patient tolerated injection without incident.     Discharge Plan:   Patient discharged in stable condition accompanied by: self.      Meryl Roth RN

## 2022-08-22 ENCOUNTER — MYC REFILL (OUTPATIENT)
Dept: ONCOLOGY | Facility: HOSPITAL | Age: 32
End: 2022-08-22

## 2022-08-22 DIAGNOSIS — C50.412 MALIGNANT NEOPLASM OF UPPER-OUTER QUADRANT OF LEFT BREAST IN FEMALE, ESTROGEN RECEPTOR POSITIVE (H): ICD-10-CM

## 2022-08-22 DIAGNOSIS — Z17.0 MALIGNANT NEOPLASM OF UPPER-OUTER QUADRANT OF LEFT BREAST IN FEMALE, ESTROGEN RECEPTOR POSITIVE (H): ICD-10-CM

## 2022-08-22 RX ORDER — METHYLPHENIDATE HYDROCHLORIDE 10 MG/1
10 TABLET ORAL 2 TIMES DAILY
Qty: 60 TABLET | Refills: 0 | Status: SHIPPED | OUTPATIENT
Start: 2022-08-22 | End: 2022-10-21

## 2022-08-24 DIAGNOSIS — Z17.0 MALIGNANT NEOPLASM OF OVERLAPPING SITES OF BREAST IN FEMALE, ESTROGEN RECEPTOR POSITIVE, UNSPECIFIED LATERALITY (H): ICD-10-CM

## 2022-08-24 DIAGNOSIS — C50.412 MALIGNANT NEOPLASM OF UPPER-OUTER QUADRANT OF LEFT BREAST IN FEMALE, ESTROGEN RECEPTOR POSITIVE (H): Primary | ICD-10-CM

## 2022-08-24 DIAGNOSIS — C50.819 MALIGNANT NEOPLASM OF OVERLAPPING SITES OF BREAST IN FEMALE, ESTROGEN RECEPTOR POSITIVE, UNSPECIFIED LATERALITY (H): ICD-10-CM

## 2022-08-24 DIAGNOSIS — Z17.0 MALIGNANT NEOPLASM OF UPPER-OUTER QUADRANT OF LEFT BREAST IN FEMALE, ESTROGEN RECEPTOR POSITIVE (H): Primary | ICD-10-CM

## 2022-08-26 ENCOUNTER — LAB (OUTPATIENT)
Dept: INFUSION THERAPY | Facility: HOSPITAL | Age: 32
End: 2022-08-26
Attending: INTERNAL MEDICINE
Payer: COMMERCIAL

## 2022-08-26 VITALS
OXYGEN SATURATION: 98 % | RESPIRATION RATE: 16 BRPM | HEART RATE: 71 BPM | TEMPERATURE: 98.4 F | DIASTOLIC BLOOD PRESSURE: 67 MMHG | SYSTOLIC BLOOD PRESSURE: 133 MMHG

## 2022-08-26 DIAGNOSIS — Z17.0 MALIGNANT NEOPLASM OF UPPER-OUTER QUADRANT OF LEFT BREAST IN FEMALE, ESTROGEN RECEPTOR POSITIVE (H): Primary | ICD-10-CM

## 2022-08-26 DIAGNOSIS — C50.412 MALIGNANT NEOPLASM OF UPPER-OUTER QUADRANT OF LEFT BREAST IN FEMALE, ESTROGEN RECEPTOR POSITIVE (H): Primary | ICD-10-CM

## 2022-08-26 LAB
ALBUMIN SERPL-MCNC: 3.5 G/DL (ref 3.5–5)
ALP SERPL-CCNC: 68 U/L (ref 45–120)
ALT SERPL W P-5'-P-CCNC: 50 U/L (ref 0–45)
ANION GAP SERPL CALCULATED.3IONS-SCNC: 7 MMOL/L (ref 5–18)
AST SERPL W P-5'-P-CCNC: 41 U/L (ref 0–40)
BASOPHILS # BLD AUTO: 0 10E3/UL (ref 0–0.2)
BASOPHILS NFR BLD AUTO: 0 %
BILIRUB SERPL-MCNC: 0.3 MG/DL (ref 0–1)
BUN SERPL-MCNC: 15 MG/DL (ref 8–22)
CALCIUM SERPL-MCNC: 8.8 MG/DL (ref 8.5–10.5)
CHLORIDE BLD-SCNC: 108 MMOL/L (ref 98–107)
CO2 SERPL-SCNC: 26 MMOL/L (ref 22–31)
CREAT SERPL-MCNC: 1.01 MG/DL (ref 0.6–1.1)
EOSINOPHIL # BLD AUTO: 0.2 10E3/UL (ref 0–0.7)
EOSINOPHIL NFR BLD AUTO: 3 %
ERYTHROCYTE [DISTWIDTH] IN BLOOD BY AUTOMATED COUNT: 13.7 % (ref 10–15)
GFR SERPL CREATININE-BSD FRML MDRD: 75 ML/MIN/1.73M2
GLUCOSE BLD-MCNC: 98 MG/DL (ref 70–125)
HCT VFR BLD AUTO: 36.4 % (ref 35–47)
HGB BLD-MCNC: 11.8 G/DL (ref 11.7–15.7)
IMM GRANULOCYTES # BLD: 0 10E3/UL
IMM GRANULOCYTES NFR BLD: 0 %
LYMPHOCYTES # BLD AUTO: 1.9 10E3/UL (ref 0.8–5.3)
LYMPHOCYTES NFR BLD AUTO: 29 %
MCH RBC QN AUTO: 28.6 PG (ref 26.5–33)
MCHC RBC AUTO-ENTMCNC: 32.4 G/DL (ref 31.5–36.5)
MCV RBC AUTO: 88 FL (ref 78–100)
MONOCYTES # BLD AUTO: 0.7 10E3/UL (ref 0–1.3)
MONOCYTES NFR BLD AUTO: 11 %
NEUTROPHILS # BLD AUTO: 3.9 10E3/UL (ref 1.6–8.3)
NEUTROPHILS NFR BLD AUTO: 57 %
NRBC # BLD AUTO: 0 10E3/UL
NRBC BLD AUTO-RTO: 0 /100
PLATELET # BLD AUTO: 265 10E3/UL (ref 150–450)
POTASSIUM BLD-SCNC: 3.9 MMOL/L (ref 3.5–5)
PROT SERPL-MCNC: 7 G/DL (ref 6–8)
RBC # BLD AUTO: 4.12 10E6/UL (ref 3.8–5.2)
SODIUM SERPL-SCNC: 141 MMOL/L (ref 136–145)
WBC # BLD AUTO: 6.8 10E3/UL (ref 4–11)

## 2022-08-26 PROCEDURE — 250N000011 HC RX IP 250 OP 636: Performed by: INTERNAL MEDICINE

## 2022-08-26 PROCEDURE — 80053 COMPREHEN METABOLIC PANEL: CPT | Performed by: INTERNAL MEDICINE

## 2022-08-26 PROCEDURE — 82040 ASSAY OF SERUM ALBUMIN: CPT | Performed by: INTERNAL MEDICINE

## 2022-08-26 PROCEDURE — 85025 COMPLETE CBC W/AUTO DIFF WBC: CPT | Performed by: INTERNAL MEDICINE

## 2022-08-26 PROCEDURE — 96413 CHEMO IV INFUSION 1 HR: CPT

## 2022-08-26 PROCEDURE — 258N000003 HC RX IP 258 OP 636: Performed by: INTERNAL MEDICINE

## 2022-08-26 RX ORDER — HEPARIN SODIUM (PORCINE) LOCK FLUSH IV SOLN 100 UNIT/ML 100 UNIT/ML
5 SOLUTION INTRAVENOUS
Status: DISCONTINUED | OUTPATIENT
Start: 2022-08-26 | End: 2022-08-26 | Stop reason: HOSPADM

## 2022-08-26 RX ORDER — MEPERIDINE HYDROCHLORIDE 25 MG/ML
25 INJECTION INTRAMUSCULAR; INTRAVENOUS; SUBCUTANEOUS EVERY 30 MIN PRN
Status: DISCONTINUED | OUTPATIENT
Start: 2022-08-26 | End: 2022-08-26 | Stop reason: HOSPADM

## 2022-08-26 RX ORDER — METHYLPREDNISOLONE SODIUM SUCCINATE 125 MG/2ML
125 INJECTION, POWDER, LYOPHILIZED, FOR SOLUTION INTRAMUSCULAR; INTRAVENOUS
Status: DISCONTINUED | OUTPATIENT
Start: 2022-08-26 | End: 2022-08-26 | Stop reason: HOSPADM

## 2022-08-26 RX ORDER — EPINEPHRINE 1 MG/ML
0.3 INJECTION, SOLUTION INTRAMUSCULAR; SUBCUTANEOUS EVERY 5 MIN PRN
Status: DISCONTINUED | OUTPATIENT
Start: 2022-08-26 | End: 2022-08-26 | Stop reason: HOSPADM

## 2022-08-26 RX ORDER — ALBUTEROL SULFATE 0.83 MG/ML
2.5 SOLUTION RESPIRATORY (INHALATION)
Status: DISCONTINUED | OUTPATIENT
Start: 2022-08-26 | End: 2022-08-26 | Stop reason: HOSPADM

## 2022-08-26 RX ORDER — DIPHENHYDRAMINE HYDROCHLORIDE 50 MG/ML
50 INJECTION INTRAMUSCULAR; INTRAVENOUS
Status: DISCONTINUED | OUTPATIENT
Start: 2022-08-26 | End: 2022-08-26 | Stop reason: HOSPADM

## 2022-08-26 RX ORDER — NALOXONE HYDROCHLORIDE 0.4 MG/ML
0.2 INJECTION, SOLUTION INTRAMUSCULAR; INTRAVENOUS; SUBCUTANEOUS
Status: DISCONTINUED | OUTPATIENT
Start: 2022-08-26 | End: 2022-08-26 | Stop reason: HOSPADM

## 2022-08-26 RX ORDER — ALBUTEROL SULFATE 90 UG/1
1-2 AEROSOL, METERED RESPIRATORY (INHALATION)
Status: DISCONTINUED | OUTPATIENT
Start: 2022-08-26 | End: 2022-08-26 | Stop reason: HOSPADM

## 2022-08-26 RX ADMIN — ADO-TRASTUZUMAB EMTANSINE 298 MG: 20 INJECTION, POWDER, LYOPHILIZED, FOR SOLUTION INTRAVENOUS at 10:05

## 2022-08-26 RX ADMIN — SODIUM CHLORIDE 250 ML: 9 INJECTION, SOLUTION INTRAVENOUS at 10:05

## 2022-08-26 RX ADMIN — HEPARIN SODIUM (PORCINE) LOCK FLUSH IV SOLN 100 UNIT/ML 5 ML: 100 SOLUTION at 10:42

## 2022-08-26 NOTE — PROGRESS NOTES
Infusion Nursing Note:  Veronica Nieves presents today for C14, D1 Kadcyla.    Patient seen by provider today: No   present during visit today: Not Applicable.    Note: N/A.    Intravenous Access:  Implanted Port.    Treatment Conditions:  Lab Results   Component Value Date    HGB 11.8 08/26/2022    WBC 6.8 08/26/2022    ANEU 15.5 (H) 08/09/2021    ANEUTAUTO 3.9 08/26/2022     08/26/2022      Lab Results   Component Value Date     08/26/2022    POTASSIUM 3.9 08/26/2022    CR 1.01 08/26/2022    BURT 8.8 08/26/2022    BILITOTAL 0.3 08/26/2022    ALBUMIN 3.5 08/26/2022    ALT 50 (H) 08/26/2022    AST 41 (H) 08/26/2022     Results reviewed, labs MET treatment parameters, ok to proceed with treatment.    Post Infusion Assessment:  Patient tolerated infusion without incident.  Blood return noted pre and post infusion.  Site patent and intact, free from redness, edema or discomfort.  Access discontinued per protocol.     Discharge Plan:   Patient will return Sept 15th for next appointment.   Patient discharged in stable condition accompanied by: self.  Departure Mode: Ambulatory.      Licha Ruiz RN

## 2022-09-13 DIAGNOSIS — C50.412 MALIGNANT NEOPLASM OF UPPER-OUTER QUADRANT OF LEFT BREAST IN FEMALE, ESTROGEN RECEPTOR POSITIVE (H): Primary | ICD-10-CM

## 2022-09-13 DIAGNOSIS — Z17.0 MALIGNANT NEOPLASM OF UPPER-OUTER QUADRANT OF LEFT BREAST IN FEMALE, ESTROGEN RECEPTOR POSITIVE (H): Primary | ICD-10-CM

## 2022-09-14 ENCOUNTER — HOSPITAL ENCOUNTER (OUTPATIENT)
Dept: CT IMAGING | Facility: HOSPITAL | Age: 32
Discharge: HOME | End: 2022-09-14
Attending: INTERNAL MEDICINE | Admitting: INTERNAL MEDICINE
Payer: COMMERCIAL

## 2022-09-14 DIAGNOSIS — C50.412 MALIGNANT NEOPLASM OF UPPER-OUTER QUADRANT OF LEFT BREAST IN FEMALE, ESTROGEN RECEPTOR POSITIVE (H): ICD-10-CM

## 2022-09-14 DIAGNOSIS — Z17.0 MALIGNANT NEOPLASM OF UPPER-OUTER QUADRANT OF LEFT BREAST IN FEMALE, ESTROGEN RECEPTOR POSITIVE (H): ICD-10-CM

## 2022-09-14 LAB
CREAT BLD-MCNC: 1 MG/DL (ref 0.6–1.1)
GFR SERPL CREATININE-BSD FRML MDRD: >60 ML/MIN/1.73M2

## 2022-09-14 PROCEDURE — 250N000011 HC RX IP 250 OP 636: Performed by: INTERNAL MEDICINE

## 2022-09-14 PROCEDURE — 82565 ASSAY OF CREATININE: CPT

## 2022-09-14 PROCEDURE — 74177 CT ABD & PELVIS W/CONTRAST: CPT

## 2022-09-14 RX ORDER — IOPAMIDOL 755 MG/ML
100 INJECTION, SOLUTION INTRAVASCULAR ONCE
Status: COMPLETED | OUTPATIENT
Start: 2022-09-14 | End: 2022-09-14

## 2022-09-14 RX ADMIN — HEPARIN 500 UNITS: 100 SYRINGE at 15:18

## 2022-09-14 RX ADMIN — IOPAMIDOL 100 ML: 755 INJECTION, SOLUTION INTRAVENOUS at 15:05

## 2022-09-15 ENCOUNTER — ONCOLOGY VISIT (OUTPATIENT)
Dept: ONCOLOGY | Facility: HOSPITAL | Age: 32
End: 2022-09-15
Attending: INTERNAL MEDICINE
Payer: COMMERCIAL

## 2022-09-15 ENCOUNTER — INFUSION THERAPY VISIT (OUTPATIENT)
Dept: INFUSION THERAPY | Facility: HOSPITAL | Age: 32
End: 2022-09-15
Attending: INTERNAL MEDICINE
Payer: COMMERCIAL

## 2022-09-15 VITALS
BODY MASS INDEX: 30.34 KG/M2 | WEIGHT: 188 LBS | TEMPERATURE: 98 F | SYSTOLIC BLOOD PRESSURE: 129 MMHG | OXYGEN SATURATION: 98 % | DIASTOLIC BLOOD PRESSURE: 75 MMHG | RESPIRATION RATE: 16 BRPM | HEART RATE: 67 BPM

## 2022-09-15 DIAGNOSIS — Z17.0 MALIGNANT NEOPLASM OF UPPER-OUTER QUADRANT OF LEFT BREAST IN FEMALE, ESTROGEN RECEPTOR POSITIVE (H): Primary | ICD-10-CM

## 2022-09-15 DIAGNOSIS — C50.412 MALIGNANT NEOPLASM OF UPPER-OUTER QUADRANT OF LEFT BREAST IN FEMALE, ESTROGEN RECEPTOR POSITIVE (H): Primary | ICD-10-CM

## 2022-09-15 DIAGNOSIS — T45.1X5A CHEMOTHERAPY-INDUCED PERIPHERAL NEUROPATHY (H): ICD-10-CM

## 2022-09-15 DIAGNOSIS — G62.0 CHEMOTHERAPY-INDUCED PERIPHERAL NEUROPATHY (H): ICD-10-CM

## 2022-09-15 LAB
ALBUMIN SERPL-MCNC: 3.7 G/DL (ref 3.5–5)
ALP SERPL-CCNC: 68 U/L (ref 45–120)
ALT SERPL W P-5'-P-CCNC: 35 U/L (ref 0–45)
ANION GAP SERPL CALCULATED.3IONS-SCNC: 5 MMOL/L (ref 5–18)
AST SERPL W P-5'-P-CCNC: 34 U/L (ref 0–40)
BASOPHILS # BLD AUTO: 0 10E3/UL (ref 0–0.2)
BASOPHILS NFR BLD AUTO: 0 %
BILIRUB SERPL-MCNC: 0.2 MG/DL (ref 0–1)
BUN SERPL-MCNC: 14 MG/DL (ref 8–22)
CALCIUM SERPL-MCNC: 9.4 MG/DL (ref 8.5–10.5)
CHLORIDE BLD-SCNC: 104 MMOL/L (ref 98–107)
CO2 SERPL-SCNC: 30 MMOL/L (ref 22–31)
CREAT SERPL-MCNC: 1 MG/DL (ref 0.6–1.1)
EOSINOPHIL # BLD AUTO: 0.2 10E3/UL (ref 0–0.7)
EOSINOPHIL NFR BLD AUTO: 2 %
ERYTHROCYTE [DISTWIDTH] IN BLOOD BY AUTOMATED COUNT: 13.5 % (ref 10–15)
GFR SERPL CREATININE-BSD FRML MDRD: 76 ML/MIN/1.73M2
GLUCOSE BLD-MCNC: 109 MG/DL (ref 70–125)
HCT VFR BLD AUTO: 38.4 % (ref 35–47)
HGB BLD-MCNC: 12.6 G/DL (ref 11.7–15.7)
IMM GRANULOCYTES # BLD: 0 10E3/UL
IMM GRANULOCYTES NFR BLD: 0 %
LYMPHOCYTES # BLD AUTO: 1.3 10E3/UL (ref 0.8–5.3)
LYMPHOCYTES NFR BLD AUTO: 19 %
MCH RBC QN AUTO: 28.6 PG (ref 26.5–33)
MCHC RBC AUTO-ENTMCNC: 32.8 G/DL (ref 31.5–36.5)
MCV RBC AUTO: 87 FL (ref 78–100)
MONOCYTES # BLD AUTO: 0.7 10E3/UL (ref 0–1.3)
MONOCYTES NFR BLD AUTO: 10 %
NEUTROPHILS # BLD AUTO: 4.9 10E3/UL (ref 1.6–8.3)
NEUTROPHILS NFR BLD AUTO: 69 %
NRBC # BLD AUTO: 0 10E3/UL
NRBC BLD AUTO-RTO: 0 /100
PLATELET # BLD AUTO: 268 10E3/UL (ref 150–450)
POTASSIUM BLD-SCNC: 4 MMOL/L (ref 3.5–5)
PROT SERPL-MCNC: 7.5 G/DL (ref 6–8)
RBC # BLD AUTO: 4.41 10E6/UL (ref 3.8–5.2)
SODIUM SERPL-SCNC: 139 MMOL/L (ref 136–145)
WBC # BLD AUTO: 7 10E3/UL (ref 4–11)

## 2022-09-15 PROCEDURE — 85025 COMPLETE CBC W/AUTO DIFF WBC: CPT

## 2022-09-15 PROCEDURE — 99214 OFFICE O/P EST MOD 30 MIN: CPT | Performed by: NURSE PRACTITIONER

## 2022-09-15 PROCEDURE — 96402 CHEMO HORMON ANTINEOPL SQ/IM: CPT

## 2022-09-15 PROCEDURE — 36591 DRAW BLOOD OFF VENOUS DEVICE: CPT

## 2022-09-15 PROCEDURE — G0463 HOSPITAL OUTPT CLINIC VISIT: HCPCS | Mod: 25

## 2022-09-15 PROCEDURE — 80053 COMPREHEN METABOLIC PANEL: CPT

## 2022-09-15 PROCEDURE — 250N000011 HC RX IP 250 OP 636: Performed by: NURSE PRACTITIONER

## 2022-09-15 PROCEDURE — 250N000011 HC RX IP 250 OP 636: Performed by: INTERNAL MEDICINE

## 2022-09-15 RX ORDER — HEPARIN SODIUM (PORCINE) LOCK FLUSH IV SOLN 100 UNIT/ML 100 UNIT/ML
5 SOLUTION INTRAVENOUS
Status: DISCONTINUED | OUTPATIENT
Start: 2022-09-15 | End: 2022-09-15 | Stop reason: HOSPADM

## 2022-09-15 RX ADMIN — Medication 5 ML: at 10:34

## 2022-09-15 RX ADMIN — GOSERELIN ACETATE 10.8 MG: 10.8 IMPLANT SUBCUTANEOUS at 10:42

## 2022-09-15 ASSESSMENT — PAIN SCALES - GENERAL: PAINLEVEL: NO PAIN (0)

## 2022-09-15 NOTE — PROGRESS NOTES
"Oncology Rooming Note    September 15, 2022 9:23 AM   Veronica Nieves is a 32 year old female who presents for:    Chief Complaint   Patient presents with     Oncology Clinic Visit     Initial Vitals: /75   Pulse 67   Temp 98  F (36.7  C)   Resp 16   Wt 85.3 kg (188 lb)   SpO2 98%   BMI 30.34 kg/m   Estimated body mass index is 30.34 kg/m  as calculated from the following:    Height as of 8/5/22: 1.676 m (5' 6\").    Weight as of this encounter: 85.3 kg (188 lb). Body surface area is 1.99 meters squared.  No Pain (0) Comment: Data Unavailable   No LMP recorded. (Menstrual status: IUD).  Allergies reviewed: Yes  Medications reviewed: Yes    Medications: Medication refills not needed today.  Pharmacy name entered into [employer]: [facility]  ([facility]) - [place], [place] - [address] [facility]        Ellen Ace RN            "

## 2022-09-15 NOTE — PROGRESS NOTES
[facility] Hematology and Oncology Progress Note ([place])    Patient: Veronica Nieves  MRN: [id number]  Date of Service: Sep 15, 2022        Assessment and Plan:    Cancer Staging  Malignant neoplasm of upper-outer quadrant of left breast in female, estrogen receptor positive (H)  Staging form: Breast, AJCC 8th Edition  - Clinical stage from 5/19/2021: Stage IB (cT2, cN0, cM0, G2, ER+, AL+, HER2+) - Signed by Jefferson Garcia MD on 5/19/2021     1.  Invasive ductal carcinoma of the left breast (triple positive)   Completed 14 cycles of Kadcyla.   Continues on Zoladex (monthly) and tamoxifen    CBC and CMP normal.    CTchest/abd/pelvis negative for cancer/mets. Right lung GGOs noted in March are resolved.     She will be having reconstruction surgery to replace right breast implant in near future.     Plan:  -She's completed all 14 planned cycles of Kadcyla  -She can have her port-a-cath removed at time of her Plastic Surgery, she will discuss this with the surgeon  -Change Zoladex to every 3 months starting with today's dose  -Continue Tamoxifen, planning up to 10 yrs  -No mammograms needed with bilateral mastectomies  -Return every 3 months with labs and Zoladex  -Follow every 3 months for a few visits, then can see every 6 months    2.  Chemotherapy-induced peripheral neuropathy:   Involves the fingertips.  Grade 1. Not interfering with ADLs and not painful. She came off duloxetine due to drug:drug interaction with tamoxifen and has not worsened. This will hopefully improve with time off chemo, usually allow up to 6 months.    3.  Anxiety and depression:   Ally was on duloxetine but due to drug:drug interaction with tamoxifen, she changed to citalopram 40 mg. Doing well.    4.   Lack of concentration  Likely chemo side effect. Continues on methylphenidate 10 mg BID.     Plan:  -Consider titrating down as able when chemo completed     5.  Birth control  She has a copper IUD in (changed from Mirena with  her hormone + breast cancer)    ECOG Performance  0    Diagnosis:    1.  Invasive ductal carcinoma of the left breast: Diagnosed May, 2021.  Initial imaging suggested 3 masses.  Biopsy of the largest mass showed an invasive ductal carcinoma, grade 2.  ER/MS positive, HER-2 positive.  Evaluation of the axilla was negative.    Treatment:    Neoadjuvant chemotherapy with TCHP initiated on May 27, 2021.  Cycle 6 was given on September 10.    Bilateral mastectomy performed on October 7.  Pathology showed a 9 mm focus of invasive ductal carcinoma of the left breast.  Lymph nodes are negative.    Kadcyla initiated November 29, 2021. Completed 14 cycles August 26, 2022  Zoladex and tamoxifen initiated November 29, 2021    Interim History:    Abbey presents today for a 6-week follow-up visit. She completed her final cycle of Kadcyla 3 weeks ago.   She has continued on monthly Zoladex and tamoxifen. Tolerating this well.    Overall doing okay.  Still has some numbness in the fingertips, non-painful and no interference with ADLs/fine motor.     No n/v, diarrhea. No new sites of pain. No new headaches.       Past History:    Past Medical History:   Diagnosis Date     Abnormal Pap smear of cervix     age 13, subsequent pap smears normal     Anxiety      Breast cancer (H) 05/04/2021    Left     Depression      Physical Exam:    /75   Pulse 67   Temp 98  F (36.7  C)   Resp 16   Wt 85.3 kg (188 lb)   SpO2 98%   BMI 30.34 kg/m      General: patient appears stated age of 32 year old. Nontoxic and in no distress.   HEENT: Head: atraumatic, normocephalic. Sclerae anicteric.  Lymph: No palpable cervical adenopathy.   Breasts: Not assessed  Chest:  Normal respiratory effort. Clear lungs bilaterally.   Cardiac:  No edema. RRR  Abdomen: abdomen is non-distended  Extremities: normal tone and muscle bulk.  Skin: no lesions or rash on visible skin. Warm and dry.   CNS: alert and oriented. Grossly non-focal.   Psychiatric: normal  mood and affect.     Lab Results:    Recent Results (from the past 24 hour(s))   Creatinine POCT    Collection Time: 09/14/22  2:51 PM   Result Value Ref Range    Creatinine POCT 1.0 0.6 - 1.1 mg/dL    GFR, ESTIMATED POCT >60 >60 mL/min/1.73m2   Comprehensive metabolic panel (BMP + Alb, Alk Phos, ALT, AST, Total. Bili, TP)    Collection Time: 09/15/22 10:26 AM   Result Value Ref Range    Sodium 139 136 - 145 mmol/L    Potassium 4.0 3.5 - 5.0 mmol/L    Chloride 104 98 - 107 mmol/L    Carbon Dioxide (CO2) 30 22 - 31 mmol/L    Anion Gap 5 5 - 18 mmol/L    Urea Nitrogen 14 8 - 22 mg/dL    Creatinine 1.00 0.60 - 1.10 mg/dL    Calcium 9.4 8.5 - 10.5 mg/dL    Glucose 109 70 - 125 mg/dL    Alkaline Phosphatase 68 45 - 120 U/L    AST 34 0 - 40 U/L    ALT 35 0 - 45 U/L    Protein Total 7.5 6.0 - 8.0 g/dL    Albumin 3.7 3.5 - 5.0 g/dL    Bilirubin Total 0.2 0.0 - 1.0 mg/dL    GFR Estimate 76 >60 mL/min/1.73m2   CBC with platelets and differential    Collection Time: 09/15/22 10:26 AM   Result Value Ref Range    WBC Count 7.0 4.0 - 11.0 10e3/uL    RBC Count 4.41 3.80 - 5.20 10e6/uL    Hemoglobin 12.6 11.7 - 15.7 g/dL    Hematocrit 38.4 35.0 - 47.0 %    MCV 87 78 - 100 fL    MCH 28.6 26.5 - 33.0 pg    MCHC 32.8 31.5 - 36.5 g/dL    RDW 13.5 10.0 - 15.0 %    Platelet Count 268 150 - 450 10e3/uL    % Neutrophils 69 %    % Lymphocytes 19 %    % Monocytes 10 %    % Eosinophils 2 %    % Basophils 0 %    % Immature Granulocytes 0 %    NRBCs per 100 WBC 0 <1 /100    Absolute Neutrophils 4.9 1.6 - 8.3 10e3/uL    Absolute Lymphocytes 1.3 0.8 - 5.3 10e3/uL    Absolute Monocytes 0.7 0.0 - 1.3 10e3/uL    Absolute Eosinophils 0.2 0.0 - 0.7 10e3/uL    Absolute Basophils 0.0 0.0 - 0.2 10e3/uL    Absolute Immature Granulocytes 0.0 <=0.4 10e3/uL    Absolute NRBCs 0.0 10e3/uL        Total time 35 minutes, to include face to face visit, review of EMR, ordering, documentation and coordination of care      Signed by: Marge Coon, NP

## 2022-09-15 NOTE — PROGRESS NOTES
Pt arrived ambulatory to clinic for labs, provider visit, and subcutaneous Zoladex Injection.  Port was accessed using aseptic technique without difficulties with excellent blood return.  Marge switched pt to 3 month dosing of the Zoladex.  Administered subcutaneous Zoladex into LUQ of ABD per MD order.  Pt tolerated procedure well, no s/s of bleeding or swelling at site.  Port was flushed with NS and Heparin then de-accessed using 2x2 and papertape.  Instructed pt to call clinic with any further questions or concerns.  Pt verbalized understanding of plan of care and return to clinic.

## 2022-09-15 NOTE — LETTER
9/15/2022         RE: Veronica Nieves  [address] [place] [facility] [address]        Dear Colleague,    Thank you for referring your patient, Veronica Nieves, to the [facility]. Please see a copy of my visit note below.    [facility] Hematology and Oncology Progress Note ([place])    Patient: Veronica Nieves  MRN: [id number]  Date of Service: Sep 15, 2022        Assessment and Plan:    Cancer Staging  Malignant neoplasm of upper-outer quadrant of left breast in female, estrogen receptor positive (H)  Staging form: Breast, AJCC 8th Edition  - Clinical stage from 5/19/2021: Stage IB (cT2, cN0, cM0, G2, ER+, FL+, HER2+) - Signed by Jefferson Garcia MD on 5/19/2021     1.  Invasive ductal carcinoma of the left breast (triple positive)   Completed 14 cycles of Kadcyla.   Continues on Zoladex (monthly) and tamoxifen    CBC and CMP normal.    CTchest/abd/pelvis negative for cancer/mets. Right lung GGOs noted in March are resolved.     She will be having reconstruction surgery to replace right breast implant in near future.     Plan:  -She's completed all 14 planned cycles of Kadcyla  -She can have her port-a-cath removed at time of her Plastic Surgery, she will discuss this with the surgeon  -Change Zoladex to every 3 months starting with today's dose  -Continue Tamoxifen, planning up to 10 yrs  -No mammograms needed with bilateral mastectomies  -Return every 3 months with labs and Zoladex  -Follow every 3 months for a few visits, then can see every 6 months    2.  Chemotherapy-induced peripheral neuropathy:   Involves the fingertips.  Grade 1. Not interfering with ADLs and not painful. She came off duloxetine due to drug:drug interaction with tamoxifen and has not worsened. This will hopefully improve with time off chemo, usually allow up to 6 months.    3.  Anxiety and depression:   Ally was on duloxetine but due to drug:drug interaction with tamoxifen, she changed to  citalopram 40 mg. Doing well.    4.   Lack of concentration  Likely chemo side effect. Continues on methylphenidate 10 mg BID.     Plan:  -Consider titrating down as able when chemo completed     5.  Birth control  She has a copper IUD in (changed from Mirena with her hormone + breast cancer)    ECOG Performance  0    Diagnosis:    1.  Invasive ductal carcinoma of the left breast: Diagnosed May, 2021.  Initial imaging suggested 3 masses.  Biopsy of the largest mass showed an invasive ductal carcinoma, grade 2.  ER/MD positive, HER-2 positive.  Evaluation of the axilla was negative.    Treatment:    Neoadjuvant chemotherapy with TCHP initiated on May 27, 2021.  Cycle 6 was given on September 10.    Bilateral mastectomy performed on October 7.  Pathology showed a 9 mm focus of invasive ductal carcinoma of the left breast.  Lymph nodes are negative.    Kadcyla initiated November 29, 2021. Completed 14 cycles August 26, 2022  Zoladex and tamoxifen initiated November 29, 2021    Interim History:    Abbey presents today for a 6-week follow-up visit. She completed her final cycle of Kadcyla 3 weeks ago.   She has continued on monthly Zoladex and tamoxifen. Tolerating this well.    Overall doing okay.  Still has some numbness in the fingertips, non-painful and no interference with ADLs/fine motor.     No n/v, diarrhea. No new sites of pain. No new headaches.       Past History:    Past Medical History:   Diagnosis Date     Abnormal Pap smear of cervix     age 13, subsequent pap smears normal     Anxiety      Breast cancer (H) 05/04/2021    Left     Depression      Physical Exam:    /75   Pulse 67   Temp 98  F (36.7  C)   Resp 16   Wt 85.3 kg (188 lb)   SpO2 98%   BMI 30.34 kg/m      General: patient appears stated age of 32 year old. Nontoxic and in no distress.   HEENT: Head: atraumatic, normocephalic. Sclerae anicteric.  Lymph: No palpable cervical adenopathy.   Breasts: Not assessed  Chest:  Normal respiratory  effort. Clear lungs bilaterally.   Cardiac:  No edema. RRR  Abdomen: abdomen is non-distended  Extremities: normal tone and muscle bulk.  Skin: no lesions or rash on visible skin. Warm and dry.   CNS: alert and oriented. Grossly non-focal.   Psychiatric: normal mood and affect.     Lab Results:    Recent Results (from the past 24 hour(s))   Creatinine POCT    Collection Time: 09/14/22  2:51 PM   Result Value Ref Range    Creatinine POCT 1.0 0.6 - 1.1 mg/dL    GFR, ESTIMATED POCT >60 >60 mL/min/1.73m2   Comprehensive metabolic panel (BMP + Alb, Alk Phos, ALT, AST, Total. Bili, TP)    Collection Time: 09/15/22 10:26 AM   Result Value Ref Range    Sodium 139 136 - 145 mmol/L    Potassium 4.0 3.5 - 5.0 mmol/L    Chloride 104 98 - 107 mmol/L    Carbon Dioxide (CO2) 30 22 - 31 mmol/L    Anion Gap 5 5 - 18 mmol/L    Urea Nitrogen 14 8 - 22 mg/dL    Creatinine 1.00 0.60 - 1.10 mg/dL    Calcium 9.4 8.5 - 10.5 mg/dL    Glucose 109 70 - 125 mg/dL    Alkaline Phosphatase 68 45 - 120 U/L    AST 34 0 - 40 U/L    ALT 35 0 - 45 U/L    Protein Total 7.5 6.0 - 8.0 g/dL    Albumin 3.7 3.5 - 5.0 g/dL    Bilirubin Total 0.2 0.0 - 1.0 mg/dL    GFR Estimate 76 >60 mL/min/1.73m2   CBC with platelets and differential    Collection Time: 09/15/22 10:26 AM   Result Value Ref Range    WBC Count 7.0 4.0 - 11.0 10e3/uL    RBC Count 4.41 3.80 - 5.20 10e6/uL    Hemoglobin 12.6 11.7 - 15.7 g/dL    Hematocrit 38.4 35.0 - 47.0 %    MCV 87 78 - 100 fL    MCH 28.6 26.5 - 33.0 pg    MCHC 32.8 31.5 - 36.5 g/dL    RDW 13.5 10.0 - 15.0 %    Platelet Count 268 150 - 450 10e3/uL    % Neutrophils 69 %    % Lymphocytes 19 %    % Monocytes 10 %    % Eosinophils 2 %    % Basophils 0 %    % Immature Granulocytes 0 %    NRBCs per 100 WBC 0 <1 /100    Absolute Neutrophils 4.9 1.6 - 8.3 10e3/uL    Absolute Lymphocytes 1.3 0.8 - 5.3 10e3/uL    Absolute Monocytes 0.7 0.0 - 1.3 10e3/uL    Absolute Eosinophils 0.2 0.0 - 0.7 10e3/uL    Absolute Basophils 0.0 0.0 - 0.2  "10e3/uL    Absolute Immature Granulocytes 0.0 <=0.4 10e3/uL    Absolute NRBCs 0.0 10e3/uL        Total time 35 minutes, to include face to face visit, review of EMR, ordering, documentation and coordination of care      Signed by: Marge Coon NP        Oncology Rooming Note    September 15, 2022 9:23 AM   Veronica Nieves is a 32 year old female who presents for:    Chief Complaint   Patient presents with     Oncology Clinic Visit     Initial Vitals: /75   Pulse 67   Temp 98  F (36.7  C)   Resp 16   Wt 85.3 kg (188 lb)   SpO2 98%   BMI 30.34 kg/m   Estimated body mass index is 30.34 kg/m  as calculated from the following:    Height as of 8/5/22: 1.676 m (5' 6\").    Weight as of this encounter: 85.3 kg (188 lb). Body surface area is 1.99 meters squared.  No Pain (0) Comment: Data Unavailable   No LMP recorded. (Menstrual status: IUD).  Allergies reviewed: Yes  Medications reviewed: Yes    Medications: Medication refills not needed today.  Pharmacy name entered into [facility]: [facility]  ([facility]) - [place], [place] - [address]                Again, thank you for allowing me to participate in the care of your patient.        Sincerely,        Marge Coon NP    "

## 2022-09-16 ENCOUNTER — OFFICE VISIT (OUTPATIENT)
Dept: FAMILY MEDICINE | Facility: OTHER | Age: 32
End: 2022-09-16
Payer: COMMERCIAL

## 2022-09-16 VITALS
HEART RATE: 70 BPM | RESPIRATION RATE: 16 BRPM | SYSTOLIC BLOOD PRESSURE: 122 MMHG | TEMPERATURE: 98.3 F | OXYGEN SATURATION: 98 % | BODY MASS INDEX: 30.42 KG/M2 | DIASTOLIC BLOOD PRESSURE: 64 MMHG | WEIGHT: 188.5 LBS

## 2022-09-16 DIAGNOSIS — Z90.13 H/O BILATERAL MASTECTOMY: ICD-10-CM

## 2022-09-16 DIAGNOSIS — Z01.818 PREOP GENERAL PHYSICAL EXAM: Primary | ICD-10-CM

## 2022-09-16 PROCEDURE — 99214 OFFICE O/P EST MOD 30 MIN: CPT | Performed by: FAMILY MEDICINE

## 2022-09-16 ASSESSMENT — PATIENT HEALTH QUESTIONNAIRE - PHQ9
SUM OF ALL RESPONSES TO PHQ QUESTIONS 1-9: 9
SUM OF ALL RESPONSES TO PHQ QUESTIONS 1-9: 9
10. IF YOU CHECKED OFF ANY PROBLEMS, HOW DIFFICULT HAVE THESE PROBLEMS MADE IT FOR YOU TO DO YOUR WORK, TAKE CARE OF THINGS AT HOME, OR GET ALONG WITH OTHER PEOPLE: SOMEWHAT DIFFICULT

## 2022-09-16 ASSESSMENT — PAIN SCALES - GENERAL: PAINLEVEL: NO PAIN (0)

## 2022-09-16 NOTE — H&P (VIEW-ONLY)
[address]  [facility] [address]  Phone: [phone]  Primary Provider: Marietta Brandt  Pre-op Performing Provider: ARCELIA CHAVEZ      PREOPERATIVE EVALUATION:  Today's date: 9/16/2022    Veronica Nieves is a 32 year old female who presents for a preoperative evaluation.    Surgical Information:  Surgery/Procedure: INSERTION OF TISSUE EXPANDER RIGHT BREAST, REMOVAL OF PORT  Surgery Location: [facility]  Surgeon: Dr. Álvarez  Surgery Date: 9/23/22  Time of Surgery: 8:15am  Where patient plans to recover: at home   Fax number for surgical facility: Note does not need to be faxed, will be available electronically in Epic.    Type of Anesthesia Anticipated: General    Assessment & Plan     The proposed surgical procedure is considered LOW risk.    Preop general physical exam  H/O bilateral mastectomy      Risks and Recommendations:  The patient has the following additional risks and recommendations for perioperative complications:   - No identified additional risk factors other than previously addressed    Medication Instructions:  Patient is to take all scheduled medications on the day of surgery    RECOMMENDATION:  APPROVAL GIVEN to proceed with proposed procedure, without further diagnostic evaluation.      Subjective     HPI related to upcoming procedure:     Patient is a pleasant 32-year-old with history of left breast cancer s/p bilateral mastectomy scheduled for tissue expander on the right side is here for a preop evaluation.  Preop Questions 9/16/2022   1. Have you ever had a heart attack or stroke? No   2. Have you ever had surgery on your heart or blood vessels, such as a stent placement, a coronary artery bypass, or surgery on an artery in your head, neck, heart, or legs? No   3. Do you have chest pain with activity? No   4. Do you have a history of  heart failure? No   5. Do you currently have a cold, bronchitis or  symptoms of other infection? No   6. Do you have a cough, shortness of breath, or wheezing? No   7. Do you or anyone in your family have previous history of blood clots? No   8. Do you or does anyone in your family have a serious bleeding problem such as prolonged bleeding following surgeries or cuts? No   9. Have you ever had problems with anemia or been told to take iron pills? No   10. Have you had any abnormal blood loss such as black, tarry or bloody stools, or abnormal vaginal bleeding? No   11. Have you ever had a blood transfusion? No   12. Are you willing to have a blood transfusion if it is medically needed before, during, or after your surgery? Yes   13. Have you or any of your relatives ever had problems with anesthesia? No   14. Do you have sleep apnea, excessive snoring or daytime drowsiness? No   15. Do you have any artifical heart valves or other implanted medical devices like a pacemaker, defibrillator, or continuous glucose monitor? No   16. Do you have artificial joints? No   17. Are you allergic to latex? No   18. Is there any chance that you may be pregnant? No     Health Care Directive:  Patient has a Health Care Directive on file      Preoperative Review of :   reviewed - no record of controlled substances prescribed.      Status of Chronic Conditions:  See problem list for active medical problems.  Problems all longstanding and stable, except as noted/documented.  See ROS for pertinent symptoms related to these conditions.    DEPRESSION - Patient has a long history of Depression of moderate severity requiring medication for control with recent symptoms being stable..Current symptoms of depression include none.       Review of Systems  CONSTITUTIONAL: NEGATIVE for fever, chills, change in weight  INTEGUMENTARY/SKIN: NEGATIVE for worrisome rashes, moles or lesions  EYES: NEGATIVE for vision changes or irritation  ENT/MOUTH: NEGATIVE for ear, mouth and throat problems  RESP: NEGATIVE for  significant cough or SOB  CV: NEGATIVE for chest pain, palpitations or peripheral edema  GI: NEGATIVE for nausea, abdominal pain, heartburn, or change in bowel habits  : NEGATIVE for frequency, dysuria, or hematuria  MUSCULOSKELETAL: NEGATIVE for significant arthralgias or myalgia  NEURO: NEGATIVE for weakness, dizziness or paresthesias  ENDOCRINE: NEGATIVE for temperature intolerance, skin/hair changes  HEME: NEGATIVE for bleeding problems  PSYCHIATRIC: NEGATIVE for changes in mood or affect    Patient Active Problem List    Diagnosis Date Noted     Elevated serum creatinine 08/05/2022     Priority: Medium     Chemotherapy-induced peripheral neuropathy (H) 08/05/2022     Priority: Medium     Malignant neoplasm of upper-outer quadrant of left breast in female, estrogen receptor positive (H) 05/19/2021     Priority: Medium     08/24/22 EOTD       Mild depression and anxiety 02/10/2016     Priority: Medium     Duloxetine. Some issues with libido. Tried bupropion and tolerated this   well but was diagnosed with breast cancer and opted to discontinue   medication, unclear if bupropion helped with libido given confounding   factors at the time           Past Medical History:   Diagnosis Date     Abnormal Pap smear of cervix     age 13, subsequent pap smears normal     Anxiety      Breast cancer (H) 05/04/2021    Left     Depression      Past Surgical History:   Procedure Laterality Date     BIOPSY NODE SENTINEL Left 10/7/2021    Procedure: left sentinel lymph node biopsy;  Surgeon: Audrey Newman MD;  Location: [facility]     MASTECTOMY MODIFIED RADICAL Bilateral 10/7/2021    Procedure: Bilateral mastectomies,;  Surgeon: Audrey Newman MD;  Location: [facility] OR     OTHER SURGICAL HISTORY      no surgical history     SD INSJ PRPH CTR VAD W/SUBQ PORT AGE 4 YR/> N/A 5/26/2021    Procedure: Port Placement;  Surgeon: Audrey Newman MD;  Location: [facility];  Service: General     RECONSTRUCT BREAST,  IMPLANT PROSTHESIS, COMBINED Bilateral 10/7/2021    Procedure: BILATERAL BREAST RECONSTRUCTION WITH IMPLANTS;  Surgeon: Shanice Álvarez MD;  Location: [facility] OR     REMOVE IMPLANT BREAST Right 6/10/2022    Procedure: REMOVAL MAMMARY IMPLANT RIGHT;  Surgeon: Shanice Álvarez MD;  Location: [facility] OR     US BREAST CORE BIOPSY LEFT Left 5/4/2021     WISDOM TOOTH EXTRACTION  2008     Current Outpatient Medications   Medication Sig Dispense Refill     acetaminophen (TYLENOL) 325 MG tablet [ACETAMINOPHEN (TYLENOL) 325 MG TABLET] Take 650 mg by mouth every 6 (six) hours as needed for pain.       bismuth subsalicylate (BISMUTH SUBSALICYLATE) 262 mg Chew chew tab [BISMUTH SUBSALICYLATE (BISMUTH SUBSALICYLATE) 262 MG CHEW CHEW TAB] Chew 1 tablet 4 (four) times a day as needed.       citalopram (CELEXA) 40 MG tablet TAKE 1 TABLET BY MOUTH  DAILY 30 tablet 11     famotidine-calcium carbonate-magnesium hydroxide (PEPCID COMPLETE) -165 mg Chew [FAMOTIDINE-CALCIUM CARBONATE-MAGNESIUM HYDROXIDE (PEPCID COMPLETE) -165 MG CHEW] Chew 1 tablet daily as needed.       methylphenidate (RITALIN) 10 MG tablet Take 1 tablet (10 mg) by mouth 2 times daily Take before breakfast and lunch 60 tablet 0     paragard intrauterine copper device 1 each by Intrauterine route once       tamoxifen (NOLVADEX) 20 MG tablet TAKE 1 TABLET BY MOUTH EVERY DAY 90 tablet 1     Tart Cherry (TART CHERRY ULTRA) 1200 MG CAPS Take 2 capsules by mouth At Bedtime       inulin (FIBER GUMMIES ORAL) [INULIN (FIBER GUMMIES ORAL)] Take by mouth daily. (Patient not taking: No sig reported)       oxyCODONE (ROXICODONE) 5 MG tablet Take 1-2 tablets (5-10 mg) by mouth every 6 hours as needed for moderate to severe pain (Patient not taking: No sig reported) 10 tablet 0       No Known Allergies     Social History     Tobacco Use     Smoking status: Never Smoker     Smokeless tobacco: Never Used   Substance Use Topics     Alcohol use: Yes      Alcohol/week: 6.0 - 7.0 standard drinks     Family History   Problem Relation Age of Onset     Heart Disease Father      Depression Father      Depression Mother      No Known Problems Brother      Depression Sister      Cancer Paternal Grandfather      Cancer Maternal Grandmother      Lung Cancer Maternal Grandmother         50's     History   Drug Use No         Objective     /64   Pulse 70   Temp 98.3  F (36.8  C) (Temporal)   Resp 16   Wt 85.5 kg (188 lb 8 oz)   SpO2 98%   BMI 30.42 kg/m      Physical Exam    GENERAL APPEARANCE: healthy, alert and no distress     EYES: EOMI, PERRL     HENT: ear canals and TM's normal and nose and mouth without ulcers or lesions     NECK: no adenopathy, no asymmetry, masses, or scars and thyroid normal to palpation     RESP: lungs clear to auscultation - no rales, rhonchi or wheezes     CV: regular rates and rhythm, normal S1 S2, no S3 or S4 and no murmur, click or rub     ABDOMEN:  soft, nontender, no HSM or masses and bowel sounds normal     MS: extremities normal- no gross deformities noted, no evidence of inflammation in joints, FROM in all extremities.     SKIN: no suspicious lesions or rashes     NEURO: Normal strength and tone, sensory exam grossly normal, mentation intact and speech normal     PSYCH: mentation appears normal. and affect normal/bright     LYMPHATICS: No cervical adenopathy    Recent Labs   Lab Test 09/15/22  1026 09/14/22  1451 08/26/22  0901   HGB 12.6  --  11.8     --  265     --  141   POTASSIUM 4.0  --  3.9   CR 1.00 1.0 1.01        Diagnostics:  No labs were ordered during this visit.   No EKG required, no history of coronary heart disease, significant arrhythmia, peripheral arterial disease or other structural heart disease.    Revised Cardiac Risk Index (RCRI):  The patient has the following serious cardiovascular risks for perioperative complications:   - No serious cardiac risks = 0 points     RCRI Interpretation: 0  points: Class I (very low risk - 0.4% complication rate)           Signed Electronically by: Keyana Crum MD  Copy of this evaluation report is provided to requesting physician.      Answers for HPI/ROS submitted by the patient on 9/16/2022  If you checked off any problems, how difficult have these problems made it for you to do your work, take care of things at home, or get along with other people?: Somewhat difficult  PHQ9 TOTAL SCORE: 9

## 2022-09-16 NOTE — PATIENT INSTRUCTIONS
Preparing for Your Surgery  Getting started  A nurse will call you to review your health history and instructions. They will give you an arrival time based on your scheduled surgery time. Please be ready to share:    Your doctor's clinic name and phone number    Your medical, surgical and anesthesia history    A list of allergies and sensitivities    A list of medicines, including herbal treatments and over-the-counter drugs    Whether the patient has a legal guardian (ask how to send us the papers in advance)  Please tell us if you're pregnant--or if there's any chance you might be pregnant. Some surgeries may injure a fetus (unborn baby), so they require a pregnancy test. Surgeries that are safe for a fetus don't always need a test, and you can choose whether to have one.   If you have a child who's having surgery, please ask for a copy of Preparing for Your Child's Surgery.    Preparing for surgery    Within 30 days of surgery: Have a pre-op exam (sometimes called an H&P, or History and Physical). This can be done at a clinic or pre-operative center.  ? If you're having a , you may not need this exam. Talk to your care team.    At your pre-op exam, talk to your care team about all medicines you take. If you need to stop any medicines before surgery, ask when to start taking them again.  ? We do this for your safety. Many medicines can make you bleed too much during surgery. Some change how well surgery (anesthesia) drugs work.    Call your insurance company to let them know you're having surgery. (If you don't have insurance, call [phone].)    Call your clinic if there's any change in your health. This includes signs of a cold or flu (sore throat, runny nose, cough, rash, fever). It also includes a scrape or scratch near the surgery site.    If you have questions on the day of surgery, call your hospital or surgery center.  COVID testing  You may need to be tested for COVID-19 before having  surgery. If so, we will give you instructions.  Eating and drinking guidelines  For your safety: Unless your surgeon tells you otherwise, follow the guidelines below.    Eat and drink as usual until 8 hours before surgery. After that, no food or milk.    Drink clear liquids until 2 hours before surgery. These are liquids you can see through, like water, Gatorade and Propel Water. You may also have black coffee and tea (no cream or milk).    Nothing by mouth within 2 hours of surgery. This includes gum, candy and breath mints.    If you drink alcohol: Stop drinking it the night before surgery.    If your care team tells you to take medicine on the morning of surgery, it's okay to take it with a sip of water.  Preventing infection    Shower or bathe the night before and morning of your surgery. Follow the instructions your clinic gave you. (If no instructions, use regular soap.)    Don't shave or clip hair near your surgery site. We'll remove the hair if needed.    Don't smoke or vape the morning of surgery. You may chew nicotine gum up to 2 hours before surgery. A nicotine patch is okay.  ? Note: Some surgeries require you to completely quit smoking and nicotine. Check with your surgeon.    Your care team will make every effort to keep you safe from infection. We will:  ? Clean our hands often with soap and water (or an alcohol-based hand rub).  ? Clean the skin at your surgery site with a special soap that kills germs.  ? Give you a special gown to keep you warm. (Cold raises the risk of infection.)  ? Wear special hair covers, masks, gowns and gloves during surgery.  ? Give antibiotic medicine, if prescribed. Not all surgeries need antibiotics.  What to bring on the day of surgery    Photo ID and insurance card    Copy of your health care directive, if you have one    Glasses and hearing aides (bring cases)  ? You can't wear contacts during surgery    Inhaler and eye drops, if you use them (tell us about these when  you arrive)    CPAP machine or breathing device, if you use them    A few personal items, if spending the night    If you have . . .  ? A pacemaker, ICD (cardiac defibrillator) or other implant: Bring the ID card.  ? An implanted stimulator: Bring the remote control.  ? A legal guardian: Bring a copy of the certified (court-stamped) guardianship papers.  Please remove any jewelry, including body piercings. Leave jewelry and other valuables at home.  If you're going home the day of surgery    You must have a responsible adult drive you home. They should stay with you overnight as well.    If you don't have someone to stay with you, and you aren't safe to go home alone, we may keep you overnight. Insurance often won't pay for this.  After surgery  If it's hard to control your pain or you need more pain medicine, please call your surgeon's office.  Questions?   If you have any questions for your care team, list them here: _________________________________________________________________________________________________________________________________________________________________________ ____________________________________ ____________________________________ ____________________________________  For informational purposes only. Not to replace the advice of your health care provider. Copyright   2003, 2019 [facility]. All rights reserved. Clinically reviewed by Nadya Quiroga MD. [employer] [id number] - REV 07/21.

## 2022-09-16 NOTE — PROGRESS NOTES
[address]  [facility] [address]  Phone: [phone]  Primary Provider: Marietta Brandt  Pre-op Performing Provider: ARCELIA CHAVEZ      PREOPERATIVE EVALUATION:  Today's date: 9/16/2022    Veronica Nieves is a 32 year old female who presents for a preoperative evaluation.    Surgical Information:  Surgery/Procedure: INSERTION OF TISSUE EXPANDER RIGHT BREAST, REMOVAL OF PORT  Surgery Location: [facility]  Surgeon: Dr. Álvarez  Surgery Date: 9/23/22  Time of Surgery: 8:15am  Where patient plans to recover: at home   Fax number for surgical facility: Note does not need to be faxed, will be available electronically in Epic.    Type of Anesthesia Anticipated: General    Assessment & Plan     The proposed surgical procedure is considered LOW risk.    Preop general physical exam  H/O bilateral mastectomy      Risks and Recommendations:  The patient has the following additional risks and recommendations for perioperative complications:   - No identified additional risk factors other than previously addressed    Medication Instructions:  Patient is to take all scheduled medications on the day of surgery    RECOMMENDATION:  APPROVAL GIVEN to proceed with proposed procedure, without further diagnostic evaluation.      Subjective     HPI related to upcoming procedure:     Patient is a pleasant 32-year-old with history of left breast cancer s/p bilateral mastectomy scheduled for tissue expander on the right side is here for a preop evaluation.  Preop Questions 9/16/2022   1. Have you ever had a heart attack or stroke? No   2. Have you ever had surgery on your heart or blood vessels, such as a stent placement, a coronary artery bypass, or surgery on an artery in your head, neck, heart, or legs? No   3. Do you have chest pain with activity? No   4. Do you have a history of  heart failure? No   5. Do you currently have a cold, bronchitis or  symptoms of other infection? No   6. Do you have a cough, shortness of breath, or wheezing? No   7. Do you or anyone in your family have previous history of blood clots? No   8. Do you or does anyone in your family have a serious bleeding problem such as prolonged bleeding following surgeries or cuts? No   9. Have you ever had problems with anemia or been told to take iron pills? No   10. Have you had any abnormal blood loss such as black, tarry or bloody stools, or abnormal vaginal bleeding? No   11. Have you ever had a blood transfusion? No   12. Are you willing to have a blood transfusion if it is medically needed before, during, or after your surgery? Yes   13. Have you or any of your relatives ever had problems with anesthesia? No   14. Do you have sleep apnea, excessive snoring or daytime drowsiness? No   15. Do you have any artifical heart valves or other implanted medical devices like a pacemaker, defibrillator, or continuous glucose monitor? No   16. Do you have artificial joints? No   17. Are you allergic to latex? No   18. Is there any chance that you may be pregnant? No     Health Care Directive:  Patient has a Health Care Directive on file      Preoperative Review of :   reviewed - no record of controlled substances prescribed.      Status of Chronic Conditions:  See problem list for active medical problems.  Problems all longstanding and stable, except as noted/documented.  See ROS for pertinent symptoms related to these conditions.    DEPRESSION - Patient has a long history of Depression of moderate severity requiring medication for control with recent symptoms being stable..Current symptoms of depression include none.       Review of Systems  CONSTITUTIONAL: NEGATIVE for fever, chills, change in weight  INTEGUMENTARY/SKIN: NEGATIVE for worrisome rashes, moles or lesions  EYES: NEGATIVE for vision changes or irritation  ENT/MOUTH: NEGATIVE for ear, mouth and throat problems  RESP: NEGATIVE for  significant cough or SOB  CV: NEGATIVE for chest pain, palpitations or peripheral edema  GI: NEGATIVE for nausea, abdominal pain, heartburn, or change in bowel habits  : NEGATIVE for frequency, dysuria, or hematuria  MUSCULOSKELETAL: NEGATIVE for significant arthralgias or myalgia  NEURO: NEGATIVE for weakness, dizziness or paresthesias  ENDOCRINE: NEGATIVE for temperature intolerance, skin/hair changes  HEME: NEGATIVE for bleeding problems  PSYCHIATRIC: NEGATIVE for changes in mood or affect    Patient Active Problem List    Diagnosis Date Noted     Elevated serum creatinine 08/05/2022     Priority: Medium     Chemotherapy-induced peripheral neuropathy (H) 08/05/2022     Priority: Medium     Malignant neoplasm of upper-outer quadrant of left breast in female, estrogen receptor positive (H) 05/19/2021     Priority: Medium     08/24/22 EOTD       Mild depression and anxiety 02/10/2016     Priority: Medium     Duloxetine. Some issues with libido. Tried bupropion and tolerated this   well but was diagnosed with breast cancer and opted to discontinue   medication, unclear if bupropion helped with libido given confounding   factors at the time           Past Medical History:   Diagnosis Date     Abnormal Pap smear of cervix     age 13, subsequent pap smears normal     Anxiety      Breast cancer (H) 05/04/2021    Left     Depression      Past Surgical History:   Procedure Laterality Date     BIOPSY NODE SENTINEL Left 10/7/2021    Procedure: left sentinel lymph node biopsy;  Surgeon: Audrey Newman MD;  Location: [facility]     MASTECTOMY MODIFIED RADICAL Bilateral 10/7/2021    Procedure: Bilateral mastectomies,;  Surgeon: Audrey Newman MD;  Location: [facility] OR     OTHER SURGICAL HISTORY      no surgical history     MN INSJ PRPH CTR VAD W/SUBQ PORT AGE 5 YR/> N/A 5/26/2021    Procedure: Port Placement;  Surgeon: Audrey Newman MD;  Location: [facility];  Service: General     RECONSTRUCT BREAST,  IMPLANT PROSTHESIS, COMBINED Bilateral 10/7/2021    Procedure: BILATERAL BREAST RECONSTRUCTION WITH IMPLANTS;  Surgeon: Shanice Álvarez MD;  Location: [facility] [place] OR     REMOVE IMPLANT BREAST Right 6/10/2022    Procedure: REMOVAL MAMMARY IMPLANT RIGHT;  Surgeon: Shanice Álvarez MD;  Location: [facility] OR     US BREAST CORE BIOPSY LEFT Left 5/4/2021     WISDOM TOOTH EXTRACTION  2008     Current Outpatient Medications   Medication Sig Dispense Refill     acetaminophen (TYLENOL) 325 MG tablet [ACETAMINOPHEN (TYLENOL) 325 MG TABLET] Take 650 mg by mouth every 6 (six) hours as needed for pain.       bismuth subsalicylate (BISMUTH SUBSALICYLATE) 262 mg Chew chew tab [BISMUTH SUBSALICYLATE (BISMUTH SUBSALICYLATE) 262 MG CHEW CHEW TAB] Chew 1 tablet 4 (four) times a day as needed.       citalopram (CELEXA) 40 MG tablet TAKE 1 TABLET BY MOUTH  DAILY 30 tablet 11     famotidine-calcium carbonate-magnesium hydroxide (PEPCID COMPLETE) -165 mg Chew [FAMOTIDINE-CALCIUM CARBONATE-MAGNESIUM HYDROXIDE (PEPCID COMPLETE) -165 MG CHEW] Chew 1 tablet daily as needed.       methylphenidate (RITALIN) 10 MG tablet Take 1 tablet (10 mg) by mouth 2 times daily Take before breakfast and lunch 60 tablet 0     paragard intrauterine copper device 1 each by Intrauterine route once       tamoxifen (NOLVADEX) 20 MG tablet TAKE 1 TABLET BY MOUTH EVERY DAY 90 tablet 1     Tart Cherry (TART CHERRY ULTRA) 1200 MG CAPS Take 2 capsules by mouth At Bedtime       inulin (FIBER GUMMIES ORAL) [INULIN (FIBER GUMMIES ORAL)] Take by mouth daily. (Patient not taking: No sig reported)       oxyCODONE (ROXICODONE) 5 MG tablet Take 1-2 tablets (5-10 mg) by mouth every 6 hours as needed for moderate to severe pain (Patient not taking: No sig reported) 10 tablet 0       No Known Allergies     Social History     Tobacco Use     Smoking status: Never Smoker     Smokeless tobacco: Never Used   Substance Use Topics     Alcohol use: Yes      Alcohol/week: 6.0 - 7.0 standard drinks     Family History   Problem Relation Age of Onset     Heart Disease Father      Depression Father      Depression Mother      No Known Problems Brother      Depression Sister      Cancer Paternal Grandfather      Cancer Maternal Grandmother      Lung Cancer Maternal Grandmother         50's     History   Drug Use No         Objective     /64   Pulse 70   Temp 98.3  F (36.8  C) (Temporal)   Resp 16   Wt 85.5 kg (188 lb 8 oz)   SpO2 98%   BMI 30.42 kg/m      Physical Exam    GENERAL APPEARANCE: healthy, alert and no distress     EYES: EOMI, PERRL     HENT: ear canals and TM's normal and nose and mouth without ulcers or lesions     NECK: no adenopathy, no asymmetry, masses, or scars and thyroid normal to palpation     RESP: lungs clear to auscultation - no rales, rhonchi or wheezes     CV: regular rates and rhythm, normal S1 S2, no S3 or S4 and no murmur, click or rub     ABDOMEN:  soft, nontender, no HSM or masses and bowel sounds normal     MS: extremities normal- no gross deformities noted, no evidence of inflammation in joints, FROM in all extremities.     SKIN: no suspicious lesions or rashes     NEURO: Normal strength and tone, sensory exam grossly normal, mentation intact and speech normal     PSYCH: mentation appears normal. and affect normal/bright     LYMPHATICS: No cervical adenopathy    Recent Labs   Lab Test 09/15/22  1026 09/14/22  1451 08/26/22  0901   HGB 12.6  --  11.8     --  265     --  141   POTASSIUM 4.0  --  3.9   CR 1.00 1.0 1.01        Diagnostics:  No labs were ordered during this visit.   No EKG required, no history of coronary heart disease, significant arrhythmia, peripheral arterial disease or other structural heart disease.    Revised Cardiac Risk Index (RCRI):  The patient has the following serious cardiovascular risks for perioperative complications:   - No serious cardiac risks = 0 points     RCRI Interpretation: 0  points: Class I (very low risk - 0.4% complication rate)           Signed Electronically by: Keyana Crum MD  Copy of this evaluation report is provided to requesting physician.      Answers for HPI/ROS submitted by the patient on 9/16/2022  If you checked off any problems, how difficult have these problems made it for you to do your work, take care of things at home, or get along with other people?: Somewhat difficult  PHQ9 TOTAL SCORE: 9

## 2022-09-17 ENCOUNTER — HEALTH MAINTENANCE LETTER (OUTPATIENT)
Age: 32
End: 2022-09-17

## 2022-09-22 ENCOUNTER — ANESTHESIA EVENT (OUTPATIENT)
Dept: SURGERY | Facility: HOSPITAL | Age: 32
End: 2022-09-22
Payer: COMMERCIAL

## 2022-09-23 ENCOUNTER — HOSPITAL ENCOUNTER (OUTPATIENT)
Facility: HOSPITAL | Age: 32
Discharge: HOME | End: 2022-09-23
Attending: PLASTIC SURGERY | Admitting: PLASTIC SURGERY
Payer: COMMERCIAL

## 2022-09-23 ENCOUNTER — ANESTHESIA (OUTPATIENT)
Dept: SURGERY | Facility: HOSPITAL | Age: 32
End: 2022-09-23
Payer: COMMERCIAL

## 2022-09-23 VITALS
OXYGEN SATURATION: 97 % | SYSTOLIC BLOOD PRESSURE: 130 MMHG | DIASTOLIC BLOOD PRESSURE: 64 MMHG | RESPIRATION RATE: 16 BRPM | BODY MASS INDEX: 30.41 KG/M2 | WEIGHT: 188.4 LBS | TEMPERATURE: 97.8 F | HEART RATE: 68 BPM

## 2022-09-23 DIAGNOSIS — Z17.0 MALIGNANT NEOPLASM OF UPPER-OUTER QUADRANT OF LEFT BREAST IN FEMALE, ESTROGEN RECEPTOR POSITIVE (H): Primary | ICD-10-CM

## 2022-09-23 DIAGNOSIS — C50.412 MALIGNANT NEOPLASM OF UPPER-OUTER QUADRANT OF LEFT BREAST IN FEMALE, ESTROGEN RECEPTOR POSITIVE (H): Primary | ICD-10-CM

## 2022-09-23 PROCEDURE — 250N000013 HC RX MED GY IP 250 OP 250 PS 637: Performed by: ANESTHESIOLOGY

## 2022-09-23 PROCEDURE — 250N000011 HC RX IP 250 OP 636: Performed by: ANESTHESIOLOGY

## 2022-09-23 PROCEDURE — 250N000011 HC RX IP 250 OP 636: Performed by: PLASTIC SURGERY

## 2022-09-23 PROCEDURE — 710N000012 HC RECOVERY PHASE 2, PER MINUTE: Performed by: PLASTIC SURGERY

## 2022-09-23 PROCEDURE — 258N000001 HC RX 258: Performed by: PLASTIC SURGERY

## 2022-09-23 PROCEDURE — L8699 PROSTHETIC IMPLANT NOS: HCPCS | Performed by: PLASTIC SURGERY

## 2022-09-23 PROCEDURE — 360N000076 HC SURGERY LEVEL 3, PER MIN: Performed by: PLASTIC SURGERY

## 2022-09-23 PROCEDURE — 999N000141 HC STATISTIC PRE-PROCEDURE NURSING ASSESSMENT: Performed by: PLASTIC SURGERY

## 2022-09-23 PROCEDURE — 250N000009 HC RX 250: Performed by: NURSE ANESTHETIST, CERTIFIED REGISTERED

## 2022-09-23 PROCEDURE — 250N000025 HC SEVOFLURANE, PER MIN: Performed by: PLASTIC SURGERY

## 2022-09-23 PROCEDURE — 250N000009 HC RX 250: Performed by: PLASTIC SURGERY

## 2022-09-23 PROCEDURE — 710N000010 HC RECOVERY PHASE 1, LEVEL 2, PER MIN: Performed by: PLASTIC SURGERY

## 2022-09-23 PROCEDURE — 250N000011 HC RX IP 250 OP 636: Performed by: NURSE ANESTHETIST, CERTIFIED REGISTERED

## 2022-09-23 PROCEDURE — 370N000017 HC ANESTHESIA TECHNICAL FEE, PER MIN: Performed by: PLASTIC SURGERY

## 2022-09-23 PROCEDURE — 258N000003 HC RX IP 258 OP 636: Performed by: ANESTHESIOLOGY

## 2022-09-23 PROCEDURE — 272N000001 HC OR GENERAL SUPPLY STERILE: Performed by: PLASTIC SURGERY

## 2022-09-23 DEVICE — NATRELLE TE SMOOTH 133S-MV-13-T (US)
Type: IMPLANTABLE DEVICE | Site: CHEST | Status: NON-FUNCTIONAL
Brand: NATRELLE 133S TISSUE EXPANDERS
Removed: 2022-12-20

## 2022-09-23 RX ORDER — SODIUM CHLORIDE, SODIUM LACTATE, POTASSIUM CHLORIDE, CALCIUM CHLORIDE 600; 310; 30; 20 MG/100ML; MG/100ML; MG/100ML; MG/100ML
INJECTION, SOLUTION INTRAVENOUS CONTINUOUS
Status: DISCONTINUED | OUTPATIENT
Start: 2022-09-23 | End: 2022-09-23 | Stop reason: HOSPADM

## 2022-09-23 RX ORDER — ONDANSETRON 4 MG/1
4 TABLET, ORALLY DISINTEGRATING ORAL EVERY 30 MIN PRN
Status: COMPLETED | OUTPATIENT
Start: 2022-09-23 | End: 2022-09-23

## 2022-09-23 RX ORDER — PROPOFOL 10 MG/ML
INJECTION, EMULSION INTRAVENOUS CONTINUOUS PRN
Status: DISCONTINUED | OUTPATIENT
Start: 2022-09-23 | End: 2022-09-23

## 2022-09-23 RX ORDER — ONDANSETRON 2 MG/ML
4 INJECTION INTRAMUSCULAR; INTRAVENOUS EVERY 30 MIN PRN
Status: COMPLETED | OUTPATIENT
Start: 2022-09-23 | End: 2022-09-23

## 2022-09-23 RX ORDER — LIDOCAINE HYDROCHLORIDE 10 MG/ML
INJECTION, SOLUTION INFILTRATION; PERINEURAL PRN
Status: DISCONTINUED | OUTPATIENT
Start: 2022-09-23 | End: 2022-09-23

## 2022-09-23 RX ORDER — ACETAMINOPHEN 325 MG/1
650 TABLET ORAL EVERY 4 HOURS PRN
Qty: 50 TABLET | Refills: 0 | Status: SHIPPED | OUTPATIENT
Start: 2022-09-23 | End: 2023-09-15

## 2022-09-23 RX ORDER — FENTANYL CITRATE 50 UG/ML
INJECTION, SOLUTION INTRAMUSCULAR; INTRAVENOUS PRN
Status: DISCONTINUED | OUTPATIENT
Start: 2022-09-23 | End: 2022-09-23

## 2022-09-23 RX ORDER — NALOXONE HYDROCHLORIDE 0.4 MG/ML
0.4 INJECTION, SOLUTION INTRAMUSCULAR; INTRAVENOUS; SUBCUTANEOUS
Status: DISCONTINUED | OUTPATIENT
Start: 2022-09-23 | End: 2022-09-23 | Stop reason: HOSPADM

## 2022-09-23 RX ORDER — NALOXONE HYDROCHLORIDE 0.4 MG/ML
0.2 INJECTION, SOLUTION INTRAMUSCULAR; INTRAVENOUS; SUBCUTANEOUS
Status: DISCONTINUED | OUTPATIENT
Start: 2022-09-23 | End: 2022-09-23 | Stop reason: HOSPADM

## 2022-09-23 RX ORDER — EPHEDRINE SULFATE 50 MG/ML
INJECTION, SOLUTION INTRAMUSCULAR; INTRAVENOUS; SUBCUTANEOUS PRN
Status: DISCONTINUED | OUTPATIENT
Start: 2022-09-23 | End: 2022-09-23

## 2022-09-23 RX ORDER — ACETAMINOPHEN 325 MG/1
650 TABLET ORAL
Status: DISCONTINUED | OUTPATIENT
Start: 2022-09-23 | End: 2022-09-23 | Stop reason: HOSPADM

## 2022-09-23 RX ORDER — OXYCODONE HYDROCHLORIDE 5 MG/1
5 TABLET ORAL EVERY 4 HOURS PRN
Qty: 10 TABLET | Refills: 0 | Status: SHIPPED | OUTPATIENT
Start: 2022-09-23 | End: 2022-12-15

## 2022-09-23 RX ORDER — MEPERIDINE HYDROCHLORIDE 25 MG/ML
12.5 INJECTION INTRAMUSCULAR; INTRAVENOUS; SUBCUTANEOUS
Status: DISCONTINUED | OUTPATIENT
Start: 2022-09-23 | End: 2022-09-23 | Stop reason: HOSPADM

## 2022-09-23 RX ORDER — FENTANYL CITRATE 50 UG/ML
25 INJECTION, SOLUTION INTRAMUSCULAR; INTRAVENOUS
Status: DISCONTINUED | OUTPATIENT
Start: 2022-09-23 | End: 2022-09-23 | Stop reason: HOSPADM

## 2022-09-23 RX ORDER — KETOROLAC TROMETHAMINE 30 MG/ML
INJECTION, SOLUTION INTRAMUSCULAR; INTRAVENOUS PRN
Status: DISCONTINUED | OUTPATIENT
Start: 2022-09-23 | End: 2022-09-23

## 2022-09-23 RX ORDER — OXYCODONE HYDROCHLORIDE 5 MG/1
5 TABLET ORAL
Status: DISCONTINUED | OUTPATIENT
Start: 2022-09-23 | End: 2022-09-23 | Stop reason: HOSPADM

## 2022-09-23 RX ORDER — BUPIVACAINE HYDROCHLORIDE 5 MG/ML
INJECTION, SOLUTION PERINEURAL PRN
Status: DISCONTINUED | OUTPATIENT
Start: 2022-09-23 | End: 2022-09-23 | Stop reason: HOSPADM

## 2022-09-23 RX ORDER — OXYCODONE HYDROCHLORIDE 5 MG/1
5 TABLET ORAL EVERY 4 HOURS PRN
Status: DISCONTINUED | OUTPATIENT
Start: 2022-09-23 | End: 2022-09-23 | Stop reason: HOSPADM

## 2022-09-23 RX ORDER — DEXAMETHASONE SODIUM PHOSPHATE 10 MG/ML
INJECTION, SOLUTION INTRAMUSCULAR; INTRAVENOUS PRN
Status: DISCONTINUED | OUTPATIENT
Start: 2022-09-23 | End: 2022-09-23

## 2022-09-23 RX ORDER — FENTANYL CITRATE 50 UG/ML
25 INJECTION, SOLUTION INTRAMUSCULAR; INTRAVENOUS EVERY 5 MIN PRN
Status: DISCONTINUED | OUTPATIENT
Start: 2022-09-23 | End: 2022-09-23 | Stop reason: HOSPADM

## 2022-09-23 RX ORDER — PROPOFOL 10 MG/ML
INJECTION, EMULSION INTRAVENOUS PRN
Status: DISCONTINUED | OUTPATIENT
Start: 2022-09-23 | End: 2022-09-23

## 2022-09-23 RX ORDER — ACETAMINOPHEN 325 MG/1
975 TABLET ORAL ONCE
Status: COMPLETED | OUTPATIENT
Start: 2022-09-23 | End: 2022-09-23

## 2022-09-23 RX ORDER — CEFAZOLIN SODIUM/WATER 2 G/20 ML
SYRINGE (ML) INTRAVENOUS
Status: DISCONTINUED
Start: 2022-09-23 | End: 2022-09-23 | Stop reason: HOSPADM

## 2022-09-23 RX ORDER — HYDROMORPHONE HYDROCHLORIDE 1 MG/ML
0.2 INJECTION, SOLUTION INTRAMUSCULAR; INTRAVENOUS; SUBCUTANEOUS EVERY 5 MIN PRN
Status: DISCONTINUED | OUTPATIENT
Start: 2022-09-23 | End: 2022-09-23 | Stop reason: HOSPADM

## 2022-09-23 RX ORDER — LIDOCAINE 40 MG/G
CREAM TOPICAL
Status: DISCONTINUED | OUTPATIENT
Start: 2022-09-23 | End: 2022-09-23 | Stop reason: HOSPADM

## 2022-09-23 RX ORDER — MAGNESIUM SULFATE 4 G/50ML
4 INJECTION INTRAVENOUS ONCE
Status: COMPLETED | OUTPATIENT
Start: 2022-09-23 | End: 2022-09-23

## 2022-09-23 RX ORDER — CEPHALEXIN 500 MG/1
500 CAPSULE ORAL 3 TIMES DAILY
Qty: 15 CAPSULE | Refills: 0 | Status: SHIPPED | OUTPATIENT
Start: 2022-09-23 | End: 2022-09-28

## 2022-09-23 RX ORDER — CEFAZOLIN SODIUM 1 G/3ML
INJECTION, POWDER, FOR SOLUTION INTRAMUSCULAR; INTRAVENOUS PRN
Status: DISCONTINUED | OUTPATIENT
Start: 2022-09-23 | End: 2022-09-23

## 2022-09-23 RX ADMIN — DEXAMETHASONE SODIUM PHOSPHATE 10 MG: 10 INJECTION, SOLUTION INTRAMUSCULAR; INTRAVENOUS at 08:02

## 2022-09-23 RX ADMIN — ONDANSETRON 2 MG: 2 INJECTION INTRAMUSCULAR; INTRAVENOUS at 08:27

## 2022-09-23 RX ADMIN — ACETAMINOPHEN 975 MG: 325 TABLET, FILM COATED ORAL at 06:49

## 2022-09-23 RX ADMIN — CEFAZOLIN 2 G: 1 INJECTION, POWDER, FOR SOLUTION INTRAMUSCULAR; INTRAVENOUS at 08:10

## 2022-09-23 RX ADMIN — LIDOCAINE HYDROCHLORIDE 50 MG: 10 INJECTION, SOLUTION INFILTRATION; PERINEURAL at 08:02

## 2022-09-23 RX ADMIN — PROPOFOL 300 MG: 10 INJECTION, EMULSION INTRAVENOUS at 08:02

## 2022-09-23 RX ADMIN — SODIUM CHLORIDE, POTASSIUM CHLORIDE, SODIUM LACTATE AND CALCIUM CHLORIDE: 600; 310; 30; 20 INJECTION, SOLUTION INTRAVENOUS at 06:58

## 2022-09-23 RX ADMIN — MIDAZOLAM 2 MG: 1 INJECTION INTRAMUSCULAR; INTRAVENOUS at 07:55

## 2022-09-23 RX ADMIN — Medication 5 MG: at 08:22

## 2022-09-23 RX ADMIN — PROPOFOL 50 MCG/KG/MIN: 10 INJECTION, EMULSION INTRAVENOUS at 08:13

## 2022-09-23 RX ADMIN — KETOROLAC TROMETHAMINE 30 MG: 30 INJECTION, SOLUTION INTRAMUSCULAR at 08:29

## 2022-09-23 RX ADMIN — FENTANYL CITRATE 25 MCG: 50 INJECTION, SOLUTION INTRAMUSCULAR; INTRAVENOUS at 09:17

## 2022-09-23 RX ADMIN — FENTANYL CITRATE 50 MCG: 50 INJECTION, SOLUTION INTRAMUSCULAR; INTRAVENOUS at 08:35

## 2022-09-23 RX ADMIN — ONDANSETRON 2 MG: 2 INJECTION INTRAMUSCULAR; INTRAVENOUS at 08:02

## 2022-09-23 RX ADMIN — OXYCODONE HYDROCHLORIDE 5 MG: 5 TABLET ORAL at 10:37

## 2022-09-23 RX ADMIN — FENTANYL CITRATE 25 MCG: 50 INJECTION, SOLUTION INTRAMUSCULAR; INTRAVENOUS at 09:22

## 2022-09-23 RX ADMIN — FENTANYL CITRATE 50 MCG: 50 INJECTION, SOLUTION INTRAMUSCULAR; INTRAVENOUS at 08:06

## 2022-09-23 RX ADMIN — MAGNESIUM SULFATE HEPTAHYDRATE 4 G: 4 INJECTION, SOLUTION INTRAVENOUS at 06:58

## 2022-09-23 ASSESSMENT — ACTIVITIES OF DAILY LIVING (ADL)
ADLS_ACUITY_SCORE: 18

## 2022-09-23 NOTE — ANESTHESIA PROCEDURE NOTES
Airway       Patient location during procedure: OR  Staff -        Anesthesiologist:  Bart Pulliam MD       CRNA: Chasity Manjarrez APRN CRNA       Performed By: CRNA  Consent for Airway        Urgency: elective  Indications and Patient Condition       Indications for airway management: prashant-procedural       Induction type:intravenous       Mask difficulty assessment: 1 - vent by mask    Final Airway Details       Final airway type: supraglottic airway    Supraglottic Airway Details        Type: LMA       Brand: Ambu AuraGain       LMA size: 4    Post intubation assessment        Placement verified by: capnometry and chest rise        Number of attempts at approach: 1       Secured with: silk tape       Ease of procedure: easy       Dentition: Intact and Unchanged

## 2022-09-23 NOTE — BRIEF OP NOTE
[facility]    Brief Operative Note    Pre-operative diagnosis: Status post bilateral mastectomy [Z90.13]  Personal history of malignant neoplasm of breast [Z85.3]  Post-operative diagnosis Same as pre-operative diagnosis    Procedure: Procedure(s):  INSERTION OF TISSUE EXPANDER RIGHT BREAST,  REMOVAL OF PORT  Surgeon: Surgeon(s) and Role:     * Shanice Álvarez MD - Primary  Anesthesia: General   Estimated Blood Loss: 5mL    Right tissue expander filled to 150cc of saline    Drains: None  Specimens: * No specimens in log *  Findings:   None.  Complications: None.  Implants:   Implant Name Type Inv. Item Serial No.  Lot No. LRB No. Used Action   PORT, IMPLANTABLE, CT SCAN/MRI, LOW PROFILE, POWERPAK, TITANIUM, [place], WITH 7FR DETACHED PU CATHETER AND STANDARD INTRODUCER KIT Port   [facility] [id number] Right 1 Explanted   EXPANDER TISSUE STYLE 133S 400ML [id number] - [id number] Breast Implant/Tissue Expander EXPANDER TISSUE STYLE 133S 400ML [id number] [employer] NA Right 1 Implanted

## 2022-09-23 NOTE — ANESTHESIA CARE TRANSFER NOTE
Patient: Veronica Nieves    Procedure: Procedure(s):  INSERTION OF TISSUE EXPANDER RIGHT BREAST,  REMOVAL OF PORT       Diagnosis: Status post bilateral mastectomy [Z90.13]  Personal history of malignant neoplasm of breast [Z85.3]  Diagnosis Additional Information: No value filed.    Anesthesia Type:   General     Note:    Oropharynx: oral airway in place and spontaneously breathing  Level of Consciousness: drowsy  Oxygen Supplementation: face mask  Level of Supplemental Oxygen (L/min / FiO2): 6  Independent Airway: airway patency satisfactory and stable  Dentition: dentition unchanged  Vital Signs Stable: post-procedure vital signs reviewed and stable  Report to RN Given: handoff report given  Patient transferred to: PACU    Handoff Report: Identifed the Patient, Identified the Reponsible Provider, Reviewed the pertinent medical history, Discussed the surgical course, Reviewed Intra-OP anesthesia mangement and issues during anesthesia, Set expectations for post-procedure period and Allowed opportunity for questions and acknowledgement of understanding      Vitals:  Vitals Value Taken Time   /59 09/23/22 0900   Temp 36.7  C (98  F) 09/23/22 0858   Pulse 72 09/23/22 0859   Resp 0 09/23/22 0859   SpO2 95 % 09/23/22 0859   Vitals shown include unvalidated device data.    Electronically Signed By: ROSEMARY Pires CRNA  September 23, 2022  9:01 AM

## 2022-09-23 NOTE — ANESTHESIA POSTPROCEDURE EVALUATION
Patient: Veronica Nieves    Procedure: Procedure(s):  INSERTION OF TISSUE EXPANDER RIGHT BREAST,  REMOVAL OF PORT       Anesthesia Type:  General    Note:  Disposition: Outpatient   Postop Pain Control: Uneventful            Sign Out: Well controlled pain   PONV: No   Neuro/Psych: Uneventful            Sign Out: Acceptable/Baseline neuro status   Airway/Respiratory: Uneventful            Sign Out: Acceptable/Baseline resp. status   CV/Hemodynamics: Uneventful            Sign Out: Acceptable CV status; No obvious hypovolemia; No obvious fluid overload   Other NRE: NONE   DID A NON-ROUTINE EVENT OCCUR? No           Last vitals:  Vitals Value Taken Time   /66 09/23/22 1004   Temp 36.6  C (97.8  F) 09/23/22 1004   Pulse 64 09/23/22 1006   Resp 16 09/23/22 1004   SpO2 94 % 09/23/22 1006   Vitals shown include unvalidated device data.    Electronically Signed By: Bart Pulliam MD  September 23, 2022  12:04 PM

## 2022-09-23 NOTE — OP NOTE
Procedure Date: 09/23/2022    PREOPERATIVE DIAGNOSES:    1.  History of breast cancer.  2.  Acquired absence of bilateral breasts.    POSTOPERATIVE DIAGNOSES:    1.  History of breast cancer.  2.  Acquired absence of bilateral breasts.    INDICATIONS FOR PROCEDURE:  A 32-year-old female who has had previous mastectomies.  Unfortunately, she lost one of her implants.  She is here for placement of a tissue expander on the right side.  I discussed with her the risks and benefits of surgery.  She understands and agrees and wishes to proceed.    PROCEDURE PERFORMED:   1.  Right breast reconstruction with tissue expander.  2.  Removal of Port-A-Cath.    SURGEON:  Shanice Álvarez MD    FIRST ASSISTANT:  Diana Moreau, nurse practitioner.   Diana assisted in retraction and suturing to decrease and facilitate intraoperative time.  There were no residents available.    DESCRIPTION OF PROCEDURE:  The patient was identified and marked in the preoperative area, taken to the operating room.  After an adequate level of anesthesia was obtained, the patient was prepped and draped in sterile fashion.  We reopened the previous mastectomy incision.  Dissection carried down.  We dissected in a pocket for placement of our tissue expander in the prepectoral plane.  The tissues were quite healed together.  There was no evidence of persistent seroma.  We dissected superiorly, identified the port with pressure over top of the port site.   We removed the port and tubing in its entirety.  Once we had done this, irrigated out copiously with antibiotic irrigation.  Hemostasis was obtained.  We placed a 400 mL tissue expander into the defect, sutured down to the tabs with 2-0 Vicryl, expanded it was 150 mL of saline.  We closed the skin incision with 3-0 PDS deep tissue, followed by 3-0 Monocryl deep dermal and 4-0 subcuticular Dexon.  A soft dressing and a bra were placed.  The patient was awakened and taken to recovery  room.    ESTIMATED BLOOD LOSS:  5 mL    BLOOD PRODUCTS GIVEN:  None.    DRAINS AND PACKS:  Correct.    Shanice Álvarez MD        D: 2022   T: 2022   MT: gamal    Name:     TONIO MOJICA  MRN:      [id number]        Account:        [id number]   :      1990           Procedure Date: 2022     Document: [id number]

## 2022-09-23 NOTE — DISCHARGE INSTRUCTIONS
Instructions following breast reconstruction with tissue expanders    Medications:  Antibiotic: Take your prescribed antibiotic until finished  Pain: Oxycodone as needed for pain  Do not drink alcohol or drive while taking pain pills  Make sure you have take pain medication with food, taking on an empty stomach can cause nasuea  Take pain medication only for severe pain  Take Tylenol (acetaminophen) 500mg every 6 hours staggered with ibuprofen 400-600mg every 6 hours for mild pain    Activity:  -Ice to incisions and breasts as tolerated for the first 24 hours  -No lifting more than 5-10lbs for the first 3 weeks  -Resume exercising gently at three weeks and more vigorously at four weeks  -Wear a supportive/compression bra at all times except for showers    Bathing:  -You may shower in 24-48 hours after surgery. Gently wash your breasts and pat dry. Avoid soaking your incisions    Expectations:  -Tissue expanders feel quite hard, the skin over them will often be wrinkled. Some fluid is added at the time of surgery and more will be added in clinic starting at 3 weeks after surgery.  -You will have swelling and bruising especially under your arms and above your breasts    Follow-up:  -Your follow-up appointment is already made, see the appointment reminder on your discharge instructions    Questions:  -Call our office for any questions at [place] [phone] or [place] [phone]  -If it is after hours, call the Nurse CareLine at [phone]  -Call immediately if you ave any of the following:   Fever >101.5  One breast that is swelling much larger than the other  Redness extending from you incision  Pain that is not relieved by the pain medication  Drainage from your wound

## 2022-09-23 NOTE — ANESTHESIA PREPROCEDURE EVALUATION
Anesthesia Pre-Procedure Evaluation    Patient: Veronica Nieves   MRN: [id number] : 1990        Procedure : Procedure(s):  INSERTION OF TISSUE EXPANDER RIGHT BREAST,  REMOVAL OF PORT          Past Medical History:   Diagnosis Date     Abnormal Pap smear of cervix     age 13, subsequent pap smears normal     Anxiety      Breast cancer (H) 2021    Left     Depression       Past Surgical History:   Procedure Laterality Date     BIOPSY NODE SENTINEL Left 10/7/2021    Procedure: left sentinel lymph node biopsy;  Surgeon: Audrey Newman MD;  [place] [facility] [place]     MASTECTOMY MODIFIED RADICAL Bilateral 10/7/2021    Procedure: Bilateral mastectomies,;  Surgeon: Audrey Newman MD;  [place] [facility] [place]     OTHER SURGICAL HISTORY      no surgical history     [place] INSJ PRPH CTR VAD W/SUBQ PORT AGE 4 YR/> N/A 2021    Procedure: Port Placement;  Surgeon: Audrey Newman MD;  Location: [facility];  Service: General     RECONSTRUCT BREAST, IMPLANT PROSTHESIS, COMBINED Bilateral 10/7/2021    Procedure: BILATERAL BREAST RECONSTRUCTION WITH IMPLANTS;  Surgeon: Shanice Álvarez MD;  Location: [facility]     REMOVE IMPLANT BREAST Right 6/10/2022    Procedure: REMOVAL MAMMARY IMPLANT RIGHT;  Surgeon: Shanice Álvarez MD;  [place] [facility] [place]     US BREAST CORE BIOPSY LEFT Left 2021     WISDOM TOOTH EXTRACTION        No Known Allergies   Social History     Tobacco Use     Smoking status: Never Smoker     Smokeless tobacco: Never Used   Substance Use Topics     Alcohol use: Yes     Alcohol/week: 6.0 - 7.0 standard drinks      Wt Readings from Last 1 Encounters:   22 85.5 kg (188 lb 6.4 oz)        Anesthesia Evaluation   Pt has had prior anesthetic. Type: General.        ROS/MED HX  ENT/Pulmonary:  - neg pulmonary ROS     Neurologic:  - neg neurologic ROS     Cardiovascular:  - neg cardiovascular ROS     METS/Exercise Tolerance:     Hematologic:  - neg  hematologic  ROS     Musculoskeletal:  - neg musculoskeletal ROS     GI/Hepatic:  - neg GI/hepatic ROS     Renal/Genitourinary:  - neg Renal ROS     Endo:  - neg endo ROS   (+) Obesity,     Psychiatric/Substance Use:  - neg psychiatric ROS     Infectious Disease:  - neg infectious disease ROS     Malignancy:   (+) Malignancy,     Other:  - neg other ROS             OUTSIDE LABS:  CBC:   Lab Results   Component Value Date    WBC 7.0 09/15/2022    WBC 6.8 08/26/2022    HGB 12.6 09/15/2022    HGB 11.8 08/26/2022    HCT 38.4 09/15/2022    HCT 36.4 08/26/2022     09/15/2022     08/26/2022     BMP:   Lab Results   Component Value Date     09/15/2022     08/26/2022    POTASSIUM 4.0 09/15/2022    POTASSIUM 3.9 08/26/2022    CHLORIDE 104 09/15/2022    CHLORIDE 108 (H) 08/26/2022    CO2 30 09/15/2022    CO2 26 08/26/2022    BUN 14 09/15/2022    BUN 15 08/26/2022    CR 1.00 09/15/2022    CR 1.0 09/14/2022     09/15/2022    GLC 98 08/26/2022     COAGS: No results found for: PTT, INR, FIBR  POC: No results found for: BGM, HCG, HCGS  HEPATIC:   Lab Results   Component Value Date    ALBUMIN 3.7 09/15/2022    PROTTOTAL 7.5 09/15/2022    ALT 35 09/15/2022    AST 34 09/15/2022    ALKPHOS 68 09/15/2022    BILITOTAL 0.2 09/15/2022     OTHER:   Lab Results   Component Value Date    BURT 9.4 09/15/2022       Anesthesia Plan    ASA Status:  2   NPO Status:  NPO Appropriate    Anesthesia Type: General.     - Airway: LMA   Induction: Propofol, Intravenous.   Maintenance: Balanced.        Consents    Anesthesia Plan(s) and associated risks, benefits, and realistic alternatives discussed. Questions answered and patient/representative(s) expressed understanding.    - Discussed:     - Discussed with:  Patient, Spouse         Postoperative Care    Pain management: IV analgesics, Oral pain medications, Multi-modal analgesia.   PONV prophylaxis: Ondansetron (or other 5HT-3), Dexamethasone or Solumedrol, Background  Propofol Infusion     Comments:                Bart Pulliam MD

## 2022-10-19 ENCOUNTER — TELEPHONE (OUTPATIENT)
Dept: FAMILY MEDICINE | Facility: OTHER | Age: 32
End: 2022-10-19

## 2022-10-19 NOTE — TELEPHONE ENCOUNTER
Forms/Letter Request    Type of form/letter: [facility]ician Results Form    Have you been seen for this request: Yes recent pre-op    Do we have the form/letter: Yes: Placed in provider's bin for review and completion if approrpriate    When is form/letter needed by: 12/31/2022    How would you like the form/letter returned: She's requesting it to be sent back to her.    Patient Notified form requests are processed in 3-5 business days:No    Could we send this information to you in [employer] or would you prefer to receive a phone call?:   Patient would like to be contacted via [employer] - Taken from [employer] Encounter, please respond to patient through that encounter.

## 2022-10-20 NOTE — TELEPHONE ENCOUNTER
I did a preop on the patient. She needs a routine physical scheduled before able to fill this form . Please schedule and advise her to come fasting for the visit

## 2022-10-25 DIAGNOSIS — Z17.0 MALIGNANT NEOPLASM OF UPPER-OUTER QUADRANT OF LEFT BREAST IN FEMALE, ESTROGEN RECEPTOR POSITIVE (H): ICD-10-CM

## 2022-10-25 DIAGNOSIS — C50.412 MALIGNANT NEOPLASM OF UPPER-OUTER QUADRANT OF LEFT BREAST IN FEMALE, ESTROGEN RECEPTOR POSITIVE (H): ICD-10-CM

## 2022-10-25 RX ORDER — METHYLPHENIDATE HYDROCHLORIDE 5 MG/1
5 TABLET ORAL
Qty: 60 TABLET | Refills: 1 | OUTPATIENT
Start: 2022-11-23

## 2022-10-25 RX ORDER — METHYLPHENIDATE HYDROCHLORIDE 5 MG/1
TABLET ORAL
Qty: 90 TABLET | Refills: 0 | Status: SHIPPED | OUTPATIENT
Start: 2022-10-25 | End: 2022-10-27

## 2022-10-27 DIAGNOSIS — R41.840 POOR CONCENTRATION: Primary | ICD-10-CM

## 2022-10-27 DIAGNOSIS — C50.412 MALIGNANT NEOPLASM OF UPPER-OUTER QUADRANT OF LEFT BREAST IN FEMALE, ESTROGEN RECEPTOR POSITIVE (H): ICD-10-CM

## 2022-10-27 DIAGNOSIS — Z17.0 MALIGNANT NEOPLASM OF UPPER-OUTER QUADRANT OF LEFT BREAST IN FEMALE, ESTROGEN RECEPTOR POSITIVE (H): ICD-10-CM

## 2022-10-27 RX ORDER — METHYLPHENIDATE HYDROCHLORIDE 5 MG/1
TABLET ORAL
Qty: 90 TABLET | Refills: 0 | Status: SHIPPED | OUTPATIENT
Start: 2022-10-27 | End: 2023-03-17

## 2022-12-15 ENCOUNTER — OFFICE VISIT (OUTPATIENT)
Dept: FAMILY MEDICINE | Facility: CLINIC | Age: 32
End: 2022-12-15
Payer: COMMERCIAL

## 2022-12-15 VITALS
OXYGEN SATURATION: 97 % | BODY MASS INDEX: 31.68 KG/M2 | HEART RATE: 68 BPM | HEIGHT: 66 IN | TEMPERATURE: 99.5 F | DIASTOLIC BLOOD PRESSURE: 62 MMHG | WEIGHT: 197.13 LBS | SYSTOLIC BLOOD PRESSURE: 132 MMHG

## 2022-12-15 DIAGNOSIS — Z11.59 NEED FOR HEPATITIS C SCREENING TEST: Primary | ICD-10-CM

## 2022-12-15 DIAGNOSIS — Z01.818 PREOP GENERAL PHYSICAL EXAM: ICD-10-CM

## 2022-12-15 PROCEDURE — 99214 OFFICE O/P EST MOD 30 MIN: CPT | Performed by: FAMILY MEDICINE

## 2022-12-15 ASSESSMENT — PATIENT HEALTH QUESTIONNAIRE - PHQ9
10. IF YOU CHECKED OFF ANY PROBLEMS, HOW DIFFICULT HAVE THESE PROBLEMS MADE IT FOR YOU TO DO YOUR WORK, TAKE CARE OF THINGS AT HOME, OR GET ALONG WITH OTHER PEOPLE: SOMEWHAT DIFFICULT
SUM OF ALL RESPONSES TO PHQ QUESTIONS 1-9: 6
SUM OF ALL RESPONSES TO PHQ QUESTIONS 1-9: 6

## 2022-12-15 ASSESSMENT — PAIN SCALES - GENERAL: PAINLEVEL: NO PAIN (0)

## 2022-12-15 NOTE — PATIENT INSTRUCTIONS
For informational purposes only. Not to replace the advice of your health care provider. Copyright   2003,  [employer] [facility]. All rights reserved. Clinically reviewed by Nadya Quiroga MD. [employer] [id number] - REV .  Preparing for Your Surgery  Getting started  A nurse will call you to review your health history and instructions. They will give you an arrival time based on your scheduled surgery time. Please be ready to share:    Your doctor's clinic name and phone number    Your medical, surgical, and anesthesia history    A list of allergies and sensitivities    A list of medicines, including herbal treatments and over-the-counter drugs    Whether the patient has a legal guardian (ask how to send us the papers in advance)  Please tell us if you're pregnant--or if there's any chance you might be pregnant. Some surgeries may injure a fetus (unborn baby), so they require a pregnancy test. Surgeries that are safe for a fetus don't always need a test, and you can choose whether to have one.   If you have a child who's having surgery, please ask for a copy of Preparing for Your Child's Surgery.    Preparing for surgery    Within 10 to 30 days of surgery: Have a pre-op exam (sometimes called an H&P, or History and Physical). This can be done at a clinic or pre-operative center.  ? If you're having a , you may not need this exam. Talk to your care team.    At your pre-op exam, talk to your care team about all medicines you take. If you need to stop any medicines before surgery, ask when to start taking them again.  ? We do this for your safety. Many medicines can make you bleed too much during surgery. Some change how well surgery (anesthesia) drugs work.    Call your insurance company to let them know you're having surgery. (If you don't have insurance, call [phone].)    Call your clinic if there's any change in your health. This includes signs of a cold or flu (sore throat, runny nose,  cough, rash, fever). It also includes a scrape or scratch near the surgery site.    If you have questions on the day of surgery, call your hospital or surgery center.  Eating and drinking guidelines  For your safety: Unless your surgeon tells you otherwise, follow the guidelines below.    Eat and drink as usual until 8 hours before you arrive for surgery. After that, no food or milk.    Drink clear liquids until 2 hours before you arrive. These are liquids you can see through, like water, Gatorade, and Propel Water. They also include plain black coffee and tea (no cream or milk), candy, and breath mints. You can spit out gum when you arrive.    If you drink alcohol: Stop drinking it the night before surgery.    If your care team tells you to take medicine on the morning of surgery, it's okay to take it with a sip of water.  Preventing infection    Shower or bathe the night before and morning of your surgery. Follow the instructions your clinic gave you. (If no instructions, use regular soap.)    Don't shave or clip hair near your surgery site. We'll remove the hair if needed.    Don't smoke or vape the morning of surgery. You may chew nicotine gum up to 2 hours before surgery. A nicotine patch is okay.  ? Note: Some surgeries require you to completely quit smoking and nicotine. Check with your surgeon.    Your care team will make every effort to keep you safe from infection. We will:  ? Clean our hands often with soap and water (or an alcohol-based hand rub).  ? Clean the skin at your surgery site with a special soap that kills germs.  ? Give you a special gown to keep you warm. (Cold raises the risk of infection.)  ? Wear special hair covers, masks, gowns and gloves during surgery.  ? Give antibiotic medicine, if prescribed. Not all surgeries need antibiotics.  What to bring on the day of surgery    Photo ID and insurance card    Copy of your health care directive, if you have one    Glasses and hearing aids (bring  cases)  ? You can't wear contacts during surgery    Inhaler and eye drops, if you use them (tell us about these when you arrive)    CPAP machine or breathing device, if you use them    A few personal items, if spending the night    If you have . . .  ? A pacemaker, ICD (cardiac defibrillator) or other implant: Bring the ID card.  ? An implanted stimulator: Bring the remote control.  ? A legal guardian: Bring a copy of the certified (court-stamped) guardianship papers.  Please remove any jewelry, including body piercings. Leave jewelry and other valuables at home.  If you're going home the day of surgery    You must have a responsible adult drive you home. They should stay with you overnight as well.    If you don't have someone to stay with you, and you aren't safe to go home alone, we may keep you overnight. Insurance often won't pay for this.  After surgery  If it's hard to control your pain or you need more pain medicine, please call your surgeon's office.  Questions?   If you have any questions for your care team, list them here: _________________________________________________________________________________________________________________________________________________________________________ ____________________________________ ____________________________________ ____________________________________    For informational purposes only. Not to replace the advice of your health care provider. Copyright   2003, 2019 [employer]. All rights reserved. Clinically reviewed by Nadya Quiroga MD. [employer] [id number] - REV 12/22.  Preparing for Your Surgery  Getting started  A nurse will call you to review your health history and instructions. They will give you an arrival time based on your scheduled surgery time. Please be ready to share:    Your doctor's clinic name and phone number    Your medical, surgical, and anesthesia history    A list of allergies and sensitivities    A list of medicines, including  herbal treatments and over-the-counter drugs    Whether the patient has a legal guardian (ask how to send us the papers in advance)  Please tell us if you're pregnant--or if there's any chance you might be pregnant. Some surgeries may injure a fetus (unborn baby), so they require a pregnancy test. Surgeries that are safe for a fetus don't always need a test, and you can choose whether to have one.   If you have a child who's having surgery, please ask for a copy of Preparing for Your Child's Surgery.    Preparing for surgery    Within 10 to 30 days of surgery: Have a pre-op exam (sometimes called an H&P, or History and Physical). This can be done at a clinic or pre-operative center.  ? If you're having a , you may not need this exam. Talk to your care team.    At your pre-op exam, talk to your care team about all medicines you take. If you need to stop any medicines before surgery, ask when to start taking them again.  ? We do this for your safety. Many medicines can make you bleed too much during surgery. Some change how well surgery (anesthesia) drugs work.    Call your insurance company to let them know you're having surgery. (If you don't have insurance, call [phone].)    Call your clinic if there's any change in your health. This includes signs of a cold or flu (sore throat, runny nose, cough, rash, fever). It also includes a scrape or scratch near the surgery site.    If you have questions on the day of surgery, call your hospital or surgery center.  Eating and drinking guidelines  For your safety: Unless your surgeon tells you otherwise, follow the guidelines below.    Eat and drink as usual until 8 hours before you arrive for surgery. After that, no food or milk.    Drink clear liquids until 2 hours before you arrive. These are liquids you can see through, like water, Gatorade, and Propel Water. They also include plain black coffee and tea (no cream or milk), candy, and breath mints. You can  spit out gum when you arrive.    If you drink alcohol: Stop drinking it the night before surgery.    If your care team tells you to take medicine on the morning of surgery, it's okay to take it with a sip of water.  Preventing infection    Shower or bathe the night before and morning of your surgery. Follow the instructions your clinic gave you. (If no instructions, use regular soap.)    Don't shave or clip hair near your surgery site. We'll remove the hair if needed.    Don't smoke or vape the morning of surgery. You may chew nicotine gum up to 2 hours before surgery. A nicotine patch is okay.  ? Note: Some surgeries require you to completely quit smoking and nicotine. Check with your surgeon.    Your care team will make every effort to keep you safe from infection. We will:  ? Clean our hands often with soap and water (or an alcohol-based hand rub).  ? Clean the skin at your surgery site with a special soap that kills germs.  ? Give you a special gown to keep you warm. (Cold raises the risk of infection.)  ? Wear special hair covers, masks, gowns and gloves during surgery.  ? Give antibiotic medicine, if prescribed. Not all surgeries need antibiotics.  What to bring on the day of surgery    Photo ID and insurance card    Copy of your health care directive, if you have one    Glasses and hearing aids (bring cases)  ? You can't wear contacts during surgery    Inhaler and eye drops, if you use them (tell us about these when you arrive)    CPAP machine or breathing device, if you use them    A few personal items, if spending the night    If you have . . .  ? A pacemaker, ICD (cardiac defibrillator) or other implant: Bring the ID card.  ? An implanted stimulator: Bring the remote control.  ? A legal guardian: Bring a copy of the certified (court-stamped) guardianship papers.  Please remove any jewelry, including body piercings. Leave jewelry and other valuables at home.  If you're going home the day of surgery    You  must have a responsible adult drive you home. They should stay with you overnight as well.    If you don't have someone to stay with you, and you aren't safe to go home alone, we may keep you overnight. Insurance often won't pay for this.  After surgery  If it's hard to control your pain or you need more pain medicine, please call your surgeon's office.  Questions?   If you have any questions for your care team, list them here: _________________________________________________________________________________________________________________________________________________________________________ ____________________________________ ____________________________________ ____________________________________

## 2022-12-15 NOTE — PROGRESS NOTES
[address] [address]  Phone: [phone]  Fax: [phone]  Primary Provider: Marietta Brandt  Pre-op Performing Provider: BOB FRENCH      PREOPERATIVE EVALUATION:  Today's date: 12/15/2022    Veronica Nieves is a 32 year old female who presents for a preoperative evaluation.    Surgical Information:  Surgery/Procedure: reconstruction after mastectomy   Surgery Location: [facility]   Surgeon: Dr. Byrd  Surgery Date: 12/20/2022  Time of Surgery: 3pm  Where patient plans to recover: At home with family  Fax number for surgical facility: Note does not need to be faxed, will be available electronically in Epic.    Type of Anesthesia Anticipated: General    Assessment & Plan     The proposed surgical procedure is considered INTERMEDIATE risk.      ICD-10-CM    1. Need for hepatitis C screening test  Z11.59       2. Preop general physical exam  Z01.818                   Risks and Recommendations:  The patient has the following additional risks and recommendations for perioperative complications:   - No identified additional risk factors other than previously addressed    Medication Instructions:  No medications morning of surgery    RECOMMENDATION:  APPROVAL GIVEN to proceed with proposed procedure, without further diagnostic evaluation.            Subjective     HPI related to upcoming procedure:     Upcoming breast reconstruction  Can get up a single flight of stairs without dyspnea. Estimated METS > 4.      Preop Questions 12/15/2022   1. Have you ever had a heart attack or stroke? No   2. Have you ever had surgery on your heart or blood vessels, such as a stent placement, a coronary artery bypass, or surgery on an artery in your head, neck, heart, or legs? No   3. Do you have chest pain with activity? No   4. Do you have a history of  heart failure? No   5. Do you currently have a cold, bronchitis or symptoms of other  infection? No   6. Do you have a cough, shortness of breath, or wheezing? No   7. Do you or anyone in your family have previous history of blood clots? No   8. Do you or does anyone in your family have a serious bleeding problem such as prolonged bleeding following surgeries or cuts? No   9. Have you ever had problems with anemia or been told to take iron pills? No   10. Have you had any abnormal blood loss such as black, tarry or bloody stools, or abnormal vaginal bleeding? No   11. Have you ever had a blood transfusion? No   12. Are you willing to have a blood transfusion if it is medically needed before, during, or after your surgery? Yes   13. Have you or any of your relatives ever had problems with anesthesia? No   14. Do you have sleep apnea, excessive snoring or daytime drowsiness? No   15. Do you have any artifical heart valves or other implanted medical devices like a pacemaker, defibrillator, or continuous glucose monitor? No   16. Do you have artificial joints? No   17. Are you allergic to latex? No   18. Is there any chance that you may be pregnant? No       Health Care Directive:  Patient has a Health Care Directive on file      Preoperative Review of :            Review of Systems  CONSTITUTIONAL: NEGATIVE for fever, chills, change in weight  ENT/MOUTH: NEGATIVE for ear, mouth and throat problems  RESP: NEGATIVE for significant cough or SOB  CV: NEGATIVE for chest pain, palpitations or peripheral edema    Patient Active Problem List    Diagnosis Date Noted     Elevated serum creatinine 08/05/2022     Priority: Medium     Chemotherapy-induced peripheral neuropathy (H) 08/05/2022     Priority: Medium     Malignant neoplasm of upper-outer quadrant of left breast in female, estrogen receptor positive (H) 05/19/2021     Priority: Medium     Due January. MD visit 12/16.       Mild depression and anxiety 02/10/2016     Priority: Medium     Duloxetine. Some issues with libido. Tried bupropion and tolerated  this   well but was diagnosed with breast cancer and opted to discontinue   medication, unclear if bupropion helped with libido given confounding   factors at the time           Past Medical History:   Diagnosis Date     Abnormal Pap smear of cervix     age 13, subsequent pap smears normal     Anxiety      Breast cancer (H) 05/04/2021    Left     Depression      Past Surgical History:   Procedure Laterality Date     BIOPSY NODE SENTINEL Left 10/7/2021    Procedure: left sentinel lymph node biopsy;  Surgeon: Audrey Newman MD;  [place] [facility]     INSERT TISSUE EXPANDER BREAST Right 9/23/2022    Procedure: INSERTION OF TISSUE EXPANDER RIGHT BREAST,;  Surgeon: Shanice Álvarez MD;  [facility] [facility]     MASTECTOMY MODIFIED RADICAL Bilateral 10/7/2021    Procedure: Bilateral mastectomies,;  Surgeon: Audrey Newman MD;  [facility] [facility]     OTHER SURGICAL HISTORY      no surgical history     TN INSJ PRPH CTR VAD W/SUBQ PORT AGE 5 YR/> N/A 5/26/2021    Procedure: Port Placement;  Surgeon: Audrey Newman MD;  Location: [facility];  Service: General     RECONSTRUCT BREAST, IMPLANT PROSTHESIS, COMBINED Bilateral 10/7/2021    Procedure: BILATERAL BREAST RECONSTRUCTION WITH IMPLANTS;  Surgeon: Shanice Álvarez MD;  [facility]     REMOVE IMPLANT BREAST Right 6/10/2022    Procedure: REMOVAL MAMMARY IMPLANT RIGHT;  Surgeon: Shanice Álvarez MD;  [facility]     TUNNELED VENOUS CATHETER PLACEMENT N/A 9/23/2022    Procedure: REMOVAL OF PORT;  Surgeon: Shanice Álvarez MD;  [facility]     US BREAST CORE BIOPSY LEFT Left 5/4/2021     WISDOM TOOTH EXTRACTION  2008     Current Outpatient Medications   Medication Sig Dispense Refill     acetaminophen (TYLENOL) 325 MG tablet Take 2 tablets (650 mg) by mouth every 4 hours as needed for mild pain 50 tablet 0     acetaminophen (TYLENOL) 325 MG tablet [ACETAMINOPHEN (TYLENOL) 325 MG  "TABLET] Take 650 mg by mouth every 6 (six) hours as needed for pain.       citalopram (CELEXA) 40 MG tablet TAKE 1 TABLET BY MOUTH  DAILY 30 tablet 11     methylphenidate (RITALIN) 5 MG tablet Take 2 tablets (10 mg) by mouth every morning before breakfast AND 1 tablet (5 mg) daily before lunch 90 tablet 0     paragard intrauterine copper device 1 each by Intrauterine route once       tamoxifen (NOLVADEX) 20 MG tablet TAKE 1 TABLET BY MOUTH  DAILY 90 tablet 0     famotidine-calcium carbonate-magnesium hydroxide (PEPCID COMPLETE) -165 mg Chew [FAMOTIDINE-CALCIUM CARBONATE-MAGNESIUM HYDROXIDE (PEPCID COMPLETE) -165 MG CHEW] Chew 1 tablet daily as needed. (Patient not taking: Reported on 12/15/2022)       oxyCODONE (ROXICODONE) 5 MG tablet Take 1 tablet (5 mg) by mouth every 4 hours as needed for moderate to severe pain (Patient not taking: Reported on 12/15/2022) 10 tablet 0     Tart Cherry (TART CHERRY ULTRA) 1200 MG CAPS Take 2 capsules by mouth At Bedtime (Patient not taking: Reported on 12/15/2022)         No Known Allergies     Social History     Tobacco Use     Smoking status: Never     Smokeless tobacco: Never   Substance Use Topics     Alcohol use: Yes     Alcohol/week: 6.0 - 7.0 standard drinks       History   Drug Use No         Objective     /62   Pulse 68   Temp 99.5  F (37.5  C) (Temporal)   Ht 1.676 m (5' 6\")   Wt 89.4 kg (197 lb 2 oz)   SpO2 97%   BMI 31.82 kg/m      Physical Exam  GENERAL APPEARANCE: healthy, alert and no distress  HENT: ear canals and TM's normal and nose and mouth without ulcers or lesions  RESP: lungs clear to auscultation - no rales, rhonchi or wheezes  CV: regular rate and rhythm, normal S1 S2, no S3 or S4 and no murmur, click or rub   ABDOMEN: soft, nontender, no HSM or masses and bowel sounds normal  NEURO: Normal strength and tone, sensory exam grossly normal, mentation intact and speech normal    Recent Labs   Lab Test 09/15/22  1026 09/14/22  1451 " 08/26/22  0901   HGB 12.6  --  11.8     --  265     --  141   POTASSIUM 4.0  --  3.9   CR 1.00 1.0 1.01        Diagnostics:  No labs were ordered during this visit.   No EKG required, no history of coronary heart disease, significant arrhythmia, peripheral arterial disease or other structural heart disease.    Revised Cardiac Risk Index (RCRI):  The patient has the following serious cardiovascular risks for perioperative complications:   - No serious cardiac risks = 0 points     RCRI Interpretation: 0 points: Class I (very low risk - 0.4% complication rate)           Signed Electronically by: Robbin Chau MD  Copy of this evaluation report is provided to requesting physician.

## 2022-12-15 NOTE — H&P (VIEW-ONLY)
[address] [address]  Phone: [phone]  Fax: [phone]  Primary Provider: Marietta Brandt  Pre-op Performing Provider: BOB FRENCH      PREOPERATIVE EVALUATION:  Today's date: 12/15/2022    Veronica Nivees is a 32 year old female who presents for a preoperative evaluation.    Surgical Information:  Surgery/Procedure: reconstruction after mastectomy   Surgery Location: [facility]   Surgeon: Dr. Byrd  Surgery Date: 12/20/2022  Time of Surgery: 3pm  Where patient plans to recover: At home with family  Fax number for surgical facility: Note does not need to be faxed, will be available electronically in Epic.    Type of Anesthesia Anticipated: General    Assessment & Plan     The proposed surgical procedure is considered INTERMEDIATE risk.      ICD-10-CM    1. Need for hepatitis C screening test  Z11.59       2. Preop general physical exam  Z01.818                   Risks and Recommendations:  The patient has the following additional risks and recommendations for perioperative complications:   - No identified additional risk factors other than previously addressed    Medication Instructions:  No medications morning of surgery    RECOMMENDATION:  APPROVAL GIVEN to proceed with proposed procedure, without further diagnostic evaluation.            Subjective     HPI related to upcoming procedure:     Upcoming breast reconstruction  Can get up a single flight of stairs without dyspnea. Estimated METS > 4.      Preop Questions 12/15/2022   1. Have you ever had a heart attack or stroke? No   2. Have you ever had surgery on your heart or blood vessels, such as a stent placement, a coronary artery bypass, or surgery on an artery in your head, neck, heart, or legs? No   3. Do you have chest pain with activity? No   4. Do you have a history of  heart failure? No   5. Do you currently have a cold, bronchitis or symptoms of other  infection? No   6. Do you have a cough, shortness of breath, or wheezing? No   7. Do you or anyone in your family have previous history of blood clots? No   8. Do you or does anyone in your family have a serious bleeding problem such as prolonged bleeding following surgeries or cuts? No   9. Have you ever had problems with anemia or been told to take iron pills? No   10. Have you had any abnormal blood loss such as black, tarry or bloody stools, or abnormal vaginal bleeding? No   11. Have you ever had a blood transfusion? No   12. Are you willing to have a blood transfusion if it is medically needed before, during, or after your surgery? Yes   13. Have you or any of your relatives ever had problems with anesthesia? No   14. Do you have sleep apnea, excessive snoring or daytime drowsiness? No   15. Do you have any artifical heart valves or other implanted medical devices like a pacemaker, defibrillator, or continuous glucose monitor? No   16. Do you have artificial joints? No   17. Are you allergic to latex? No   18. Is there any chance that you may be pregnant? No       Health Care Directive:  Patient has a Health Care Directive on file      Preoperative Review of :            Review of Systems  CONSTITUTIONAL: NEGATIVE for fever, chills, change in weight  ENT/MOUTH: NEGATIVE for ear, mouth and throat problems  RESP: NEGATIVE for significant cough or SOB  CV: NEGATIVE for chest pain, palpitations or peripheral edema    Patient Active Problem List    Diagnosis Date Noted     Elevated serum creatinine 08/05/2022     Priority: Medium     Chemotherapy-induced peripheral neuropathy (H) 08/05/2022     Priority: Medium     Malignant neoplasm of upper-outer quadrant of left breast in female, estrogen receptor positive (H) 05/19/2021     Priority: Medium     Due January. MD visit 12/16.       Mild depression and anxiety 02/10/2016     Priority: Medium     Duloxetine. Some issues with libido. Tried bupropion and tolerated  this   well but was diagnosed with breast cancer and opted to discontinue   medication, unclear if bupropion helped with libido given confounding   factors at the time           Past Medical History:   Diagnosis Date     Abnormal Pap smear of cervix     age 13, subsequent pap smears normal     Anxiety      Breast cancer (H) 05/04/2021    Left     Depression      Past Surgical History:   Procedure Laterality Date     BIOPSY NODE SENTINEL Left 10/7/2021    Procedure: left sentinel lymph node biopsy;  Surgeon: Audrey Newman MD;  Location: [facility]     INSERT TISSUE EXPANDER BREAST Right 9/23/2022    Procedure: INSERTION OF TISSUE EXPANDER RIGHT BREAST,;  Surgeon: Shanice Álvarez MD;  Location: [facility]     MASTECTOMY MODIFIED RADICAL Bilateral 10/7/2021    Procedure: Bilateral mastectomies,;  Surgeon: Audrey Newman MD;  Location: [facility]     OTHER SURGICAL HISTORY      no surgical history     MT INSJ PRPH CTR VAD W/SUBQ PORT AGE 5 YR/> N/A 5/26/2021    Procedure: Port Placement;  Surgeon: Audrey Newman MD;  Location: [facility];  Service: General     RECONSTRUCT BREAST, IMPLANT PROSTHESIS, COMBINED Bilateral 10/7/2021    Procedure: BILATERAL BREAST RECONSTRUCTION WITH IMPLANTS;  Surgeon: Shanice Álvarez MD;  Location: [facility]     REMOVE IMPLANT BREAST Right 6/10/2022    Procedure: REMOVAL MAMMARY IMPLANT RIGHT;  Surgeon: Shanice Álvarez MD;  Location: [facility]     TUNNELED VENOUS CATHETER PLACEMENT N/A 9/23/2022    Procedure: REMOVAL OF PORT;  Surgeon: Shanice Álvarez MD;  Location: [facility]     US BREAST CORE BIOPSY LEFT Left 5/4/2021     WISDOM TOOTH EXTRACTION  2008     Current Outpatient Medications   Medication Sig Dispense Refill     acetaminophen (TYLENOL) 325 MG tablet Take 2 tablets (650 mg) by mouth every 4 hours as needed for mild pain 50 tablet 0     acetaminophen (TYLENOL) 325 MG tablet [ACETAMINOPHEN (TYLENOL) 325 MG  "TABLET] Take 650 mg by mouth every 6 (six) hours as needed for pain.       citalopram (CELEXA) 40 MG tablet TAKE 1 TABLET BY MOUTH  DAILY 30 tablet 11     methylphenidate (RITALIN) 5 MG tablet Take 2 tablets (10 mg) by mouth every morning before breakfast AND 1 tablet (5 mg) daily before lunch 90 tablet 0     paragard intrauterine copper device 1 each by Intrauterine route once       tamoxifen (NOLVADEX) 20 MG tablet TAKE 1 TABLET BY MOUTH  DAILY 90 tablet 0     famotidine-calcium carbonate-magnesium hydroxide (PEPCID COMPLETE) -165 mg Chew [FAMOTIDINE-CALCIUM CARBONATE-MAGNESIUM HYDROXIDE (PEPCID COMPLETE) -165 MG CHEW] Chew 1 tablet daily as needed. (Patient not taking: Reported on 12/15/2022)       oxyCODONE (ROXICODONE) 5 MG tablet Take 1 tablet (5 mg) by mouth every 4 hours as needed for moderate to severe pain (Patient not taking: Reported on 12/15/2022) 10 tablet 0     Tart Cherry (TART CHERRY ULTRA) 1200 MG CAPS Take 2 capsules by mouth At Bedtime (Patient not taking: Reported on 12/15/2022)         No Known Allergies     Social History     Tobacco Use     Smoking status: Never     Smokeless tobacco: Never   Substance Use Topics     Alcohol use: Yes     Alcohol/week: 6.0 - 7.0 standard drinks       History   Drug Use No         Objective     /62   Pulse 68   Temp 99.5  F (37.5  C) (Temporal)   Ht 1.676 m (5' 6\")   Wt 89.4 kg (197 lb 2 oz)   SpO2 97%   BMI 31.82 kg/m      Physical Exam  GENERAL APPEARANCE: healthy, alert and no distress  HENT: ear canals and TM's normal and nose and mouth without ulcers or lesions  RESP: lungs clear to auscultation - no rales, rhonchi or wheezes  CV: regular rate and rhythm, normal S1 S2, no S3 or S4 and no murmur, click or rub   ABDOMEN: soft, nontender, no HSM or masses and bowel sounds normal  NEURO: Normal strength and tone, sensory exam grossly normal, mentation intact and speech normal    Recent Labs   Lab Test 09/15/22  1026 09/14/22  1451 " 08/26/22  0901   HGB 12.6  --  11.8     --  265     --  141   POTASSIUM 4.0  --  3.9   CR 1.00 1.0 1.01        Diagnostics:  No labs were ordered during this visit.   No EKG required, no history of coronary heart disease, significant arrhythmia, peripheral arterial disease or other structural heart disease.    Revised Cardiac Risk Index (RCRI):  The patient has the following serious cardiovascular risks for perioperative complications:   - No serious cardiac risks = 0 points     RCRI Interpretation: 0 points: Class I (very low risk - 0.4% complication rate)           Signed Electronically by: Robbin Chau MD  Copy of this evaluation report is provided to requesting physician.

## 2022-12-16 ENCOUNTER — ONCOLOGY VISIT (OUTPATIENT)
Dept: ONCOLOGY | Facility: HOSPITAL | Age: 32
End: 2022-12-16
Attending: INTERNAL MEDICINE
Payer: COMMERCIAL

## 2022-12-16 ENCOUNTER — INFUSION THERAPY VISIT (OUTPATIENT)
Dept: INFUSION THERAPY | Facility: HOSPITAL | Age: 32
End: 2022-12-16
Attending: INTERNAL MEDICINE
Payer: COMMERCIAL

## 2022-12-16 VITALS
SYSTOLIC BLOOD PRESSURE: 129 MMHG | TEMPERATURE: 98 F | WEIGHT: 198 LBS | OXYGEN SATURATION: 97 % | DIASTOLIC BLOOD PRESSURE: 78 MMHG | RESPIRATION RATE: 17 BRPM | HEART RATE: 77 BPM | BODY MASS INDEX: 31.96 KG/M2

## 2022-12-16 DIAGNOSIS — Z17.0 MALIGNANT NEOPLASM OF UPPER-OUTER QUADRANT OF LEFT BREAST IN FEMALE, ESTROGEN RECEPTOR POSITIVE (H): Primary | ICD-10-CM

## 2022-12-16 DIAGNOSIS — R23.2 VASOMOTOR FLUSHING: ICD-10-CM

## 2022-12-16 DIAGNOSIS — C50.412 MALIGNANT NEOPLASM OF UPPER-OUTER QUADRANT OF LEFT BREAST IN FEMALE, ESTROGEN RECEPTOR POSITIVE (H): ICD-10-CM

## 2022-12-16 DIAGNOSIS — C50.412 MALIGNANT NEOPLASM OF UPPER-OUTER QUADRANT OF LEFT BREAST IN FEMALE, ESTROGEN RECEPTOR POSITIVE (H): Primary | ICD-10-CM

## 2022-12-16 DIAGNOSIS — Z17.0 MALIGNANT NEOPLASM OF UPPER-OUTER QUADRANT OF LEFT BREAST IN FEMALE, ESTROGEN RECEPTOR POSITIVE (H): ICD-10-CM

## 2022-12-16 LAB
ALBUMIN SERPL BCG-MCNC: 4.6 G/DL (ref 3.5–5.2)
ALP SERPL-CCNC: 73 U/L (ref 35–104)
ALT SERPL W P-5'-P-CCNC: 33 U/L (ref 10–35)
ANION GAP SERPL CALCULATED.3IONS-SCNC: 10 MMOL/L (ref 7–15)
AST SERPL W P-5'-P-CCNC: 35 U/L (ref 10–35)
BASOPHILS # BLD AUTO: 0 10E3/UL (ref 0–0.2)
BASOPHILS NFR BLD AUTO: 0 %
BILIRUB SERPL-MCNC: 0.3 MG/DL
BUN SERPL-MCNC: 14.4 MG/DL (ref 6–20)
CALCIUM SERPL-MCNC: 9.4 MG/DL (ref 8.6–10)
CHLORIDE SERPL-SCNC: 99 MMOL/L (ref 98–107)
CREAT SERPL-MCNC: 0.99 MG/DL (ref 0.51–0.95)
DEPRECATED HCO3 PLAS-SCNC: 27 MMOL/L (ref 22–29)
EOSINOPHIL # BLD AUTO: 0.1 10E3/UL (ref 0–0.7)
EOSINOPHIL NFR BLD AUTO: 2 %
ERYTHROCYTE [DISTWIDTH] IN BLOOD BY AUTOMATED COUNT: 13.2 % (ref 10–15)
GFR SERPL CREATININE-BSD FRML MDRD: 77 ML/MIN/1.73M2
GLUCOSE SERPL-MCNC: 88 MG/DL (ref 70–99)
HCT VFR BLD AUTO: 41.6 % (ref 35–47)
HGB BLD-MCNC: 13.5 G/DL (ref 11.7–15.7)
IMM GRANULOCYTES # BLD: 0 10E3/UL
IMM GRANULOCYTES NFR BLD: 0 %
LYMPHOCYTES # BLD AUTO: 1.5 10E3/UL (ref 0.8–5.3)
LYMPHOCYTES NFR BLD AUTO: 27 %
MCH RBC QN AUTO: 28.7 PG (ref 26.5–33)
MCHC RBC AUTO-ENTMCNC: 32.5 G/DL (ref 31.5–36.5)
MCV RBC AUTO: 88 FL (ref 78–100)
MONOCYTES # BLD AUTO: 0.5 10E3/UL (ref 0–1.3)
MONOCYTES NFR BLD AUTO: 9 %
NEUTROPHILS # BLD AUTO: 3.6 10E3/UL (ref 1.6–8.3)
NEUTROPHILS NFR BLD AUTO: 62 %
NRBC # BLD AUTO: 0 10E3/UL
NRBC BLD AUTO-RTO: 0 /100
PLATELET # BLD AUTO: 280 10E3/UL (ref 150–450)
POTASSIUM SERPL-SCNC: 4.2 MMOL/L (ref 3.4–5.3)
PROT SERPL-MCNC: 7.6 G/DL (ref 6.4–8.3)
RBC # BLD AUTO: 4.71 10E6/UL (ref 3.8–5.2)
SODIUM SERPL-SCNC: 136 MMOL/L (ref 136–145)
WBC # BLD AUTO: 5.7 10E3/UL (ref 4–11)

## 2022-12-16 PROCEDURE — G0463 HOSPITAL OUTPT CLINIC VISIT: HCPCS | Performed by: INTERNAL MEDICINE

## 2022-12-16 PROCEDURE — 250N000011 HC RX IP 250 OP 636: Performed by: INTERNAL MEDICINE

## 2022-12-16 PROCEDURE — 36415 COLL VENOUS BLD VENIPUNCTURE: CPT

## 2022-12-16 PROCEDURE — 85025 COMPLETE CBC W/AUTO DIFF WBC: CPT

## 2022-12-16 PROCEDURE — 96402 CHEMO HORMON ANTINEOPL SQ/IM: CPT

## 2022-12-16 PROCEDURE — 80053 COMPREHEN METABOLIC PANEL: CPT

## 2022-12-16 PROCEDURE — 99214 OFFICE O/P EST MOD 30 MIN: CPT | Performed by: INTERNAL MEDICINE

## 2022-12-16 PROCEDURE — G0463 HOSPITAL OUTPT CLINIC VISIT: HCPCS | Mod: 25

## 2022-12-16 RX ADMIN — GOSERELIN ACETATE 10.8 MG: 10.8 IMPLANT SUBCUTANEOUS at 11:18

## 2022-12-16 NOTE — PROGRESS NOTES
"Oncology Rooming Note    December 16, 2022 9:48 AM   Veronica Nieves is a 32 year old female who presents for:    Chief Complaint   Patient presents with     Oncology Clinic Visit     3 month Lab, MD, injection     Initial Vitals: /78   Pulse 77   Temp 98  F (36.7  C)   Resp 17   Wt 89.8 kg (198 lb)   SpO2 97%   BMI 31.96 kg/m   Estimated body mass index is 31.96 kg/m  as calculated from the following:    Height as of 12/15/22: 1.676 m (5' 6\").    Weight as of this encounter: 89.8 kg (198 lb). Body surface area is 2.04 meters squared.  Data Unavailable Comment: Data Unavailable   No LMP recorded. (Menstrual status: IUD).  Allergies reviewed: Yes  Medications reviewed: Yes    Medications: Medication refills not needed today.  Pharmacy name entered into [facility]:    [facility] - [place], [place] - [address] [place] [address] PHARMACY [place], [place] - [address] [place] [address]  [facility] - [place], [place] - [address] [address]    Clinical concerns:     Yandel Londono RN            "

## 2022-12-16 NOTE — PROGRESS NOTES
[facility]    Patient: Veronica Nieves  MRN: [id number]  Date of Service: Dec 16, 2022        Assessment and Plan:    Cancer Staging  Malignant neoplasm of upper-outer quadrant of left breast in female, estrogen receptor positive (H)  Staging form: Breast, AJCC 8th Edition  - Clinical stage from 5/19/2021: Stage IB (cT2, cN0, cM0, G2, ER+, WI+, HER2+) - Signed by Jefferson Garcia MD on 5/19/2021     1.  Invasive ductal carcinoma of the left breast: Completed Kadcyla.  Continues on tamoxifen and Zoladex.  Zoladex is dosed every 3 months.  Overall she is doing okay.  No clinical signs or symptoms of recurrence.  We will see her back in clinic every 3 months.  She is status post bilateral mastectomies and no surveillance mammograms are needed.    2.  Chemotherapy-induced peripheral neuropathy: This is mostly resolved now. She is no longer on Cymbalta.    3.  Anxiety and depression:  We changed from duloxetine to citalopram in February of this year.  She is on 40 mg daily.  Continue at this dose.    4.  Lack of concentration/chemo brain: She is taking her Ritalin basically as needed.  She does not feel she needs as much anymore and will not renew the current prescription once it runs out.    5.  Vasomotor: She has many hot flashes a day.  Not so many at night.  She is going to try vitamin E 400 mg daily. If this does not work we also discussed venlafaxine or switching to tamoxifen to an aromatase inhibitor.    I spent 35 minutes in the care of this patient today, which included time necessary for preparation for the visit, face to face time with the patient, communication of recommendations to the care team, and documentation time.    ECOG Performance  0    Diagnosis:    1.  Invasive ductal carcinoma of the left breast: Diagnosed May, 2021.  Initial imaging suggested 3 masses. Biopsy of the largest mass showed an invasive ductal carcinoma, grade 2.  ER/WI positive, HER-2  positive. Evaluation of the axilla was negative.    Treatment:    Neoadjuvant chemotherapy with TCHP initiated on May 27, 2021.  Cycle 6 was given on September 10.    Bilateral mastectomy performed on October 7.  Pathology showed a 9 mm focus of invasive ductal carcinoma of the left breast.  Lymph nodes are negative.    Kadcyla initiated November 29, 2021. Kadcyla completed August 26, 2022.  Zoladex and tamoxifen initiated November 29, 2021. Zoladex dosing q3 months starting Sept., 2022.    She had her right implant removed secondary to infection on Tiana 10, 2022.    Interim History:    Abbey presents today for a follow-up visit.  Overall she is doing okay.  She continues to have hot flashes and sweats. Multiple times during the day.  Occasionally at night.  No pain.  Energy and appetite are okay.  Still has some brain fog but its better.  No acute complaints today.    Review of Systems:    As above in the history.     Review of Systems otherwise Negative for:  General: chills, fever or night sweats  Psychological: anxiety or depression  Ophthalmic: blurry vision, double vision or loss of vision, vision change  ENT: epistaxis, oral lesions, hearing changes  Hematological and Lymphatic: bleeding, bruising, jaundice, swollen lymph nodes  Endocrine: hot flashes, unexpected weight changes  Respiratory: cough, hemoptysis, orthopnea or shortness of breath/SHAHID  Cardiovascular: chest pain, edema, palpitations or PND  Gastrointestinal: abdominal pain, blood in stools, change in bowel habits, constipation, diarrhea or nausea/vomiting  Genito-Urinary: change in urinary stream, incontinence, frequency/urgency  Musculoskeletal: joint pain, stiffness, swelling, muscle pain  Neurological: dizziness, headaches  Dermatological: lumps and rash    Past History:    Past Medical History:   Diagnosis Date     Abnormal Pap smear of cervix     age 13, subsequent pap smears normal     Anxiety      Breast cancer (H) 05/04/2021    Left      Depression      Physical Exam:    /78   Pulse 77   Temp 98  F (36.7  C)   Resp 17   Wt 89.8 kg (198 lb)   SpO2 97%   BMI 31.96 kg/m      General: patient appears stated age of 31 year old. Nontoxic and in no distress.   HEENT: Head: atraumatic, normocephalic. Sclerae anicteric.  Chest:  Normal respiratory effort  Cardiac:  No edema.   Abdomen: abdomen is non-distended  Extremities: normal tone and muscle bulk.  Skin: no lesions or rash on visible skin. Warm and dry.   CNS: alert and oriented. Grossly non-focal.   Psychiatric: normal mood and affect.     Lab Results:    Recent Results (from the past 24 hour(s))   Comprehensive metabolic panel (BMP + Alb, Alk Phos, ALT, AST, Total. Bili, TP)    Collection Time: 12/16/22  9:11 AM   Result Value Ref Range    Sodium 136 136 - 145 mmol/L    Potassium 4.2 3.4 - 5.3 mmol/L    Chloride 99 98 - 107 mmol/L    Carbon Dioxide (CO2) 27 22 - 29 mmol/L    Anion Gap 10 7 - 15 mmol/L    Urea Nitrogen 14.4 6.0 - 20.0 mg/dL    Creatinine 0.99 (H) 0.51 - 0.95 mg/dL    Calcium 9.4 8.6 - 10.0 mg/dL    Glucose 88 70 - 99 mg/dL    Alkaline Phosphatase 73 35 - 104 U/L    AST 35 10 - 35 U/L    ALT 33 10 - 35 U/L    Protein Total 7.6 6.4 - 8.3 g/dL    Albumin 4.6 3.5 - 5.2 g/dL    Bilirubin Total 0.3 <=1.2 mg/dL    GFR Estimate 77 >60 mL/min/1.73m2   CBC with platelets and differential    Collection Time: 12/16/22  9:11 AM   Result Value Ref Range    WBC Count 5.7 4.0 - 11.0 10e3/uL    RBC Count 4.71 3.80 - 5.20 10e6/uL    Hemoglobin 13.5 11.7 - 15.7 g/dL    Hematocrit 41.6 35.0 - 47.0 %    MCV 88 78 - 100 fL    MCH 28.7 26.5 - 33.0 pg    MCHC 32.5 31.5 - 36.5 g/dL    RDW 13.2 10.0 - 15.0 %    Platelet Count 280 150 - 450 10e3/uL    % Neutrophils 62 %    % Lymphocytes 27 %    % Monocytes 9 %    % Eosinophils 2 %    % Basophils 0 %    % Immature Granulocytes 0 %    NRBCs per 100 WBC 0 <1 /100    Absolute Neutrophils 3.6 1.6 - 8.3 10e3/uL    Absolute Lymphocytes 1.5 0.8 - 5.3 10e3/uL     Absolute Monocytes 0.5 0.0 - 1.3 10e3/uL    Absolute Eosinophils 0.1 0.0 - 0.7 10e3/uL    Absolute Basophils 0.0 0.0 - 0.2 10e3/uL    Absolute Immature Granulocytes 0.0 <=0.4 10e3/uL    Absolute NRBCs 0.0 10e3/uL       Signed by: Jefferson Garcia MD

## 2022-12-16 NOTE — PROGRESS NOTES
Infusion Nursing Note:  Veronica LIGIA Gutierrezjeffrey presents today for Zoladex injection.    Patient seen by provider today: Yes: Dr. Garcia   present during visit today: Not Applicable.    Note: Abdomen numbed with ice and Zoladex administered deep subcutaneous (right lower abdomen) as ordered per provider.     Intravenous Access:  Labs drawn peripherally. No longer has port.    Treatment Conditions:  Lab Results   Component Value Date    HGB 13.5 12/16/2022    WBC 5.7 12/16/2022    ANEU 15.5 (H) 08/09/2021    ANEUTAUTO 3.6 12/16/2022     12/16/2022      Lab Results   Component Value Date     12/16/2022    POTASSIUM 4.2 12/16/2022    CR 0.99 (H) 12/16/2022    BURT 9.4 12/16/2022    BILITOTAL 0.3 12/16/2022    ALBUMIN 4.6 12/16/2022    ALT 33 12/16/2022    AST 35 12/16/2022     Results reviewed, labs MET treatment parameters, ok to proceed with treatment.    Post Infusion Assessment:  Patient tolerated injection without incident.     Discharge Plan:   Patient discharged in stable condition accompanied by: self.  Departure Mode: Ambulatory.      ROB URBAN RN

## 2022-12-16 NOTE — LETTER
"    12/16/2022         RE: Veronica Nieves  [address] [facility] [address]        Dear Colleague,    Thank you for referring your patient, Veronica Nieves, to the [facility]. Please see a copy of my visit note below.    Oncology Rooming Note    December 16, 2022 9:48 AM   Veronica Nieves is a 32 year old female who presents for:    Chief Complaint   Patient presents with     Oncology Clinic Visit     3 month Lab, MD, injection     Initial Vitals: /78   Pulse 77   Temp 98  F (36.7  C)   Resp 17   Wt 89.8 kg (198 lb)   SpO2 97%   BMI 31.96 kg/m   Estimated body mass index is 31.96 kg/m  as calculated from the following:    Height as of 12/15/22: 1.676 m (5' 6\").    Weight as of this encounter: 89.8 kg (198 lb). Body surface area is 2.04 meters squared.  Data Unavailable Comment: Data Unavailable   No LMP recorded. (Menstrual status: IUD).  Allergies reviewed: Yes  Medications reviewed: Yes    Medications: Medication refills not needed today.  Pharmacy name entered into [employer]:    [facility] - [address] [facility] [address]  [facility] ([facility] - [place], [place] - [facility] [address]    Clinical concerns:     Yandel Londono RN              [facility] Hematology and Oncology Progress Note    Patient: Veronica Nieves  MRN: [id number]  Date of Service: Dec 16, 2022        Assessment and Plan:    Cancer Staging  Malignant neoplasm of upper-outer quadrant of left breast in female, estrogen receptor positive (H)  Staging form: Breast, AJCC 8th Edition  - Clinical stage from 5/19/2021: Stage IB (cT2, cN0, cM0, G2, ER+, VA+, HER2+) - Signed by Jefferson Garcia MD on 5/19/2021     1.  Invasive ductal carcinoma of the left breast: Completed Kadcyla.  Continues on tamoxifen and Zoladex.  Zoladex is dosed every 3 months.  Overall she is " doing okay.  No clinical signs or symptoms of recurrence.  We will see her back in clinic every 3 months.  She is status post bilateral mastectomies and no surveillance mammograms are needed.    2.  Chemotherapy-induced peripheral neuropathy: This is mostly resolved now. She is no longer on Cymbalta.    3.  Anxiety and depression:  We changed from duloxetine to citalopram in February of this year.  She is on 40 mg daily.  Continue at this dose.    4.  Lack of concentration/chemo brain: She is taking her Ritalin basically as needed.  She does not feel she needs as much anymore and will not renew the current prescription once it runs out.    5.  Vasomotor: She has many hot flashes a day.  Not so many at night.  She is going to try vitamin E 400 mg daily. If this does not work we also discussed venlafaxine or switching to tamoxifen to an aromatase inhibitor.    I spent 35 minutes in the care of this patient today, which included time necessary for preparation for the visit, face to face time with the patient, communication of recommendations to the care team, and documentation time.    ECOG Performance  0    Diagnosis:    1.  Invasive ductal carcinoma of the left breast: Diagnosed May, 2021.  Initial imaging suggested 3 masses. Biopsy of the largest mass showed an invasive ductal carcinoma, grade 2.  ER/MD positive, HER-2 positive. Evaluation of the axilla was negative.    Treatment:    Neoadjuvant chemotherapy with TCHP initiated on May 27, 2021.  Cycle 6 was given on September 10.    Bilateral mastectomy performed on October 7.  Pathology showed a 9 mm focus of invasive ductal carcinoma of the left breast.  Lymph nodes are negative.    Kadcyla initiated November 29, 2021. Kadcyla completed August 26, 2022.  Zoladex and tamoxifen initiated November 29, 2021. Zoladex dosing q3 months starting Sept., 2022.    She had her right implant removed secondary to infection on Tiana 10, 2022.    Interim History:    Abbey presents  today for a follow-up visit.  Overall she is doing okay.  She continues to have hot flashes and sweats. Multiple times during the day.  Occasionally at night.  No pain.  Energy and appetite are okay.  Still has some brain fog but its better.  No acute complaints today.    Review of Systems:    As above in the history.     Review of Systems otherwise Negative for:  General: chills, fever or night sweats  Psychological: anxiety or depression  Ophthalmic: blurry vision, double vision or loss of vision, vision change  ENT: epistaxis, oral lesions, hearing changes  Hematological and Lymphatic: bleeding, bruising, jaundice, swollen lymph nodes  Endocrine: hot flashes, unexpected weight changes  Respiratory: cough, hemoptysis, orthopnea or shortness of breath/SHAIHD  Cardiovascular: chest pain, edema, palpitations or PND  Gastrointestinal: abdominal pain, blood in stools, change in bowel habits, constipation, diarrhea or nausea/vomiting  Genito-Urinary: change in urinary stream, incontinence, frequency/urgency  Musculoskeletal: joint pain, stiffness, swelling, muscle pain  Neurological: dizziness, headaches  Dermatological: lumps and rash    Past History:    Past Medical History:   Diagnosis Date     Abnormal Pap smear of cervix     age 13, subsequent pap smears normal     Anxiety      Breast cancer (H) 05/04/2021    Left     Depression      Physical Exam:    /78   Pulse 77   Temp 98  F (36.7  C)   Resp 17   Wt 89.8 kg (198 lb)   SpO2 97%   BMI 31.96 kg/m      General: patient appears stated age of 31 year old. Nontoxic and in no distress.   HEENT: Head: atraumatic, normocephalic. Sclerae anicteric.  Chest:  Normal respiratory effort  Cardiac:  No edema.   Abdomen: abdomen is non-distended  Extremities: normal tone and muscle bulk.  Skin: no lesions or rash on visible skin. Warm and dry.   CNS: alert and oriented. Grossly non-focal.   Psychiatric: normal mood and affect.     Lab Results:    Recent Results (from  the past 24 hour(s))   Comprehensive metabolic panel (BMP + Alb, Alk Phos, ALT, AST, Total. Bili, TP)    Collection Time: 12/16/22  9:11 AM   Result Value Ref Range    Sodium 136 136 - 145 mmol/L    Potassium 4.2 3.4 - 5.3 mmol/L    Chloride 99 98 - 107 mmol/L    Carbon Dioxide (CO2) 27 22 - 29 mmol/L    Anion Gap 10 7 - 15 mmol/L    Urea Nitrogen 14.4 6.0 - 20.0 mg/dL    Creatinine 0.99 (H) 0.51 - 0.95 mg/dL    Calcium 9.4 8.6 - 10.0 mg/dL    Glucose 88 70 - 99 mg/dL    Alkaline Phosphatase 73 35 - 104 U/L    AST 35 10 - 35 U/L    ALT 33 10 - 35 U/L    Protein Total 7.6 6.4 - 8.3 g/dL    Albumin 4.6 3.5 - 5.2 g/dL    Bilirubin Total 0.3 <=1.2 mg/dL    GFR Estimate 77 >60 mL/min/1.73m2   CBC with platelets and differential    Collection Time: 12/16/22  9:11 AM   Result Value Ref Range    WBC Count 5.7 4.0 - 11.0 10e3/uL    RBC Count 4.71 3.80 - 5.20 10e6/uL    Hemoglobin 13.5 11.7 - 15.7 g/dL    Hematocrit 41.6 35.0 - 47.0 %    MCV 88 78 - 100 fL    MCH 28.7 26.5 - 33.0 pg    MCHC 32.5 31.5 - 36.5 g/dL    RDW 13.2 10.0 - 15.0 %    Platelet Count 280 150 - 450 10e3/uL    % Neutrophils 62 %    % Lymphocytes 27 %    % Monocytes 9 %    % Eosinophils 2 %    % Basophils 0 %    % Immature Granulocytes 0 %    NRBCs per 100 WBC 0 <1 /100    Absolute Neutrophils 3.6 1.6 - 8.3 10e3/uL    Absolute Lymphocytes 1.5 0.8 - 5.3 10e3/uL    Absolute Monocytes 0.5 0.0 - 1.3 10e3/uL    Absolute Eosinophils 0.1 0.0 - 0.7 10e3/uL    Absolute Basophils 0.0 0.0 - 0.2 10e3/uL    Absolute Immature Granulocytes 0.0 <=0.4 10e3/uL    Absolute NRBCs 0.0 10e3/uL       Signed by: Jefferson Garcia MD          Again, thank you for allowing me to participate in the care of your patient.        Sincerely,        Jefferson Garcia MD

## 2022-12-20 ENCOUNTER — HOSPITAL ENCOUNTER (OUTPATIENT)
Facility: HOSPITAL | Age: 32
Discharge: HOME | End: 2022-12-20
Attending: PLASTIC SURGERY | Admitting: PLASTIC SURGERY
Payer: COMMERCIAL

## 2022-12-20 ENCOUNTER — ANESTHESIA (OUTPATIENT)
Dept: SURGERY | Facility: HOSPITAL | Age: 32
End: 2022-12-20
Payer: COMMERCIAL

## 2022-12-20 ENCOUNTER — ANESTHESIA EVENT (OUTPATIENT)
Dept: SURGERY | Facility: HOSPITAL | Age: 32
End: 2022-12-20
Payer: COMMERCIAL

## 2022-12-20 VITALS
TEMPERATURE: 98.1 F | BODY MASS INDEX: 31.96 KG/M2 | HEART RATE: 78 BPM | RESPIRATION RATE: 16 BRPM | OXYGEN SATURATION: 97 % | SYSTOLIC BLOOD PRESSURE: 126 MMHG | DIASTOLIC BLOOD PRESSURE: 65 MMHG | WEIGHT: 198 LBS

## 2022-12-20 DIAGNOSIS — Z17.0 MALIGNANT NEOPLASM OF UPPER-OUTER QUADRANT OF LEFT BREAST IN FEMALE, ESTROGEN RECEPTOR POSITIVE (H): Primary | ICD-10-CM

## 2022-12-20 DIAGNOSIS — C50.412 MALIGNANT NEOPLASM OF UPPER-OUTER QUADRANT OF LEFT BREAST IN FEMALE, ESTROGEN RECEPTOR POSITIVE (H): Primary | ICD-10-CM

## 2022-12-20 PROCEDURE — 258N000003 HC RX IP 258 OP 636: Performed by: PLASTIC SURGERY

## 2022-12-20 PROCEDURE — 250N000011 HC RX IP 250 OP 636: Performed by: NURSE ANESTHETIST, CERTIFIED REGISTERED

## 2022-12-20 PROCEDURE — 258N000003 HC RX IP 258 OP 636: Performed by: ANESTHESIOLOGY

## 2022-12-20 PROCEDURE — 250N000009 HC RX 250: Performed by: PLASTIC SURGERY

## 2022-12-20 PROCEDURE — 250N000009 HC RX 250: Performed by: NURSE ANESTHETIST, CERTIFIED REGISTERED

## 2022-12-20 PROCEDURE — 999N000141 HC STATISTIC PRE-PROCEDURE NURSING ASSESSMENT: Performed by: PLASTIC SURGERY

## 2022-12-20 PROCEDURE — 258N000003 HC RX IP 258 OP 636: Performed by: NURSE ANESTHETIST, CERTIFIED REGISTERED

## 2022-12-20 PROCEDURE — 250N000013 HC RX MED GY IP 250 OP 250 PS 637: Performed by: PLASTIC SURGERY

## 2022-12-20 PROCEDURE — 250N000011 HC RX IP 250 OP 636: Performed by: PLASTIC SURGERY

## 2022-12-20 PROCEDURE — 250N000011 HC RX IP 250 OP 636: Performed by: ANESTHESIOLOGY

## 2022-12-20 PROCEDURE — 710N000009 HC RECOVERY PHASE 1, LEVEL 1, PER MIN: Performed by: PLASTIC SURGERY

## 2022-12-20 PROCEDURE — L8600 IMPLANT BREAST SILICONE/EQ: HCPCS | Performed by: PLASTIC SURGERY

## 2022-12-20 PROCEDURE — 710N000012 HC RECOVERY PHASE 2, PER MINUTE: Performed by: PLASTIC SURGERY

## 2022-12-20 PROCEDURE — 250N000025 HC SEVOFLURANE, PER MIN: Performed by: PLASTIC SURGERY

## 2022-12-20 PROCEDURE — 250N000009 HC RX 250: Performed by: ANESTHESIOLOGY

## 2022-12-20 PROCEDURE — 278N000051 HC OR IMPLANT GENERAL: Performed by: PLASTIC SURGERY

## 2022-12-20 PROCEDURE — 360N000076 HC SURGERY LEVEL 3, PER MIN: Performed by: PLASTIC SURGERY

## 2022-12-20 PROCEDURE — 370N000017 HC ANESTHESIA TECHNICAL FEE, PER MIN: Performed by: PLASTIC SURGERY

## 2022-12-20 PROCEDURE — 272N000001 HC OR GENERAL SUPPLY STERILE: Performed by: PLASTIC SURGERY

## 2022-12-20 DEVICE — IMPLANTABLE DEVICE: Type: IMPLANTABLE DEVICE | Site: BREAST | Status: FUNCTIONAL

## 2022-12-20 RX ORDER — CEPHALEXIN 500 MG/1
500 CAPSULE ORAL 4 TIMES DAILY
Qty: 28 CAPSULE | Refills: 0 | Status: SHIPPED | OUTPATIENT
Start: 2022-12-20 | End: 2022-12-27

## 2022-12-20 RX ORDER — DEXAMETHASONE SODIUM PHOSPHATE 10 MG/ML
INJECTION, SOLUTION INTRAMUSCULAR; INTRAVENOUS PRN
Status: DISCONTINUED | OUTPATIENT
Start: 2022-12-20 | End: 2022-12-20

## 2022-12-20 RX ORDER — KETAMINE HYDROCHLORIDE 10 MG/ML
INJECTION INTRAMUSCULAR; INTRAVENOUS PRN
Status: DISCONTINUED | OUTPATIENT
Start: 2022-12-20 | End: 2022-12-20

## 2022-12-20 RX ORDER — FENTANYL CITRATE 50 UG/ML
25 INJECTION, SOLUTION INTRAMUSCULAR; INTRAVENOUS
Status: DISCONTINUED | OUTPATIENT
Start: 2022-12-20 | End: 2022-12-20 | Stop reason: HOSPADM

## 2022-12-20 RX ORDER — LIDOCAINE HYDROCHLORIDE 10 MG/ML
INJECTION, SOLUTION INFILTRATION; PERINEURAL PRN
Status: DISCONTINUED | OUTPATIENT
Start: 2022-12-20 | End: 2022-12-20

## 2022-12-20 RX ORDER — NALOXONE HYDROCHLORIDE 0.4 MG/ML
0.4 INJECTION, SOLUTION INTRAMUSCULAR; INTRAVENOUS; SUBCUTANEOUS
Status: DISCONTINUED | OUTPATIENT
Start: 2022-12-20 | End: 2022-12-20 | Stop reason: HOSPADM

## 2022-12-20 RX ORDER — HYDROCODONE BITARTRATE AND ACETAMINOPHEN 5; 325 MG/1; MG/1
1 TABLET ORAL EVERY 4 HOURS PRN
Qty: 15 TABLET | Refills: 0 | Status: SHIPPED | OUTPATIENT
Start: 2022-12-20 | End: 2023-03-17

## 2022-12-20 RX ORDER — HYDROMORPHONE HYDROCHLORIDE 1 MG/ML
0.2 INJECTION, SOLUTION INTRAMUSCULAR; INTRAVENOUS; SUBCUTANEOUS EVERY 5 MIN PRN
Status: DISCONTINUED | OUTPATIENT
Start: 2022-12-20 | End: 2022-12-20 | Stop reason: HOSPADM

## 2022-12-20 RX ORDER — MEPERIDINE HYDROCHLORIDE 25 MG/ML
12.5 INJECTION INTRAMUSCULAR; INTRAVENOUS; SUBCUTANEOUS
Status: DISCONTINUED | OUTPATIENT
Start: 2022-12-20 | End: 2022-12-20 | Stop reason: HOSPADM

## 2022-12-20 RX ORDER — ONDANSETRON 2 MG/ML
4 INJECTION INTRAMUSCULAR; INTRAVENOUS EVERY 30 MIN PRN
Status: DISCONTINUED | OUTPATIENT
Start: 2022-12-20 | End: 2022-12-20 | Stop reason: HOSPADM

## 2022-12-20 RX ORDER — BUPIVACAINE HYDROCHLORIDE 5 MG/ML
INJECTION, SOLUTION PERINEURAL PRN
Status: DISCONTINUED | OUTPATIENT
Start: 2022-12-20 | End: 2022-12-20 | Stop reason: HOSPADM

## 2022-12-20 RX ORDER — CEFAZOLIN SODIUM 1 G/3ML
INJECTION, POWDER, FOR SOLUTION INTRAMUSCULAR; INTRAVENOUS PRN
Status: DISCONTINUED | OUTPATIENT
Start: 2022-12-20 | End: 2022-12-20

## 2022-12-20 RX ORDER — NALOXONE HYDROCHLORIDE 0.4 MG/ML
0.2 INJECTION, SOLUTION INTRAMUSCULAR; INTRAVENOUS; SUBCUTANEOUS
Status: DISCONTINUED | OUTPATIENT
Start: 2022-12-20 | End: 2022-12-20 | Stop reason: HOSPADM

## 2022-12-20 RX ORDER — MAGNESIUM SULFATE 4 G/50ML
4 INJECTION INTRAVENOUS ONCE
Status: COMPLETED | OUTPATIENT
Start: 2022-12-20 | End: 2022-12-20

## 2022-12-20 RX ORDER — SODIUM CHLORIDE, SODIUM LACTATE, POTASSIUM CHLORIDE, CALCIUM CHLORIDE 600; 310; 30; 20 MG/100ML; MG/100ML; MG/100ML; MG/100ML
INJECTION, SOLUTION INTRAVENOUS CONTINUOUS
Status: DISCONTINUED | OUTPATIENT
Start: 2022-12-20 | End: 2022-12-20 | Stop reason: HOSPADM

## 2022-12-20 RX ORDER — PROPOFOL 10 MG/ML
INJECTION, EMULSION INTRAVENOUS PRN
Status: DISCONTINUED | OUTPATIENT
Start: 2022-12-20 | End: 2022-12-20

## 2022-12-20 RX ORDER — HYDROMORPHONE HYDROCHLORIDE 1 MG/ML
0.4 INJECTION, SOLUTION INTRAMUSCULAR; INTRAVENOUS; SUBCUTANEOUS EVERY 5 MIN PRN
Status: DISCONTINUED | OUTPATIENT
Start: 2022-12-20 | End: 2022-12-20 | Stop reason: HOSPADM

## 2022-12-20 RX ORDER — EPHEDRINE SULFATE 50 MG/ML
INJECTION, SOLUTION INTRAMUSCULAR; INTRAVENOUS; SUBCUTANEOUS PRN
Status: DISCONTINUED | OUTPATIENT
Start: 2022-12-20 | End: 2022-12-20

## 2022-12-20 RX ORDER — GLYCOPYRROLATE 0.2 MG/ML
INJECTION, SOLUTION INTRAMUSCULAR; INTRAVENOUS PRN
Status: DISCONTINUED | OUTPATIENT
Start: 2022-12-20 | End: 2022-12-20

## 2022-12-20 RX ORDER — HYDROCODONE BITARTRATE AND ACETAMINOPHEN 5; 325 MG/1; MG/1
2 TABLET ORAL
Status: COMPLETED | OUTPATIENT
Start: 2022-12-20 | End: 2022-12-20

## 2022-12-20 RX ORDER — FENTANYL CITRATE 50 UG/ML
50 INJECTION, SOLUTION INTRAMUSCULAR; INTRAVENOUS EVERY 5 MIN PRN
Status: DISCONTINUED | OUTPATIENT
Start: 2022-12-20 | End: 2022-12-20 | Stop reason: HOSPADM

## 2022-12-20 RX ORDER — CEFAZOLIN SODIUM/WATER 2 G/20 ML
SYRINGE (ML) INTRAVENOUS
Status: DISCONTINUED
Start: 2022-12-20 | End: 2022-12-20 | Stop reason: HOSPADM

## 2022-12-20 RX ORDER — SCOLOPAMINE TRANSDERMAL SYSTEM 1 MG/1
1 PATCH, EXTENDED RELEASE TRANSDERMAL ONCE
Status: DISCONTINUED | OUTPATIENT
Start: 2022-12-20 | End: 2022-12-20 | Stop reason: HOSPADM

## 2022-12-20 RX ORDER — ONDANSETRON 4 MG/1
4 TABLET, ORALLY DISINTEGRATING ORAL EVERY 30 MIN PRN
Status: DISCONTINUED | OUTPATIENT
Start: 2022-12-20 | End: 2022-12-20 | Stop reason: HOSPADM

## 2022-12-20 RX ORDER — PROPOFOL 10 MG/ML
INJECTION, EMULSION INTRAVENOUS CONTINUOUS PRN
Status: DISCONTINUED | OUTPATIENT
Start: 2022-12-20 | End: 2022-12-20

## 2022-12-20 RX ORDER — FENTANYL CITRATE 50 UG/ML
INJECTION, SOLUTION INTRAMUSCULAR; INTRAVENOUS PRN
Status: DISCONTINUED | OUTPATIENT
Start: 2022-12-20 | End: 2022-12-20

## 2022-12-20 RX ORDER — SODIUM CHLORIDE, SODIUM LACTATE, POTASSIUM CHLORIDE, AND CALCIUM CHLORIDE .6; .31; .03; .02 G/100ML; G/100ML; G/100ML; G/100ML
IRRIGANT IRRIGATION PRN
Status: DISCONTINUED | OUTPATIENT
Start: 2022-12-20 | End: 2022-12-20 | Stop reason: HOSPADM

## 2022-12-20 RX ORDER — FENTANYL CITRATE 50 UG/ML
25 INJECTION, SOLUTION INTRAMUSCULAR; INTRAVENOUS EVERY 5 MIN PRN
Status: DISCONTINUED | OUTPATIENT
Start: 2022-12-20 | End: 2022-12-20 | Stop reason: HOSPADM

## 2022-12-20 RX ORDER — ONDANSETRON 2 MG/ML
INJECTION INTRAMUSCULAR; INTRAVENOUS PRN
Status: DISCONTINUED | OUTPATIENT
Start: 2022-12-20 | End: 2022-12-20

## 2022-12-20 RX ORDER — LIDOCAINE 40 MG/G
CREAM TOPICAL
Status: DISCONTINUED | OUTPATIENT
Start: 2022-12-20 | End: 2022-12-20 | Stop reason: HOSPADM

## 2022-12-20 RX ORDER — KETOROLAC TROMETHAMINE 30 MG/ML
INJECTION, SOLUTION INTRAMUSCULAR; INTRAVENOUS PRN
Status: DISCONTINUED | OUTPATIENT
Start: 2022-12-20 | End: 2022-12-20

## 2022-12-20 RX ADMIN — CEFAZOLIN 2 G: 1 INJECTION, POWDER, FOR SOLUTION INTRAMUSCULAR; INTRAVENOUS at 11:00

## 2022-12-20 RX ADMIN — Medication 5 MG: at 12:09

## 2022-12-20 RX ADMIN — FENTANYL CITRATE 50 MCG: 50 INJECTION, SOLUTION INTRAMUSCULAR; INTRAVENOUS at 13:06

## 2022-12-20 RX ADMIN — KETOROLAC TROMETHAMINE 30 MG: 30 INJECTION, SOLUTION INTRAMUSCULAR at 12:34

## 2022-12-20 RX ADMIN — DEXAMETHASONE SODIUM PHOSPHATE 10 MG: 10 INJECTION, SOLUTION INTRAMUSCULAR; INTRAVENOUS at 11:33

## 2022-12-20 RX ADMIN — MIDAZOLAM 1 MG: 1 INJECTION INTRAMUSCULAR; INTRAVENOUS at 11:06

## 2022-12-20 RX ADMIN — LIDOCAINE HYDROCHLORIDE 30 MG: 10 INJECTION, SOLUTION INFILTRATION; PERINEURAL at 11:10

## 2022-12-20 RX ADMIN — KETAMINE HYDROCHLORIDE 50 MG: 10 INJECTION, SOLUTION INTRAMUSCULAR; INTRAVENOUS at 11:10

## 2022-12-20 RX ADMIN — SCOPALAMINE 1 PATCH: 1 PATCH, EXTENDED RELEASE TRANSDERMAL at 10:25

## 2022-12-20 RX ADMIN — ROCURONIUM BROMIDE 50 MG: 50 INJECTION, SOLUTION INTRAVENOUS at 11:10

## 2022-12-20 RX ADMIN — PROPOFOL 100 MG: 10 INJECTION, EMULSION INTRAVENOUS at 11:10

## 2022-12-20 RX ADMIN — Medication 5 MG: at 12:01

## 2022-12-20 RX ADMIN — PHENYLEPHRINE HYDROCHLORIDE 100 MCG: 10 INJECTION INTRAVENOUS at 11:28

## 2022-12-20 RX ADMIN — ONDANSETRON 4 MG: 2 INJECTION INTRAMUSCULAR; INTRAVENOUS at 12:33

## 2022-12-20 RX ADMIN — GLYCOPYRROLATE 0.2 MG: 0.2 INJECTION, SOLUTION INTRAMUSCULAR; INTRAVENOUS at 11:39

## 2022-12-20 RX ADMIN — FENTANYL CITRATE 50 MCG: 50 INJECTION, SOLUTION INTRAMUSCULAR; INTRAVENOUS at 11:32

## 2022-12-20 RX ADMIN — FENTANYL CITRATE 50 MCG: 50 INJECTION, SOLUTION INTRAMUSCULAR; INTRAVENOUS at 12:15

## 2022-12-20 RX ADMIN — SODIUM CHLORIDE, POTASSIUM CHLORIDE, SODIUM LACTATE AND CALCIUM CHLORIDE: 600; 310; 30; 20 INJECTION, SOLUTION INTRAVENOUS at 10:24

## 2022-12-20 RX ADMIN — MIDAZOLAM 1 MG: 1 INJECTION INTRAMUSCULAR; INTRAVENOUS at 11:00

## 2022-12-20 RX ADMIN — MAGNESIUM SULFATE HEPTAHYDRATE 4 G: 80 INJECTION, SOLUTION INTRAVENOUS at 10:26

## 2022-12-20 RX ADMIN — SUGAMMADEX 200 MG: 100 INJECTION, SOLUTION INTRAVENOUS at 12:37

## 2022-12-20 RX ADMIN — PROPOFOL 50 MCG/KG/MIN: 10 INJECTION, EMULSION INTRAVENOUS at 11:37

## 2022-12-20 RX ADMIN — HYDROCODONE BITARTRATE AND ACETAMINOPHEN 2 TABLET: 5; 325 TABLET ORAL at 15:03

## 2022-12-20 RX ADMIN — Medication 10 MG: at 11:40

## 2022-12-20 RX ADMIN — Medication 5 MG: at 11:43

## 2022-12-20 ASSESSMENT — ACTIVITIES OF DAILY LIVING (ADL)
ADLS_ACUITY_SCORE: 20

## 2022-12-20 NOTE — OP NOTE
Procedure Date: 12/20/2022    SURGEON:  Shanice Álvarez MD    PREOPERATIVE DIAGNOSES:  1.  History of breast cancer.  2.  Acquired absence of bilateral breasts.    POSTOPERATIVE DIAGNOSES:  1.  History of breast cancer.  2.  Acquired absence of bilateral breasts.    INDICATIONS FOR PROCEDURE:  This is a 32-year-old female who has had bilateral mastectomies.  She had reconstruction with immediate implants.  Unfortunately, while she was on chemotherapy, she developed a severe infection and the right side shows loss of her implant.  She then had a tissue expander placed. Due to the complex nature of this procedure, it is felt that she would do better continuing with her original surgeon as I have now operated on her 3 times and have a very good understanding of her anatomy and given the fact she has had complications in the past, I feel it is a better choice to stay with the original surgeon.  I have discussed with her the risks and benefits of surgery.  She understands and agrees and wishes to proceed.    PROCEDURE PERFORMED:  1.  Removal of right breast tissue expander with placement of permanent implant.  2.  Removal of left breast implant with placement of permanent implant.  3.  Fat grafting to bilateral breasts from the abdomen, 100 mL to the right breast and 50 mL to the left.    DESCRIPTION OF PROCEDURE:  The patient was identified, marked in the preoperative area, taken to the operating room.  After an adequate level of anesthesia was obtained, the patient was prepped and draped in sterile fashion.  We tumesced the abdomen and flank area.  We then excised the previous mastectomy scars.  Dissection carried down.  We elevated the skin flaps superiorly and inferiorly and a stair-step entry into the capsule.  We removed the intact tissue expander on the right side, irrigated the pocket copiously with antibiotic irrigation, released the capsule medially and superiorly to allow us to put in a better implant.   We put in a trial of a sizer.  We opted for a 595 implant.  On the right side, we placed a reference number [id number], lot #[id number].  On the left side, again, we opened the capsule medially and superiorly.  We did plicate both sides with a running 2-0 Prolene for a short distance laterally also.  On the left side, we placed a reference number [id number], lot #[id number].  We closed the capsule on both sides with a 3-0 PDS deep, running suture.  The deep tissue was closed over top of it to allow us to do better fat grafting with 3-0 PDS, skin closed with 3-0 Monocryl deep dermal and 4-0 subcuticular.  Fat was harvested from the abdomen and flank area using the Revolve device.  It was washed and .  We then proceeded to fat graft both breasts, 100 mL on the right side, focusing superiorly, medially and anteriorly and then on the left side, anteriorly and superiorly.  We did 50 mL on the left for a total of 150 mL.  Exofin, soft dressing and a bra and a binder were placed.  The patient was awakened and taken to recovery room.    ESTIMATED BLOOD LOSS:  10 mL.    BLOOD PRODUCTS GIVEN:  None.    DRAINS AND PACKS:  Correct.    Shanice Álvarez MD        D: 2022   T: 2022   MT: olga    Name:     TONIO MOJICA  MRN:      [id number]        Account:        [id number]   :      1990           Procedure Date: 2022     Document: [id number]

## 2022-12-20 NOTE — ANESTHESIA PROCEDURE NOTES
Airway       Patient location during procedure: OR       Procedure Start/Stop Times: 12/20/2022 11:13 AM  Staff -        Anesthesiologist:  Melissa Maria MD       CRNA: Azeb Berrios APRN CRNA       Performed By: CRNAIndications and Patient Condition       Indications for airway management: prashant-procedural       Induction type:intravenous       Mask difficulty assessment: 1 - vent by mask    Final Airway Details       Final airway type: endotracheal airway       Successful airway: ETT - single and Oral  Endotracheal Airway Details        ETT size (mm): 7.5       Cuffed: yes       Successful intubation technique: direct laryngoscopy       DL Blade Type: Whitley 2       Grade View of Cords: 1       Adjucts: stylet       Position: Right       Measured from: gums/teeth       Secured at (cm): 21       Bite block used: None    Post intubation assessment        Placement verified by: capnometry, equal breath sounds and chest rise        Number of attempts at approach: 1       Number of other approaches attempted: 0       Secured with: silk tape       Ease of procedure: easy       Dentition: Intact and Unchanged       Dental guard used and removed.    Medication(s) Administered   Medication Administration Time: 12/20/2022 11:13 AM

## 2022-12-20 NOTE — ANESTHESIA POSTPROCEDURE EVALUATION
Patient: Veronica Nieves    Procedure: Procedure(s):  REMOVAL OF RIGHT TISSUE EXPANDER WITH PLACEMENT OF IMPLANT, REMOVAL LEFT TISSUE IMPLANT  AND REPLACE IMPLANT  FAT GRAFTING BILATERAL BREASTS FROM ABDOMEN       Anesthesia Type:  General    Note:  Disposition: Outpatient   Postop Pain Control: Uneventful            Sign Out: Well controlled pain   PONV: No   Neuro/Psych: Uneventful            Sign Out: Acceptable/Baseline neuro status   Airway/Respiratory: Uneventful            Sign Out: Acceptable/Baseline resp. status   CV/Hemodynamics: Uneventful            Sign Out: Acceptable CV status; No obvious hypovolemia; No obvious fluid overload   Other NRE: NONE   DID A NON-ROUTINE EVENT OCCUR? No           Last vitals:  Vitals Value Taken Time   /59 12/20/22 1400   Temp 36.4  C (97.6  F) 12/20/22 1345   Pulse 101 12/20/22 1408   Resp 19 12/20/22 1408   SpO2 98 % 12/20/22 1408   Vitals shown include unvalidated device data.    Electronically Signed By: Melissa Maria MD  December 20, 2022  2:10 PM

## 2022-12-20 NOTE — BRIEF OP NOTE
[facility]    Brief Operative Note    Pre-operative diagnosis: S/P bilateral mastectomy [Z90.13]  Personal history of malignant neoplasm of breast [Z85.3]  Post-operative diagnosis Same as pre-operative diagnosis    Procedure: Procedure(s):  REMOVAL OF RIGHT TISSUE EXPANDER WITH PLACEMENT OF IMPLANT, REMOVAL LEFT TISSUE IMPLANT  AND REPLACE IMPLANT  FAT GRAFTING BILATERAL BREASTS FROM ABDOMEN  Surgeon: Surgeon(s) and Role:     * Shanice Álvarez MD - Primary  Anesthesia: General   Estimated Blood Loss: Less than 10 ml    Drains: Rafael-Peraza  Specimens: * No specimens in log *  Findings:   None.  Complications: None.  Implants:   Implant Name Type Inv. Item Serial No.  Lot No. LRB No. Used Action   [place] Memory Gel Xtra Breast Implant, Smooth High Profile Xtra.   [id number] [id number]  Left 1 Implanted   [place] Memory Gel Xtra Breast Implant, Smooth High Profile Xtra.   [id number] [id number]  Right 1 Implanted   EXPANDER TISSUE STYLE 133S [id number] - [id number] Breast Implant/Tissue Expander EXPANDER TISSUE STYLE 133S 400ML [id number] [employer] NA Right 1 Explanted

## 2022-12-20 NOTE — INTERVAL H&P NOTE
"I have reviewed the surgical (or preoperative) H&P that is linked to this encounter, and examined the patient. There are no significant changes    Clinical Conditions Present on Arrival:  Clinically Significant Risk Factors Present on Admission                    # Obesity: Estimated body mass index is 31.96 kg/m  as calculated from the following:    Height as of 12/15/22: 1.676 m (5' 6\").    Weight as of this encounter: 89.8 kg (198 lb).       "

## 2022-12-20 NOTE — ANESTHESIA PREPROCEDURE EVALUATION
Anesthesia Pre-Procedure Evaluation    Patient: Veronica Nieves   MRN: [id number] : 1990        Procedure : Procedure(s):  REMOVAL OF RIGHT TISSUE EXPANDER WITH PLACEMENT OF IMPLANT, REMOVAL LEFT TISSUE EXPANDER AND REPLACE IMPLANT  FAT GRAFTING BILATERAL BREASTS FROM ABDOMEN          Past Medical History:   Diagnosis Date     Abnormal Pap smear of cervix     age 13, subsequent pap smears normal     Anxiety      Breast cancer (H) 2021    Left     Depression       Past Surgical History:   Procedure Laterality Date     BIOPSY NODE SENTINEL Left 10/7/2021    Procedure: left sentinel lymph node biopsy;  Surgeon: Audrey Newman MD;  Location: [facility] OR     INSERT TISSUE EXPANDER BREAST Right 2022    Procedure: INSERTION OF TISSUE EXPANDER RIGHT BREAST,;  Surgeon: Shanice Álvarez MD;  Location: [facility] OR     MASTECTOMY MODIFIED RADICAL Bilateral 10/7/2021    Procedure: Bilateral mastectomies,;  Surgeon: Audrey Newman MD;  Location: [facility] OR     OTHER SURGICAL HISTORY      no surgical history     MS INSJ PRPH CTR VAD W/SUBQ PORT AGE 4 YR/> N/A 2021    Procedure: Port Placement;  Surgeon: Audrey Newman MD;  Location: [facility];  Service: General     RECONSTRUCT BREAST, IMPLANT PROSTHESIS, COMBINED Bilateral 10/7/2021    Procedure: BILATERAL BREAST RECONSTRUCTION WITH IMPLANTS;  Surgeon: Shanice Álvarez MD;  Location: [facility] OR     REMOVE IMPLANT BREAST Right 6/10/2022    Procedure: REMOVAL MAMMARY IMPLANT RIGHT;  Surgeon: Shanice Álvarez MD;  Location: [facility] OR     TUNNELED VENOUS CATHETER PLACEMENT N/A 2022    Procedure: REMOVAL OF PORT;  Surgeon: Shanice Álvarez MD;  Location: [facility] OR     US BREAST CORE BIOPSY LEFT Left 2021     WISDOM TOOTH EXTRACTION        No Known Allergies   Social History     Tobacco Use     Smoking status: Never     Smokeless tobacco: Never   Substance Use Topics     Alcohol use:  Yes     Alcohol/week: 6.0 - 7.0 standard drinks      Wt Readings from Last 1 Encounters:   12/20/22 89.8 kg (198 lb)        Anesthesia Evaluation   Pt has had prior anesthetic.     No history of anesthetic complications       ROS/MED HX  ENT/Pulmonary:       Neurologic:       Cardiovascular:       METS/Exercise Tolerance:     Hematologic:       Musculoskeletal:       GI/Hepatic:       Renal/Genitourinary:       Endo:     (+) Obesity,     Psychiatric/Substance Use:       Infectious Disease:       Malignancy:       Other:            Physical Exam    Airway        Mallampati: II    Neck ROM: full     Respiratory Devices and Support         Dental  no notable dental history         Cardiovascular   cardiovascular exam normal          Pulmonary   pulmonary exam normal                OUTSIDE LABS:  CBC:   Lab Results   Component Value Date    WBC 5.7 12/16/2022    WBC 7.0 09/15/2022    HGB 13.5 12/16/2022    HGB 12.6 09/15/2022    HCT 41.6 12/16/2022    HCT 38.4 09/15/2022     12/16/2022     09/15/2022     BMP:   Lab Results   Component Value Date     12/16/2022     09/15/2022    POTASSIUM 4.2 12/16/2022    POTASSIUM 4.0 09/15/2022    CHLORIDE 99 12/16/2022    CHLORIDE 104 09/15/2022    CO2 27 12/16/2022    CO2 30 09/15/2022    BUN 14.4 12/16/2022    BUN 14 09/15/2022    CR 0.99 (H) 12/16/2022    CR 1.00 09/15/2022    GLC 88 12/16/2022     09/15/2022     COAGS: No results found for: PTT, INR, FIBR  POC: No results found for: BGM, HCG, HCGS  HEPATIC:   Lab Results   Component Value Date    ALBUMIN 4.6 12/16/2022    PROTTOTAL 7.6 12/16/2022    ALT 33 12/16/2022    AST 35 12/16/2022    ALKPHOS 73 12/16/2022    BILITOTAL 0.3 12/16/2022     OTHER:   Lab Results   Component Value Date    BURT 9.4 12/16/2022       Anesthesia Plan    ASA Status:  2      Anesthesia Type: General.     - Airway: ETT              Consents    Anesthesia Plan(s) and associated risks, benefits, and realistic alternatives  discussed. Questions answered and patient/representative(s) expressed understanding.     - Discussed: Risks, Benefits and Alternatives for the PROCEDURE were discussed     - Discussed with:  Patient      - Extended Intubation/Ventilatory Support Discussed: No.      - Patient is DNR/DNI Status: No    Use of blood products discussed: No .     Postoperative Care    Pain management: Multi-modal analgesia.   PONV prophylaxis: Ondansetron (or other 5HT-3), Dexamethasone or Solumedrol     Comments:                Melissa Maria MD

## 2022-12-20 NOTE — ANESTHESIA CARE TRANSFER NOTE
Patient: Veronica Nieves    Procedure: Procedure(s):  REMOVAL OF RIGHT TISSUE EXPANDER WITH PLACEMENT OF IMPLANT, REMOVAL LEFT TISSUE IMPLANT  AND REPLACE IMPLANT  FAT GRAFTING BILATERAL BREASTS FROM ABDOMEN       Diagnosis: S/P bilateral mastectomy [Z90.13]  Personal history of malignant neoplasm of breast [Z85.3]  Diagnosis Additional Information: No value filed.    Anesthesia Type:   General     Note:    Oropharynx: oropharynx clear of all foreign objects and spontaneously breathing  Level of Consciousness: drowsy  Oxygen Supplementation: face mask  Level of Supplemental Oxygen (L/min / FiO2): 8  Independent Airway: airway patency satisfactory and stable  Dentition: dentition unchanged  Vital Signs Stable: post-procedure vital signs reviewed and stable  Report to RN Given: handoff report given  Patient transferred to: PACU    Handoff Report: Identifed the Patient, Identified the Reponsible Provider, Reviewed the pertinent medical history, Discussed the surgical course, Reviewed Intra-OP anesthesia mangement and issues during anesthesia, Set expectations for post-procedure period and Allowed opportunity for questions and acknowledgement of understanding      Vitals:  Vitals Value Taken Time   /77 12/20/22 1258   Temp 36.3  C (97.3  F) 12/20/22 1258   Pulse 100 12/20/22 1259   Resp 22 12/20/22 1259   SpO2 99 % 12/20/22 1259   Vitals shown include unvalidated device data.    Electronically Signed By: ROSEMARY Degroot CRNA  December 20, 2022  1:00 PM

## 2022-12-20 NOTE — DISCHARGE INSTRUCTIONS
Discharge Instructions: After Your Surgery  You ve just had surgery. During surgery, you were given medicine called anesthesia to keep you relaxed and free of pain. After surgery, you may have some pain or nausea. This is common. Here are some tips for feeling better and getting well after surgery.     Stay on schedule with your medicine.   Going home  Your healthcare provider will show you how to take care of yourself when you go home. He or she will also answer your questions. Have an adult family member or friend drive you home. For the first 24 hours after your surgery:  Don't drive or use heavy equipment.  Don't make important decisions or sign legal papers.  Don't drink alcohol.  Have someone stay with you, if needed. He or she can watch for problems and help keep you safe.  Be sure to go to all follow-up visits with your healthcare provider. And rest after your surgery for as long as your healthcare provider tells you to.  Coping with pain  If you have pain after surgery, pain medicine will help you feel better. Take it as told, before pain becomes severe. Also, ask your healthcare provider or pharmacist about other ways to control pain. This might be with heat, ice, or relaxation. And follow any other instructions your surgeon or nurse gives you.  Tips for taking pain medicine  To get the best relief possible, remember these points:  Pain medicines can upset your stomach. Taking them with a little food may help.  Most pain relievers taken by mouth need at least 20 to 30 minutes to start to work.  Don't wait till your pain becomes severe before you take your medicine. Try to time your medicine so that you can take it before starting an activity. This might be before you get dressed, go for a walk, or sit down for dinner.  Constipation is a common side effect of pain medicines. Call your healthcare provider before taking any medicines such as laxatives or stool softeners to help ease constipation. Also ask  if you should skip any foods. Drinking lots of fluids and eating foods such as fruits and vegetables that are high in fiber can also help. Remember, don't take laxatives unless your surgeon has prescribed them.  Drinking alcohol and taking pain medicine can cause dizziness and slow your breathing. It can even be deadly. Don't drink alcohol while taking pain medicine.  Pain medicine can make you react more slowly to things. Don't drive or run machinery while taking pain medicine.  Your healthcare provider may tell you to take acetaminophen to help ease your pain. Ask him or her how much you are supposed to take each day. Acetaminophen or other pain relievers may interact with your prescription medicines or other over-the-counter (OTC) medicines. Some prescription medicines have acetaminophen and other ingredients. Using both prescription and OTC acetaminophen for pain can cause you to overdose. Read the labels on your OTC medicines with care. This will help you to clearly know the list of ingredients, how much to take, and any warnings. It may also help you not take too much acetaminophen. If you have questions or don't understand the information, ask your pharmacist or healthcare provider to explain it to you before you take the OTC medicine.  Managing nausea  Some people have an upset stomach after surgery. This is often because of anesthesia, pain, or pain medicine, or the stress of surgery. These tips will help you handle nausea and eat healthy foods as you get better. If you were on a special food plan before surgery, ask your healthcare provider if you should follow it while you get better. These tips may help:  Don't push yourself to eat. Your body will tell you when to eat and how much.  Start off with clear liquids and soup. They are easier to digest.  Next try semi-solid foods, such as mashed potatoes, applesauce, and gelatin, as you feel ready.  Slowly move to solid foods. Don t eat fatty, rich, or spicy  foods at first.  Don't force yourself to have 3 large meals a day. Instead eat smaller amounts more often.  Take pain medicines with a small amount of solid food, such as crackers or toast, to prevent nausea.  When to call your healthcare provider  Call your healthcare provider if:  You still have intolerable pain an hour after taking medicine. The medicine may not be strong enough.  You feel too sleepy, dizzy, or groggy. The medicine may be too strong.  You have side effects such as nausea or vomiting, or skin changes such as rash, itching, or hives. Your healthcare provider may suggest other medicines to control side effects.  Rash, itching, or hives may mean you have an allergic reaction. Report this right away. If you have trouble breathing or facial swelling, call 911 right away.  If you have obstructive sleep apnea  You were given anesthesia medicine during surgery to keep you comfortable and free of pain. After surgery, you may have more apnea spells because of this medicine and other medicines you were given. The spells may last longer than usual.   At home:  Keep using the continuous positive airway pressure (CPAP) device when you sleep. Unless your healthcare provider tells you not to, use it when you sleep, day or night. CPAP is a common device used to treat obstructive sleep apnea.  Talk with your provider before taking any pain medicine, muscle relaxants, or sedatives. Your provider will tell you about the possible dangers of taking these medicines.  [employer] last reviewed this educational content on 3/1/2019    4532-0655 [employer]. All rights reserved. This information is not intended as a substitute for professional medical care. Always follow your healthcare professional's instructions.

## 2023-01-06 PROBLEM — Z17.0 MALIGNANT NEOPLASM OF UPPER-OUTER QUADRANT OF LEFT BREAST IN FEMALE, ESTROGEN RECEPTOR POSITIVE (H): Status: ACTIVE | Noted: 2021-05-19

## 2023-01-06 PROBLEM — C50.412 MALIGNANT NEOPLASM OF UPPER-OUTER QUADRANT OF LEFT BREAST IN FEMALE, ESTROGEN RECEPTOR POSITIVE (H): Status: ACTIVE | Noted: 2021-05-19

## 2023-01-16 ENCOUNTER — PATIENT OUTREACH (OUTPATIENT)
Dept: ONCOLOGY | Facility: CLINIC | Age: 33
End: 2023-01-16
Payer: COMMERCIAL

## 2023-01-16 NOTE — PROGRESS NOTES
[facility]: Cancer Care                                                                                          Attempted to call patient today to discuss the cancer treatment summary that was prepared and sent via [employer]. Left a generic voice message requesting a call back at my direct phone number: [phone].    Janel Maguire RN BSN  Cancer Survivorship Coordinator

## 2023-01-30 NOTE — PROGRESS NOTES
[facility]: Cancer Care                                                                                          Called patient today to review her cancer treatment summary. She states she was able to review the information in her mychart. She was appreciative of the phone call and has no survivorship needs or questions at this time.     Janel Maguire RN BSN  Cancer Survivorship Coordinator

## 2023-02-07 ENCOUNTER — MYC MEDICAL ADVICE (OUTPATIENT)
Dept: FAMILY MEDICINE | Facility: CLINIC | Age: 33
End: 2023-02-07
Payer: COMMERCIAL

## 2023-02-09 NOTE — TELEPHONE ENCOUNTER
Just needs to establish with me with appt. Can be virtual. But I can't for 4-6 wks due to scheduling right now, hopefully she can wait

## 2023-02-13 DIAGNOSIS — Z17.0 MALIGNANT NEOPLASM OF UPPER-OUTER QUADRANT OF LEFT BREAST IN FEMALE, ESTROGEN RECEPTOR POSITIVE (H): ICD-10-CM

## 2023-02-13 DIAGNOSIS — C50.412 MALIGNANT NEOPLASM OF UPPER-OUTER QUADRANT OF LEFT BREAST IN FEMALE, ESTROGEN RECEPTOR POSITIVE (H): ICD-10-CM

## 2023-02-13 RX ORDER — TAMOXIFEN CITRATE 20 MG/1
20 TABLET ORAL DAILY
Qty: 90 TABLET | Refills: 0 | Status: SHIPPED | OUTPATIENT
Start: 2023-02-13 | End: 2023-06-16

## 2023-03-17 ENCOUNTER — INFUSION THERAPY VISIT (OUTPATIENT)
Dept: INFUSION THERAPY | Facility: HOSPITAL | Age: 33
End: 2023-03-17
Attending: INTERNAL MEDICINE
Payer: COMMERCIAL

## 2023-03-17 ENCOUNTER — ONCOLOGY VISIT (OUTPATIENT)
Dept: ONCOLOGY | Facility: HOSPITAL | Age: 33
End: 2023-03-17
Attending: INTERNAL MEDICINE
Payer: COMMERCIAL

## 2023-03-17 VITALS
HEART RATE: 72 BPM | RESPIRATION RATE: 16 BRPM | SYSTOLIC BLOOD PRESSURE: 119 MMHG | DIASTOLIC BLOOD PRESSURE: 72 MMHG | WEIGHT: 203 LBS | OXYGEN SATURATION: 96 % | HEIGHT: 66 IN | TEMPERATURE: 98.5 F | BODY MASS INDEX: 32.62 KG/M2

## 2023-03-17 DIAGNOSIS — Z17.0 MALIGNANT NEOPLASM OF UPPER-OUTER QUADRANT OF LEFT BREAST IN FEMALE, ESTROGEN RECEPTOR POSITIVE (H): Primary | ICD-10-CM

## 2023-03-17 DIAGNOSIS — C50.412 MALIGNANT NEOPLASM OF UPPER-OUTER QUADRANT OF LEFT BREAST IN FEMALE, ESTROGEN RECEPTOR POSITIVE (H): Primary | ICD-10-CM

## 2023-03-17 PROCEDURE — 96402 CHEMO HORMON ANTINEOPL SQ/IM: CPT

## 2023-03-17 PROCEDURE — G0463 HOSPITAL OUTPT CLINIC VISIT: HCPCS | Mod: 25 | Performed by: INTERNAL MEDICINE

## 2023-03-17 PROCEDURE — 99215 OFFICE O/P EST HI 40 MIN: CPT | Performed by: INTERNAL MEDICINE

## 2023-03-17 PROCEDURE — 250N000011 HC RX IP 250 OP 636: Performed by: INTERNAL MEDICINE

## 2023-03-17 RX ORDER — EXEMESTANE 25 MG/1
25 TABLET ORAL DAILY
Qty: 30 TABLET | Refills: 3 | Status: SHIPPED | OUTPATIENT
Start: 2023-03-17 | End: 2023-05-23

## 2023-03-17 RX ADMIN — GOSERELIN ACETATE 10.8 MG: 10.8 IMPLANT SUBCUTANEOUS at 09:52

## 2023-03-17 ASSESSMENT — PAIN SCALES - GENERAL: PAINLEVEL: NO PAIN (0)

## 2023-03-17 NOTE — PROGRESS NOTES
"Oncology Rooming Note    March 17, 2023 9:08 AM   Veronica Nieves is a 32 year old female who presents for:    Chief Complaint   Patient presents with     Oncology Clinic Visit     Malignant neoplasm of upper-outer quadrant of left breast in female, estrogen receptor positive - Provider and infusion     Initial Vitals: Ht 1.676 m (5' 6\")   Wt 92.1 kg (203 lb)   BMI 32.77 kg/m   Estimated body mass index is 32.77 kg/m  as calculated from the following:    Height as of this encounter: 1.676 m (5' 6\").    Weight as of this encounter: 92.1 kg (203 lb). Body surface area is 2.07 meters squared.  Data Unavailable Comment: Data Unavailable   No LMP recorded. (Menstrual status: IUD).  Allergies reviewed: Yes  Medications reviewed: Yes    Medications: Medication refills not needed today.  Pharmacy name entered into [employer]:    [facility] - [place], [place] - [address] [place] [address] PHARMACY [place], [place] - [address] [place] [address]  [facility] - [place], [place] - [address]    Clinical concerns:  None      Shanda Lema CMA            "

## 2023-03-17 NOTE — PROGRESS NOTES
[facility] Progress Note    Patient: Veronica Nieves  MRN: [id number]  Date of Service: Mar 17, 2023        Assessment and Plan:    Cancer Staging  Malignant neoplasm of upper-outer quadrant of left breast in female, estrogen receptor positive (H)  Staging form: Breast, AJCC 8th Edition  - Clinical stage from 5/19/2021: Stage IB (cT2, cN0, cM0, G2, ER+, PA+, HER2+) - Signed by Jefferson Garcia MD on 5/19/2021     1.  Invasive ductal carcinoma of the left breast: She is now on tamoxifen and Zoladex.  She continues to have 2 or 3 hot flashes each night and 3-4 hot flashes during the day.  She has also had a greater than 10 pound weight gain which is frustrating for her.  We reviewed some of the updated data from the SOFT trial.  Given the problems she is having with tamoxifen, and a new trial data, were going to switch her to exemestane.  Reviewed some of the more common side effects of this drug with her today.  These include, but are not limited to, hot flashes, sweats, decreased bone density.  Were going to get a baseline DEXA scan.  I asked her to take calcium and vitamin D.  We will see her back in clinic in about 4 months.  I asked her to let us know if she develops any worsening vasomotor side effects after starting the exemestane. She was given some written information about this drug to take, and review.    2.  Chemotherapy-induced peripheral neuropathy: Resolved.    3.  Anxiety and depression: She is on Celexa 40 mg.  She thinks this is okay.  Her mood has been a little bit lower lately, possibly seasonal affective disorder.  We will continue to follow clinically.  She just had a new primary care doctor.      4.  Vasomotor: She continues vitamin E 400 mg which may have some improvement.  Hopefully her vasomotor symptoms will improve with the change to exemestane.      I spent 40 minutes in the care of this patient today, which included time necessary for preparation for the  visit, face to face time with the patient, communication of recommendations to the care team, and documentation time.    ECOG Performance  0    Diagnosis:    1.  Invasive ductal carcinoma of the left breast: Diagnosed May, 2021.  Initial imaging suggested 3 masses. Biopsy of the largest mass showed an invasive ductal carcinoma, grade 2.  ER/MN positive, HER-2 positive. Evaluation of the axilla was negative.    Treatment:    Neoadjuvant chemotherapy with TCHP initiated on May 27, 2021.  Cycle 6 was given on September 10.    Bilateral mastectomy performed on October 7.  Pathology showed a 9 mm focus of invasive ductal carcinoma of the left breast.  Lymph nodes are negative.    Kadcyla initiated November 29, 2021. Kadcyla completed August 26, 2022.  Zoladex and tamoxifen initiated November 29, 2021. Zoladex dosing q3 months starting Sept., 2022.    Exemestane started today, 3/17/23    She had her right implant removed secondary to infection on Tiana 10, 2022.    Interim History:    Abbey presents today for a follow-up visit.  She continues to have 1-6 hot flashes a day and 1:59 at night minimum.  She has night sweats as well.  She is also been troubled by weight gain.  This is distressing for her.  She has some sleepiness during the day but is unsure if it is just regular due to day-to-day life activities.  No pain.  No headaches.  No shortness of breath or lower extremity edema.    Review of Systems:    As above in the history.     Review of Systems otherwise Negative for:  General: chills, fever or night sweats  Psychological: anxiety or depression  Ophthalmic: blurry vision, double vision or loss of vision, vision change  ENT: epistaxis, oral lesions, hearing changes  Hematological and Lymphatic: bleeding, bruising, jaundice, swollen lymph nodes  Endocrine: hot flashes, unexpected weight changes  Respiratory: cough, hemoptysis, orthopnea or shortness of breath/SHAHID  Cardiovascular: chest pain, edema, palpitations or  "PND  Gastrointestinal: abdominal pain, blood in stools, change in bowel habits, constipation, diarrhea or nausea/vomiting  Genito-Urinary: change in urinary stream, incontinence, frequency/urgency  Musculoskeletal: joint pain, stiffness, swelling, muscle pain  Neurological: dizziness, headaches  Dermatological: lumps and rash    Past History:    Past Medical History:   Diagnosis Date     Abnormal Pap smear of cervix     age 13, subsequent pap smears normal     Anxiety      Breast cancer (H) 05/04/2021    Left     Depression      Physical Exam:    /72 (BP Location: Right arm, Patient Position: Sitting, Cuff Size: Adult Large)   Pulse 72   Temp 98.5  F (36.9  C) (Tympanic)   Resp 16   Ht 1.676 m (5' 6\")   Wt 92.1 kg (203 lb)   SpO2 96%   BMI 32.77 kg/m      General: patient appears stated age of 31 year old. Nontoxic and in no distress.   HEENT: Head: atraumatic, normocephalic. Sclerae anicteric.  Chest:  Normal respiratory effort  Cardiac:  No edema.   Abdomen: abdomen is non-distended  Extremities: normal tone and muscle bulk.  Skin: no lesions or rash on visible skin. Warm and dry.   CNS: alert and oriented. Grossly non-focal.   Psychiatric: normal mood and affect.     Lab Results:    No results found for this or any previous visit (from the past 24 hour(s)).      Signed by: Jefferson Garcia MD      "

## 2023-03-17 NOTE — PROGRESS NOTES
PT here ambulatory for zoladex inj. Reviewed inj and adminsitered in left lower abdome/gauze and tape over site. Pt tolerated without any problems. Follow up reviewed and pt dc'd steady gait

## 2023-03-17 NOTE — LETTER
"    3/17/2023         RE: Veronica Nieves  [address] [facility] [address]        Dear Colleague,    Thank you for referring your patient, Veronica Nieves, to the [facility]. Please see a copy of my visit note below.    Oncology Rooming Note    March 17, 2023 9:08 AM   Veronica Nieves is a 32 year old female who presents for:    Chief Complaint   Patient presents with     Oncology Clinic Visit     Malignant neoplasm of upper-outer quadrant of left breast in female, estrogen receptor positive - Provider and infusion     Initial Vitals: Ht 1.676 m (5' 6\")   Wt 92.1 kg (203 lb)   BMI 32.77 kg/m   Estimated body mass index is 32.77 kg/m  as calculated from the following:    Height as of this encounter: 1.676 m (5' 6\").    Weight as of this encounter: 92.1 kg (203 lb). Body surface area is 2.07 meters squared.  Data Unavailable Comment: Data Unavailable   No LMP recorded. (Menstrual status: IUD).  Allergies reviewed: Yes  Medications reviewed: Yes    Medications: Medication refills not needed today.  Pharmacy name entered into [facility]:    [facility] - [address] [facility] [address]  [facility] ([facility] ) - [address]    Clinical concerns:  None      Shanda Lema CMA              [facility] Hematology and Oncology Progress Note    Patient: Veronica Nieves  MRN: [id number]  Date of Service: Mar 17, 2023        Assessment and Plan:    Cancer Staging  Malignant neoplasm of upper-outer quadrant of left breast in female, estrogen receptor positive (H)  Staging form: Breast, AJCC 8th Edition  - Clinical stage from 5/19/2021: Stage IB (cT2, cN0, cM0, G2, ER+, MI+, HER2+) - Signed by Jefferson Garcia MD on 5/19/2021     1.  Invasive ductal carcinoma of the left breast: She is now on tamoxifen and Zoladex.  She continues to " have 2 or 3 hot flashes each night and 3-4 hot flashes during the day.  She has also had a greater than 10 pound weight gain which is frustrating for her.  We reviewed some of the updated data from the SOFT trial.  Given the problems she is having with tamoxifen, and a new trial data, were going to switch her to exemestane.  Reviewed some of the more common side effects of this drug with her today.  These include, but are not limited to, hot flashes, sweats, decreased bone density.  Were going to get a baseline DEXA scan.  I asked her to take calcium and vitamin D.  We will see her back in clinic in about 4 months.  I asked her to let us know if she develops any worsening vasomotor side effects after starting the exemestane. She was given some written information about this drug to take, and review.    2.  Chemotherapy-induced peripheral neuropathy: Resolved.    3.  Anxiety and depression: She is on Celexa 40 mg.  She thinks this is okay.  Her mood has been a little bit lower lately, possibly seasonal affective disorder.  We will continue to follow clinically.  She just had a new primary care doctor.      4.  Vasomotor: She continues vitamin E 400 mg which may have some improvement.  Hopefully her vasomotor symptoms will improve with the change to exemestane.      I spent 40 minutes in the care of this patient today, which included time necessary for preparation for the visit, face to face time with the patient, communication of recommendations to the care team, and documentation time.    ECOG Performance  0    Diagnosis:    1.  Invasive ductal carcinoma of the left breast: Diagnosed May, 2021.  Initial imaging suggested 3 masses. Biopsy of the largest mass showed an invasive ductal carcinoma, grade 2.  ER/AK positive, HER-2 positive. Evaluation of the axilla was negative.    Treatment:    Neoadjuvant chemotherapy with TCHP initiated on May 27, 2021.  Cycle 6 was given on September 10.    Bilateral mastectomy  performed on October 7.  Pathology showed a 9 mm focus of invasive ductal carcinoma of the left breast.  Lymph nodes are negative.    Kadcyla initiated November 29, 2021. Kadcyla completed August 26, 2022.  Zoladex and tamoxifen initiated November 29, 2021. Zoladex dosing q3 months starting Sept., 2022.    Exemestane started today, 3/17/23    She had her right implant removed secondary to infection on Tiana 10, 2022.    Interim History:    Abbey presents today for a follow-up visit.  She continues to have 1-6 hot flashes a day and 1:59 at night minimum.  She has night sweats as well.  She is also been troubled by weight gain.  This is distressing for her.  She has some sleepiness during the day but is unsure if it is just regular due to day-to-day life activities.  No pain.  No headaches.  No shortness of breath or lower extremity edema.    Review of Systems:    As above in the history.     Review of Systems otherwise Negative for:  General: chills, fever or night sweats  Psychological: anxiety or depression  Ophthalmic: blurry vision, double vision or loss of vision, vision change  ENT: epistaxis, oral lesions, hearing changes  Hematological and Lymphatic: bleeding, bruising, jaundice, swollen lymph nodes  Endocrine: hot flashes, unexpected weight changes  Respiratory: cough, hemoptysis, orthopnea or shortness of breath/SHAHID  Cardiovascular: chest pain, edema, palpitations or PND  Gastrointestinal: abdominal pain, blood in stools, change in bowel habits, constipation, diarrhea or nausea/vomiting  Genito-Urinary: change in urinary stream, incontinence, frequency/urgency  Musculoskeletal: joint pain, stiffness, swelling, muscle pain  Neurological: dizziness, headaches  Dermatological: lumps and rash    Past History:    Past Medical History:   Diagnosis Date     Abnormal Pap smear of cervix     age 13, subsequent pap smears normal     Anxiety      Breast cancer (H) 05/04/2021    Left     Depression      Physical  "Exam:    /72 (BP Location: Right arm, Patient Position: Sitting, Cuff Size: Adult Large)   Pulse 72   Temp 98.5  F (36.9  C) (Tympanic)   Resp 16   Ht 1.676 m (5' 6\")   Wt 92.1 kg (203 lb)   SpO2 96%   BMI 32.77 kg/m      General: patient appears stated age of 31 year old. Nontoxic and in no distress.   HEENT: Head: atraumatic, normocephalic. Sclerae anicteric.  Chest:  Normal respiratory effort  Cardiac:  No edema.   Abdomen: abdomen is non-distended  Extremities: normal tone and muscle bulk.  Skin: no lesions or rash on visible skin. Warm and dry.   CNS: alert and oriented. Grossly non-focal.   Psychiatric: normal mood and affect.     Lab Results:    No results found for this or any previous visit (from the past 24 hour(s)).      Signed by: Jefferson Garcia MD          Again, thank you for allowing me to participate in the care of your patient.        Sincerely,        Jefferson Garcia MD    "

## 2023-03-26 DIAGNOSIS — F32.A MILD DEPRESSION: ICD-10-CM

## 2023-03-27 RX ORDER — CITALOPRAM HYDROBROMIDE 40 MG/1
TABLET ORAL
Qty: 30 TABLET | Refills: 11 | Status: SHIPPED | OUTPATIENT
Start: 2023-03-27 | End: 2024-01-02

## 2023-03-30 ENCOUNTER — TELEPHONE (OUTPATIENT)
Dept: ONCOLOGY | Facility: HOSPITAL | Age: 33
End: 2023-03-30
Payer: COMMERCIAL

## 2023-03-30 NOTE — TELEPHONE ENCOUNTER
Oncology Distress Screen    Called Ry in regards to her positive oncology nutrition distress screen:    2. How concerned are you about unintended weight loss or your current weight? : 8    Ry notes she does have some weight concerns. She reports she know what to do but acknowledges that doing that can be challenging. She was somewhat limited with her time to visit today. Referred to her to a couple of web sites with helpful information for cancer patients and provided my number if she would like to visit longer sometime in the future.      Coreen Bruce, MS, RD, LD

## 2023-04-10 ENCOUNTER — ANCILLARY PROCEDURE (OUTPATIENT)
Dept: BONE DENSITY | Facility: CLINIC | Age: 33
End: 2023-04-10
Attending: INTERNAL MEDICINE
Payer: COMMERCIAL

## 2023-04-10 DIAGNOSIS — Z17.0 MALIGNANT NEOPLASM OF UPPER-OUTER QUADRANT OF LEFT BREAST IN FEMALE, ESTROGEN RECEPTOR POSITIVE (H): ICD-10-CM

## 2023-04-10 DIAGNOSIS — C50.412 MALIGNANT NEOPLASM OF UPPER-OUTER QUADRANT OF LEFT BREAST IN FEMALE, ESTROGEN RECEPTOR POSITIVE (H): ICD-10-CM

## 2023-04-10 PROCEDURE — 77080 DXA BONE DENSITY AXIAL: CPT | Performed by: RADIOLOGY

## 2023-04-11 ENCOUNTER — OFFICE VISIT (OUTPATIENT)
Dept: FAMILY MEDICINE | Facility: CLINIC | Age: 33
End: 2023-04-11
Payer: COMMERCIAL

## 2023-04-11 VITALS
OXYGEN SATURATION: 99 % | BODY MASS INDEX: 33.53 KG/M2 | SYSTOLIC BLOOD PRESSURE: 110 MMHG | DIASTOLIC BLOOD PRESSURE: 72 MMHG | HEIGHT: 67 IN | RESPIRATION RATE: 18 BRPM | TEMPERATURE: 97.2 F | WEIGHT: 213.6 LBS | HEART RATE: 60 BPM

## 2023-04-11 DIAGNOSIS — R41.840 CONCENTRATION DEFICIT: Primary | ICD-10-CM

## 2023-04-11 DIAGNOSIS — Z00.00 ANNUAL PHYSICAL EXAM: ICD-10-CM

## 2023-04-11 DIAGNOSIS — F32.A MILD DEPRESSION: ICD-10-CM

## 2023-04-11 DIAGNOSIS — Z11.59 NEED FOR HEPATITIS C SCREENING TEST: ICD-10-CM

## 2023-04-11 LAB
CHOLEST SERPL-MCNC: 255 MG/DL
HDLC SERPL-MCNC: 84 MG/DL
LDLC SERPL CALC-MCNC: 145 MG/DL
NONHDLC SERPL-MCNC: 171 MG/DL
TRIGL SERPL-MCNC: 128 MG/DL
TSH SERPL DL<=0.005 MIU/L-ACNC: 1.29 UIU/ML (ref 0.3–4.2)

## 2023-04-11 PROCEDURE — 84443 ASSAY THYROID STIM HORMONE: CPT | Performed by: FAMILY MEDICINE

## 2023-04-11 PROCEDURE — 99213 OFFICE O/P EST LOW 20 MIN: CPT | Mod: 25 | Performed by: FAMILY MEDICINE

## 2023-04-11 PROCEDURE — 82306 VITAMIN D 25 HYDROXY: CPT | Performed by: FAMILY MEDICINE

## 2023-04-11 PROCEDURE — 99395 PREV VISIT EST AGE 18-39: CPT | Mod: 25 | Performed by: FAMILY MEDICINE

## 2023-04-11 PROCEDURE — 80061 LIPID PANEL: CPT | Performed by: FAMILY MEDICINE

## 2023-04-11 PROCEDURE — 36415 COLL VENOUS BLD VENIPUNCTURE: CPT | Performed by: FAMILY MEDICINE

## 2023-04-11 RX ORDER — METHYLPHENIDATE HYDROCHLORIDE 10 MG/1
10 CAPSULE, EXTENDED RELEASE ORAL DAILY
Qty: 30 CAPSULE | Refills: 0 | Status: SHIPPED | OUTPATIENT
Start: 2023-04-11 | End: 2023-05-30

## 2023-04-11 ASSESSMENT — ENCOUNTER SYMPTOMS
CONSTIPATION: 0
CHILLS: 0
HEARTBURN: 0
HEMATURIA: 0
FREQUENCY: 0
BREAST MASS: 0
MYALGIAS: 0
SORE THROAT: 0
DYSURIA: 0
SHORTNESS OF BREATH: 0
DIARRHEA: 0
COUGH: 0
PALPITATIONS: 0
NAUSEA: 0
WEAKNESS: 0
PARESTHESIAS: 0
ARTHRALGIAS: 0
FEVER: 0
HEMATOCHEZIA: 0
ABDOMINAL PAIN: 0
NERVOUS/ANXIOUS: 1
JOINT SWELLING: 0
EYE PAIN: 0
HEADACHES: 0
DIZZINESS: 0

## 2023-04-11 ASSESSMENT — PATIENT HEALTH QUESTIONNAIRE - PHQ9
10. IF YOU CHECKED OFF ANY PROBLEMS, HOW DIFFICULT HAVE THESE PROBLEMS MADE IT FOR YOU TO DO YOUR WORK, TAKE CARE OF THINGS AT HOME, OR GET ALONG WITH OTHER PEOPLE: SOMEWHAT DIFFICULT
SUM OF ALL RESPONSES TO PHQ QUESTIONS 1-9: 9
SUM OF ALL RESPONSES TO PHQ QUESTIONS 1-9: 9

## 2023-04-11 ASSESSMENT — PAIN SCALES - GENERAL: PAINLEVEL: NO PAIN (0)

## 2023-04-11 NOTE — PROGRESS NOTES
SUBJECTIVE:   CC: Ry is an 32 year old who presents for preventive health visit.       2023     1:10 PM   Additional Questions   Roomed by Janel AHN   Patient has been advised of split billing requirements and indicates understanding: Yes  Healthy Habits:     Getting at least 3 servings of Calcium per day:  Yes    Bi-annual eye exam:  Yes    Dental care twice a year:  Yes    Sleep apnea or symptoms of sleep apnea:  None    Diet:  Regular (no restrictions)    Frequency of exercise:  2-3 days/week    Duration of exercise:  Greater than 60 minutes    Taking medications regularly:  Yes    Medication side effects:  Significant flushing    PHQ-2 Total Score: 3    Additional concerns today:  No    Doing well  Now on maintenance therapy after her breast cancer  Wondering about restarting her Ritalin that was used during her chemotherapy.  She has had difficulty concentrating since then on the Ritalin made a marked improvement.    She is working on weight loss with attention to diet and exercise            Today's PHQ-2 Score:       2023     1:00 PM   PHQ-2 (  [employer])   Q1: Little interest or pleasure in doing things 1   Q2: Feeling down, depressed or hopeless 2   PHQ-2 Score 3   Q1: Little interest or pleasure in doing things Several days   Q2: Feeling down, depressed or hopeless More than half the days   PHQ-2 Score 3           Social History     Tobacco Use     Smoking status: Never     Smokeless tobacco: Never   Vaping Use     Vaping status: Never Used   Substance Use Topics     Alcohol use: Yes     Alcohol/week: 6.0 - 7.0 standard drinks of alcohol           2023     1:00 PM   Alcohol Use   Prescreen: >3 drinks/day or >7 drinks/week? No     Reviewed orders with patient.  Reviewed health maintenance and updated orders accordingly -       Breast Cancer Screenin/16/2022     1:40 PM   Breast CA Risk Assessment (FHS-7)   Do you have a family history of breast, colon, or ovarian cancer? No /  "Unknown           Pertinent mammograms are reviewed under the imaging tab.    History of abnormal Pap smear:       Latest Ref Rng & Units 12/17/2019     8:46 AM   PAP / HPV   PAP Negative for squamous intraepithelial lesion or malignancy. Negative for squamous intraepithelial lesion or malignancy  Electronically signed by Lolita Otero CT (ASCP) on 12/27/2019 at  3:10 PM       HPV 16 DNA NEG Negative     HPV 18 DNA NEG Negative     Other HR HPV NEG Negative       Reviewed and updated as needed this visit by clinical staff   Tobacco  Allergies  Meds              Reviewed and updated as needed this visit by Provider                     Review of Systems   Constitutional: Negative for chills and fever.   HENT: Negative for congestion, ear pain, hearing loss and sore throat.    Eyes: Negative for pain and visual disturbance.   Respiratory: Negative for cough and shortness of breath.    Cardiovascular: Negative for chest pain, palpitations and peripheral edema.   Gastrointestinal: Negative for abdominal pain, constipation, diarrhea, heartburn, hematochezia and nausea.   Breasts:  Negative for tenderness, breast mass and discharge.   Genitourinary: Negative for dysuria, frequency, genital sores, hematuria, pelvic pain, urgency, vaginal bleeding and vaginal discharge.   Musculoskeletal: Negative for arthralgias, joint swelling and myalgias.   Skin: Negative for rash.   Neurological: Negative for dizziness, weakness, headaches and paresthesias.   Psychiatric/Behavioral: Positive for mood changes. The patient is nervous/anxious.           OBJECTIVE:   /72   Pulse 60   Temp 97.2  F (36.2  C) (Temporal)   Resp 18   Ht 1.707 m (5' 7.2\")   Wt 96.9 kg (213 lb 9.6 oz)   SpO2 99%   BMI 33.26 kg/m    Physical Exam  GENERAL: healthy, alert and no distress  HENT: ear canals and TM's normal, nose and mouth without ulcers or lesions  NECK: no adenopathy, no asymmetry, masses, or scars and thyroid normal to " "palpation  RESP: lungs clear to auscultation - no rales, rhonchi or wheezes  CV: regular rate and rhythm, normal S1 S2, no S3 or S4, no murmur, click or rub, no peripheral edema and peripheral pulses strong  ABDOMEN: soft, nontender, no hepatosplenomegaly, no masses and bowel sounds normal  MS: no gross musculoskeletal defects noted, no edema  NEURO: Normal strength and tone, mentation intact and speech normal  PSYCH: mentation appears normal, affect normal/bright    Diagnostic Test Results:  Labs reviewed in Epic    ASSESSMENT/PLAN:       ICD-10-CM    1. Concentration deficit  R41.840 methylphenidate (RITALIN LA) 10 MG 24 hr capsule      2. Need for hepatitis C screening test  Z11.59       3. Annual physical exam  Z00.00 Lipid panel reflex to direct LDL Fasting     Lipid panel reflex to direct LDL Fasting      4. Mild depression and anxiety  F32.A Vitamin D Deficiency     TSH with free T4 reflex     Vitamin D Deficiency     TSH with free T4 reflex        Doing well  Annual topics covered with labs above  Discussed updating her COVID-vaccine, she is precontemplative  In terms of her concentration issue, and history of depression on Celexa, recheck TSH, vitamin D, start Ritalin  Discussed some potential long-term issues with stimulants, she sees as a short-term option, and will continue with counseling to help her maximize her focus.  Could also do occupational therapy  Recheck in 4 to 6 weeks      COUNSELING:  Reviewed preventive health counseling, as reflected in patient instructions      BMI:   Estimated body mass index is 33.26 kg/m  as calculated from the following:    Height as of this encounter: 1.707 m (5' 7.2\").    Weight as of this encounter: 96.9 kg (213 lb 9.6 oz).         She reports that she has never smoked. She has never used smokeless tobacco.      Robbin Chau MD  [facility]  Answers for HPI/ROS submitted by the patient on 4/11/2023  If you checked off any problems, how " difficult have these problems made it for you to do your work, take care of things at home, or get along with other people?: Somewhat difficult  PHQ9 TOTAL SCORE: 9

## 2023-04-12 LAB — DEPRECATED CALCIDIOL+CALCIFEROL SERPL-MC: 41 UG/L (ref 20–75)

## 2023-05-25 ENCOUNTER — VIRTUAL VISIT (OUTPATIENT)
Dept: FAMILY MEDICINE | Facility: CLINIC | Age: 33
End: 2023-05-25
Payer: COMMERCIAL

## 2023-05-25 DIAGNOSIS — R41.840 CONCENTRATION DEFICIT: ICD-10-CM

## 2023-05-25 PROCEDURE — 99442 PR PHYSICIAN TELEPHONE EVALUATION 11-20 MIN: CPT | Performed by: FAMILY MEDICINE

## 2023-05-25 PROCEDURE — 96127 BRIEF EMOTIONAL/BEHAV ASSMT: CPT | Mod: 93 | Performed by: FAMILY MEDICINE

## 2023-05-25 ASSESSMENT — PATIENT HEALTH QUESTIONNAIRE - PHQ9
SUM OF ALL RESPONSES TO PHQ QUESTIONS 1-9: 4
10. IF YOU CHECKED OFF ANY PROBLEMS, HOW DIFFICULT HAVE THESE PROBLEMS MADE IT FOR YOU TO DO YOUR WORK, TAKE CARE OF THINGS AT HOME, OR GET ALONG WITH OTHER PEOPLE: SOMEWHAT DIFFICULT
SUM OF ALL RESPONSES TO PHQ QUESTIONS 1-9: 4

## 2023-05-25 NOTE — PROGRESS NOTES
Ry is a 33 year old who is being evaluated via a billable telephone visit.      What phone number would you like to be contacted at? [phone]  How would you like to obtain your AVS? Gabriela    Distant Location (provider location):  On-site    Assessment & Plan       ICD-10-CM    1. Concentration deficit  R41.840 methylphenidate (RITALIN LA) 10 MG 24 hr capsule         Much improved concentration on low-dose Ritalin.  We will continue and follow-up in 3 months.  No issues with escalation misuse or diverse or illegal substance use    Return in about 3 months (around 8/25/2023).    Robbin Chau MD  [facility]   Ry is a 33 year old, presenting for the following health issues:  Recheck Medication (Ritalin)        5/25/2023     9:57 AM   Additional Questions   Roomed by Erick RAGLAND   Accompanied by Self         5/25/2023     9:57 AM   Patient Reported Additional Medications   Patient reports taking the following new medications None     History of Present Illness       Reason for visit:  Med Check -Ritalin    She eats 2-3 servings of fruits and vegetables daily.She consumes 0 sweetened beverage(s) daily.She exercises with enough effort to increase her heart rate 30 to 60 minutes per day.  She exercises with enough effort to increase her heart rate 4 days per week.   She is taking medications regularly.    Today's PHQ-9         PHQ-9 Total Score: 4    PHQ-9 Q9 Thoughts of better off dead/self-harm past 2 weeks :   Not at all    How difficult have these problems made it for you to do your work, take care of things at home, or get along with other people: Somewhat difficult       Concentration is going much better   Likes XR  No SE's  Work as       Review of Systems   Constitutional, HEENT, cardiovascular, pulmonary, gi and gu systems are negative, except as otherwise noted.      Objective           Vitals:  No vitals were obtained today due to virtual  visit.    Physical Exam   healthy, alert and no distress  PSYCH: Alert and oriented times 3; coherent speech, normal   rate and volume, able to articulate logical thoughts, able   to abstract reason, no tangential thoughts, no hallucinations   or delusions  Her affect is normal  RESP: No cough, no audible wheezing, able to talk in full sentences  Remainder of exam unable to be completed due to telephone visits                Phone call duration: 12 minutes

## 2023-05-30 ENCOUNTER — MYC REFILL (OUTPATIENT)
Dept: FAMILY MEDICINE | Facility: CLINIC | Age: 33
End: 2023-05-30
Payer: COMMERCIAL

## 2023-05-30 DIAGNOSIS — R41.840 CONCENTRATION DEFICIT: ICD-10-CM

## 2023-05-30 RX ORDER — METHYLPHENIDATE HYDROCHLORIDE 10 MG/1
10 CAPSULE, EXTENDED RELEASE ORAL DAILY
Qty: 30 CAPSULE | Refills: 0 | Status: CANCELLED | OUTPATIENT
Start: 2023-05-30

## 2023-05-30 NOTE — TELEPHONE ENCOUNTER
Pending Prescriptions:                       Disp   Refills    methylphenidate (RITALIN LA) 10 MG 24 hr c*30 cap*0        Sig: Take 1 capsule (10 mg) by mouth daily    Routing refill request to provider for review/approval because:  Drug not on the [facility] refill protocol     Requested Prescriptions   Pending Prescriptions Disp Refills    methylphenidate (RITALIN LA) 10 MG 24 hr capsule 30 capsule 0     Sig: Take 1 capsule (10 mg) by mouth daily       There is no refill protocol information for this order

## 2023-05-31 RX ORDER — METHYLPHENIDATE HYDROCHLORIDE 10 MG/1
10 CAPSULE, EXTENDED RELEASE ORAL DAILY
Qty: 30 CAPSULE | Refills: 0 | Status: SHIPPED | OUTPATIENT
Start: 2023-05-31 | End: 2023-09-09

## 2023-06-11 ENCOUNTER — MYC REFILL (OUTPATIENT)
Dept: FAMILY MEDICINE | Facility: CLINIC | Age: 33
End: 2023-06-11
Payer: COMMERCIAL

## 2023-06-11 DIAGNOSIS — R41.840 CONCENTRATION DEFICIT: ICD-10-CM

## 2023-06-13 RX ORDER — METHYLPHENIDATE HYDROCHLORIDE 10 MG/1
10 CAPSULE, EXTENDED RELEASE ORAL DAILY
Qty: 30 CAPSULE | Refills: 0 | OUTPATIENT
Start: 2023-06-13

## 2023-06-16 ENCOUNTER — ONCOLOGY VISIT (OUTPATIENT)
Dept: ONCOLOGY | Facility: HOSPITAL | Age: 33
End: 2023-06-16
Attending: NURSE PRACTITIONER
Payer: COMMERCIAL

## 2023-06-16 ENCOUNTER — LAB (OUTPATIENT)
Dept: INFUSION THERAPY | Facility: HOSPITAL | Age: 33
End: 2023-06-16
Attending: INTERNAL MEDICINE
Payer: COMMERCIAL

## 2023-06-16 VITALS
WEIGHT: 215 LBS | HEIGHT: 67 IN | TEMPERATURE: 98.6 F | DIASTOLIC BLOOD PRESSURE: 69 MMHG | SYSTOLIC BLOOD PRESSURE: 119 MMHG | OXYGEN SATURATION: 98 % | RESPIRATION RATE: 20 BRPM | HEART RATE: 58 BPM | BODY MASS INDEX: 33.74 KG/M2

## 2023-06-16 DIAGNOSIS — R79.89 ELEVATED LFTS: ICD-10-CM

## 2023-06-16 DIAGNOSIS — C50.412 MALIGNANT NEOPLASM OF UPPER-OUTER QUADRANT OF LEFT BREAST IN FEMALE, ESTROGEN RECEPTOR POSITIVE (H): Primary | ICD-10-CM

## 2023-06-16 DIAGNOSIS — Z17.0 MALIGNANT NEOPLASM OF UPPER-OUTER QUADRANT OF LEFT BREAST IN FEMALE, ESTROGEN RECEPTOR POSITIVE (H): Primary | ICD-10-CM

## 2023-06-16 DIAGNOSIS — C50.412 MALIGNANT NEOPLASM OF UPPER-OUTER QUADRANT OF LEFT BREAST IN FEMALE, ESTROGEN RECEPTOR POSITIVE (H): ICD-10-CM

## 2023-06-16 DIAGNOSIS — Z17.0 MALIGNANT NEOPLASM OF UPPER-OUTER QUADRANT OF LEFT BREAST IN FEMALE, ESTROGEN RECEPTOR POSITIVE (H): ICD-10-CM

## 2023-06-16 LAB
ALBUMIN SERPL BCG-MCNC: 4.5 G/DL (ref 3.5–5.2)
ALP SERPL-CCNC: 88 U/L (ref 35–104)
ALT SERPL W P-5'-P-CCNC: 100 U/L (ref 0–50)
ANION GAP SERPL CALCULATED.3IONS-SCNC: 10 MMOL/L (ref 7–15)
AST SERPL W P-5'-P-CCNC: 56 U/L (ref 0–45)
BASOPHILS # BLD AUTO: 0 10E3/UL (ref 0–0.2)
BASOPHILS NFR BLD AUTO: 0 %
BILIRUB SERPL-MCNC: 0.2 MG/DL
BUN SERPL-MCNC: 14 MG/DL (ref 6–20)
CALCIUM SERPL-MCNC: 9.6 MG/DL (ref 8.6–10)
CHLORIDE SERPL-SCNC: 105 MMOL/L (ref 98–107)
CREAT SERPL-MCNC: 0.97 MG/DL (ref 0.51–0.95)
DEPRECATED HCO3 PLAS-SCNC: 26 MMOL/L (ref 22–29)
EOSINOPHIL # BLD AUTO: 0.4 10E3/UL (ref 0–0.7)
EOSINOPHIL NFR BLD AUTO: 7 %
ERYTHROCYTE [DISTWIDTH] IN BLOOD BY AUTOMATED COUNT: 13.1 % (ref 10–15)
GFR SERPL CREATININE-BSD FRML MDRD: 78 ML/MIN/1.73M2
GLUCOSE SERPL-MCNC: 85 MG/DL (ref 70–99)
HCT VFR BLD AUTO: 40.6 % (ref 35–47)
HGB BLD-MCNC: 13.5 G/DL (ref 11.7–15.7)
IMM GRANULOCYTES # BLD: 0 10E3/UL
IMM GRANULOCYTES NFR BLD: 0 %
LYMPHOCYTES # BLD AUTO: 1.7 10E3/UL (ref 0.8–5.3)
LYMPHOCYTES NFR BLD AUTO: 29 %
MCH RBC QN AUTO: 28.9 PG (ref 26.5–33)
MCHC RBC AUTO-ENTMCNC: 33.3 G/DL (ref 31.5–36.5)
MCV RBC AUTO: 87 FL (ref 78–100)
MONOCYTES # BLD AUTO: 0.6 10E3/UL (ref 0–1.3)
MONOCYTES NFR BLD AUTO: 11 %
NEUTROPHILS # BLD AUTO: 3 10E3/UL (ref 1.6–8.3)
NEUTROPHILS NFR BLD AUTO: 53 %
NRBC # BLD AUTO: 0 10E3/UL
NRBC BLD AUTO-RTO: 0 /100
PLATELET # BLD AUTO: 286 10E3/UL (ref 150–450)
POTASSIUM SERPL-SCNC: 4.3 MMOL/L (ref 3.4–5.3)
PROT SERPL-MCNC: 7.5 G/DL (ref 6.4–8.3)
RBC # BLD AUTO: 4.67 10E6/UL (ref 3.8–5.2)
SODIUM SERPL-SCNC: 141 MMOL/L (ref 136–145)
WBC # BLD AUTO: 5.6 10E3/UL (ref 4–11)

## 2023-06-16 PROCEDURE — 99214 OFFICE O/P EST MOD 30 MIN: CPT | Performed by: NURSE PRACTITIONER

## 2023-06-16 PROCEDURE — 96402 CHEMO HORMON ANTINEOPL SQ/IM: CPT

## 2023-06-16 PROCEDURE — G0463 HOSPITAL OUTPT CLINIC VISIT: HCPCS | Mod: 25 | Performed by: NURSE PRACTITIONER

## 2023-06-16 PROCEDURE — 85025 COMPLETE CBC W/AUTO DIFF WBC: CPT

## 2023-06-16 PROCEDURE — 80053 COMPREHEN METABOLIC PANEL: CPT

## 2023-06-16 PROCEDURE — 36415 COLL VENOUS BLD VENIPUNCTURE: CPT

## 2023-06-16 PROCEDURE — 250N000011 HC RX IP 250 OP 636: Performed by: INTERNAL MEDICINE

## 2023-06-16 PROCEDURE — G0463 HOSPITAL OUTPT CLINIC VISIT: HCPCS | Performed by: NURSE PRACTITIONER

## 2023-06-16 RX ORDER — RIFAMPIN 150 MG/1
2 CAPSULE ORAL DAILY
COMMUNITY
Start: 2022-06-06 | End: 2023-06-16

## 2023-06-16 RX ORDER — EXEMESTANE 25 MG/1
25 TABLET ORAL DAILY
Qty: 90 TABLET | Refills: 1 | Status: SHIPPED | OUTPATIENT
Start: 2023-06-16 | End: 2023-11-07

## 2023-06-16 RX ADMIN — GOSERELIN ACETATE 10.8 MG: 10.8 IMPLANT SUBCUTANEOUS at 10:09

## 2023-06-16 ASSESSMENT — PAIN SCALES - GENERAL: PAINLEVEL: NO PAIN (0)

## 2023-06-16 NOTE — LETTER
6/16/2023         RE: Veronica Nieves  [address] [facility] [address]        Dear Colleague,    Thank you for referring your patient, Veronica Nieves, to the [facility]. Please see a copy of my visit note below.    [facility] Hematology and Oncology Progress Note    Patient: Veronica Nieves  MRN: [id number]  Date of Service: Jun 16, 2023        Assessment and Plan:    Cancer Staging  Malignant neoplasm of upper-outer quadrant of left breast in female, estrogen receptor positive (H)  Staging form: Breast, AJCC 8th Edition  - Clinical stage from 5/19/2021: Stage IB (cT2, cN0, cM0, G2, ER+, ND+, HER2+) - Signed by Jefferson Garcia MD on 5/19/2021     1.  Invasive ductal carcinoma of the left breast (triple positive):   2.  Elevated LFTs, mild  Ry was diagnosed 2 years ago and is now status post neoadjuvant chemotherapy, bilateral mastectomies and adjuvant Kadcyla (completed August, 2022).  He has been on endocrine treatment and Zoladex ovarian suppression.  Initially she was on tamoxifen with Zoladex.  She was having a lot of hot flashes and weight gain on tamoxifen and given the updated data from the SOFT trial, Dr. Garcia changed her endocrine treatment to exemestane with Zoladex 3 months ago. She is tolerating this about the same with continued hot flashes, difficulty losing weight and has noted some mild generalized arthralgias.  She also notes intermittent mild positional tingling in her hands which may be related to the exemestane.    Lab work shows elevated AST and ALT.  She has had fluctuations in her LFTs over time, however this had improved once she had completed chemotherapy and is now risen again since starting exemestane.    No symptoms or exam findings of recurrence.    Plan:  -For now, we decided to continue with the exemestane since she has only been on it 3 months and will give it more time to see how she continues to tolerate.  -Continue Zoladex  every 3 months, due today  -Repeat LFTs in 3 months.  This could be related side effect of the exemestane so we will need to follow.  She does not have any symptoms to suggest requiring imaging of her abdomen at this time.    3.  Bone health  Baseline DEXA normal.  On calcium and vitamin D    Plan:  -Continue calcium and vitamin D while on AI therapy  -Weightbearing exercise  -Follow DEXA every 2 yrs on AI.      4.  Chemotherapy-induced peripheral neuropathy:   Resolved.    5.  Anxiety and depression:   She is on Celexa 40 mg.  Doing okay on this.  Bowels primary care provider.      ECOG Performance  0    Diagnosis:    1.  Invasive ductal carcinoma of the left breast: Diagnosed May, 2021.  Initial imaging suggested 3 masses. Biopsy of the largest mass showed an invasive ductal carcinoma, grade 2.  ER/CT positive, HER-2 positive. Evaluation of the axilla was negative.    Treatment:    Neoadjuvant chemotherapy with TCHP initiated on May 27, 2021.  Cycle 6 was given on September 10.    Bilateral mastectomy performed on October 7.  Pathology showed a 9 mm focus of invasive ductal carcinoma of the left breast.  Lymph nodes are negative.    Kadcyla initiated November 29, 2021. Kadcyla completed August 26, 2022.    Zoladex and tamoxifen initiated November 29, 2021. Zoladex dosing q3 months starting Sept., 2022.  -Secondary to significant hot flashes and weight gain on tamoxifen, as well as considering SOFT trial data she was changed to exemestane 3/17/23    She had her right implant removed secondary to infection on Tiana 10, 2022.    Interim History:  Ry returns for a 3-month visit.  At her last visit, her endocrine treatment was changed to exemestane (from tamoxifen).  She still has the same degree of hot flashes which can be fairly significant at times and difficulty losing weight as she was having prior.  She does have some slight increased generalized joint pain on the exemestane that she was not having prior, she  "finds this tolerable.  She has noted that her hands feel like they are falling asleep at times with certain positions, new since she started exemestane.  Her prior chemotherapy-related neuropathy has resolved.  She has no new focal sites of pain.  No abdominal pain/bloating nor nausea.  She takes Tylenol on a rare occasion.  He drinks alcohol 2 days a week, usually only a few drinks per time.  No headaches.      Physical Exam:    /69 (BP Location: Left arm, Patient Position: Sitting, Cuff Size: Adult Large)   Pulse 58   Temp 98.6  F (37  C) (Oral)   Resp 20   Ht 1.702 m (5' 7\")   Wt 97.5 kg (215 lb)   LMP  (LMP Unknown)   SpO2 98%   BMI 33.67 kg/m      General: patient appears stated age of 33 year old. Nontoxic and in no distress.  Alone.  HEENT: Head: atraumatic, normocephalic. Sclerae anicteric.  Lymph: No palpable cervical, supraclavicular nor axillary adenopathy  Breasts: Chest wall not assessed, history of bilateral mastectomies with reconstruction.  Chest:  Normal respiratory effort.  Clear lungs bilaterally.  Cardiac:  No edema.  RRR  Abdomen: abdomen is non-distended.  Nontender.  No hepatomegaly.  Extremities: normal tone and muscle bulk.  No edema.  Skin: no lesions or rash on visible skin. Warm and dry.   CNS: alert and oriented. Grossly non-focal.   Psychiatric: normal mood and affect.     Lab Results:    Recent Results (from the past 24 hour(s))   Comprehensive metabolic panel    Collection Time: 06/16/23  8:28 AM   Result Value Ref Range    Sodium 141 136 - 145 mmol/L    Potassium 4.3 3.4 - 5.3 mmol/L    Chloride 105 98 - 107 mmol/L    Carbon Dioxide (CO2) 26 22 - 29 mmol/L    Anion Gap 10 7 - 15 mmol/L    Urea Nitrogen 14.0 6.0 - 20.0 mg/dL    Creatinine 0.97 (H) 0.51 - 0.95 mg/dL    Calcium 9.6 8.6 - 10.0 mg/dL    Glucose 85 70 - 99 mg/dL    Alkaline Phosphatase 88 35 - 104 U/L    AST 56 (H) 0 - 45 U/L     (H) 0 - 50 U/L    Protein Total 7.5 6.4 - 8.3 g/dL    Albumin 4.5 3.5 - " "5.2 g/dL    Bilirubin Total 0.2 <=1.2 mg/dL    GFR Estimate 78 >60 mL/min/1.73m2   CBC with platelets and differential    Collection Time: 06/16/23  8:28 AM   Result Value Ref Range    WBC Count 5.6 4.0 - 11.0 10e3/uL    RBC Count 4.67 3.80 - 5.20 10e6/uL    Hemoglobin 13.5 11.7 - 15.7 g/dL    Hematocrit 40.6 35.0 - 47.0 %    MCV 87 78 - 100 fL    MCH 28.9 26.5 - 33.0 pg    MCHC 33.3 31.5 - 36.5 g/dL    RDW 13.1 10.0 - 15.0 %    Platelet Count 286 150 - 450 10e3/uL    % Neutrophils 53 %    % Lymphocytes 29 %    % Monocytes 11 %    % Eosinophils 7 %    % Basophils 0 %    % Immature Granulocytes 0 %    NRBCs per 100 WBC 0 <1 /100    Absolute Neutrophils 3.0 1.6 - 8.3 10e3/uL    Absolute Lymphocytes 1.7 0.8 - 5.3 10e3/uL    Absolute Monocytes 0.6 0.0 - 1.3 10e3/uL    Absolute Eosinophils 0.4 0.0 - 0.7 10e3/uL    Absolute Basophils 0.0 0.0 - 0.2 10e3/uL    Absolute Immature Granulocytes 0.0 <=0.4 10e3/uL    Absolute NRBCs 0.0 10e3/uL       Total time 35 minutes, to include face to face visit, review of EMR, ordering, documentation and coordination of care on date of service    Signed by: Marge Coon NP        Oncology Rooming Note    June 16, 2023 9:32 AM   Veronica Nieves is a 33 year old female who presents for:    Chief Complaint   Patient presents with     Oncology Clinic Visit     3 month return Malignant neoplasm of upper-outer quadrant of left breast in female, estrogen receptor positive, review labs, DEXA & injection      Initial Vitals: /69 (BP Location: Left arm, Patient Position: Sitting, Cuff Size: Adult Large)   Pulse 58   Temp 98.6  F (37  C) (Oral)   Resp 20   Ht 1.702 m (5' 7\")   Wt 97.5 kg (215 lb)   LMP  (LMP Unknown)   SpO2 98%   BMI 33.67 kg/m   Estimated body mass index is 33.67 kg/m  as calculated from the following:    Height as of this encounter: 1.702 m (5' 7\").    Weight as of this encounter: 97.5 kg (215 lb). Body surface area is 2.15 meters squared.  No Pain (0) Comment: Data " Unavailable   No LMP recorded (lmp unknown). (Menstrual status: IUD).  Allergies reviewed: Yes  Medications reviewed: Yes    Medications: MEDICATION REFILLS NEEDED TODAY. Provider was notified.  Pharmacy name entered into [employer]:    [facility] - [place], [place] - [address] [facility]      Clinical concerns: 3 month return Malignant neoplasm of upper-outer quadrant of left breast in female, estrogen receptor positive, review labs, DEXA & injection       Angie Henriquez CMA                Again, thank you for allowing me to participate in the care of your patient.        Sincerely,        Marge Coon, NP

## 2023-06-16 NOTE — PROGRESS NOTES
[facility] Hematology and Oncology Progress Note    Patient: Veronica Nieves  MRN: [id number]  Date of Service: Jun 16, 2023        Assessment and Plan:    Cancer Staging  Malignant neoplasm of upper-outer quadrant of left breast in female, estrogen receptor positive (H)  Staging form: Breast, AJCC 8th Edition  - Clinical stage from 5/19/2021: Stage IB (cT2, cN0, cM0, G2, ER+, AK+, HER2+) - Signed by Jefferson Garcia MD on 5/19/2021     1.  Invasive ductal carcinoma of the left breast (triple positive):   2.  Elevated LFTs, mild  Ry was diagnosed 2 years ago and is now status post neoadjuvant chemotherapy, bilateral mastectomies and adjuvant Kadcyla (completed August, 2022).  He has been on endocrine treatment and Zoladex ovarian suppression.  Initially she was on tamoxifen with Zoladex.  She was having a lot of hot flashes and weight gain on tamoxifen and given the updated data from the SOFT trial, Dr. Garcia changed her endocrine treatment to exemestane with Zoladex 3 months ago. She is tolerating this about the same with continued hot flashes, difficulty losing weight and has noted some mild generalized arthralgias.  She also notes intermittent mild positional tingling in her hands which may be related to the exemestane.    Lab work shows elevated AST and ALT.  She has had fluctuations in her LFTs over time, however this had improved once she had completed chemotherapy and is now risen again since starting exemestane.    No symptoms or exam findings of recurrence.    Plan:  -For now, we decided to continue with the exemestane since she has only been on it 3 months and will give it more time to see how she continues to tolerate.  -Continue Zoladex every 3 months, due today  -Repeat LFTs in 3 months.  This could be related side effect of the exemestane so we will need to follow.  She does not have any symptoms to suggest requiring imaging of her abdomen at this time.    3.  Bone health  Baseline DEXA  normal.  On calcium and vitamin D    Plan:  -Continue calcium and vitamin D while on AI therapy  -Weightbearing exercise  -Follow DEXA every 2 yrs on AI.      4.  Chemotherapy-induced peripheral neuropathy:   Resolved.    5.  Anxiety and depression:   She is on Celexa 40 mg.  Doing okay on this.  Bowels primary care provider.      ECOG Performance  0    Diagnosis:    1.  Invasive ductal carcinoma of the left breast: Diagnosed May, 2021.  Initial imaging suggested 3 masses. Biopsy of the largest mass showed an invasive ductal carcinoma, grade 2.  ER/TX positive, HER-2 positive. Evaluation of the axilla was negative.    Treatment:    Neoadjuvant chemotherapy with TCHP initiated on May 27, 2021.  Cycle 6 was given on September 10.    Bilateral mastectomy performed on October 7.  Pathology showed a 9 mm focus of invasive ductal carcinoma of the left breast.  Lymph nodes are negative.    Kadcyla initiated November 29, 2021. Kadcyla completed August 26, 2022.    Zoladex and tamoxifen initiated November 29, 2021. Zoladex dosing q3 months starting Sept., 2022.  -Secondary to significant hot flashes and weight gain on tamoxifen, as well as considering SOFT trial data she was changed to exemestane 3/17/23    She had her right implant removed secondary to infection on Tiana 10, 2022.    Interim History:  Ry returns for a 3-month visit.  At her last visit, her endocrine treatment was changed to exemestane (from tamoxifen).  She still has the same degree of hot flashes which can be fairly significant at times and difficulty losing weight as she was having prior.  She does have some slight increased generalized joint pain on the exemestane that she was not having prior, she finds this tolerable.  She has noted that her hands feel like they are falling asleep at times with certain positions, new since she started exemestane.  Her prior chemotherapy-related neuropathy has resolved.  She has no new focal sites of pain.  No abdominal  "pain/bloating nor nausea.  She takes Tylenol on a rare occasion.  He drinks alcohol 2 days a week, usually only a few drinks per time.  No headaches.      Physical Exam:    /69 (BP Location: Left arm, Patient Position: Sitting, Cuff Size: Adult Large)   Pulse 58   Temp 98.6  F (37  C) (Oral)   Resp 20   Ht 1.702 m (5' 7\")   Wt 97.5 kg (215 lb)   LMP  (LMP Unknown)   SpO2 98%   BMI 33.67 kg/m      General: patient appears stated age of 33 year old. Nontoxic and in no distress.  Alone.  HEENT: Head: atraumatic, normocephalic. Sclerae anicteric.  Lymph: No palpable cervical, supraclavicular nor axillary adenopathy  Breasts: Chest wall not assessed, history of bilateral mastectomies with reconstruction.  Chest:  Normal respiratory effort.  Clear lungs bilaterally.  Cardiac:  No edema.  RRR  Abdomen: abdomen is non-distended.  Nontender.  No hepatomegaly.  Extremities: normal tone and muscle bulk.  No edema.  Skin: no lesions or rash on visible skin. Warm and dry.   CNS: alert and oriented. Grossly non-focal.   Psychiatric: normal mood and affect.     Lab Results:    Recent Results (from the past 24 hour(s))   Comprehensive metabolic panel    Collection Time: 06/16/23  8:28 AM   Result Value Ref Range    Sodium 141 136 - 145 mmol/L    Potassium 4.3 3.4 - 5.3 mmol/L    Chloride 105 98 - 107 mmol/L    Carbon Dioxide (CO2) 26 22 - 29 mmol/L    Anion Gap 10 7 - 15 mmol/L    Urea Nitrogen 14.0 6.0 - 20.0 mg/dL    Creatinine 0.97 (H) 0.51 - 0.95 mg/dL    Calcium 9.6 8.6 - 10.0 mg/dL    Glucose 85 70 - 99 mg/dL    Alkaline Phosphatase 88 35 - 104 U/L    AST 56 (H) 0 - 45 U/L     (H) 0 - 50 U/L    Protein Total 7.5 6.4 - 8.3 g/dL    Albumin 4.5 3.5 - 5.2 g/dL    Bilirubin Total 0.2 <=1.2 mg/dL    GFR Estimate 78 >60 mL/min/1.73m2   CBC with platelets and differential    Collection Time: 06/16/23  8:28 AM   Result Value Ref Range    WBC Count 5.6 4.0 - 11.0 10e3/uL    RBC Count 4.67 3.80 - 5.20 10e6/uL    " Hemoglobin 13.5 11.7 - 15.7 g/dL    Hematocrit 40.6 35.0 - 47.0 %    MCV 87 78 - 100 fL    MCH 28.9 26.5 - 33.0 pg    MCHC 33.3 31.5 - 36.5 g/dL    RDW 13.1 10.0 - 15.0 %    Platelet Count 286 150 - 450 10e3/uL    % Neutrophils 53 %    % Lymphocytes 29 %    % Monocytes 11 %    % Eosinophils 7 %    % Basophils 0 %    % Immature Granulocytes 0 %    NRBCs per 100 WBC 0 <1 /100    Absolute Neutrophils 3.0 1.6 - 8.3 10e3/uL    Absolute Lymphocytes 1.7 0.8 - 5.3 10e3/uL    Absolute Monocytes 0.6 0.0 - 1.3 10e3/uL    Absolute Eosinophils 0.4 0.0 - 0.7 10e3/uL    Absolute Basophils 0.0 0.0 - 0.2 10e3/uL    Absolute Immature Granulocytes 0.0 <=0.4 10e3/uL    Absolute NRBCs 0.0 10e3/uL       Total time 35 minutes, to include face to face visit, review of EMR, ordering, documentation and coordination of care on date of service    Signed by: Marge Coon, NP

## 2023-06-16 NOTE — PROGRESS NOTES
Infusion Nursing Note:  Veronica Nieves presents today for 3 month zoladex.    Patient seen by provider today: Yes: Marge Coon NP   present during visit today: Not Applicable.    Note: Ry arrived ambulatory and in stable condition. Zoladex administered into RUQ and site covered with gauze and secured with tegaderm. Will return on 9/15 for next appointment.      Intravenous Access:  Labs drawn without difficulty.    Treatment Conditions:  Lab Results   Component Value Date    HGB 13.5 06/16/2023    WBC 5.6 06/16/2023    ANEU 15.5 (H) 08/09/2021    ANEUTAUTO 3.0 06/16/2023     06/16/2023      Lab Results   Component Value Date     06/16/2023    POTASSIUM 4.3 06/16/2023    CR 0.97 (H) 06/16/2023    BURT 9.6 06/16/2023    BILITOTAL 0.2 06/16/2023    ALBUMIN 4.5 06/16/2023     (H) 06/16/2023    AST 56 (H) 06/16/2023     Results reviewed, labs MET treatment parameters, ok to proceed with treatment.      Post Infusion Assessment:  Patient tolerated injection without incident.       Discharge Plan:   Patient and/or family verbalized understanding of discharge instructions and all questions answered.  AVS to patient via [employer].  Patient will return 9/15 for next appointment.   Patient discharged in stable condition accompanied by: self.  Departure Mode: Ambulatory.      Ingrid Naik RN

## 2023-06-16 NOTE — PROGRESS NOTES
"Oncology Rooming Note    June 16, 2023 9:32 AM   Veronica Nieves is a 33 year old female who presents for:    Chief Complaint   Patient presents with     Oncology Clinic Visit     3 month return Malignant neoplasm of upper-outer quadrant of left breast in female, estrogen receptor positive, review labs, DEXA & injection      Initial Vitals: /69 (BP Location: Left arm, Patient Position: Sitting, Cuff Size: Adult Large)   Pulse 58   Temp 98.6  F (37  C) (Oral)   Resp 20   Ht 1.702 m (5' 7\")   Wt 97.5 kg (215 lb)   LMP  (LMP Unknown)   SpO2 98%   BMI 33.67 kg/m   Estimated body mass index is 33.67 kg/m  as calculated from the following:    Height as of this encounter: 1.702 m (5' 7\").    Weight as of this encounter: 97.5 kg (215 lb). Body surface area is 2.15 meters squared.  No Pain (0) Comment: Data Unavailable   No LMP recorded (lmp unknown). (Menstrual status: IUD).  Allergies reviewed: Yes  Medications reviewed: Yes    Medications: MEDICATION REFILLS NEEDED TODAY. Provider was notified.  Pharmacy name entered into [employer]:    [facility] - [place], [place] - [address] [facility]      Clinical concerns: 3 month return Malignant neoplasm of upper-outer quadrant of left breast in female, estrogen receptor positive, review labs, DEXA & injection       Angie Henriquez CMA            "

## 2023-06-20 RX ORDER — METHYLPHENIDATE HYDROCHLORIDE 10 MG/1
10 CAPSULE, EXTENDED RELEASE ORAL DAILY
Qty: 30 CAPSULE | Refills: 0 | Status: SHIPPED | OUTPATIENT
Start: 2023-06-20 | End: 2023-07-03

## 2023-07-03 ENCOUNTER — MYC REFILL (OUTPATIENT)
Dept: FAMILY MEDICINE | Facility: CLINIC | Age: 33
End: 2023-07-03
Payer: COMMERCIAL

## 2023-07-03 DIAGNOSIS — R41.840 CONCENTRATION DEFICIT: ICD-10-CM

## 2023-07-03 RX ORDER — METHYLPHENIDATE HYDROCHLORIDE 10 MG/1
10 CAPSULE, EXTENDED RELEASE ORAL DAILY
Qty: 30 CAPSULE | Refills: 0 | Status: SHIPPED | OUTPATIENT
Start: 2023-07-03

## 2023-07-03 NOTE — TELEPHONE ENCOUNTER
methylphenidate (RITALIN LA) 10 MG 24 hr capsule [Robbin Chau]       Patient Comment: [facility] is out of this medication. I never received this refill back in June because I need the refill to be switched to the [facility] pharmacy on file.

## 2023-09-09 ENCOUNTER — MYC REFILL (OUTPATIENT)
Dept: FAMILY MEDICINE | Facility: CLINIC | Age: 33
End: 2023-09-09
Payer: COMMERCIAL

## 2023-09-09 DIAGNOSIS — R41.840 CONCENTRATION DEFICIT: ICD-10-CM

## 2023-09-13 RX ORDER — METHYLPHENIDATE HYDROCHLORIDE 10 MG/1
10 CAPSULE, EXTENDED RELEASE ORAL DAILY
Qty: 30 CAPSULE | Refills: 0 | Status: SHIPPED | OUTPATIENT
Start: 2023-09-13 | End: 2023-11-25

## 2023-09-15 ENCOUNTER — ONCOLOGY VISIT (OUTPATIENT)
Dept: ONCOLOGY | Facility: HOSPITAL | Age: 33
End: 2023-09-15
Attending: INTERNAL MEDICINE
Payer: COMMERCIAL

## 2023-09-15 ENCOUNTER — INFUSION THERAPY VISIT (OUTPATIENT)
Dept: INFUSION THERAPY | Facility: HOSPITAL | Age: 33
End: 2023-09-15
Attending: INTERNAL MEDICINE
Payer: COMMERCIAL

## 2023-09-15 VITALS
RESPIRATION RATE: 20 BRPM | OXYGEN SATURATION: 98 % | TEMPERATURE: 98.4 F | WEIGHT: 207.1 LBS | HEART RATE: 61 BPM | BODY MASS INDEX: 32.51 KG/M2 | HEIGHT: 67 IN | SYSTOLIC BLOOD PRESSURE: 113 MMHG | DIASTOLIC BLOOD PRESSURE: 66 MMHG

## 2023-09-15 DIAGNOSIS — R79.89 ELEVATED LFTS: ICD-10-CM

## 2023-09-15 DIAGNOSIS — Z17.0 MALIGNANT NEOPLASM OF UPPER-OUTER QUADRANT OF LEFT BREAST IN FEMALE, ESTROGEN RECEPTOR POSITIVE (H): Primary | ICD-10-CM

## 2023-09-15 DIAGNOSIS — C50.412 MALIGNANT NEOPLASM OF UPPER-OUTER QUADRANT OF LEFT BREAST IN FEMALE, ESTROGEN RECEPTOR POSITIVE (H): Primary | ICD-10-CM

## 2023-09-15 LAB
ALBUMIN SERPL BCG-MCNC: 4.8 G/DL (ref 3.5–5.2)
ALP SERPL-CCNC: 85 U/L (ref 35–104)
ALT SERPL W P-5'-P-CCNC: 38 U/L (ref 0–50)
AST SERPL W P-5'-P-CCNC: 35 U/L (ref 0–45)
BILIRUB DIRECT SERPL-MCNC: <0.2 MG/DL (ref 0–0.3)
BILIRUB SERPL-MCNC: 0.4 MG/DL
PROT SERPL-MCNC: 7.7 G/DL (ref 6.4–8.3)

## 2023-09-15 PROCEDURE — 80076 HEPATIC FUNCTION PANEL: CPT

## 2023-09-15 PROCEDURE — 36415 COLL VENOUS BLD VENIPUNCTURE: CPT

## 2023-09-15 PROCEDURE — 250N000011 HC RX IP 250 OP 636: Mod: JZ | Performed by: INTERNAL MEDICINE

## 2023-09-15 PROCEDURE — 99214 OFFICE O/P EST MOD 30 MIN: CPT | Performed by: INTERNAL MEDICINE

## 2023-09-15 PROCEDURE — 96402 CHEMO HORMON ANTINEOPL SQ/IM: CPT

## 2023-09-15 PROCEDURE — G0463 HOSPITAL OUTPT CLINIC VISIT: HCPCS | Performed by: INTERNAL MEDICINE

## 2023-09-15 RX ADMIN — GOSERELIN ACETATE 10.8 MG: 10.8 IMPLANT SUBCUTANEOUS at 09:35

## 2023-09-15 ASSESSMENT — PAIN SCALES - GENERAL: PAINLEVEL: NO PAIN (0)

## 2023-09-15 NOTE — PROGRESS NOTES
Infusion Nursing Note:  Veronica Nieves presents today for Zoladex.    Patient seen by provider today: Yes: Dr Garcia   present during visit today: Not Applicable.    Note:    Pt educated on her plan of care and drug was reviewed.  Zoladex was given subcutaneous into her Left upper abdomen.      Intravenous Access:  N/a.    Treatment Conditions:  Not Applicable.      Post Infusion Assessment:  Patient tolerated injection without incident.       Discharge Plan:   Patient discharged in stable condition accompanied by: self.  Departure Mode: Ambulatory.      Meryl Roth RN

## 2023-09-15 NOTE — PROGRESS NOTES
"Oncology Rooming Note    September 15, 2023 8:48 AM   Veronica Nieves is a 33 year old female who presents for:    Chief Complaint   Patient presents with    Oncology Clinic Visit     3 month return Malignant neoplasm of upper-outer quadrant of left breast in female, estrogen receptor positive, review labs and injection      Initial Vitals: /66 (BP Location: Left arm, Patient Position: Sitting, Cuff Size: Adult Large)   Pulse 61   Temp 98.4  F (36.9  C) (Oral)   Resp 20   Ht 1.689 m (5' 6.5\")   Wt 93.9 kg (207 lb 1.6 oz)   SpO2 98%   BMI 32.93 kg/m   Estimated body mass index is 32.93 kg/m  as calculated from the following:    Height as of this encounter: 1.689 m (5' 6.5\").    Weight as of this encounter: 93.9 kg (207 lb 1.6 oz). Body surface area is 2.1 meters squared.  No Pain (0) Comment: Data Unavailable   No LMP recorded. (Menstrual status: IUD).  Allergies reviewed: Yes  Medications reviewed: Yes    Medications: Medication refills not needed today.  Pharmacy name entered into [employer]:    [facility] - [place], [place] - [address] [id number] [place], [place] - [address]    Clinical concerns:  3 month return Malignant neoplasm of upper-outer quadrant of left breast in female, estrogen receptor positive, review labs and injection       Angie Henriquez CMA            "

## 2023-09-15 NOTE — LETTER
"    9/15/2023         RE: Veronica Nieves  [address] [facility] [address]        Dear Colleague,    Thank you for referring your patient, Veronica Nieves, to the [facility]. Please see a copy of my visit note below.    Oncology Rooming Note    September 15, 2023 8:48 AM   Veronica Nieves is a 33 year old female who presents for:    Chief Complaint   Patient presents with     Oncology Clinic Visit     3 month return Malignant neoplasm of upper-outer quadrant of left breast in female, estrogen receptor positive, review labs and injection      Initial Vitals: /66 (BP Location: Left arm, Patient Position: Sitting, Cuff Size: Adult Large)   Pulse 61   Temp 98.4  F (36.9  C) (Oral)   Resp 20   Ht 1.689 m (5' 6.5\")   Wt 93.9 kg (207 lb 1.6 oz)   SpO2 98%   BMI 32.93 kg/m   Estimated body mass index is 32.93 kg/m  as calculated from the following:    Height as of this encounter: 1.689 m (5' 6.5\").    Weight as of this encounter: 93.9 kg (207 lb 1.6 oz). Body surface area is 2.1 meters squared.  No Pain (0) Comment: Data Unavailable   No LMP recorded. (Menstrual status: IUD).  Allergies reviewed: Yes  Medications reviewed: Yes    Medications: Medication refills not needed today.  Pharmacy name entered into [employer]:    [facility]) - [place], [place] - [address] [facility] [id number] [place], [place] - [address]    Clinical concerns:  3 month return Malignant neoplasm of upper-outer quadrant of left breast in female, estrogen receptor positive, review labs and injection       Angie Henriquez CMA              [facility] Hematology and Oncology Progress Note    Patient: Veronica Nieves  MRN: [id number]  Date of Service: Sep 15, 2023        Assessment and Plan:    Cancer Staging  Malignant neoplasm of upper-outer quadrant of left breast in female, estrogen receptor positive (H)  Staging form: Breast, AJCC 8th Edition  - " Clinical stage from 5/19/2021: Stage IB (cT2, cN0, cM0, G2, ER+, TN+, HER2+) - Signed by Jefferson Garcia MD on 5/19/2021     1.  Invasive ductal carcinoma of the left breast: She remains on exemestane and Zoladex.  Overall her symptoms are tolerable.  Hot flashes are somewhat better.  There is no clinical evidence of recurrent disease.  She gets her Zoladex injections every 3 months.  We will see her in clinic on the same day.  Her hepatic function from today was reviewed and is normal with a total bilirubin of 0.4, alkaline phosphatase 85, AST 35 and ALT 38.  She had a bone density scan on April 10.  This was normal.  We can repeat again in April 2025.  She will continue calcium and vitamin D.    2.  Chemotherapy-induced peripheral neuropathy: Resolved.    3.  Anxiety and depression: She is on Celexa 40 mg.  Overall her mood has been stable.  Continues to have life stressors.  We will continue at this dose for now.     4.  Vasomotor: Have tried vitamin E.  She is going to go back on it since she has not used it while on exemestane.  Hopefully this will give her some relief.    Medical decision Making:  I spent 35 minutes in the care of this patient today, which included time necessary for preparation for the visit, face to face time with the patient, communication of recommendations to the care team, and documentation time.    ECOG Performance  0    Diagnosis:    1.  Invasive ductal carcinoma of the left breast: Diagnosed May, 2021.  Initial imaging suggested 3 masses. Biopsy of the largest mass showed an invasive ductal carcinoma, grade 2.  ER/TN positive, HER-2 positive. Evaluation of the axilla was negative.    Treatment:    Neoadjuvant chemotherapy with TCHP initiated on May 27, 2021.  Cycle 6 was given on September 10.    Bilateral mastectomy performed on October 7.  Pathology showed a 9 mm focus of invasive ductal carcinoma of the left breast.  Lymph nodes are negative.    Kadcyla initiated November 29,  "2021. Boraa completed August 26, 2022.  Zoladex and tamoxifen initiated November 29, 2021. Zoladex dosing q3 months starting Sept., 2022.    Exemestane started 3/17/23 (hot flashes, weight gain).    She had her right implant removed secondary to infection on Tiana 10, 2022.    Interim History:    Abbey presents today for a follow-up visit.  She is now on exemestane and Zoladex.  Tolerating better than tamoxifen.  Hot flashes are better.  She continues to have a lot of night sweats but does not think her sleeping is impacted overall.  She has no shortness of breath or lower extremity edema.  Mood is still low but stable.  No acute complaints today.    Review of Systems:    As above in the history.     Review of Systems otherwise Negative for:  General: chills, fever or night sweats  Psychological: anxiety or depression  Ophthalmic: blurry vision, double vision or loss of vision, vision change  ENT: epistaxis, oral lesions, hearing changes  Hematological and Lymphatic: bleeding, bruising, jaundice, swollen lymph nodes  Endocrine: hot flashes, unexpected weight changes  Respiratory: cough, hemoptysis, orthopnea or shortness of breath/SHAHID  Cardiovascular: chest pain, edema, palpitations or PND  Gastrointestinal: abdominal pain, blood in stools, change in bowel habits, constipation, diarrhea or nausea/vomiting  Genito-Urinary: change in urinary stream, incontinence, frequency/urgency  Musculoskeletal: joint pain, stiffness, swelling, muscle pain  Neurological: dizziness, headaches  Dermatological: lumps and rash    Past History:    Past Medical History:   Diagnosis Date     Abnormal Pap smear of cervix     age 13, subsequent pap smears normal     Anxiety      Breast cancer (H) 05/04/2021    Left     Depression      Physical Exam:    /66 (BP Location: Left arm, Patient Position: Sitting, Cuff Size: Adult Large)   Pulse 61   Temp 98.4  F (36.9  C) (Oral)   Resp 20   Ht 1.689 m (5' 6.5\")   Wt 93.9 kg (207 lb 1.6 " oz)   SpO2 98%   BMI 32.93 kg/m      General: patient appears stated age of 31 year old. Nontoxic and in no distress.   HEENT: Head: atraumatic, normocephalic. Sclerae anicteric.  Chest:  Normal respiratory effort  Cardiac:  No edema.   Abdomen: abdomen is non-distended  Extremities: normal tone and muscle bulk.  Skin: no lesions or rash on visible skin. Warm and dry.   CNS: alert and oriented. Grossly non-focal.   Psychiatric: normal mood and affect.     Lab Results:    Recent Results (from the past 24 hour(s))   Hepatic panel (Albumin, ALT, AST, Bili, Alk Phos, TP)    Collection Time: 09/15/23  8:18 AM   Result Value Ref Range    Protein Total 7.7 6.4 - 8.3 g/dL    Albumin 4.8 3.5 - 5.2 g/dL    Bilirubin Total 0.4 <=1.2 mg/dL    Alkaline Phosphatase 85 35 - 104 U/L    AST 35 0 - 45 U/L    ALT 38 0 - 50 U/L    Bilirubin Direct <0.20 0.00 - 0.30 mg/dL         Signed by: Jefferson Garcia MD          Again, thank you for allowing me to participate in the care of your patient.        Sincerely,        Jefferson Garcia MD

## 2023-09-15 NOTE — PROGRESS NOTES
[facility] Hematology and Oncology Progress Note    Patient: Veronica Nieves  MRN: [id number]  Date of Service: Sep 15, 2023        Assessment and Plan:    Cancer Staging  Malignant neoplasm of upper-outer quadrant of left breast in female, estrogen receptor positive (H)  Staging form: Breast, AJCC 8th Edition  - Clinical stage from 5/19/2021: Stage IB (cT2, cN0, cM0, G2, ER+, ID+, HER2+) - Signed by Jefferson Garcia MD on 5/19/2021     1.  Invasive ductal carcinoma of the left breast: She remains on exemestane and Zoladex.  Overall her symptoms are tolerable.  Hot flashes are somewhat better.  There is no clinical evidence of recurrent disease.  She gets her Zoladex injections every 3 months.  We will see her in clinic on the same day.  Her hepatic function from today was reviewed and is normal with a total bilirubin of 0.4, alkaline phosphatase 85, AST 35 and ALT 38.  She had a bone density scan on April 10.  This was normal.  We can repeat again in April 2025.  She will continue calcium and vitamin D.    2.  Chemotherapy-induced peripheral neuropathy: Resolved.    3.  Anxiety and depression: She is on Celexa 40 mg.  Overall her mood has been stable.  Continues to have life stressors.  We will continue at this dose for now.     4.  Vasomotor: Have tried vitamin E.  She is going to go back on it since she has not used it while on exemestane.  Hopefully this will give her some relief.    Medical decision Making:  I spent 35 minutes in the care of this patient today, which included time necessary for preparation for the visit, face to face time with the patient, communication of recommendations to the care team, and documentation time.    ECOG Performance  0    Diagnosis:    1.  Invasive ductal carcinoma of the left breast: Diagnosed May, 2021.  Initial imaging suggested 3 masses. Biopsy of the largest mass showed an invasive ductal carcinoma, grade 2.  ER/ID positive, HER-2 positive. Evaluation of the  axilla was negative.    Treatment:    Neoadjuvant chemotherapy with TCHP initiated on May 27, 2021.  Cycle 6 was given on September 10.    Bilateral mastectomy performed on October 7.  Pathology showed a 9 mm focus of invasive ductal carcinoma of the left breast.  Lymph nodes are negative.    Kadcyla initiated November 29, 2021. Kadcyla completed August 26, 2022.  Zoladex and tamoxifen initiated November 29, 2021. Zoladex dosing q3 months starting Sept., 2022.    Exemestane started 3/17/23 (hot flashes, weight gain).    She had her right implant removed secondary to infection on Tiana 10, 2022.    Interim History:    Abbey presents today for a follow-up visit.  She is now on exemestane and Zoladex.  Tolerating better than tamoxifen.  Hot flashes are better.  She continues to have a lot of night sweats but does not think her sleeping is impacted overall.  She has no shortness of breath or lower extremity edema.  Mood is still low but stable.  No acute complaints today.    Review of Systems:    As above in the history.     Review of Systems otherwise Negative for:  General: chills, fever or night sweats  Psychological: anxiety or depression  Ophthalmic: blurry vision, double vision or loss of vision, vision change  ENT: epistaxis, oral lesions, hearing changes  Hematological and Lymphatic: bleeding, bruising, jaundice, swollen lymph nodes  Endocrine: hot flashes, unexpected weight changes  Respiratory: cough, hemoptysis, orthopnea or shortness of breath/SHAHID  Cardiovascular: chest pain, edema, palpitations or PND  Gastrointestinal: abdominal pain, blood in stools, change in bowel habits, constipation, diarrhea or nausea/vomiting  Genito-Urinary: change in urinary stream, incontinence, frequency/urgency  Musculoskeletal: joint pain, stiffness, swelling, muscle pain  Neurological: dizziness, headaches  Dermatological: lumps and rash    Past History:    Past Medical History:   Diagnosis Date    Abnormal Pap smear of cervix   "   age 13, subsequent pap smears normal    Anxiety     Breast cancer (H) 05/04/2021    Left    Depression      Physical Exam:    /66 (BP Location: Left arm, Patient Position: Sitting, Cuff Size: Adult Large)   Pulse 61   Temp 98.4  F (36.9  C) (Oral)   Resp 20   Ht 1.689 m (5' 6.5\")   Wt 93.9 kg (207 lb 1.6 oz)   SpO2 98%   BMI 32.93 kg/m      General: patient appears stated age of 31 year old. Nontoxic and in no distress.   HEENT: Head: atraumatic, normocephalic. Sclerae anicteric.  Chest:  Normal respiratory effort  Cardiac:  No edema.   Abdomen: abdomen is non-distended  Extremities: normal tone and muscle bulk.  Skin: no lesions or rash on visible skin. Warm and dry.   CNS: alert and oriented. Grossly non-focal.   Psychiatric: normal mood and affect.     Lab Results:    Recent Results (from the past 24 hour(s))   Hepatic panel (Albumin, ALT, AST, Bili, Alk Phos, TP)    Collection Time: 09/15/23  8:18 AM   Result Value Ref Range    Protein Total 7.7 6.4 - 8.3 g/dL    Albumin 4.8 3.5 - 5.2 g/dL    Bilirubin Total 0.4 <=1.2 mg/dL    Alkaline Phosphatase 85 35 - 104 U/L    AST 35 0 - 45 U/L    ALT 38 0 - 50 U/L    Bilirubin Direct <0.20 0.00 - 0.30 mg/dL         Signed by: Jefferson Garcia MD      "

## 2023-09-20 ENCOUNTER — TELEPHONE (OUTPATIENT)
Dept: ONCOLOGY | Facility: HOSPITAL | Age: 33
End: 2023-09-20
Payer: COMMERCIAL

## 2023-11-07 DIAGNOSIS — Z17.0 MALIGNANT NEOPLASM OF UPPER-OUTER QUADRANT OF LEFT BREAST IN FEMALE, ESTROGEN RECEPTOR POSITIVE (H): ICD-10-CM

## 2023-11-07 DIAGNOSIS — C50.412 MALIGNANT NEOPLASM OF UPPER-OUTER QUADRANT OF LEFT BREAST IN FEMALE, ESTROGEN RECEPTOR POSITIVE (H): ICD-10-CM

## 2023-11-07 RX ORDER — EXEMESTANE 25 MG/1
25 TABLET ORAL DAILY
Qty: 90 TABLET | Refills: 1 | Status: SHIPPED | OUTPATIENT
Start: 2023-11-07 | End: 2024-04-16

## 2023-11-25 ENCOUNTER — MYC REFILL (OUTPATIENT)
Dept: FAMILY MEDICINE | Facility: CLINIC | Age: 33
End: 2023-11-25
Payer: COMMERCIAL

## 2023-11-25 DIAGNOSIS — R41.840 CONCENTRATION DEFICIT: ICD-10-CM

## 2023-11-28 RX ORDER — METHYLPHENIDATE HYDROCHLORIDE 10 MG/1
10 CAPSULE, EXTENDED RELEASE ORAL DAILY
Qty: 30 CAPSULE | Refills: 0 | Status: SHIPPED | OUTPATIENT
Start: 2023-11-28 | End: 2024-01-27

## 2023-12-04 ENCOUNTER — OFFICE VISIT (OUTPATIENT)
Dept: OBGYN | Facility: CLINIC | Age: 33
End: 2023-12-04
Payer: COMMERCIAL

## 2023-12-04 VITALS
SYSTOLIC BLOOD PRESSURE: 120 MMHG | WEIGHT: 202.9 LBS | DIASTOLIC BLOOD PRESSURE: 79 MMHG | HEIGHT: 67 IN | BODY MASS INDEX: 31.84 KG/M2

## 2023-12-04 DIAGNOSIS — C50.912 MALIGNANT NEOPLASM OF LEFT BREAST IN FEMALE, ESTROGEN RECEPTOR POSITIVE, UNSPECIFIED SITE OF BREAST (H): Primary | ICD-10-CM

## 2023-12-04 DIAGNOSIS — Z30.432 ENCOUNTER FOR IUD REMOVAL: ICD-10-CM

## 2023-12-04 DIAGNOSIS — Z17.0 MALIGNANT NEOPLASM OF LEFT BREAST IN FEMALE, ESTROGEN RECEPTOR POSITIVE, UNSPECIFIED SITE OF BREAST (H): Primary | ICD-10-CM

## 2023-12-04 DIAGNOSIS — N39.3 PRIMARY STRESS URINARY INCONTINENCE: ICD-10-CM

## 2023-12-04 DIAGNOSIS — Z12.4 PAP SMEAR FOR CERVICAL CANCER SCREENING: ICD-10-CM

## 2023-12-04 PROCEDURE — G0145 SCR C/V CYTO,THINLAYER,RESCR: HCPCS | Performed by: FAMILY MEDICINE

## 2023-12-04 PROCEDURE — 58301 REMOVE INTRAUTERINE DEVICE: CPT | Performed by: FAMILY MEDICINE

## 2023-12-04 PROCEDURE — 99395 PREV VISIT EST AGE 18-39: CPT | Mod: 25 | Performed by: FAMILY MEDICINE

## 2023-12-04 PROCEDURE — 87624 HPV HI-RISK TYP POOLED RSLT: CPT | Performed by: FAMILY MEDICINE

## 2023-12-04 NOTE — NURSING NOTE
"Chief Complaint   Patient presents with    Gyn Exam     Pap due    IUD     removal       Initial /79   Ht 1.689 m (5' 6.5\")   Wt 92 kg (202 lb 14.4 oz)   BMI 32.26 kg/m   Estimated body mass index is 32.26 kg/m  as calculated from the following:    Height as of this encounter: 1.689 m (5' 6.5\").    Weight as of this encounter: 92 kg (202 lb 14.4 oz).  BP completed using cuff size: regular    Questioned patient about current smoking habits.  Pt. has never smoked.          The following HM Due: pap smear    "

## 2023-12-04 NOTE — PATIENT INSTRUCTIONS
Return yearly       Dr. Jessi Dumont, DO    Obstetrics and Gynecology  [facility] [place] [facility] [place]

## 2023-12-04 NOTE — PROGRESS NOTES
SUBJECTIVE:  Veronica Nieves is an 33 year old  woman who presents for   annual gyn exam. No LMP recorded. (Menstrual status: IUD). Periods are none, lasting   2 days. Dysmenorrhea:mild, occurring premenstrually and first 1-2 days of flow. Cyclic symptoms   include none. No intermenstrual bleeding,   spotting, or discharge.  Menarche age teenager.  STD hx: none .    Current contraception: Mirena IUD  ALBERT exposure: no  History of abnormal Pap smear: age 13, now normal   Family history of uterine or ovarian cancer: No  Regular self breast exam: Yes  History of abnormal mammogram: No  Family history of breast cancer: personal history   History of abnormal lipids: No    Past Medical History:   Diagnosis Date    Abnormal Pap smear of cervix     age 13, subsequent pap smears normal    Anxiety     Breast cancer (H) 2021    Left    Depression         Family History   Problem Relation Age of Onset    Heart Disease Father     Depression Father     Depression Mother     No Known Problems Brother     Depression Sister     Cancer Paternal Grandfather     Cancer Maternal Grandmother     Lung Cancer Maternal Grandmother         50's       Past Surgical History:   Procedure Laterality Date    BIOPSY NODE SENTINEL Left 10/7/2021    Procedure: left sentinel lymph node biopsy;  Surgeon: Audrey Newman MD;  Location: [facility] OR    INJECTION, FAT GRAFT Bilateral 2022    Procedure: FAT GRAFTING BILATERAL BREASTS FROM ABDOMEN;  Surgeon: Shanice Álvarez MD;  Location: [facility] OR    INSERT TISSUE EXPANDER BREAST Right 2022    Procedure: INSERTION OF TISSUE EXPANDER RIGHT BREAST,;  Surgeon: Shanice Álvarez MD;  Location: [facility] OR    MASTECTOMY MODIFIED RADICAL Bilateral 10/7/2021    Procedure: Bilateral mastectomies,;  Surgeon: Audrey Newman MD;  Location: [facility] OR    OTHER SURGICAL HISTORY      no surgical history    GA INSJ PRPH CTR VAD W/SUBQ PORT AGE 5 YR/> N/A 2021     Procedure: Port Placement;  Surgeon: Audrey Newman MD;  Location: [facility];  Service: General    RECONSTRUCT BREAST, IMPLANT PROSTHESIS, COMBINED Bilateral 10/7/2021    Procedure: BILATERAL BREAST RECONSTRUCTION WITH IMPLANTS;  Surgeon: Shanice Álvarez MD;  Location: [facility]    REMOVAL, TISSUE EXPANDER, BREAST, WITH IMPLANT INSERTION Bilateral 12/20/2022    Procedure: REMOVAL OF RIGHT TISSUE EXPANDER WITH PLACEMENT OF IMPLANT, REMOVAL LEFT TISSUE IMPLANT  AND REPLACE IMPLANT;  Surgeon: Shanice Álvarez MD;  Location: [facility] OR    REMOVE IMPLANT BREAST Right 6/10/2022    Procedure: REMOVAL MAMMARY IMPLANT RIGHT;  Surgeon: Shanice Álvarez MD;  Location: [facility] [place] OR    TUNNELED VENOUS CATHETER PLACEMENT N/A 9/23/2022    Procedure: REMOVAL OF PORT;  Surgeon: Shanice Álvarez MD;  Location: [facility]    US BREAST CORE BIOPSY LEFT Left 5/4/2021    WISDOM TOOTH EXTRACTION  2008       Current Outpatient Medications   Medication    acetaminophen (TYLENOL) 325 MG tablet    citalopram (CELEXA) 40 MG tablet    exemestane (AROMASIN) 25 MG tablet    methylphenidate (RITALIN LA) 10 MG 24 hr capsule    methylphenidate (RITALIN LA) 10 MG 24 hr capsule    paragard intrauterine copper device     No current facility-administered medications for this visit.     No Known Allergies    Social History     Tobacco Use    Smoking status: Never     Passive exposure: Never    Smokeless tobacco: Never   Substance Use Topics    Alcohol use: Yes     Alcohol/week: 6.0 - 7.0 standard drinks of alcohol       Review Of Systems  Ears/Nose/Throat: negative  Respiratory: No shortness of breath, dyspnea on exertion, cough, or hemoptysis  Cardiovascular: negative  Gastrointestinal: negative  Genitourinary: See HPI   Constitutional, HEENT, cardiovascular, pulmonary, GI, , musculoskeletal, neuro, skin, endocrine and psych systems are negative, except as otherwise noted.      OBJECTIVE:  /79  "  Ht 1.689 m (5' 6.5\")   Wt 92 kg (202 lb 14.4 oz)   BMI 32.26 kg/m      General appearance: healthy, alert and no distress  Skin: Skin color, texture, turgor normal. No rashes or lesions.  Ears: negative  Nose/Sinuses: Nares normal. Septum midline. Mucosa normal. No drainage or sinus tenderness.  Oropharynx: Lips, mucosa, and tongue normal. Teeth and gums normal.  Neck: Neck supple. No adenopathy. Thyroid symmetric, normal size,, Carotids without bruits.  Lungs: negative, Percussion normal. Good diaphragmatic excursion. Lungs clear  Heart: negative, PMI normal. No lifts, heaves, or thrills. RRR. No murmurs, clicks gallops or rub  Breasts: Inspection negative. No nipple discharge or bleeding. No masses.  Abdomen: Abdomen soft, non-tender. BS normal. No masses, organomegaly  Pelvic: Pelvic:  Pelvic examination with  pap/no Gonorrhea and Chlamydia   including  External genitalia normal   and vagina normal rugatted  atrophic  Examination of urethra  normal no masses, tenderness, scarring  bladder, no masses or tenderness  Cervix  no lesions or discharge  Bimanual exam with   Uterus 6 weeks size, mid position, mobile,no-tenderness, no descent   Adnexa/parametria  normal no masses     Procedure:  Strings grasped with ring forceps and IUD easily removed, intact.   Patient tolerated well, no complications.      ASSESSMENT:  Veronica Nieves is an 33 year old  woman who presents for   annual gyn exam.     PLAN:  Dx:  1)  Pap smear completed   Gonorrhea  Chlamydia declines   2)  Breast surveillance through breast surgeon, CT yearly , lipids at appropriate intervals ordered   Colonoscopy due age 44   3)  Contraception: would like IUD removed   4)  Breast cancer:  Stage 2 breast cancer May 2021, went through 20 rounds of chemotherapy   With double mastectomy and reconstruction.  Spontaneous.    Would like a pelvic ultrasound to look at the gynecologic structures and this can be done yearly  Has bad hot flushes " (tried anti-depressants)  and vaginal dryness and   5)  stress urinary incontinence  Referral for physical therapy   6)  IUD removal as above      PE:  Reviewed health maintenance including diet, regular exercise   and periodic exams.    Dr. Jessi Dumont, DO    Obstetrics and Gynecology  [facility] [place] [facility] [place]

## 2023-12-08 LAB
BKR LAB AP GYN ADEQUACY: NORMAL
BKR LAB AP GYN INTERPRETATION: NORMAL
BKR LAB AP HPV REFLEX: NORMAL
BKR LAB AP PREVIOUS ABNORMAL: NORMAL
PATH REPORT.COMMENTS IMP SPEC: NORMAL
PATH REPORT.COMMENTS IMP SPEC: NORMAL
PATH REPORT.RELEVANT HX SPEC: NORMAL

## 2023-12-11 ENCOUNTER — PATIENT OUTREACH (OUTPATIENT)
Dept: OBGYN | Facility: CLINIC | Age: 33
End: 2023-12-11
Payer: COMMERCIAL

## 2023-12-11 PROBLEM — R87.810 CERVICAL HIGH RISK HPV (HUMAN PAPILLOMAVIRUS) TEST POSITIVE: Status: ACTIVE | Noted: 2023-12-11

## 2023-12-11 LAB
HUMAN PAPILLOMA VIRUS 16 DNA: NEGATIVE
HUMAN PAPILLOMA VIRUS 18 DNA: NEGATIVE
HUMAN PAPILLOMA VIRUS FINAL DIAGNOSIS: ABNORMAL
HUMAN PAPILLOMA VIRUS OTHER HR: POSITIVE

## 2023-12-13 ENCOUNTER — INFUSION THERAPY VISIT (OUTPATIENT)
Dept: INFUSION THERAPY | Facility: HOSPITAL | Age: 33
End: 2023-12-13
Attending: INTERNAL MEDICINE
Payer: COMMERCIAL

## 2023-12-13 ENCOUNTER — ONCOLOGY VISIT (OUTPATIENT)
Dept: ONCOLOGY | Facility: HOSPITAL | Age: 33
End: 2023-12-13
Attending: INTERNAL MEDICINE
Payer: COMMERCIAL

## 2023-12-13 VITALS
WEIGHT: 200.5 LBS | DIASTOLIC BLOOD PRESSURE: 63 MMHG | HEART RATE: 55 BPM | RESPIRATION RATE: 16 BRPM | TEMPERATURE: 98.4 F | BODY MASS INDEX: 31.47 KG/M2 | OXYGEN SATURATION: 98 % | HEIGHT: 67 IN | SYSTOLIC BLOOD PRESSURE: 115 MMHG

## 2023-12-13 DIAGNOSIS — C50.412 MALIGNANT NEOPLASM OF UPPER-OUTER QUADRANT OF LEFT BREAST IN FEMALE, ESTROGEN RECEPTOR POSITIVE (H): Primary | ICD-10-CM

## 2023-12-13 DIAGNOSIS — C50.412 MALIGNANT NEOPLASM OF UPPER-OUTER QUADRANT OF LEFT BREAST IN FEMALE, ESTROGEN RECEPTOR POSITIVE (H): ICD-10-CM

## 2023-12-13 DIAGNOSIS — Z17.0 MALIGNANT NEOPLASM OF UPPER-OUTER QUADRANT OF LEFT BREAST IN FEMALE, ESTROGEN RECEPTOR POSITIVE (H): Primary | ICD-10-CM

## 2023-12-13 DIAGNOSIS — Z17.0 MALIGNANT NEOPLASM OF UPPER-OUTER QUADRANT OF LEFT BREAST IN FEMALE, ESTROGEN RECEPTOR POSITIVE (H): ICD-10-CM

## 2023-12-13 LAB
ALBUMIN SERPL BCG-MCNC: 4.9 G/DL (ref 3.5–5.2)
ALP SERPL-CCNC: 92 U/L (ref 40–150)
ALT SERPL W P-5'-P-CCNC: 30 U/L (ref 0–50)
ANION GAP SERPL CALCULATED.3IONS-SCNC: 10 MMOL/L (ref 7–15)
AST SERPL W P-5'-P-CCNC: 33 U/L (ref 0–45)
BASOPHILS # BLD AUTO: 0 10E3/UL (ref 0–0.2)
BASOPHILS NFR BLD AUTO: 1 %
BILIRUB SERPL-MCNC: 0.3 MG/DL
BUN SERPL-MCNC: 11.1 MG/DL (ref 6–20)
CALCIUM SERPL-MCNC: 9.9 MG/DL (ref 8.6–10)
CHLORIDE SERPL-SCNC: 104 MMOL/L (ref 98–107)
CREAT SERPL-MCNC: 1.05 MG/DL (ref 0.51–0.95)
DEPRECATED HCO3 PLAS-SCNC: 27 MMOL/L (ref 22–29)
EGFRCR SERPLBLD CKD-EPI 2021: 71 ML/MIN/1.73M2
EOSINOPHIL # BLD AUTO: 0.4 10E3/UL (ref 0–0.7)
EOSINOPHIL NFR BLD AUTO: 7 %
ERYTHROCYTE [DISTWIDTH] IN BLOOD BY AUTOMATED COUNT: 12.9 % (ref 10–15)
GLUCOSE SERPL-MCNC: 97 MG/DL (ref 70–99)
HCT VFR BLD AUTO: 40.8 % (ref 35–47)
HGB BLD-MCNC: 13.5 G/DL (ref 11.7–15.7)
IMM GRANULOCYTES # BLD: 0 10E3/UL
IMM GRANULOCYTES NFR BLD: 0 %
LYMPHOCYTES # BLD AUTO: 1.8 10E3/UL (ref 0.8–5.3)
LYMPHOCYTES NFR BLD AUTO: 30 %
MCH RBC QN AUTO: 29.1 PG (ref 26.5–33)
MCHC RBC AUTO-ENTMCNC: 33.1 G/DL (ref 31.5–36.5)
MCV RBC AUTO: 88 FL (ref 78–100)
MONOCYTES # BLD AUTO: 0.6 10E3/UL (ref 0–1.3)
MONOCYTES NFR BLD AUTO: 11 %
NEUTROPHILS # BLD AUTO: 3 10E3/UL (ref 1.6–8.3)
NEUTROPHILS NFR BLD AUTO: 51 %
NRBC # BLD AUTO: 0 10E3/UL
NRBC BLD AUTO-RTO: 0 /100
PLATELET # BLD AUTO: 252 10E3/UL (ref 150–450)
POTASSIUM SERPL-SCNC: 4.5 MMOL/L (ref 3.4–5.3)
PROT SERPL-MCNC: 7.4 G/DL (ref 6.4–8.3)
RBC # BLD AUTO: 4.64 10E6/UL (ref 3.8–5.2)
SODIUM SERPL-SCNC: 141 MMOL/L (ref 135–145)
WBC # BLD AUTO: 5.9 10E3/UL (ref 4–11)

## 2023-12-13 PROCEDURE — 96402 CHEMO HORMON ANTINEOPL SQ/IM: CPT

## 2023-12-13 PROCEDURE — 99213 OFFICE O/P EST LOW 20 MIN: CPT | Performed by: INTERNAL MEDICINE

## 2023-12-13 PROCEDURE — 99214 OFFICE O/P EST MOD 30 MIN: CPT | Mod: 25 | Performed by: INTERNAL MEDICINE

## 2023-12-13 PROCEDURE — 80053 COMPREHEN METABOLIC PANEL: CPT

## 2023-12-13 PROCEDURE — 36415 COLL VENOUS BLD VENIPUNCTURE: CPT

## 2023-12-13 PROCEDURE — 250N000011 HC RX IP 250 OP 636: Mod: JZ | Performed by: INTERNAL MEDICINE

## 2023-12-13 PROCEDURE — 85025 COMPLETE CBC W/AUTO DIFF WBC: CPT

## 2023-12-13 RX ADMIN — GOSERELIN ACETATE 10.8 MG: 10.8 IMPLANT SUBCUTANEOUS at 10:15

## 2023-12-13 ASSESSMENT — PAIN SCALES - GENERAL: PAINLEVEL: NO PAIN (0)

## 2023-12-13 NOTE — LETTER
12/13/2023         RE: Veronica Nieves  [address]  [facility] [address]        Dear Colleague,    Thank you for referring your patient, Veronica Nieves, to the [facility]. Please see a copy of my visit note below.    [facility] Hematology and Oncology Progress Note    Patient: Veronica Nieves  MRN: [id number]  Date of Service: Dec 13, 2023        Assessment and Plan:    Cancer Staging  Malignant neoplasm of upper-outer quadrant of left breast in female, estrogen receptor positive (H)  Staging form: Breast, AJCC 8th Edition  - Clinical stage from 5/19/2021: Stage IB (cT2, cN0, cM0, G2, ER+, OR+, HER2+) - Signed by Jefferson Garcia MD on 5/19/2021     1.  Invasive ductal carcinoma of the left breast:  She remains on exemestane and Zoladex.  Overall she is tolerating okay.  Still having some hot flashes but they are tolerable.  Clinically there is no evidence of recurrent disease.  We reviewed the mechanism of action of Zoladex, tamoxifen, and exemestane.  She is getting her Zoladex every 3 months.  She will be on adjuvant endocrine therapy until November, 2026.  She had a bone density test in April 2023 which was normal.  Continue calcium and vitamin D.  Next bone density test will be in April 2024.  Status post bilateral mastectomy, does not need mammograms.    2.  Anxiety and depression: She is on Celexa 40 mg.  She states she has been more depressed lately.  She does have an outside therapist that she works with.  I told her to let us know if she thinks she would like to try a medication that is more focused towards depression.      CMP from today is reviewed which shows a normal alkaline phosphatase at 92, calcium 9.9 and creatinine 1.05.  I asked her to stay well-hydrated.  CBC was reviewed showing a white count of 5.9, hemoglobin 13.5.    ECOG Performance  0    Diagnosis:    1.  Invasive ductal carcinoma of the left breast: Diagnosed May, 2021.  Initial  imaging suggested 3 masses. Biopsy of the largest mass showed an invasive ductal carcinoma, grade 2.  ER/OH positive, HER-2 positive. Evaluation of the axilla was negative.    Treatment:    Neoadjuvant chemotherapy with TCHP initiated on May 27, 2021.  Cycle 6 was given on September 10.    Bilateral mastectomy performed on October 7.  Pathology showed a 9 mm focus of invasive ductal carcinoma of the left breast.  Lymph nodes are negative.    Kadcyla initiated November 29, 2021. Kadcyla completed August 26, 2022.  Zoladex and tamoxifen initiated November 29, 2021. Zoladex dosing q3 months starting Sept., 2022.    Exemestane started 3/17/23 (hot flashes, weight gain w/ tamoxifen).    She had her right implant removed secondary to infection on Tiana 10, 2022.    Interim History:    Abbey presents today for a follow-up visit.  She is now on exemestane and Zoladex.  She was seen 3 months ago.   Less HF  Stilll has NS, has had some weight loss        Review of Systems:    As above in the history.     Review of Systems otherwise Negative for:  General: chills, fever or night sweats  Psychological: anxiety or depression  Ophthalmic: blurry vision, double vision or loss of vision, vision change  ENT: epistaxis, oral lesions, hearing changes  Hematological and Lymphatic: bleeding, bruising, jaundice, swollen lymph nodes  Endocrine: hot flashes, unexpected weight changes  Respiratory: cough, hemoptysis, orthopnea or shortness of breath/SHAHID  Cardiovascular: chest pain, edema, palpitations or PND  Gastrointestinal: abdominal pain, blood in stools, change in bowel habits, constipation, diarrhea or nausea/vomiting  Genito-Urinary: change in urinary stream, incontinence, frequency/urgency  Musculoskeletal: joint pain, stiffness, swelling, muscle pain  Neurological: dizziness, headaches  Dermatological: lumps and rash    Past History:    Past Medical History:   Diagnosis Date     Abnormal Pap smear of cervix     age 13, subsequent pap  "smears normal     Anxiety      Breast cancer (H) 05/04/2021    Left     Depression      Physical Exam:    There were no vitals taken for this visit.    General: patient appears stated age of 31 year old. Nontoxic and in no distress.   HEENT: Head: atraumatic, normocephalic. Sclerae anicteric.  Chest:  Normal respiratory effort  Cardiac:  No edema.   Abdomen: abdomen is non-distended  Extremities: normal tone and muscle bulk.  Skin: no lesions or rash on visible skin. Warm and dry.   CNS: alert and oriented. Grossly non-focal.   Psychiatric: normal mood and affect.     Lab Results:    No results found for this or any previous visit (from the past 24 hour(s)).        Signed by: Jefferson Garcia MD        Oncology Rooming Note    December 13, 2023 9:06 AM   Veronica Nieves is a 33 year old female who presents for:    Chief Complaint   Patient presents with     Oncology Clinic Visit     Return visit 3 months with lab and infusion. Malignant neoplasm of upper-outer quadrant of left breast in female, estrogen receptor positive.     Initial Vitals: /63 (BP Location: Right arm, Patient Position: Sitting, Cuff Size: Adult Regular)   Pulse 55   Temp 98.4  F (36.9  C) (Oral)   Resp 16   Ht 1.689 m (5' 6.5\")   Wt 90.9 kg (200 lb 8 oz)   SpO2 98%   BMI 31.88 kg/m   Estimated body mass index is 31.88 kg/m  as calculated from the following:    Height as of this encounter: 1.689 m (5' 6.5\").    Weight as of this encounter: 90.9 kg (200 lb 8 oz). Body surface area is 2.07 meters squared.  No Pain (0) Comment: Data Unavailable   No LMP recorded. (Menstrual status: IUD).  Allergies reviewed: Yes  Medications reviewed: Yes    Medications: Medication refills not needed today.  Pharmacy name entered into [email or web address]:    [facility] - [place], [place] - [address] [id number] [place], [place] - [address]    Clinical concerns: Return visit 3 months with lab and infusion. Malignant " neoplasm of upper-outer quadrant of left breast in female, estrogen receptor positive.      Ruchi Tipton CMA ([facility]).                Again, thank you for allowing me to participate in the care of your patient.        Sincerely,        Jefferson Garcia MD

## 2023-12-13 NOTE — PROGRESS NOTES
Infusion Nursing Note:  Veronica Nieves presents today for zoladex.    Patient seen by provider today: Yes: Dr. Garcia   present during visit today: Not Applicable.    Note: N/A.      Intravenous Access:  Labs drawn without difficulty.    Treatment Conditions:  Not Applicable.      Post Infusion Assessment:  Patient tolerated injection without incident. Injection given in Right Lower Abdomen.      Discharge Plan:   Patient discharged in stable condition accompanied by: self.  Departure Mode: Ambulatory.      Susana Pollock RN

## 2023-12-13 NOTE — PROGRESS NOTES
[facility] Hematology and Oncology Progress Note    Patient: Veronica Nieves  MRN: [id number]  Date of Service: Dec 13, 2023        Assessment and Plan:    Cancer Staging  Malignant neoplasm of upper-outer quadrant of left breast in female, estrogen receptor positive (H)  Staging form: Breast, AJCC 8th Edition  - Clinical stage from 5/19/2021: Stage IB (cT2, cN0, cM0, G2, ER+, UT+, HER2+) - Signed by Jefferson Garcia MD on 5/19/2021     1.  Invasive ductal carcinoma of the left breast:  She remains on exemestane and Zoladex.  Overall she is tolerating okay.  Still having some hot flashes but they are tolerable.  Clinically there is no evidence of recurrent disease.  We reviewed the mechanism of action of Zoladex, tamoxifen, and exemestane.  She is getting her Zoladex every 3 months.  She will be on adjuvant endocrine therapy until November, 2026.  She had a bone density test in April 2023 which was normal.  Continue calcium and vitamin D.  Next bone density test will be in April 2024.  Status post bilateral mastectomy, does not need mammograms.    2.  Anxiety and depression: She is on Celexa 40 mg.  She states she has been more depressed lately.  She does have an outside therapist that she works with.  I told her to let us know if she thinks she would like to try a medication that is more focused towards depression.      CMP from today is reviewed which shows a normal alkaline phosphatase at 92, calcium 9.9 and creatinine 1.05.  I asked her to stay well-hydrated.  CBC was reviewed showing a white count of 5.9, hemoglobin 13.5.    ECOG Performance  0    Diagnosis:    1.  Invasive ductal carcinoma of the left breast: Diagnosed May, 2021.  Initial imaging suggested 3 masses. Biopsy of the largest mass showed an invasive ductal carcinoma, grade 2.  ER/UT positive, HER-2 positive. Evaluation of the axilla was negative.    Treatment:    Neoadjuvant chemotherapy with TCHP initiated on May 27, 2021.  Cycle 6 was  given on September 10.    Bilateral mastectomy performed on October 7.  Pathology showed a 9 mm focus of invasive ductal carcinoma of the left breast.  Lymph nodes are negative.    Kadcyla initiated November 29, 2021. Kadcyla completed August 26, 2022.  Zoladex and tamoxifen initiated November 29, 2021. Zoladex dosing q3 months starting Sept., 2022.    Exemestane started 3/17/23 (hot flashes, weight gain w/ tamoxifen).    She had her right implant removed secondary to infection on Tiana 10, 2022.    Interim History:    Abbey presents today for a follow-up visit.  She is now on exemestane and Zoladex.  She was seen 3 months ago.   Less HF  Stilll has NS, has had some weight loss        Review of Systems:    As above in the history.     Review of Systems otherwise Negative for:  General: chills, fever or night sweats  Psychological: anxiety or depression  Ophthalmic: blurry vision, double vision or loss of vision, vision change  ENT: epistaxis, oral lesions, hearing changes  Hematological and Lymphatic: bleeding, bruising, jaundice, swollen lymph nodes  Endocrine: hot flashes, unexpected weight changes  Respiratory: cough, hemoptysis, orthopnea or shortness of breath/SHAHID  Cardiovascular: chest pain, edema, palpitations or PND  Gastrointestinal: abdominal pain, blood in stools, change in bowel habits, constipation, diarrhea or nausea/vomiting  Genito-Urinary: change in urinary stream, incontinence, frequency/urgency  Musculoskeletal: joint pain, stiffness, swelling, muscle pain  Neurological: dizziness, headaches  Dermatological: lumps and rash    Past History:    Past Medical History:   Diagnosis Date    Abnormal Pap smear of cervix     age 13, subsequent pap smears normal    Anxiety     Breast cancer (H) 05/04/2021    Left    Depression      Physical Exam:    There were no vitals taken for this visit.    General: patient appears stated age of 31 year old. Nontoxic and in no distress.   HEENT: Head: atraumatic,  normocephalic. Sclerae anicteric.  Chest:  Normal respiratory effort  Cardiac:  No edema.   Abdomen: abdomen is non-distended  Extremities: normal tone and muscle bulk.  Skin: no lesions or rash on visible skin. Warm and dry.   CNS: alert and oriented. Grossly non-focal.   Psychiatric: normal mood and affect.     Lab Results:    No results found for this or any previous visit (from the past 24 hour(s)).        Signed by: Jefferson Garcia MD

## 2023-12-13 NOTE — PROGRESS NOTES
"Oncology Rooming Note    December 13, 2023 9:06 AM   Veronica Nieves is a 33 year old female who presents for:    Chief Complaint   Patient presents with    Oncology Clinic Visit     Return visit 3 months with lab and infusion. Malignant neoplasm of upper-outer quadrant of left breast in female, estrogen receptor positive.     Initial Vitals: /63 (BP Location: Right arm, Patient Position: Sitting, Cuff Size: Adult Regular)   Pulse 55   Temp 98.4  F (36.9  C) (Oral)   Resp 16   Ht 1.689 m (5' 6.5\")   Wt 90.9 kg (200 lb 8 oz)   SpO2 98%   BMI 31.88 kg/m   Estimated body mass index is 31.88 kg/m  as calculated from the following:    Height as of this encounter: 1.689 m (5' 6.5\").    Weight as of this encounter: 90.9 kg (200 lb 8 oz). Body surface area is 2.07 meters squared.  No Pain (0) Comment: Data Unavailable   No LMP recorded. (Menstrual status: IUD).  Allergies reviewed: Yes  Medications reviewed: Yes    Medications: Medication refills not needed today.  Pharmacy name entered into [employer]:    [facility]) - [place], [place] - [address] [id number] [place], [place] - [address]    Clinical concerns: Return visit 3 months with lab and infusion. Malignant neoplasm of upper-outer quadrant of left breast in female, estrogen receptor positive.      Ruchi Tipton CMA ([facility]).              "

## 2023-12-31 DIAGNOSIS — F32.A MILD DEPRESSION: ICD-10-CM

## 2024-01-02 RX ORDER — CITALOPRAM HYDROBROMIDE 40 MG/1
TABLET ORAL
Qty: 30 TABLET | Refills: 11 | Status: SHIPPED | OUTPATIENT
Start: 2024-01-02

## 2024-01-27 ENCOUNTER — MYC REFILL (OUTPATIENT)
Dept: FAMILY MEDICINE | Facility: CLINIC | Age: 34
End: 2024-01-27
Payer: COMMERCIAL

## 2024-01-27 DIAGNOSIS — R41.840 CONCENTRATION DEFICIT: ICD-10-CM

## 2024-01-29 RX ORDER — METHYLPHENIDATE HYDROCHLORIDE 10 MG/1
10 CAPSULE, EXTENDED RELEASE ORAL DAILY
Qty: 30 CAPSULE | Refills: 0 | Status: SHIPPED | OUTPATIENT
Start: 2024-01-29

## 2024-01-29 NOTE — TELEPHONE ENCOUNTER
Robbin Chau MD   to [facility]      1/29/24  1:32 PM  Filled. Needs appt for med check, not urgent

## 2024-01-30 NOTE — TELEPHONE ENCOUNTER
Patient has been notified of the note below via [employer]. Reminder set for 3 days to send letter if not read.

## 2024-03-13 ENCOUNTER — INFUSION THERAPY VISIT (OUTPATIENT)
Dept: INFUSION THERAPY | Facility: HOSPITAL | Age: 34
End: 2024-03-13
Attending: INTERNAL MEDICINE
Payer: COMMERCIAL

## 2024-03-13 ENCOUNTER — ONCOLOGY VISIT (OUTPATIENT)
Dept: ONCOLOGY | Facility: HOSPITAL | Age: 34
End: 2024-03-13
Attending: INTERNAL MEDICINE
Payer: COMMERCIAL

## 2024-03-13 VITALS
HEIGHT: 67 IN | DIASTOLIC BLOOD PRESSURE: 66 MMHG | HEART RATE: 60 BPM | BODY MASS INDEX: 30.12 KG/M2 | SYSTOLIC BLOOD PRESSURE: 118 MMHG | OXYGEN SATURATION: 96 % | WEIGHT: 191.9 LBS | TEMPERATURE: 98.8 F | RESPIRATION RATE: 16 BRPM

## 2024-03-13 DIAGNOSIS — C50.412 MALIGNANT NEOPLASM OF UPPER-OUTER QUADRANT OF LEFT BREAST IN FEMALE, ESTROGEN RECEPTOR POSITIVE (H): Primary | ICD-10-CM

## 2024-03-13 DIAGNOSIS — C50.412 MALIGNANT NEOPLASM OF UPPER-OUTER QUADRANT OF LEFT BREAST IN FEMALE, ESTROGEN RECEPTOR POSITIVE (H): ICD-10-CM

## 2024-03-13 DIAGNOSIS — G62.0 CHEMOTHERAPY-INDUCED PERIPHERAL NEUROPATHY (H): ICD-10-CM

## 2024-03-13 DIAGNOSIS — T45.1X5A CHEMOTHERAPY-INDUCED PERIPHERAL NEUROPATHY (H): ICD-10-CM

## 2024-03-13 DIAGNOSIS — Z17.0 MALIGNANT NEOPLASM OF UPPER-OUTER QUADRANT OF LEFT BREAST IN FEMALE, ESTROGEN RECEPTOR POSITIVE (H): Primary | ICD-10-CM

## 2024-03-13 DIAGNOSIS — Z17.0 MALIGNANT NEOPLASM OF UPPER-OUTER QUADRANT OF LEFT BREAST IN FEMALE, ESTROGEN RECEPTOR POSITIVE (H): ICD-10-CM

## 2024-03-13 LAB
ANION GAP SERPL CALCULATED.3IONS-SCNC: 7 MMOL/L (ref 7–15)
BUN SERPL-MCNC: 15.7 MG/DL (ref 6–20)
CALCIUM SERPL-MCNC: 10.1 MG/DL (ref 8.6–10)
CHLORIDE SERPL-SCNC: 103 MMOL/L (ref 98–107)
CHOLEST SERPL-MCNC: 253 MG/DL
CREAT SERPL-MCNC: 1.08 MG/DL (ref 0.51–0.95)
DEPRECATED HCO3 PLAS-SCNC: 31 MMOL/L (ref 22–29)
EGFRCR SERPLBLD CKD-EPI 2021: 69 ML/MIN/1.73M2
FASTING STATUS PATIENT QL REPORTED: YES
GLUCOSE SERPL-MCNC: 96 MG/DL (ref 70–99)
HDLC SERPL-MCNC: 85 MG/DL
LDLC SERPL CALC-MCNC: 159 MG/DL
NONHDLC SERPL-MCNC: 168 MG/DL
POTASSIUM SERPL-SCNC: 4.4 MMOL/L (ref 3.4–5.3)
SODIUM SERPL-SCNC: 141 MMOL/L (ref 135–145)
TRIGL SERPL-MCNC: 44 MG/DL
TSH SERPL DL<=0.005 MIU/L-ACNC: 1.12 UIU/ML (ref 0.3–4.2)

## 2024-03-13 PROCEDURE — 99214 OFFICE O/P EST MOD 30 MIN: CPT | Performed by: INTERNAL MEDICINE

## 2024-03-13 PROCEDURE — 99213 OFFICE O/P EST LOW 20 MIN: CPT | Performed by: INTERNAL MEDICINE

## 2024-03-13 PROCEDURE — 80048 BASIC METABOLIC PNL TOTAL CA: CPT

## 2024-03-13 PROCEDURE — 250N000011 HC RX IP 250 OP 636: Mod: JZ | Performed by: INTERNAL MEDICINE

## 2024-03-13 PROCEDURE — 84443 ASSAY THYROID STIM HORMONE: CPT

## 2024-03-13 PROCEDURE — 80061 LIPID PANEL: CPT

## 2024-03-13 PROCEDURE — 96402 CHEMO HORMON ANTINEOPL SQ/IM: CPT

## 2024-03-13 PROCEDURE — 36415 COLL VENOUS BLD VENIPUNCTURE: CPT

## 2024-03-13 RX ORDER — CLOTRIMAZOLE AND BETAMETHASONE DIPROPIONATE 10; .64 MG/G; MG/G
CREAM TOPICAL
COMMUNITY
Start: 2024-01-22

## 2024-03-13 RX ADMIN — GOSERELIN ACETATE 10.8 MG: 10.8 IMPLANT SUBCUTANEOUS at 10:03

## 2024-03-13 ASSESSMENT — PAIN SCALES - GENERAL: PAINLEVEL: NO PAIN (0)

## 2024-03-13 NOTE — PROGRESS NOTES
"Oncology Rooming Note    March 13, 2024 8:57 AM   Veronica Nieves is a 33 year old female who presents for:    Chief Complaint   Patient presents with    Oncology Clinic Visit     Return visit 12 weeks with lab and infusion. Malignant neoplasm of upper-outer quadrant of left breast in female, estrogen receptor positive.     Initial Vitals: /66 (BP Location: Right arm, Patient Position: Sitting, Cuff Size: Adult Regular)   Pulse 60   Temp 98.8  F (37.1  C) (Oral)   Resp 16   Ht 1.689 m (5' 6.5\")   Wt 87 kg (191 lb 14.4 oz)   SpO2 96%   BMI 30.51 kg/m   Estimated body mass index is 30.51 kg/m  as calculated from the following:    Height as of this encounter: 1.689 m (5' 6.5\").    Weight as of this encounter: 87 kg (191 lb 14.4 oz). Body surface area is 2.02 meters squared.  No Pain (0) Comment: Data Unavailable   No LMP recorded. (Menstrual status: Chemotherapy).  Allergies reviewed: Yes  Medications reviewed: Yes    Medications: Medication refills not needed today.  Pharmacy name entered into [email or web address]:    [facility] - [place], [place] - [address] [place] [address] [facility]ACY [id number] [place], [place] - [address] [facility]  [facility] - [place], [place] - [address] [place] [address]    Frailty Screening:   Is the patient here for a new oncology consult visit in cancer care? 2. No      Clinical concerns: Return visit 12 weeks with lab and infusion. Malignant neoplasm of upper-outer quadrant of left breast in female, estrogen receptor positive.       Ruchi Tipton CMA            "

## 2024-03-13 NOTE — Clinical Note
"    3/13/2024         RE: Veronica Nieves  [address]  [facility] [address]        Dear Colleague,    Thank you for referring your patient, Veronica Nieves, to the [facility]. Please see a copy of my visit note below.    Oncology Rooming Note    March 13, 2024 8:57 AM   Veronica Nieves is a 33 year old female who presents for:    Chief Complaint   Patient presents with    Oncology Clinic Visit     Return visit 12 weeks with lab and infusion. Malignant neoplasm of upper-outer quadrant of left breast in female, estrogen receptor positive.     Initial Vitals: /66 (BP Location: Right arm, Patient Position: Sitting, Cuff Size: Adult Regular)   Pulse 60   Temp 98.8  F (37.1  C) (Oral)   Resp 16   Ht 1.689 m (5' 6.5\")   Wt 87 kg (191 lb 14.4 oz)   SpO2 96%   BMI 30.51 kg/m   Estimated body mass index is 30.51 kg/m  as calculated from the following:    Height as of this encounter: 1.689 m (5' 6.5\").    Weight as of this encounter: 87 kg (191 lb 14.4 oz). Body surface area is 2.02 meters squared.  No Pain (0) Comment: Data Unavailable   No LMP recorded. (Menstrual status: Chemotherapy).  Allergies reviewed: Yes  Medications reviewed: Yes    Medications: Medication refills not needed today.  Pharmacy name entered into [employer]:    [facility] ([facility]) - [address] [id number] [place], [place] - [address] [facility]  [facility] - [place], [place] - [address] [place] [address]    Frailty Screening:   Is the patient here for a new oncology consult visit in cancer care? 2. No      Clinical concerns: Return visit 12 weeks with lab and infusion. Malignant neoplasm of upper-outer quadrant of left breast in female, estrogen receptor positive.       Ruchi Tipton CMA              [facility] Hematology and Oncology Progress Note    Patient: Veronica Nieves  MRN: [id number]  Date of Service: Mar 13, " 2024        Assessment and Plan:    Cancer Staging  Malignant neoplasm of upper-outer quadrant of left breast in female, estrogen receptor positive (H)  Staging form: Breast, AJCC 8th Edition  - Clinical stage from 5/19/2021: Stage IB (cT2, cN0, cM0, G2, ER+, AK+, HER2+) - Signed by Jefferson Garcia MD on 5/19/2021     1.  Invasive ductal carcinoma of the left breast:  She remains on exemestane and Zoladex.  Overall she is tolerating okay.  Still having some hot flashes but they are tolerable.  Clinically there is no evidence of recurrent disease.  We reviewed the mechanism of action of Zoladex, tamoxifen, and exemestane.  She is getting her Zoladex every 3 months.  She will be on adjuvant endocrine therapy until November, 2026.  She had a bone density test in April 2023 which was normal.  Continue calcium and vitamin D.  Next bone density test will be in April 2024.  Status post bilateral mastectomy, does not need mammograms.    2.  Anxiety and depression: She is on Celexa 40 mg.  She states she has been more depressed lately.  She does have an outside therapist that she works with.  I told her to let us know if she thinks she would like to try a medication that is more focused towards depression.      Adrianna barrios  Needs a alonzo Lockett    PMD will be mary welsh    ECOG Performance  0    Diagnosis:    1.  Invasive ductal carcinoma of the left breast: Diagnosed May, 2021.  Initial imaging suggested 3 masses. Biopsy of the largest mass showed an invasive ductal carcinoma, grade 2.  ER/AK positive, HER-2 positive. Evaluation of the axilla was negative.    Treatment:    Neoadjuvant chemotherapy with TCHP initiated on May 27, 2021.  Cycle 6 was given on September 10.    Bilateral mastectomy performed on October 7.  Pathology showed a 9 mm focus of invasive ductal carcinoma of the left breast.  Lymph nodes are negative.    Kadcyla initiated November 29, 2021. Kadcyla completed August 26, 2022.  Zoladex and  "tamoxifen initiated November 29, 2021.   Zoladex dosing q3 months starting Sept., 2022.    Exemestane started 3/17/23 (hot flashes, weight gain w/ tamoxifen).    She had her right implant removed secondary to infection on Tiana 10, 2022.    Interim History:    Abbey presents today for a follow-up visit.  She is now on exemestane and Zoladex.  She was seen 3 months ago.     HF some imroves  Still NS  No mask  Occ HA    Review of Systems:    As above in the history.     Review of Systems otherwise Negative for:  General: chills, fever or night sweats  Psychological: anxiety or depression  Ophthalmic: blurry vision, double vision or loss of vision, vision change  ENT: epistaxis, oral lesions, hearing changes  Hematological and Lymphatic: bleeding, bruising, jaundice, swollen lymph nodes  Endocrine: hot flashes, unexpected weight changes  Respiratory: cough, hemoptysis, orthopnea or shortness of breath/SHAHID  Cardiovascular: chest pain, edema, palpitations or PND  Gastrointestinal: abdominal pain, blood in stools, change in bowel habits, constipation, diarrhea or nausea/vomiting  Genito-Urinary: change in urinary stream, incontinence, frequency/urgency  Musculoskeletal: joint pain, stiffness, swelling, muscle pain  Neurological: dizziness, headaches  Dermatological: lumps and rash    Past History:    Past Medical History:   Diagnosis Date     Abnormal Pap smear of cervix     age 13, subsequent pap smears normal     Anxiety      Breast cancer (H) 05/04/2021    Left     Depression      Physical Exam:    /66 (BP Location: Right arm, Patient Position: Sitting, Cuff Size: Adult Regular)   Pulse 60   Temp 98.8  F (37.1  C) (Oral)   Resp 16   Ht 1.689 m (5' 6.5\")   Wt 87 kg (191 lb 14.4 oz)   SpO2 96%   BMI 30.51 kg/m      General: patient appears stated age of 31 year old. Nontoxic and in no distress.   HEENT: Head: atraumatic, normocephalic. Sclerae anicteric.  Chest:  Normal respiratory effort  Cardiac:  No edema. "   Abdomen: abdomen is non-distended  Extremities: normal tone and muscle bulk.  Skin: no lesions or rash on visible skin. Warm and dry.   CNS: alert and oriented. Grossly non-focal.   Psychiatric: normal mood and affect.     Lab Results:    Recent Results (from the past 24 hour(s))   Lipid Profile (Chol, Trig, HDL, LDL calc)    Collection Time: 03/13/24  8:17 AM   Result Value Ref Range    Cholesterol 253 (H) <200 mg/dL    Triglycerides 44 <150 mg/dL    Direct Measure HDL 85 >=50 mg/dL    LDL Cholesterol Calculated 159 (H) <=100 mg/dL    Non HDL Cholesterol 168 (H) <130 mg/dL    Patient Fasting > 8hrs? Yes    Basic metabolic panel    Collection Time: 03/13/24  8:17 AM   Result Value Ref Range    Sodium 141 135 - 145 mmol/L    Potassium 4.4 3.4 - 5.3 mmol/L    Chloride 103 98 - 107 mmol/L    Carbon Dioxide (CO2) 31 (H) 22 - 29 mmol/L    Anion Gap 7 7 - 15 mmol/L    Urea Nitrogen 15.7 6.0 - 20.0 mg/dL    Creatinine 1.08 (H) 0.51 - 0.95 mg/dL    GFR Estimate 69 >60 mL/min/1.73m2    Calcium 10.1 (H) 8.6 - 10.0 mg/dL    Glucose 96 70 - 99 mg/dL           Signed by: Jefferson Garcia MD          Again, thank you for allowing me to participate in the care of your patient.        Sincerely,        Jefferson Garcia MD

## 2024-03-13 NOTE — PROGRESS NOTES
[facility] Hematology and Oncology Progress Note    Patient: Veronica Nieves  MRN: [id number]  Date of Service: Mar 13, 2024        Assessment and Plan:    Cancer Staging  Malignant neoplasm of upper-outer quadrant of left breast in female, estrogen receptor positive (H)  Staging form: Breast, AJCC 8th Edition  - Clinical stage from 5/19/2021: Stage IB (cT2, cN0, cM0, G2, ER+, NM+, HER2+) - Signed by Jefferson Garcia MD on 5/19/2021     1.  Invasive ductal carcinoma of the left breast: Overall doing okay on her current treatment.  Zoladex and exemestane.  She has been on adjuvant endocrine therapy since November 2021.  Will likely continue for 10 years.  Depending on tolerance.  Continue calcium and vitamin D supplementation.  She had a bone density test in April 2023 which was normal.  Can repeat in April 2025.  She will continue Zoladex injections every 3 months at [facility].  We will see her in clinic in 6 months.    2.  Anxiety and depression: She is on Celexa 40 mg.     ECOG Performance  0    Diagnosis:    1.  Invasive ductal carcinoma of the left breast: Diagnosed May, 2021.  Initial imaging suggested 3 masses. Biopsy of the largest mass showed an invasive ductal carcinoma, grade 2.  ER/NM positive, HER-2 positive. Evaluation of the axilla was negative.    Treatment:    Neoadjuvant chemotherapy with TCHP initiated on May 27, 2021.  Cycle 6 was given on September 10.    Bilateral mastectomy performed on October 7.  Pathology showed a 9 mm focus of invasive ductal carcinoma of the left breast.  Lymph nodes are negative.    Kadcyla initiated November 29, 2021. Kadcyla completed August 26, 2022.  Zoladex and tamoxifen initiated November 29, 2021.   Zoladex dosing q3 months starting Sept., 2022.    Exemestane started 3/17/23 (hot flashes, weight gain w/ tamoxifen).    She had her right implant removed secondary to infection on Tiana 10, 2022.    Interim History:    Abbey presents today for a follow-up visit.  " Overall tolerating her treatment okay.  Hot flashes have improved some but she still having some night sweats.  No musculoskeletal complaints.  Occasional headache.    Review of Systems:    As above in the history.     Review of Systems otherwise Negative for:  General: chills, fever or night sweats  Psychological: anxiety or depression  Ophthalmic: blurry vision, double vision or loss of vision, vision change  ENT: epistaxis, oral lesions, hearing changes  Hematological and Lymphatic: bleeding, bruising, jaundice, swollen lymph nodes  Endocrine: hot flashes, unexpected weight changes  Respiratory: cough, hemoptysis, orthopnea or shortness of breath/SHAHID  Cardiovascular: chest pain, edema, palpitations or PND  Gastrointestinal: abdominal pain, blood in stools, change in bowel habits, constipation, diarrhea or nausea/vomiting  Genito-Urinary: change in urinary stream, incontinence, frequency/urgency  Musculoskeletal: joint pain, stiffness, swelling, muscle pain  Neurological: dizziness, headaches  Dermatological: lumps and rash    Past History:    Past Medical History:   Diagnosis Date    Abnormal Pap smear of cervix     age 13, subsequent pap smears normal    Anxiety     Breast cancer (H) 05/04/2021    Left    Depression      Physical Exam:    /66 (BP Location: Right arm, Patient Position: Sitting, Cuff Size: Adult Regular)   Pulse 60   Temp 98.8  F (37.1  C) (Oral)   Resp 16   Ht 1.689 m (5' 6.5\")   Wt 87 kg (191 lb 14.4 oz)   SpO2 96%   BMI 30.51 kg/m      General: patient appears stated age of 31 year old. Nontoxic and in no distress.   HEENT: Head: atraumatic, normocephalic. Sclerae anicteric.  Chest:  Normal respiratory effort  Cardiac:  No edema.   Abdomen: abdomen is non-distended  Extremities: normal tone and muscle bulk.  Skin: no lesions or rash on visible skin. Warm and dry.   CNS: alert and oriented. Grossly non-focal.   Psychiatric: normal mood and affect.     Lab Results:    Recent Results " (from the past 24 hour(s))   Lipid Profile (Chol, Trig, HDL, LDL calc)    Collection Time: 03/13/24  8:17 AM   Result Value Ref Range    Cholesterol 253 (H) <200 mg/dL    Triglycerides 44 <150 mg/dL    Direct Measure HDL 85 >=50 mg/dL    LDL Cholesterol Calculated 159 (H) <=100 mg/dL    Non HDL Cholesterol 168 (H) <130 mg/dL    Patient Fasting > 8hrs? Yes    Basic metabolic panel    Collection Time: 03/13/24  8:17 AM   Result Value Ref Range    Sodium 141 135 - 145 mmol/L    Potassium 4.4 3.4 - 5.3 mmol/L    Chloride 103 98 - 107 mmol/L    Carbon Dioxide (CO2) 31 (H) 22 - 29 mmol/L    Anion Gap 7 7 - 15 mmol/L    Urea Nitrogen 15.7 6.0 - 20.0 mg/dL    Creatinine 1.08 (H) 0.51 - 0.95 mg/dL    GFR Estimate 69 >60 mL/min/1.73m2    Calcium 10.1 (H) 8.6 - 10.0 mg/dL    Glucose 96 70 - 99 mg/dL       Signed by: Jefferson Garcia MD

## 2024-03-13 NOTE — PROGRESS NOTES
Infusion Nursing Note:  Veronica Nieves presents today for Zoladex.    Patient seen by provider today: Yes:    present during visit today: Not Applicable.    Note: Patient was given injection in LLQ abdomen.      Intravenous Access:  Labs drawn without difficulty.    Treatment Conditions:  Not Applicable.      Post Infusion Assessment:  Patient tolerated injection without incident.       Discharge Plan:   Patient discharged in stable condition accompanied by: selfJohnnie Moore RN

## 2024-04-16 DIAGNOSIS — Z17.0 MALIGNANT NEOPLASM OF UPPER-OUTER QUADRANT OF LEFT BREAST IN FEMALE, ESTROGEN RECEPTOR POSITIVE (H): ICD-10-CM

## 2024-04-16 DIAGNOSIS — C50.412 MALIGNANT NEOPLASM OF UPPER-OUTER QUADRANT OF LEFT BREAST IN FEMALE, ESTROGEN RECEPTOR POSITIVE (H): ICD-10-CM

## 2024-04-16 RX ORDER — EXEMESTANE 25 MG/1
25 TABLET ORAL DAILY
Qty: 90 TABLET | Refills: 0 | Status: SHIPPED | OUTPATIENT
Start: 2024-04-16 | End: 2024-09-05

## 2024-06-27 DIAGNOSIS — C50.412 MALIGNANT NEOPLASM OF UPPER-OUTER QUADRANT OF LEFT BREAST IN FEMALE, ESTROGEN RECEPTOR POSITIVE (H): Primary | ICD-10-CM

## 2024-06-27 DIAGNOSIS — Z17.0 MALIGNANT NEOPLASM OF UPPER-OUTER QUADRANT OF LEFT BREAST IN FEMALE, ESTROGEN RECEPTOR POSITIVE (H): Primary | ICD-10-CM

## 2024-06-28 ENCOUNTER — INFUSION THERAPY VISIT (OUTPATIENT)
Dept: INFUSION THERAPY | Facility: HOSPITAL | Age: 34
End: 2024-06-28
Attending: INTERNAL MEDICINE
Payer: COMMERCIAL

## 2024-06-28 VITALS
OXYGEN SATURATION: 97 % | SYSTOLIC BLOOD PRESSURE: 135 MMHG | TEMPERATURE: 97.8 F | DIASTOLIC BLOOD PRESSURE: 60 MMHG | HEART RATE: 58 BPM | RESPIRATION RATE: 16 BRPM

## 2024-06-28 DIAGNOSIS — C50.412 MALIGNANT NEOPLASM OF UPPER-OUTER QUADRANT OF LEFT BREAST IN FEMALE, ESTROGEN RECEPTOR POSITIVE (H): Primary | ICD-10-CM

## 2024-06-28 DIAGNOSIS — Z17.0 MALIGNANT NEOPLASM OF UPPER-OUTER QUADRANT OF LEFT BREAST IN FEMALE, ESTROGEN RECEPTOR POSITIVE (H): Primary | ICD-10-CM

## 2024-06-28 PROCEDURE — 96402 CHEMO HORMON ANTINEOPL SQ/IM: CPT

## 2024-06-28 PROCEDURE — 250N000011 HC RX IP 250 OP 636: Performed by: NURSE PRACTITIONER

## 2024-06-28 RX ADMIN — GOSERELIN ACETATE 10.8 MG: 10.8 IMPLANT SUBCUTANEOUS at 08:23

## 2024-06-28 NOTE — PROGRESS NOTES
Infusion Nursing Note:  Veronica Nieves presents today for cycle 3 day 1 zoladex.    Patient seen by provider today: No   present during visit today: Not Applicable.    Note: Ry arrived ambulatory and in stable condition. Zoladex administered into the right abdomen.      Intravenous Access:  No Intravenous access/labs at this visit.    Treatment Conditions:  Not Applicable.      Post Infusion Assessment:  Patient tolerated injection without incident.       Discharge Plan:   Patient and/or family verbalized understanding of discharge instructions and all questions answered.  AVS to patient via [employer].  Patient will return 9/27 for next appointment.   Patient discharged in stable condition accompanied by: self.  Departure Mode: Ambulatory.      Ingrid Naik RN

## 2024-09-05 DIAGNOSIS — Z17.0 MALIGNANT NEOPLASM OF UPPER-OUTER QUADRANT OF LEFT BREAST IN FEMALE, ESTROGEN RECEPTOR POSITIVE (H): ICD-10-CM

## 2024-09-05 DIAGNOSIS — C50.412 MALIGNANT NEOPLASM OF UPPER-OUTER QUADRANT OF LEFT BREAST IN FEMALE, ESTROGEN RECEPTOR POSITIVE (H): ICD-10-CM

## 2024-09-05 RX ORDER — EXEMESTANE 25 MG/1
25 TABLET ORAL DAILY
Qty: 90 TABLET | Refills: 0 | Status: SHIPPED | OUTPATIENT
Start: 2024-09-05

## 2024-10-17 DIAGNOSIS — C50.412 MALIGNANT NEOPLASM OF UPPER-OUTER QUADRANT OF LEFT BREAST IN FEMALE, ESTROGEN RECEPTOR POSITIVE (H): Primary | ICD-10-CM

## 2024-10-17 DIAGNOSIS — Z17.0 MALIGNANT NEOPLASM OF UPPER-OUTER QUADRANT OF LEFT BREAST IN FEMALE, ESTROGEN RECEPTOR POSITIVE (H): Primary | ICD-10-CM

## 2024-10-21 ENCOUNTER — ONCOLOGY VISIT (OUTPATIENT)
Dept: ONCOLOGY | Facility: HOSPITAL | Age: 34
End: 2024-10-21
Attending: INTERNAL MEDICINE
Payer: COMMERCIAL

## 2024-10-21 ENCOUNTER — INFUSION THERAPY VISIT (OUTPATIENT)
Dept: INFUSION THERAPY | Facility: HOSPITAL | Age: 34
End: 2024-10-21
Attending: NURSE PRACTITIONER
Payer: COMMERCIAL

## 2024-10-21 ENCOUNTER — LAB (OUTPATIENT)
Dept: INFUSION THERAPY | Facility: HOSPITAL | Age: 34
End: 2024-10-21
Attending: INTERNAL MEDICINE
Payer: COMMERCIAL

## 2024-10-21 VITALS
SYSTOLIC BLOOD PRESSURE: 119 MMHG | TEMPERATURE: 98.8 F | HEIGHT: 64 IN | BODY MASS INDEX: 34.06 KG/M2 | DIASTOLIC BLOOD PRESSURE: 68 MMHG | HEART RATE: 54 BPM | WEIGHT: 199.5 LBS | OXYGEN SATURATION: 97 % | RESPIRATION RATE: 16 BRPM

## 2024-10-21 DIAGNOSIS — C50.412 MALIGNANT NEOPLASM OF UPPER-OUTER QUADRANT OF LEFT BREAST IN FEMALE, ESTROGEN RECEPTOR POSITIVE (H): ICD-10-CM

## 2024-10-21 DIAGNOSIS — C50.412 MALIGNANT NEOPLASM OF UPPER-OUTER QUADRANT OF LEFT BREAST IN FEMALE, ESTROGEN RECEPTOR POSITIVE (H): Primary | ICD-10-CM

## 2024-10-21 DIAGNOSIS — Z17.0 MALIGNANT NEOPLASM OF UPPER-OUTER QUADRANT OF LEFT BREAST IN FEMALE, ESTROGEN RECEPTOR POSITIVE (H): Primary | ICD-10-CM

## 2024-10-21 DIAGNOSIS — Z17.0 MALIGNANT NEOPLASM OF UPPER-OUTER QUADRANT OF LEFT BREAST IN FEMALE, ESTROGEN RECEPTOR POSITIVE (H): ICD-10-CM

## 2024-10-21 DIAGNOSIS — N92.6 IRREGULAR PERIODS: Primary | ICD-10-CM

## 2024-10-21 LAB
ALBUMIN SERPL BCG-MCNC: 4.3 G/DL (ref 3.5–5.2)
ALP SERPL-CCNC: 90 U/L (ref 40–150)
ALT SERPL W P-5'-P-CCNC: 17 U/L (ref 0–50)
ANION GAP SERPL CALCULATED.3IONS-SCNC: 9 MMOL/L (ref 7–15)
AST SERPL W P-5'-P-CCNC: 24 U/L (ref 0–45)
BASOPHILS # BLD AUTO: 0 10E3/UL (ref 0–0.2)
BASOPHILS NFR BLD AUTO: 1 %
BILIRUB SERPL-MCNC: 0.3 MG/DL
BUN SERPL-MCNC: 13.5 MG/DL (ref 6–20)
CALCIUM SERPL-MCNC: 9 MG/DL (ref 8.8–10.4)
CHLORIDE SERPL-SCNC: 105 MMOL/L (ref 98–107)
CREAT SERPL-MCNC: 1.06 MG/DL (ref 0.51–0.95)
EGFRCR SERPLBLD CKD-EPI 2021: 70 ML/MIN/1.73M2
EOSINOPHIL # BLD AUTO: 0.4 10E3/UL (ref 0–0.7)
EOSINOPHIL NFR BLD AUTO: 6 %
ERYTHROCYTE [DISTWIDTH] IN BLOOD BY AUTOMATED COUNT: 12.8 % (ref 10–15)
ESTRADIOL SERPL-MCNC: 10 PG/ML
FSH SERPL IRP2-ACNC: 14.9 MIU/ML
GLUCOSE SERPL-MCNC: 83 MG/DL (ref 70–99)
HCO3 SERPL-SCNC: 26 MMOL/L (ref 22–29)
HCT VFR BLD AUTO: 40.1 % (ref 35–47)
HGB BLD-MCNC: 13.1 G/DL (ref 11.7–15.7)
IMM GRANULOCYTES # BLD: 0 10E3/UL
IMM GRANULOCYTES NFR BLD: 0 %
LH SERPL-ACNC: 6.1 MIU/ML
LYMPHOCYTES # BLD AUTO: 1.6 10E3/UL (ref 0.8–5.3)
LYMPHOCYTES NFR BLD AUTO: 22 %
MCH RBC QN AUTO: 29 PG (ref 26.5–33)
MCHC RBC AUTO-ENTMCNC: 32.7 G/DL (ref 31.5–36.5)
MCV RBC AUTO: 89 FL (ref 78–100)
MONOCYTES # BLD AUTO: 0.5 10E3/UL (ref 0–1.3)
MONOCYTES NFR BLD AUTO: 8 %
NEUTROPHILS # BLD AUTO: 4.5 10E3/UL (ref 1.6–8.3)
NEUTROPHILS NFR BLD AUTO: 63 %
NRBC # BLD AUTO: 0 10E3/UL
NRBC BLD AUTO-RTO: 0 /100
PLATELET # BLD AUTO: 304 10E3/UL (ref 150–450)
POTASSIUM SERPL-SCNC: 4.3 MMOL/L (ref 3.4–5.3)
PROT SERPL-MCNC: 7.2 G/DL (ref 6.4–8.3)
RBC # BLD AUTO: 4.51 10E6/UL (ref 3.8–5.2)
SODIUM SERPL-SCNC: 140 MMOL/L (ref 135–145)
WBC # BLD AUTO: 7.1 10E3/UL (ref 4–11)

## 2024-10-21 PROCEDURE — 82670 ASSAY OF TOTAL ESTRADIOL: CPT | Performed by: NURSE PRACTITIONER

## 2024-10-21 PROCEDURE — 250N000011 HC RX IP 250 OP 636: Mod: JZ | Performed by: NURSE PRACTITIONER

## 2024-10-21 PROCEDURE — 36415 COLL VENOUS BLD VENIPUNCTURE: CPT

## 2024-10-21 PROCEDURE — 85025 COMPLETE CBC W/AUTO DIFF WBC: CPT

## 2024-10-21 PROCEDURE — G2211 COMPLEX E/M VISIT ADD ON: HCPCS | Performed by: NURSE PRACTITIONER

## 2024-10-21 PROCEDURE — 99214 OFFICE O/P EST MOD 30 MIN: CPT | Performed by: NURSE PRACTITIONER

## 2024-10-21 PROCEDURE — 96402 CHEMO HORMON ANTINEOPL SQ/IM: CPT

## 2024-10-21 PROCEDURE — 80053 COMPREHEN METABOLIC PANEL: CPT

## 2024-10-21 PROCEDURE — 83001 ASSAY OF GONADOTROPIN (FSH): CPT | Performed by: NURSE PRACTITIONER

## 2024-10-21 PROCEDURE — 99215 OFFICE O/P EST HI 40 MIN: CPT | Performed by: NURSE PRACTITIONER

## 2024-10-21 PROCEDURE — 83002 ASSAY OF GONADOTROPIN (LH): CPT | Performed by: NURSE PRACTITIONER

## 2024-10-21 RX ORDER — EXEMESTANE 25 MG/1
25 TABLET ORAL DAILY
Qty: 90 TABLET | Refills: 0 | Status: SHIPPED | OUTPATIENT
Start: 2024-10-21

## 2024-10-21 RX ADMIN — GOSERELIN ACETATE 3.6 MG: 3.6 IMPLANT SUBCUTANEOUS at 09:00

## 2024-10-21 ASSESSMENT — PAIN SCALES - GENERAL: PAINLEVEL: NO PAIN (0)

## 2024-10-21 NOTE — PROGRESS NOTES
"Oncology Rooming Note    October 21, 2024 8:24 AM   Veronica Nieves is a 34 year old female who presents for:    Chief Complaint   Patient presents with    Oncology Clinic Visit     Return visit 6 months with lab and injection. Malignant neoplasm of upper-outer quadrant of left breast in female, estrogen receptor positive.     Initial Vitals: /68 (BP Location: Left arm, Patient Position: Sitting, Cuff Size: Adult Regular)   Pulse 54   Temp 98.8  F (37.1  C) (Oral)   Resp 16   Ht 1.619 m (5' 3.75\")   Wt 90.5 kg (199 lb 8 oz)   LMP 10/17/2024 (Approximate)   SpO2 97%   BMI 34.51 kg/m   Estimated body mass index is 34.51 kg/m  as calculated from the following:    Height as of this encounter: 1.619 m (5' 3.75\").    Weight as of this encounter: 90.5 kg (199 lb 8 oz). Body surface area is 2.02 meters squared.  No Pain (0) Comment: Data Unavailable   Patient's last menstrual period was 10/17/2024 (approximate).  Allergies reviewed: Yes  Medications reviewed: Yes    Medications: MEDICATION REFILLS NEEDED TODAY. Provider was notified.  Pharmacy name entered into NOWBOX:    [facility] ([facility]) - [place], [place] - [address] [place] [address] PHARMACY #[id number] [place], [place] - [address] [facility]  [facility] - [place], [place] - [address] [place] [address]    Frailty Screening:   Is the patient here for a new oncology consult visit in cancer care? 2. No      Clinical concerns: still getting periods while on Exemestane after having IUD removed.   Marleen was notified.      Ruchi Tipton CMA            "

## 2024-10-21 NOTE — PROGRESS NOTES
[facility] Hematology and Oncology Progress Note    Patient: Veronica Nieves  MRN: [id number]  Date of Service: Oct 21, 2024          Reason for Visit    Chief Complaint   Patient presents with    Oncology Clinic Visit     Return visit 6 months with lab and injection. Malignant neoplasm of upper-outer quadrant of left breast in female, estrogen receptor positive.       Assessment and Plan     Cancer Staging   No matching staging information was found for the patient.    1.  Breast cancer, left breast, invasive ductal carcinoma, stage Ib: Currently on exemestane and zoladex. Has been on this since November 2021. Due to age, we have recommended her to continue for 10 years.  Patient will return in 3 to 4 months for her next visit.    2. Risk for Osteopenia: High risk for worsening bone density with the aromatase inhibitor.  She will continue calcium and vitamin D supplement. We will repeat her DEXA scan in April .  Encourage also to participate in weightbearing exercises. Good calcium in diet.     3.  Resumption of periods after IUD removed: This is abnormal considering she is getting Zoladex.  We will go ahead and get estradiol, FSH and LH today.  If it does suggest she is not postmenopausal then Lidex may not be working.  We want that to be working in order for her to be on the Exemestane so in the meantime before we get the results I am in to switch her over to getting a 1 month dose of the Zoladex.  Do that for 3 to 4 months and then repeat labs and evaluate whether her period goes away.  If so then we will likely just have to do it every month.  If she continues to have periods while getting that we will have to reevaluate and put her back on tamoxifen.      ECOG Performance    0 - Independent    Distress Screening (within last 30 days)    No data recorded     Pain  Pain Score: No Pain (0)    Problem List    Patient Active Problem List   Diagnosis    Mild depression and anxiety    Malignant neoplasm of  upper-outer quadrant of left breast in female, estrogen receptor positive (H)    Elevated serum creatinine    Chemotherapy-induced peripheral neuropathy (H)    Cervical high risk HPV (human papillomavirus) test positive        ______________________________________________________________________________    History of Present Illness    Oncologist: Dr. Garcia    Diagnosis:     1.  Invasive ductal carcinoma of the left breast: Diagnosed May, 2021.  Initial imaging suggested 3 masses. Biopsy of the largest mass showed an invasive ductal carcinoma, grade 2.  ER/KY positive, HER-2 positive. Evaluation of the axilla was negative.     Treatment:     Neoadjuvant chemotherapy with TCHP initiated on May 27, 2021.  Cycle 6 was given on September 10.     Bilateral mastectomy performed on October 7.  Pathology showed a 9 mm focus of invasive ductal carcinoma of the left breast.  Lymph nodes are negative.     Kadcyla initiated November 29, 2021. Kadcyla completed August 26, 2022.     Zoladex and tamoxifen initiated November 29, 2021. Zoladex dosing q3 months starting Sept., 2022.  -Secondary to significant hot flashes and weight gain on tamoxifen, as well as considering SOFT trial data she was changed to exemestane 3/17/23     She had her right implant removed secondary to infection on Tiana 10, 2022.     Interim History:  Patient is here today for a 6-month follow-up visit.  Overall she states she is doing okay but says that she had her IUD removed about a year ago and since then she has started getting her periods again.  She says she is probably had 4-5 of them over the last 9 months.  They are exactly like her previous periods.  She states that she has been getting her Zoladex on time except this 1 she is a couple weeks overdue because she was traveling.  Continues to take her Exemestane every day.  She denies any other new issues.      Review of Systems    Pertinent items are noted in HPI.    Past History    Past Medical  "History:   Diagnosis Date    Abnormal Pap smear of cervix     age 13, subsequent pap smears normal    Anxiety     Breast cancer (H) 05/04/2021    Left    Depression        PHYSICAL EXAM  /68 (BP Location: Left arm, Patient Position: Sitting, Cuff Size: Adult Regular)   Pulse 54   Temp 98.8  F (37.1  C) (Oral)   Resp 16   Ht 1.619 m (5' 3.75\")   Wt 90.5 kg (199 lb 8 oz)   LMP 10/17/2024 (Approximate)   SpO2 97%   BMI 34.51 kg/m      GENERAL: no acute distress. Cooperative in conversation. Alone in clinic  RESP: Regular respiratory rate. No expiratory wheezes   NEURO: non focal. Alert and oriented x3.   PSYCH: within normal limits. No depression or anxiety.  SKIN: exposed skin is dry intact.     Lab Results    Recent Results (from the past 168 hour(s))   Comprehensive metabolic panel (BMP + Alb, Alk Phos, ALT, AST, Total. Bili, TP)   Result Value Ref Range    Sodium 140 135 - 145 mmol/L    Potassium 4.3 3.4 - 5.3 mmol/L    Carbon Dioxide (CO2) 26 22 - 29 mmol/L    Anion Gap 9 7 - 15 mmol/L    Urea Nitrogen 13.5 6.0 - 20.0 mg/dL    Creatinine 1.06 (H) 0.51 - 0.95 mg/dL    GFR Estimate 70 >60 mL/min/1.73m2    Calcium 9.0 8.8 - 10.4 mg/dL    Chloride 105 98 - 107 mmol/L    Glucose 83 70 - 99 mg/dL    Alkaline Phosphatase 90 40 - 150 U/L    AST 24 0 - 45 U/L    ALT 17 0 - 50 U/L    Protein Total 7.2 6.4 - 8.3 g/dL    Albumin 4.3 3.5 - 5.2 g/dL    Bilirubin Total 0.3 <=1.2 mg/dL   CBC with platelets and differential   Result Value Ref Range    WBC Count 7.1 4.0 - 11.0 10e3/uL    RBC Count 4.51 3.80 - 5.20 10e6/uL    Hemoglobin 13.1 11.7 - 15.7 g/dL    Hematocrit 40.1 35.0 - 47.0 %    MCV 89 78 - 100 fL    MCH 29.0 26.5 - 33.0 pg    MCHC 32.7 31.5 - 36.5 g/dL    RDW 12.8 10.0 - 15.0 %    Platelet Count 304 150 - 450 10e3/uL    % Neutrophils 63 %    % Lymphocytes 22 %    % Monocytes 8 %    % Eosinophils 6 %    % Basophils 1 %    % Immature Granulocytes 0 %    NRBCs per 100 WBC 0 <1 /100    Absolute Neutrophils " 4.5 1.6 - 8.3 10e3/uL    Absolute Lymphocytes 1.6 0.8 - 5.3 10e3/uL    Absolute Monocytes 0.5 0.0 - 1.3 10e3/uL    Absolute Eosinophils 0.4 0.0 - 0.7 10e3/uL    Absolute Basophils 0.0 0.0 - 0.2 10e3/uL    Absolute Immature Granulocytes 0.0 <=0.4 10e3/uL    Absolute NRBCs 0.0 10e3/uL       Imaging    No results found.    The longitudinal plan of care for the diagnosis(es)/condition(s) as documented were addressed during this visit. Due to the added complexity in care, I will continue to support Ry in the subsequent management and with ongoing continuity of care.    Total time spent with patient in face to face time, chart review and documentation was 40 minutes.      Signed by: ROSEMARY Plummer CNP

## 2024-10-21 NOTE — PROGRESS NOTES
PT here ambulatory for zoladex inj.Ice pack made for left side of abdomen. Once site numb zoladex administered in left lower abodmen/bandaid to site. Follow up reviewed. PT tolerated inj without any problems.  PT dc'd steady gait

## 2024-10-21 NOTE — LETTER
"10/21/2024      Veronica Nieves  [address]  [facility] [address]      Dear Colleague,    Thank you for referring your patient, Veronica Nieves, to the [facility] [place]. Please see a copy of my visit note below.    Oncology Rooming Note    October 21, 2024 8:24 AM   Veronica Nieves is a 34 year old female who presents for:    Chief Complaint   Patient presents with     Oncology Clinic Visit     Return visit 6 months with lab and injection. Malignant neoplasm of upper-outer quadrant of left breast in female, estrogen receptor positive.     Initial Vitals: /68 (BP Location: Left arm, Patient Position: Sitting, Cuff Size: Adult Regular)   Pulse 54   Temp 98.8  F (37.1  C) (Oral)   Resp 16   Ht 1.619 m (5' 3.75\")   Wt 90.5 kg (199 lb 8 oz)   LMP 10/17/2024 (Approximate)   SpO2 97%   BMI 34.51 kg/m   Estimated body mass index is 34.51 kg/m  as calculated from the following:    Height as of this encounter: 1.619 m (5' 3.75\").    Weight as of this encounter: 90.5 kg (199 lb 8 oz). Body surface area is 2.02 meters squared.  No Pain (0) Comment: Data Unavailable   Patient's last menstrual period was 10/17/2024 (approximate).  Allergies reviewed: Yes  Medications reviewed: Yes    Medications: MEDICATION REFILLS NEEDED TODAY. Provider was notified.  Pharmacy name entered into Nintex:    [facility]) - [address] PHARMACY #[id number] - [place], [place] - [address] [facility]  [facility] - [address]    Frailty Screening:   Is the patient here for a new oncology consult visit in cancer care? 2. No      Clinical concerns: still getting periods while on Exemestane after having IUD removed.   Marleen was notified.      Ruchi Tipton, [facility] Hematology and Oncology Progress Note    Patient: Veronica Nieves  MRN: [id number]  Date of Service: Oct 21, " 2024          Reason for Visit    Chief Complaint   Patient presents with     Oncology Clinic Visit     Return visit 6 months with lab and injection. Malignant neoplasm of upper-outer quadrant of left breast in female, estrogen receptor positive.       Assessment and Plan     Cancer Staging   No matching staging information was found for the patient.    1.  Breast cancer, left breast, invasive ductal carcinoma, stage Ib: Currently on exemestane and zoladex. Has been on this since November 2021. Due to age, we have recommended her to continue for 10 years.  Patient will return in 3 to 4 months for her next visit.    2. Risk for Osteopenia: High risk for worsening bone density with the aromatase inhibitor.  She will continue calcium and vitamin D supplement. We will repeat her DEXA scan in April .  Encourage also to participate in weightbearing exercises. Good calcium in diet.     3.  Resumption of periods after IUD removed: This is abnormal considering she is getting Zoladex.  We will go ahead and get estradiol, FSH and LH today.  If it does suggest she is not postmenopausal then Lidex may not be working.  We want that to be working in order for her to be on the Exemestane so in the meantime before we get the results I am in to switch her over to getting a 1 month dose of the Zoladex.  Do that for 3 to 4 months and then repeat labs and evaluate whether her period goes away.  If so then we will likely just have to do it every month.  If she continues to have periods while getting that we will have to reevaluate and put her back on tamoxifen.      ECOG Performance    0 - Independent    Distress Screening (within last 30 days)    No data recorded     Pain  Pain Score: No Pain (0)    Problem List    Patient Active Problem List   Diagnosis     Mild depression and anxiety     Malignant neoplasm of upper-outer quadrant of left breast in female, estrogen receptor positive (H)     Elevated serum creatinine      Chemotherapy-induced peripheral neuropathy (H)     Cervical high risk HPV (human papillomavirus) test positive        ______________________________________________________________________________    History of Present Illness    Oncologist: Dr. Garcia    Diagnosis:     1.  Invasive ductal carcinoma of the left breast: Diagnosed May, 2021.  Initial imaging suggested 3 masses. Biopsy of the largest mass showed an invasive ductal carcinoma, grade 2.  ER/CA positive, HER-2 positive. Evaluation of the axilla was negative.     Treatment:     Neoadjuvant chemotherapy with TCHP initiated on May 27, 2021.  Cycle 6 was given on September 10.     Bilateral mastectomy performed on October 7.  Pathology showed a 9 mm focus of invasive ductal carcinoma of the left breast.  Lymph nodes are negative.     Kadcyla initiated November 29, 2021. Kadcyla completed August 26, 2022.     Zoladex and tamoxifen initiated November 29, 2021. Zoladex dosing q3 months starting Sept., 2022.  -Secondary to significant hot flashes and weight gain on tamoxifen, as well as considering SOFT trial data she was changed to exemestane 3/17/23     She had her right implant removed secondary to infection on Tiana 10, 2022.     Interim History:  Patient is here today for a 6-month follow-up visit.  Overall she states she is doing okay but says that she had her IUD removed about a year ago and since then she has started getting her periods again.  She says she is probably had 4-5 of them over the last 9 months.  They are exactly like her previous periods.  She states that she has been getting her Zoladex on time except this 1 she is a couple weeks overdue because she was traveling.  Continues to take her Exemestane every day.  She denies any other new issues.      Review of Systems    Pertinent items are noted in HPI.    Past History    Past Medical History:   Diagnosis Date     Abnormal Pap smear of cervix     age 13, subsequent pap smears normal     Anxiety   "    Breast cancer (H) 05/04/2021    Left     Depression        PHYSICAL EXAM  /68 (BP Location: Left arm, Patient Position: Sitting, Cuff Size: Adult Regular)   Pulse 54   Temp 98.8  F (37.1  C) (Oral)   Resp 16   Ht 1.619 m (5' 3.75\")   Wt 90.5 kg (199 lb 8 oz)   LMP 10/17/2024 (Approximate)   SpO2 97%   BMI 34.51 kg/m      GENERAL: no acute distress. Cooperative in conversation. Alone in clinic  RESP: Regular respiratory rate. No expiratory wheezes   NEURO: non focal. Alert and oriented x3.   PSYCH: within normal limits. No depression or anxiety.  SKIN: exposed skin is dry intact.     Lab Results    Recent Results (from the past 168 hour(s))   Comprehensive metabolic panel (BMP + Alb, Alk Phos, ALT, AST, Total. Bili, TP)   Result Value Ref Range    Sodium 140 135 - 145 mmol/L    Potassium 4.3 3.4 - 5.3 mmol/L    Carbon Dioxide (CO2) 26 22 - 29 mmol/L    Anion Gap 9 7 - 15 mmol/L    Urea Nitrogen 13.5 6.0 - 20.0 mg/dL    Creatinine 1.06 (H) 0.51 - 0.95 mg/dL    GFR Estimate 70 >60 mL/min/1.73m2    Calcium 9.0 8.8 - 10.4 mg/dL    Chloride 105 98 - 107 mmol/L    Glucose 83 70 - 99 mg/dL    Alkaline Phosphatase 90 40 - 150 U/L    AST 24 0 - 45 U/L    ALT 17 0 - 50 U/L    Protein Total 7.2 6.4 - 8.3 g/dL    Albumin 4.3 3.5 - 5.2 g/dL    Bilirubin Total 0.3 <=1.2 mg/dL   CBC with platelets and differential   Result Value Ref Range    WBC Count 7.1 4.0 - 11.0 10e3/uL    RBC Count 4.51 3.80 - 5.20 10e6/uL    Hemoglobin 13.1 11.7 - 15.7 g/dL    Hematocrit 40.1 35.0 - 47.0 %    MCV 89 78 - 100 fL    MCH 29.0 26.5 - 33.0 pg    MCHC 32.7 31.5 - 36.5 g/dL    RDW 12.8 10.0 - 15.0 %    Platelet Count 304 150 - 450 10e3/uL    % Neutrophils 63 %    % Lymphocytes 22 %    % Monocytes 8 %    % Eosinophils 6 %    % Basophils 1 %    % Immature Granulocytes 0 %    NRBCs per 100 WBC 0 <1 /100    Absolute Neutrophils 4.5 1.6 - 8.3 10e3/uL    Absolute Lymphocytes 1.6 0.8 - 5.3 10e3/uL    Absolute Monocytes 0.5 0.0 - 1.3 " 10e3/uL    Absolute Eosinophils 0.4 0.0 - 0.7 10e3/uL    Absolute Basophils 0.0 0.0 - 0.2 10e3/uL    Absolute Immature Granulocytes 0.0 <=0.4 10e3/uL    Absolute NRBCs 0.0 10e3/uL       Imaging    No results found.    The longitudinal plan of care for the diagnosis(es)/condition(s) as documented were addressed during this visit. Due to the added complexity in care, I will continue to support Ry in the subsequent management and with ongoing continuity of care.    Total time spent with patient in face to face time, chart review and documentation was 40 minutes.      Signed by: ROSEMARY Plummer CNP      Again, thank you for allowing me to participate in the care of your patient.        Sincerely,        ROSEMARY Plummer CNP

## 2024-10-22 DIAGNOSIS — N92.6 IRREGULAR PERIODS: Primary | ICD-10-CM

## 2024-10-22 DIAGNOSIS — C50.412 MALIGNANT NEOPLASM OF UPPER-OUTER QUADRANT OF LEFT BREAST IN FEMALE, ESTROGEN RECEPTOR POSITIVE (H): ICD-10-CM

## 2024-10-22 DIAGNOSIS — Z17.0 MALIGNANT NEOPLASM OF UPPER-OUTER QUADRANT OF LEFT BREAST IN FEMALE, ESTROGEN RECEPTOR POSITIVE (H): ICD-10-CM

## 2024-11-04 ENCOUNTER — PATIENT OUTREACH (OUTPATIENT)
Dept: CARE COORDINATION | Facility: CLINIC | Age: 34
End: 2024-11-04
Payer: COMMERCIAL

## 2024-11-13 ENCOUNTER — PATIENT OUTREACH (OUTPATIENT)
Dept: OBGYN | Facility: CLINIC | Age: 34
End: 2024-11-13
Payer: COMMERCIAL

## 2024-11-13 PROBLEM — R87.810 CERVICAL HIGH RISK HPV (HUMAN PAPILLOMAVIRUS) TEST POSITIVE: Status: ACTIVE | Noted: 2023-12-11

## 2024-11-18 ENCOUNTER — PATIENT OUTREACH (OUTPATIENT)
Dept: CARE COORDINATION | Facility: CLINIC | Age: 34
End: 2024-11-18
Payer: COMMERCIAL

## 2024-11-18 ENCOUNTER — INFUSION THERAPY VISIT (OUTPATIENT)
Dept: INFUSION THERAPY | Facility: HOSPITAL | Age: 34
End: 2024-11-18
Attending: NURSE PRACTITIONER
Payer: COMMERCIAL

## 2024-11-18 VITALS
RESPIRATION RATE: 16 BRPM | SYSTOLIC BLOOD PRESSURE: 122 MMHG | OXYGEN SATURATION: 96 % | HEART RATE: 57 BPM | DIASTOLIC BLOOD PRESSURE: 79 MMHG | TEMPERATURE: 98.3 F

## 2024-11-18 DIAGNOSIS — C50.412 MALIGNANT NEOPLASM OF UPPER-OUTER QUADRANT OF LEFT BREAST IN FEMALE, ESTROGEN RECEPTOR POSITIVE (H): Primary | ICD-10-CM

## 2024-11-18 DIAGNOSIS — Z17.0 MALIGNANT NEOPLASM OF UPPER-OUTER QUADRANT OF LEFT BREAST IN FEMALE, ESTROGEN RECEPTOR POSITIVE (H): Primary | ICD-10-CM

## 2024-11-18 PROCEDURE — 96402 CHEMO HORMON ANTINEOPL SQ/IM: CPT

## 2024-11-18 PROCEDURE — 250N000011 HC RX IP 250 OP 636: Mod: JZ | Performed by: NURSE PRACTITIONER

## 2024-11-18 RX ADMIN — GOSERELIN ACETATE 3.6 MG: 3.6 IMPLANT SUBCUTANEOUS at 14:50

## 2024-11-18 NOTE — PROGRESS NOTES
Infusion Nursing Note:  Veronica Nieves presents today for injection.    Patient seen by provider today: No   present during visit today: Not Applicable.    Note: zoladex injection given right abdomen without incident. Pt applied ice prior and gauze and tape applied upon d.c.      Intravenous Access:  No Intravenous access/labs at this visit.    Treatment Conditions:  Not Applicable.      Post Infusion Assessment:  Patient tolerated injection without incident.       Discharge Plan:   Patient discharged in stable condition accompanied by: self.  Pt aware of treatment plan.      Sejal Hutchinson RN

## 2024-11-26 DIAGNOSIS — F32.A MILD DEPRESSION: ICD-10-CM

## 2024-11-27 RX ORDER — CITALOPRAM HYDROBROMIDE 40 MG/1
TABLET ORAL
Qty: 30 TABLET | Refills: 11 | Status: SHIPPED | OUTPATIENT
Start: 2024-11-27

## 2024-12-16 ENCOUNTER — INFUSION THERAPY VISIT (OUTPATIENT)
Dept: INFUSION THERAPY | Facility: HOSPITAL | Age: 34
End: 2024-12-16
Attending: NURSE PRACTITIONER
Payer: COMMERCIAL

## 2024-12-16 VITALS
OXYGEN SATURATION: 96 % | HEART RATE: 70 BPM | SYSTOLIC BLOOD PRESSURE: 141 MMHG | DIASTOLIC BLOOD PRESSURE: 75 MMHG | RESPIRATION RATE: 16 BRPM | TEMPERATURE: 98.3 F

## 2024-12-16 DIAGNOSIS — Z17.0 MALIGNANT NEOPLASM OF UPPER-OUTER QUADRANT OF LEFT BREAST IN FEMALE, ESTROGEN RECEPTOR POSITIVE (H): Primary | ICD-10-CM

## 2024-12-16 DIAGNOSIS — C50.412 MALIGNANT NEOPLASM OF UPPER-OUTER QUADRANT OF LEFT BREAST IN FEMALE, ESTROGEN RECEPTOR POSITIVE (H): Primary | ICD-10-CM

## 2024-12-16 PROCEDURE — 96402 CHEMO HORMON ANTINEOPL SQ/IM: CPT

## 2024-12-16 PROCEDURE — 250N000011 HC RX IP 250 OP 636: Mod: JZ | Performed by: NURSE PRACTITIONER

## 2024-12-16 RX ADMIN — GOSERELIN ACETATE 3.6 MG: 3.6 IMPLANT SUBCUTANEOUS at 14:14

## 2024-12-16 NOTE — PROGRESS NOTES
Infusion Nursing Note:  Veronica Nieves presents today for Zoladex.    Patient seen by provider today: No   present during visit today: Not Applicable.    Note: N/A.      Intravenous Access:  No Intravenous access/labs at this visit.    Treatment Conditions:  Not Applicable.      Post Infusion Assessment:  Patient tolerated injection without incident.   Given into LLQ of abd. Pt iced prior to injection.      Discharge Plan:   Patient discharged in stable condition accompanied by: self.  Departure Mode: Ambulatory.      Ellen Ace RN

## 2025-01-03 PROBLEM — R87.810 CERVICAL HIGH RISK HPV (HUMAN PAPILLOMAVIRUS) TEST POSITIVE: Status: ACTIVE | Noted: 2023-12-11

## 2025-01-12 ENCOUNTER — HEALTH MAINTENANCE LETTER (OUTPATIENT)
Age: 35
End: 2025-01-12

## 2025-01-13 ENCOUNTER — ONCOLOGY VISIT (OUTPATIENT)
Dept: ONCOLOGY | Facility: HOSPITAL | Age: 35
End: 2025-01-13
Attending: NURSE PRACTITIONER
Payer: COMMERCIAL

## 2025-01-13 ENCOUNTER — INFUSION THERAPY VISIT (OUTPATIENT)
Dept: INFUSION THERAPY | Facility: HOSPITAL | Age: 35
End: 2025-01-13
Attending: NURSE PRACTITIONER
Payer: COMMERCIAL

## 2025-01-13 VITALS
DIASTOLIC BLOOD PRESSURE: 78 MMHG | BODY MASS INDEX: 35.21 KG/M2 | OXYGEN SATURATION: 96 % | SYSTOLIC BLOOD PRESSURE: 135 MMHG | RESPIRATION RATE: 16 BRPM | WEIGHT: 203.5 LBS | HEART RATE: 60 BPM | TEMPERATURE: 98.6 F

## 2025-01-13 DIAGNOSIS — Z17.0 MALIGNANT NEOPLASM OF UPPER-OUTER QUADRANT OF LEFT BREAST IN FEMALE, ESTROGEN RECEPTOR POSITIVE (H): Primary | ICD-10-CM

## 2025-01-13 DIAGNOSIS — C50.412 MALIGNANT NEOPLASM OF UPPER-OUTER QUADRANT OF LEFT BREAST IN FEMALE, ESTROGEN RECEPTOR POSITIVE (H): ICD-10-CM

## 2025-01-13 DIAGNOSIS — C50.412 MALIGNANT NEOPLASM OF UPPER-OUTER QUADRANT OF LEFT BREAST IN FEMALE, ESTROGEN RECEPTOR POSITIVE (H): Primary | ICD-10-CM

## 2025-01-13 DIAGNOSIS — N92.6 IRREGULAR PERIODS: ICD-10-CM

## 2025-01-13 DIAGNOSIS — Z17.0 MALIGNANT NEOPLASM OF UPPER-OUTER QUADRANT OF LEFT BREAST IN FEMALE, ESTROGEN RECEPTOR POSITIVE (H): ICD-10-CM

## 2025-01-13 DIAGNOSIS — Z79.811 AROMATASE INHIBITOR USE: ICD-10-CM

## 2025-01-13 LAB
ESTRADIOL SERPL-MCNC: 5 PG/ML
FSH SERPL IRP2-ACNC: 8.9 MIU/ML
LH SERPL-ACNC: 0.4 MIU/ML

## 2025-01-13 PROCEDURE — 99214 OFFICE O/P EST MOD 30 MIN: CPT | Performed by: NURSE PRACTITIONER

## 2025-01-13 PROCEDURE — 83002 ASSAY OF GONADOTROPIN (LH): CPT

## 2025-01-13 PROCEDURE — G2211 COMPLEX E/M VISIT ADD ON: HCPCS | Performed by: NURSE PRACTITIONER

## 2025-01-13 PROCEDURE — 82670 ASSAY OF TOTAL ESTRADIOL: CPT

## 2025-01-13 PROCEDURE — 250N000011 HC RX IP 250 OP 636: Mod: JZ | Performed by: NURSE PRACTITIONER

## 2025-01-13 PROCEDURE — 96402 CHEMO HORMON ANTINEOPL SQ/IM: CPT

## 2025-01-13 PROCEDURE — 83001 ASSAY OF GONADOTROPIN (FSH): CPT

## 2025-01-13 PROCEDURE — 36415 COLL VENOUS BLD VENIPUNCTURE: CPT

## 2025-01-13 RX ADMIN — GOSERELIN ACETATE 3.6 MG: 3.6 IMPLANT SUBCUTANEOUS at 14:22

## 2025-01-13 ASSESSMENT — PAIN SCALES - GENERAL: PAINLEVEL_OUTOF10: NO PAIN (0)

## 2025-01-13 NOTE — PROGRESS NOTES
"Oncology Rooming Note    January 13, 2025 1:35 PM   Veronica Nieves is a 34 year old female who presents for:    Chief Complaint   Patient presents with    Oncology Clinic Visit     Return visit with lab and injection. Malignant neoplasm of upper-outer quadrant of left breast in female, estrogen receptor positive.     Initial Vitals: /78 (BP Location: Left arm, Patient Position: Sitting, Cuff Size: Adult Large)   Pulse 60   Temp 98.6  F (37  C) (Oral)   Resp 16   Wt 92.3 kg (203 lb 8 oz)   SpO2 96%   BMI 35.21 kg/m   Estimated body mass index is 35.21 kg/m  as calculated from the following:    Height as of 10/21/24: 1.619 m (5' 3.75\").    Weight as of this encounter: 92.3 kg (203 lb 8 oz). Body surface area is 2.04 meters squared.  No Pain (0) Comment: Data Unavailable   No LMP recorded. Patient has had an injection.  Allergies reviewed: Yes  Medications reviewed: Yes    Medications: Medication refills not needed today.  Pharmacy name entered into [email or web address]:    [facility] ([facility]) - [place], [place] - [address] [place] [address] PHARMACY #[id number] [place], [place] - [address] [facility]  [facility] - [place], [place] - [address] [place] [address]    Frailty Screening:   Is the patient here for a new oncology consult visit in cancer care? 2. No      Clinical concerns:increase in hot flashes since increasing frequency of injection.  Marleen Shukla CNP was notified.      Ruchi Tipton CMA            "

## 2025-01-13 NOTE — LETTER
"1/13/2025      Veronica Nieves  [address]  [facility] [address]      Dear Colleague,    Thank you for referring your patient, Veronica Nieves, to the [facility] [place]. Please see a copy of my visit note below.    Oncology Rooming Note    January 13, 2025 1:35 PM   Veronica Nieves is a 34 year old female who presents for:    Chief Complaint   Patient presents with     Oncology Clinic Visit     Return visit with lab and injection. Malignant neoplasm of upper-outer quadrant of left breast in female, estrogen receptor positive.     Initial Vitals: /78 (BP Location: Left arm, Patient Position: Sitting, Cuff Size: Adult Large)   Pulse 60   Temp 98.6  F (37  C) (Oral)   Resp 16   Wt 92.3 kg (203 lb 8 oz)   SpO2 96%   BMI 35.21 kg/m   Estimated body mass index is 35.21 kg/m  as calculated from the following:    Height as of 10/21/24: 1.619 m (5' 3.75\").    Weight as of this encounter: 92.3 kg (203 lb 8 oz). Body surface area is 2.04 meters squared.  No Pain (0) Comment: Data Unavailable   No LMP recorded. Patient has had an injection.  Allergies reviewed: Yes  Medications reviewed: Yes    Medications: Medication refills not needed today.  Pharmacy name entered into [employer]:    [facility] ([facility]) - [address] PHARMACY #[id number] - [place], [place] - [address] [facility]  [facility] - [address]    Frailty Screening:   Is the patient here for a new oncology consult visit in cancer care? 2. No      Clinical concerns:increase in hot flashes since increasing frequency of injection.  Marleen Shukla CNP was notified.      Ruchi Tipton CMA              [facility] Hematology and Oncology Progress Note    Patient: Veronica Nieves  MRN: [id number]  Date of Service: Jan 13, 2025          Reason for Visit    Chief Complaint   Patient presents with     Oncology Clinic Visit     " Return visit with lab and injection. Malignant neoplasm of upper-outer quadrant of left breast in female, estrogen receptor positive.       Assessment and Plan     Cancer Staging   No matching staging information was found for the patient.    1.  Breast cancer, left breast, invasive ductal carcinoma, stage Ib: Currently on exemestane and zoladex. Has been on monotherapy since November 2021.  Recently was on tamoxifen from November 2021 until March 2023.  At that time we switched to Exemestane due to weight gain and hot flashes.  Overall she feels about the same.  Due to age, we have recommended her to continue for 10 years.  Patient will return in 6 months.  We did talk about the fact that could potentially stop the Zoladex and just do tamoxifen after being on treatment for 5 years to complete another 5 years.    2. Risk for Osteopenia: High risk for worsening bone density with the aromatase inhibitor.  She will continue calcium and vitamin D supplement. We will repeat her DEXA scan in April .  Encourage also to participate in weightbearing exercises. Good calcium in diet.     3.  Resumption of periods after IUD removed: This has now stopped after going to the 1 month dosing of the Zoladex instead of the 3-month dosing.  I think she will need to continue on that.  I did tell her the only other alternative would be to have her ovaries removed which she is not interested in right now.  Continue monthly dosing.    4.  Hot Flashes: These have gotten a little worse with the Zoladex every month as opposed to every 3 months.  I did talk to her about trying to increase her weight lifting and activity levels and that does seem to help.      ECOG Performance    0 - Independent    Distress Screening (within last 30 days)    No data recorded     Pain  Pain Score: No Pain (0)    Problem List    Patient Active Problem List   Diagnosis     Mild depression and anxiety     Malignant neoplasm of upper-outer quadrant of left breast in  female, estrogen receptor positive (H)     Elevated serum creatinine     Chemotherapy-induced peripheral neuropathy     Cervical high risk HPV (human papillomavirus) test positive        ______________________________________________________________________________    History of Present Illness    Oncologist: Dr. Garcia    Diagnosis:     1.  Invasive ductal carcinoma of the left breast: Diagnosed May, 2021.  Initial imaging suggested 3 masses. Biopsy of the largest mass showed an invasive ductal carcinoma, grade 2.  ER/WA positive, HER-2 positive. Evaluation of the axilla was negative.     Treatment:     Neoadjuvant chemotherapy with TCHP initiated on May 27, 2021.  Cycle 6 was given on September 10.     Bilateral mastectomy performed on October 7.  Pathology showed a 9 mm focus of invasive ductal carcinoma of the left breast.  Lymph nodes are negative.     Kadcyla initiated November 29, 2021. Kadcyla completed August 26, 2022.     Zoladex and tamoxifen initiated November 29, 2021. Zoladex dosing q3 months starting Sept., 2022.  -Secondary to significant hot flashes and weight gain on tamoxifen, as well as considering SOFT trial data she was changed to exemestane 3/17/23  -was still having periods on Zoladex every 3 month dosing when IUD was removed so switched to zoladex 1 month dosing in October 2024. Periods stopped.      She had her right implant removed secondary to infection on Tiana 10, 2022.     Interim History:  Patient is here today for follow-up visit.  She states that overall she is doing okay but states that since she went back to doing the shots every month he is having worsening hot flashes and does not like coming every month.  Other than that she is doing well.  Her periods have now stopped switching back to the 1 month dosing of the Zoladex.  She denies any changes in her breast.  Denies any bone or back pain issues.  Questioning whether there is any alternative to doing the Zoladex every  month.      Review of Systems    Pertinent items are noted in HPI.    Past History    Past Medical History:   Diagnosis Date     Abnormal Pap smear of cervix     age 13, subsequent pap smears normal     Anxiety      Breast cancer (H) 05/04/2021    Left     Depression        PHYSICAL EXAM  /78 (BP Location: Left arm, Patient Position: Sitting, Cuff Size: Adult Large)   Pulse 60   Temp 98.6  F (37  C) (Oral)   Resp 16   Wt 92.3 kg (203 lb 8 oz)   SpO2 96%   BMI 35.21 kg/m      GENERAL: no acute distress. Cooperative in conversation. Alone in clinic  RESP: Regular respiratory rate. No expiratory wheezes   NEURO: non focal. Alert and oriented x3.   PSYCH: within normal limits. No depression or anxiety.  SKIN: exposed skin is dry intact.   BREAST: Patient is status post bilateral mastectomies with implants.  She has well-healed incisions.  No abnormal rashes or lesions or any evidence of local recurrence    Lab Results    No results found for this or any previous visit (from the past week).      Imaging    No results found.    The longitudinal plan of care for the diagnosis(es)/condition(s) as documented were addressed during this visit. Due to the added complexity in care, I will continue to support Ry in the subsequent management and with ongoing continuity of care.      Signed by: ROSEMARY Plummer CNP      Again, thank you for allowing me to participate in the care of your patient.        Sincerely,        ROSEMARY Plummer CNP    Electronically signed

## 2025-01-13 NOTE — PROGRESS NOTES
[facility] Hematology and Oncology Progress Note    Patient: Veronica Nieves  MRN: [id number]  Date of Service: Jan 13, 2025          Reason for Visit    Chief Complaint   Patient presents with    Oncology Clinic Visit     Return visit with lab and injection. Malignant neoplasm of upper-outer quadrant of left breast in female, estrogen receptor positive.       Assessment and Plan     Cancer Staging   No matching staging information was found for the patient.    1.  Breast cancer, left breast, invasive ductal carcinoma, stage Ib: Currently on exemestane and zoladex. Has been on monotherapy since November 2021.  Recently was on tamoxifen from November 2021 until March 2023.  At that time we switched to Exemestane due to weight gain and hot flashes.  Overall she feels about the same.  Due to age, we have recommended her to continue for 10 years.  Patient will return in 6 months.  We did talk about the fact that could potentially stop the Zoladex and just do tamoxifen after being on treatment for 5 years to complete another 5 years.    2. Risk for Osteopenia: High risk for worsening bone density with the aromatase inhibitor.  She will continue calcium and vitamin D supplement. We will repeat her DEXA scan in April .  Encourage also to participate in weightbearing exercises. Good calcium in diet.     3.  Resumption of periods after IUD removed: This has now stopped after going to the 1 month dosing of the Zoladex instead of the 3-month dosing.  I think she will need to continue on that.  I did tell her the only other alternative would be to have her ovaries removed which she is not interested in right now.  Continue monthly dosing.    4.  Hot Flashes: These have gotten a little worse with the Zoladex every month as opposed to every 3 months.  I did talk to her about trying to increase her weight lifting and activity levels and that does seem to help.      ECOG Performance    0 - Independent    Distress Screening  (within last 30 days)    No data recorded     Pain  Pain Score: No Pain (0)    Problem List    Patient Active Problem List   Diagnosis    Mild depression and anxiety    Malignant neoplasm of upper-outer quadrant of left breast in female, estrogen receptor positive (H)    Elevated serum creatinine    Chemotherapy-induced peripheral neuropathy    Cervical high risk HPV (human papillomavirus) test positive        ______________________________________________________________________________    History of Present Illness    Oncologist: Dr. Garcia    Diagnosis:     1.  Invasive ductal carcinoma of the left breast: Diagnosed May, 2021.  Initial imaging suggested 3 masses. Biopsy of the largest mass showed an invasive ductal carcinoma, grade 2.  ER/AK positive, HER-2 positive. Evaluation of the axilla was negative.     Treatment:     Neoadjuvant chemotherapy with TCHP initiated on May 27, 2021.  Cycle 6 was given on September 10.     Bilateral mastectomy performed on October 7.  Pathology showed a 9 mm focus of invasive ductal carcinoma of the left breast.  Lymph nodes are negative.     Kadcyla initiated November 29, 2021. Kadcyla completed August 26, 2022.     Zoladex and tamoxifen initiated November 29, 2021. Zoladex dosing q3 months starting Sept., 2022.  -Secondary to significant hot flashes and weight gain on tamoxifen, as well as considering SOFT trial data she was changed to exemestane 3/17/23  -was still having periods on Zoladex every 3 month dosing when IUD was removed so switched to zoladex 1 month dosing in October 2024. Periods stopped.      She had her right implant removed secondary to infection on Tiana 10, 2022.     Interim History:  Patient is here today for follow-up visit.  She states that overall she is doing okay but states that since she went back to doing the shots every month he is having worsening hot flashes and does not like coming every month.  Other than that she is doing well.  Her periods  have now stopped switching back to the 1 month dosing of the Zoladex.  She denies any changes in her breast.  Denies any bone or back pain issues.  Questioning whether there is any alternative to doing the Zoladex every month.      Review of Systems    Pertinent items are noted in HPI.    Past History    Past Medical History:   Diagnosis Date    Abnormal Pap smear of cervix     age 13, subsequent pap smears normal    Anxiety     Breast cancer (H) 05/04/2021    Left    Depression        PHYSICAL EXAM  /78 (BP Location: Left arm, Patient Position: Sitting, Cuff Size: Adult Large)   Pulse 60   Temp 98.6  F (37  C) (Oral)   Resp 16   Wt 92.3 kg (203 lb 8 oz)   SpO2 96%   BMI 35.21 kg/m      GENERAL: no acute distress. Cooperative in conversation. Alone in clinic  RESP: Regular respiratory rate. No expiratory wheezes   NEURO: non focal. Alert and oriented x3.   PSYCH: within normal limits. No depression or anxiety.  SKIN: exposed skin is dry intact.   BREAST: Patient is status post bilateral mastectomies with implants.  She has well-healed incisions.  No abnormal rashes or lesions or any evidence of local recurrence    Lab Results    No results found for this or any previous visit (from the past week).      Imaging    No results found.    The longitudinal plan of care for the diagnosis(es)/condition(s) as documented were addressed during this visit. Due to the added complexity in care, I will continue to support Ry in the subsequent management and with ongoing continuity of care.      Signed by: ROSEMARY Plummer CNP

## 2025-01-14 NOTE — PROGRESS NOTES
Injection given right lower abdomen without incident. Pt denies complaint and is aware of treatment plan

## 2025-02-10 ENCOUNTER — INFUSION THERAPY VISIT (OUTPATIENT)
Dept: INFUSION THERAPY | Facility: HOSPITAL | Age: 35
End: 2025-02-10
Attending: INTERNAL MEDICINE
Payer: COMMERCIAL

## 2025-02-10 DIAGNOSIS — Z17.0 MALIGNANT NEOPLASM OF UPPER-OUTER QUADRANT OF LEFT BREAST IN FEMALE, ESTROGEN RECEPTOR POSITIVE (H): Primary | ICD-10-CM

## 2025-02-10 DIAGNOSIS — C50.412 MALIGNANT NEOPLASM OF UPPER-OUTER QUADRANT OF LEFT BREAST IN FEMALE, ESTROGEN RECEPTOR POSITIVE (H): Primary | ICD-10-CM

## 2025-02-10 PROCEDURE — 250N000011 HC RX IP 250 OP 636: Mod: JZ | Performed by: NURSE PRACTITIONER

## 2025-02-10 PROCEDURE — 96402 CHEMO HORMON ANTINEOPL SQ/IM: CPT

## 2025-02-10 RX ADMIN — GOSERELIN ACETATE 3.6 MG: 3.6 IMPLANT SUBCUTANEOUS at 14:04

## 2025-02-10 NOTE — PROGRESS NOTES
Infusion Nursing Note:  Veronica Nieves presents today for zoladex injection.    Patient seen by provider today: No   present during visit today: Not Applicable.    Note: PT here ambulatory for zoladex inj. Ice pack given to place on injection site prior then administered in left lower abdomen/bandaid to site. PT dc'd steady gait.      Intravenous Access:  No Intravenous access/labs at this visit.    Treatment Conditions:  Not Applicable.      Post Infusion Assessment:  Patient tolerated injection without incident.       Discharge Plan:   Follow up reviewed.      Davida Buchanan RN    unknown

## 2025-02-11 ENCOUNTER — PATIENT OUTREACH (OUTPATIENT)
Dept: ONCOLOGY | Facility: HOSPITAL | Age: 35
End: 2025-02-11
Payer: COMMERCIAL

## 2025-02-19 DIAGNOSIS — Z17.0 MALIGNANT NEOPLASM OF UPPER-OUTER QUADRANT OF LEFT BREAST IN FEMALE, ESTROGEN RECEPTOR POSITIVE (H): ICD-10-CM

## 2025-02-19 DIAGNOSIS — C50.412 MALIGNANT NEOPLASM OF UPPER-OUTER QUADRANT OF LEFT BREAST IN FEMALE, ESTROGEN RECEPTOR POSITIVE (H): ICD-10-CM

## 2025-02-19 RX ORDER — EXEMESTANE 25 MG/1
25 TABLET ORAL DAILY
Qty: 90 TABLET | Refills: 0 | Status: SHIPPED | OUTPATIENT
Start: 2025-02-19

## 2025-03-05 DIAGNOSIS — Z17.0 MALIGNANT NEOPLASM OF UPPER-OUTER QUADRANT OF LEFT BREAST IN FEMALE, ESTROGEN RECEPTOR POSITIVE (H): Primary | ICD-10-CM

## 2025-03-05 DIAGNOSIS — C50.412 MALIGNANT NEOPLASM OF UPPER-OUTER QUADRANT OF LEFT BREAST IN FEMALE, ESTROGEN RECEPTOR POSITIVE (H): Primary | ICD-10-CM

## 2025-03-10 ENCOUNTER — INFUSION THERAPY VISIT (OUTPATIENT)
Dept: INFUSION THERAPY | Facility: HOSPITAL | Age: 35
End: 2025-03-10
Attending: INTERNAL MEDICINE
Payer: COMMERCIAL

## 2025-03-10 DIAGNOSIS — Z17.0 MALIGNANT NEOPLASM OF UPPER-OUTER QUADRANT OF LEFT BREAST IN FEMALE, ESTROGEN RECEPTOR POSITIVE (H): Primary | ICD-10-CM

## 2025-03-10 DIAGNOSIS — C50.412 MALIGNANT NEOPLASM OF UPPER-OUTER QUADRANT OF LEFT BREAST IN FEMALE, ESTROGEN RECEPTOR POSITIVE (H): Primary | ICD-10-CM

## 2025-03-10 PROCEDURE — 96402 CHEMO HORMON ANTINEOPL SQ/IM: CPT

## 2025-03-10 PROCEDURE — 250N000011 HC RX IP 250 OP 636: Mod: JZ | Performed by: NURSE PRACTITIONER

## 2025-03-10 RX ADMIN — GOSERELIN ACETATE 3.6 MG: 3.6 IMPLANT SUBCUTANEOUS at 14:02

## 2025-03-10 NOTE — PROGRESS NOTES
Infusion Nursing Note:  Veronica Nieves presents today for zoladex inj.    Patient seen by provider today: No   present during visit today: Not Applicable.    Note: PT here ambulatory for zoladex inj. Ice pack to site and then once numb injection administered to right lower abdomen/bandaid to site. PT tolerated without any problems. PT dc'd steady gait.      Intravenous Access:  No Intravenous access/labs at this visit.    Treatment Conditions:  Not Applicable.      Post Infusion Assessment:  Patient tolerated injection without incident.       Discharge Plan:   Follow up reviewed.      Davida Buchanan RN

## 2025-04-07 ENCOUNTER — INFUSION THERAPY VISIT (OUTPATIENT)
Dept: INFUSION THERAPY | Facility: HOSPITAL | Age: 35
End: 2025-04-07
Attending: INTERNAL MEDICINE
Payer: COMMERCIAL

## 2025-04-07 VITALS
SYSTOLIC BLOOD PRESSURE: 121 MMHG | TEMPERATURE: 98.1 F | OXYGEN SATURATION: 97 % | RESPIRATION RATE: 16 BRPM | HEART RATE: 64 BPM | DIASTOLIC BLOOD PRESSURE: 67 MMHG

## 2025-04-07 DIAGNOSIS — Z17.0 MALIGNANT NEOPLASM OF UPPER-OUTER QUADRANT OF LEFT BREAST IN FEMALE, ESTROGEN RECEPTOR POSITIVE (H): Primary | ICD-10-CM

## 2025-04-07 DIAGNOSIS — C50.412 MALIGNANT NEOPLASM OF UPPER-OUTER QUADRANT OF LEFT BREAST IN FEMALE, ESTROGEN RECEPTOR POSITIVE (H): Primary | ICD-10-CM

## 2025-04-07 PROCEDURE — 250N000011 HC RX IP 250 OP 636: Mod: JZ | Performed by: NURSE PRACTITIONER

## 2025-04-07 PROCEDURE — 96402 CHEMO HORMON ANTINEOPL SQ/IM: CPT

## 2025-04-07 RX ADMIN — GOSERELIN ACETATE 3.6 MG: 3.6 IMPLANT SUBCUTANEOUS at 14:44

## 2025-04-07 NOTE — PROGRESS NOTES
Pt here for zoladex injection which was given L abdomen without incident. Pt denies complaint and is aware of treatment plan.

## 2025-04-21 ENCOUNTER — TELEPHONE (OUTPATIENT)
Dept: ONCOLOGY | Facility: HOSPITAL | Age: 35
End: 2025-04-21
Payer: COMMERCIAL

## 2025-04-21 NOTE — TELEPHONE ENCOUNTER
LVM letting pt know that apt on 07/09/25 with Dr. Garcia was r/s to [facility] Gonzalez due to Dr Garcia being out of clinic. Pt was instructed to call back with any questions.

## 2025-05-05 ENCOUNTER — INFUSION THERAPY VISIT (OUTPATIENT)
Dept: INFUSION THERAPY | Facility: HOSPITAL | Age: 35
End: 2025-05-05
Attending: INTERNAL MEDICINE
Payer: COMMERCIAL

## 2025-05-05 VITALS
OXYGEN SATURATION: 98 % | DIASTOLIC BLOOD PRESSURE: 71 MMHG | TEMPERATURE: 98 F | RESPIRATION RATE: 16 BRPM | HEART RATE: 52 BPM | SYSTOLIC BLOOD PRESSURE: 139 MMHG

## 2025-05-05 DIAGNOSIS — C50.412 MALIGNANT NEOPLASM OF UPPER-OUTER QUADRANT OF LEFT BREAST IN FEMALE, ESTROGEN RECEPTOR POSITIVE (H): Primary | ICD-10-CM

## 2025-05-05 DIAGNOSIS — Z17.0 MALIGNANT NEOPLASM OF UPPER-OUTER QUADRANT OF LEFT BREAST IN FEMALE, ESTROGEN RECEPTOR POSITIVE (H): Primary | ICD-10-CM

## 2025-05-05 PROCEDURE — 250N000011 HC RX IP 250 OP 636: Mod: JZ | Performed by: NURSE PRACTITIONER

## 2025-05-05 PROCEDURE — 96402 CHEMO HORMON ANTINEOPL SQ/IM: CPT

## 2025-05-05 RX ADMIN — GOSERELIN ACETATE 3.6 MG: 3.6 IMPLANT SUBCUTANEOUS at 14:17

## 2025-05-05 NOTE — PROGRESS NOTES
Infusion Nursing Note:  Veronica Nieves presents today for Zoladex.    Patient seen by provider today: No   present during visit today: Not Applicable.    Note: Reports hot flashes on and off throughout the day lasting about a minute or so. Iced site prior to administration. Zoladex administered to right lower abdomen. Site covered with 2x2 gauze and a tegaderm.     /71 (BP Location: Right arm, Patient Position: Sitting, Cuff Size: Adult Regular)   Pulse 52   Temp 98  F (36.7  C) (Oral)   Resp 16   SpO2 98%     Intravenous Access:  No Intravenous access/labs at this visit.    Treatment Conditions:  Not Applicable.    Post Infusion Assessment:  Patient tolerated injection without incident.     Discharge Plan:   Discharge instructions reviewed with: Patient.  Patient and/or family verbalized understanding of discharge instructions and all questions answered.  AVS to patient via [employer].  Patient will return on 6/2 for next appointment.   Patient discharged in stable condition accompanied by: self.  Departure Mode: Ambulatory.    Vonda Murcia RN

## 2025-05-06 ENCOUNTER — PATIENT OUTREACH (OUTPATIENT)
Dept: CARE COORDINATION | Facility: CLINIC | Age: 35
End: 2025-05-06
Payer: COMMERCIAL

## 2025-05-17 DIAGNOSIS — C50.412 MALIGNANT NEOPLASM OF UPPER-OUTER QUADRANT OF LEFT BREAST IN FEMALE, ESTROGEN RECEPTOR POSITIVE (H): ICD-10-CM

## 2025-05-17 DIAGNOSIS — Z17.0 MALIGNANT NEOPLASM OF UPPER-OUTER QUADRANT OF LEFT BREAST IN FEMALE, ESTROGEN RECEPTOR POSITIVE (H): ICD-10-CM

## 2025-05-19 RX ORDER — EXEMESTANE 25 MG/1
25 TABLET ORAL DAILY
Qty: 90 TABLET | Refills: 0 | Status: SHIPPED | OUTPATIENT
Start: 2025-05-19

## 2025-06-02 ENCOUNTER — PATIENT OUTREACH (OUTPATIENT)
Dept: CARE COORDINATION | Facility: CLINIC | Age: 35
End: 2025-06-02
Payer: COMMERCIAL

## 2025-06-02 ENCOUNTER — INFUSION THERAPY VISIT (OUTPATIENT)
Dept: INFUSION THERAPY | Facility: HOSPITAL | Age: 35
End: 2025-06-02
Attending: INTERNAL MEDICINE
Payer: COMMERCIAL

## 2025-06-02 VITALS
DIASTOLIC BLOOD PRESSURE: 71 MMHG | RESPIRATION RATE: 16 BRPM | SYSTOLIC BLOOD PRESSURE: 119 MMHG | HEART RATE: 52 BPM | OXYGEN SATURATION: 98 % | TEMPERATURE: 98.2 F

## 2025-06-02 DIAGNOSIS — C50.412 MALIGNANT NEOPLASM OF UPPER-OUTER QUADRANT OF LEFT BREAST IN FEMALE, ESTROGEN RECEPTOR POSITIVE (H): Primary | ICD-10-CM

## 2025-06-02 DIAGNOSIS — Z17.0 MALIGNANT NEOPLASM OF UPPER-OUTER QUADRANT OF LEFT BREAST IN FEMALE, ESTROGEN RECEPTOR POSITIVE (H): Primary | ICD-10-CM

## 2025-06-02 PROCEDURE — 96402 CHEMO HORMON ANTINEOPL SQ/IM: CPT

## 2025-06-02 PROCEDURE — 250N000011 HC RX IP 250 OP 636: Mod: JZ | Performed by: INTERNAL MEDICINE

## 2025-06-02 RX ADMIN — GOSERELIN ACETATE 3.6 MG: 3.6 IMPLANT SUBCUTANEOUS at 14:02

## 2025-06-02 ASSESSMENT — PAIN SCALES - GENERAL: PAINLEVEL_OUTOF10: NO PAIN (0)

## 2025-06-02 NOTE — PROGRESS NOTES
Infusion Nursing Note:  Veronica Nieves presents today for Zoladex injection.    Patient seen by provider today: No   present during visit today: Not Applicable.    Note: Patient assessed and vital signs stable. Administered Zoladex subcutaneous (left lower abdomen) as ordered per provider. Tolerated without issue.    Intravenous Access:  No Intravenous access/labs at this visit.    Treatment Conditions:  Not Applicable.    Post Infusion Assessment:  Patient tolerated injection without incident.     Discharge Plan:   Patient verbalized understanding of discharge instructions and all questions answered.  Patient discharged in stable condition accompanied by: self.  Departure Mode: Ambulatory.      ROB URBAN RN

## 2025-07-08 ENCOUNTER — LAB (OUTPATIENT)
Dept: INFUSION THERAPY | Facility: HOSPITAL | Age: 35
End: 2025-07-08
Attending: INTERNAL MEDICINE
Payer: COMMERCIAL

## 2025-07-08 ENCOUNTER — INFUSION THERAPY VISIT (OUTPATIENT)
Dept: INFUSION THERAPY | Facility: HOSPITAL | Age: 35
End: 2025-07-08
Attending: NURSE PRACTITIONER
Payer: COMMERCIAL

## 2025-07-08 ENCOUNTER — ONCOLOGY VISIT (OUTPATIENT)
Dept: ONCOLOGY | Facility: HOSPITAL | Age: 35
End: 2025-07-08
Attending: NURSE PRACTITIONER
Payer: COMMERCIAL

## 2025-07-08 VITALS
TEMPERATURE: 98.7 F | DIASTOLIC BLOOD PRESSURE: 67 MMHG | WEIGHT: 203 LBS | OXYGEN SATURATION: 98 % | BODY MASS INDEX: 35.12 KG/M2 | SYSTOLIC BLOOD PRESSURE: 118 MMHG | HEART RATE: 52 BPM | RESPIRATION RATE: 16 BRPM

## 2025-07-08 DIAGNOSIS — C50.412 MALIGNANT NEOPLASM OF UPPER-OUTER QUADRANT OF LEFT BREAST IN FEMALE, ESTROGEN RECEPTOR POSITIVE (H): Primary | ICD-10-CM

## 2025-07-08 DIAGNOSIS — Z17.0 MALIGNANT NEOPLASM OF UPPER-OUTER QUADRANT OF LEFT BREAST IN FEMALE, ESTROGEN RECEPTOR POSITIVE (H): Primary | ICD-10-CM

## 2025-07-08 DIAGNOSIS — Z17.0 MALIGNANT NEOPLASM OF UPPER-OUTER QUADRANT OF LEFT BREAST IN FEMALE, ESTROGEN RECEPTOR POSITIVE (H): ICD-10-CM

## 2025-07-08 DIAGNOSIS — C50.412 MALIGNANT NEOPLASM OF UPPER-OUTER QUADRANT OF LEFT BREAST IN FEMALE, ESTROGEN RECEPTOR POSITIVE (H): ICD-10-CM

## 2025-07-08 LAB
ALBUMIN SERPL BCG-MCNC: 4.6 G/DL (ref 3.5–5.2)
ALP SERPL-CCNC: 76 U/L (ref 40–150)
ALT SERPL W P-5'-P-CCNC: 27 U/L (ref 0–50)
ANION GAP SERPL CALCULATED.3IONS-SCNC: 8 MMOL/L (ref 7–15)
AST SERPL W P-5'-P-CCNC: 27 U/L (ref 0–45)
BASOPHILS # BLD AUTO: 0 10E3/UL (ref 0–0.2)
BASOPHILS NFR BLD AUTO: 0 %
BILIRUB SERPL-MCNC: 0.3 MG/DL
BUN SERPL-MCNC: 15.8 MG/DL (ref 6–20)
CALCIUM SERPL-MCNC: 9.7 MG/DL (ref 8.8–10.4)
CHLORIDE SERPL-SCNC: 102 MMOL/L (ref 98–107)
CREAT SERPL-MCNC: 1.09 MG/DL (ref 0.51–0.95)
EGFRCR SERPLBLD CKD-EPI 2021: 67 ML/MIN/1.73M2
EOSINOPHIL # BLD AUTO: 0.3 10E3/UL (ref 0–0.7)
EOSINOPHIL NFR BLD AUTO: 4 %
ERYTHROCYTE [DISTWIDTH] IN BLOOD BY AUTOMATED COUNT: 13 % (ref 10–15)
ESTRADIOL SERPL-MCNC: <5 PG/ML
GLUCOSE SERPL-MCNC: 94 MG/DL (ref 70–99)
HCO3 SERPL-SCNC: 29 MMOL/L (ref 22–29)
HCT VFR BLD AUTO: 39.7 % (ref 35–47)
HGB BLD-MCNC: 12.9 G/DL (ref 11.7–15.7)
IMM GRANULOCYTES # BLD: 0 10E3/UL
IMM GRANULOCYTES NFR BLD: 0 %
LYMPHOCYTES # BLD AUTO: 1.8 10E3/UL (ref 0.8–5.3)
LYMPHOCYTES NFR BLD AUTO: 25 %
MCH RBC QN AUTO: 28.7 PG (ref 26.5–33)
MCHC RBC AUTO-ENTMCNC: 32.5 G/DL (ref 31.5–36.5)
MCV RBC AUTO: 88 FL (ref 78–100)
MONOCYTES # BLD AUTO: 0.5 10E3/UL (ref 0–1.3)
MONOCYTES NFR BLD AUTO: 7 %
NEUTROPHILS # BLD AUTO: 4.6 10E3/UL (ref 1.6–8.3)
NEUTROPHILS NFR BLD AUTO: 64 %
NRBC # BLD AUTO: 0 10E3/UL
NRBC BLD AUTO-RTO: 0 /100
PLATELET # BLD AUTO: 251 10E3/UL (ref 150–450)
POTASSIUM SERPL-SCNC: 4.4 MMOL/L (ref 3.4–5.3)
PROT SERPL-MCNC: 7.5 G/DL (ref 6.4–8.3)
RBC # BLD AUTO: 4.5 10E6/UL (ref 3.8–5.2)
SODIUM SERPL-SCNC: 139 MMOL/L (ref 135–145)
WBC # BLD AUTO: 7.2 10E3/UL (ref 4–11)

## 2025-07-08 PROCEDURE — 85014 HEMATOCRIT: CPT

## 2025-07-08 PROCEDURE — 96402 CHEMO HORMON ANTINEOPL SQ/IM: CPT

## 2025-07-08 PROCEDURE — 36415 COLL VENOUS BLD VENIPUNCTURE: CPT

## 2025-07-08 PROCEDURE — 99214 OFFICE O/P EST MOD 30 MIN: CPT | Mod: 25 | Performed by: NURSE PRACTITIONER

## 2025-07-08 PROCEDURE — 99214 OFFICE O/P EST MOD 30 MIN: CPT | Performed by: NURSE PRACTITIONER

## 2025-07-08 PROCEDURE — G2211 COMPLEX E/M VISIT ADD ON: HCPCS | Performed by: NURSE PRACTITIONER

## 2025-07-08 PROCEDURE — 82040 ASSAY OF SERUM ALBUMIN: CPT

## 2025-07-08 PROCEDURE — 250N000011 HC RX IP 250 OP 636: Mod: JZ | Performed by: NURSE PRACTITIONER

## 2025-07-08 PROCEDURE — 82670 ASSAY OF TOTAL ESTRADIOL: CPT

## 2025-07-08 RX ORDER — EXEMESTANE 25 MG/1
25 TABLET ORAL DAILY
Qty: 90 TABLET | Refills: 3 | Status: SHIPPED | OUTPATIENT
Start: 2025-07-08

## 2025-07-08 RX ADMIN — GOSERELIN ACETATE 3.6 MG: 3.6 IMPLANT SUBCUTANEOUS at 15:05

## 2025-07-08 ASSESSMENT — PAIN SCALES - GENERAL: PAINLEVEL_OUTOF10: NO PAIN (0)

## 2025-07-08 NOTE — LETTER
7/8/2025      Veronica Nieves  [address] [place] [address]      Dear Colleague,    Thank you for referring your patient, Veronica Nieves, to the [facility] [place]. Please see a copy of my visit note below.    [facility] Hematology and Oncology Progress Note    Patient: Veronica Nieves  MRN: [id number]  Date of Service: Jul 8, 2025          Reason for Visit    Chief Complaint   Patient presents with     Oncology Clinic Visit     Return visit 6 months with lab and injection. Malignant neoplasm of upper-outer quadrant of left breast in female, estrogen receptor positive.       Assessment and Plan     Cancer Staging   No matching staging information was found for the patient.    1.  Breast cancer, left breast, invasive ductal carcinoma, stage Ib: Currently on exemestane and zoladex. Has been on monotherapy since November 2021.  Recently was on tamoxifen from November 2021 until March 2023.  At that time we switched to Exemestane due to weight gain and hot flashes.  Overall she feels about the same.  Due to age, we have recommended her to continue for 10 years.  Patient will return in 6 months.  We did talk about the fact that could potentially stop the Zoladex and just do tamoxifen after being on treatment for 5 years to complete another 5 years.    2. Risk for Osteopenia: High risk for worsening bone density with the aromatase inhibitor.  She will continue calcium and vitamin D supplement. We will repeat her DEXA scan now.  Encourage also to participate in weightbearing exercises. Good calcium in diet.     3.  Resumption of periods after IUD removed: This has now stopped after going to the 1 month dosing of the Zoladex instead of the 3-month dosing.  I think she will need to continue on that.  I did tell her the only other alternative would be to have her ovaries removed which she is not interested in right now.  Continue monthly dosing.    4. Weight gain: has been  since being on treatment. Likely from hormonal changes, lupron and exemestane. Encourage healthy lifestyle. She is going to ask PCP about GLP1 medications.       ECOG Performance    0 - Independent    Distress Screening (within last 30 days)    No data recorded     Pain  Pain Score: No Pain (0)    Problem List    Patient Active Problem List   Diagnosis     Mild depression and anxiety     Malignant neoplasm of upper-outer quadrant of left breast in female, estrogen receptor positive (H)     Elevated serum creatinine     Chemotherapy-induced peripheral neuropathy     Cervical high risk HPV (human papillomavirus) test positive        ______________________________________________________________________________    History of Present Illness    Oncologist: Dr. Garcia    Diagnosis:     1.  Invasive ductal carcinoma of the left breast: Diagnosed May, 2021.  Initial imaging suggested 3 masses. Biopsy of the largest mass showed an invasive ductal carcinoma, grade 2.  ER/AL positive, HER-2 positive. Evaluation of the axilla was negative.     Treatment:     Neoadjuvant chemotherapy with TCHP initiated on May 27, 2021.  Cycle 6 was given on September 10.     Bilateral mastectomy performed on October 7.  Pathology showed a 9 mm focus of invasive ductal carcinoma of the left breast.  Lymph nodes are negative.     Kadcyla initiated November 29, 2021. Kadcyla completed August 26, 2022.     Zoladex and tamoxifen initiated November 29, 2021. Zoladex dosing q3 months starting Sept., 2022.  -Secondary to significant hot flashes and weight gain on tamoxifen, as well as considering SOFT trial data she was changed to exemestane 3/17/23  -was still having periods on Zoladex every 3 month dosing when IUD was removed so switched to zoladex 1 month dosing in October 2024. Periods stopped.      She had her right implant removed secondary to infection on Tiana 10, 2022.     Interim History:  Patient is here today for follow-up visit.  Patient  feels like she is doing well.  Has not noticed any changes in her chest wall but does feel like she has little bit of stinging pain from time to time in the left armpit.  It is intermittent.  She denies any unexplained weight loss.  Denies any new bone or back pain issues.  Denies any infectious complaints.  She is feeling frustrated because she has not been able to lose any weight and has actually gained weight since being on treatment which has been frustrating.  She continues to have some hot flashes which she notices mainly at night but still is able to sleep pretty well for the most part.      Review of Systems    Pertinent items are noted in HPI.    Past History    Past Medical History:   Diagnosis Date     Abnormal Pap smear of cervix     age 13, subsequent pap smears normal     Anxiety      Breast cancer (H) 05/04/2021    Left     Depression        PHYSICAL EXAM  /67 (BP Location: Left arm, Patient Position: Sitting, Cuff Size: Adult Regular)   Pulse 52   Temp 98.7  F (37.1  C) (Oral)   Resp 16   Wt 92.1 kg (203 lb)   SpO2 98%   BMI 35.12 kg/m      GENERAL: no acute distress. Cooperative in conversation. Alone in clinic  RESP: Regular respiratory rate. No expiratory wheezes   NEURO: non focal. Alert and oriented x3.   PSYCH: within normal limits. No depression or anxiety.  SKIN: exposed skin is dry intact.   BREAST: Patient is status post bilateral mastectomies with implants.  She has well-healed incisions.  No abnormal rashes or lesions or any evidence of local recurrence    Lab Results    Recent Results (from the past week)   Comprehensive metabolic panel   Result Value Ref Range    Sodium 139 135 - 145 mmol/L    Potassium 4.4 3.4 - 5.3 mmol/L    Carbon Dioxide (CO2) 29 22 - 29 mmol/L    Anion Gap 8 7 - 15 mmol/L    Urea Nitrogen 15.8 6.0 - 20.0 mg/dL    Creatinine 1.09 (H) 0.51 - 0.95 mg/dL    GFR Estimate 67 >60 mL/min/1.73m2    Calcium 9.7 8.8 - 10.4 mg/dL    Chloride 102 98 - 107 mmol/L     Glucose 94 70 - 99 mg/dL    Alkaline Phosphatase 76 40 - 150 U/L    AST 27 0 - 45 U/L    ALT 27 0 - 50 U/L    Protein Total 7.5 6.4 - 8.3 g/dL    Albumin 4.6 3.5 - 5.2 g/dL    Bilirubin Total 0.3 <=1.2 mg/dL   CBC with platelets and differential   Result Value Ref Range    WBC Count 7.2 4.0 - 11.0 10e3/uL    RBC Count 4.50 3.80 - 5.20 10e6/uL    Hemoglobin 12.9 11.7 - 15.7 g/dL    Hematocrit 39.7 35.0 - 47.0 %    MCV 88 78 - 100 fL    MCH 28.7 26.5 - 33.0 pg    MCHC 32.5 31.5 - 36.5 g/dL    RDW 13.0 10.0 - 15.0 %    Platelet Count 251 150 - 450 10e3/uL    % Neutrophils 64 %    % Lymphocytes 25 %    % Monocytes 7 %    % Eosinophils 4 %    % Basophils 0 %    % Immature Granulocytes 0 %    NRBCs per 100 WBC 0 <1 /100    Absolute Neutrophils 4.6 1.6 - 8.3 10e3/uL    Absolute Lymphocytes 1.8 0.8 - 5.3 10e3/uL    Absolute Monocytes 0.5 0.0 - 1.3 10e3/uL    Absolute Eosinophils 0.3 0.0 - 0.7 10e3/uL    Absolute Basophils 0.0 0.0 - 0.2 10e3/uL    Absolute Immature Granulocytes 0.0 <=0.4 10e3/uL    Absolute NRBCs 0.0 10e3/uL         Imaging    No results found.    The longitudinal plan of care for the diagnosis(es)/condition(s) as documented were addressed during this visit. Due to the added complexity in care, I will continue to support Ry in the subsequent management and with ongoing continuity of care.      Signed by: ROSEMARY Plummer CNP    Oncology Rooming Note    July 8, 2025 1:55 PM   Veronica Nieves is a 35 year old female who presents for:    Chief Complaint   Patient presents with     Oncology Clinic Visit     Return visit 6 months with lab and injection. Malignant neoplasm of upper-outer quadrant of left breast in female, estrogen receptor positive.     Initial Vitals: /67 (BP Location: Left arm, Patient Position: Sitting, Cuff Size: Adult Regular)   Pulse 52   Temp 98.7  F (37.1  C) (Oral)   Resp 16   Wt 92.1 kg (203 lb)   SpO2 98%   BMI 35.12 kg/m   Estimated body mass index is 35.12 kg/m   "as calculated from the following:    Height as of 10/21/24: 1.619 m (5' 3.75\").    Weight as of this encounter: 92.1 kg (203 lb). Body surface area is 2.04 meters squared.  No Pain (0) Comment: Data Unavailable   No LMP recorded. Patient has had an injection.  Allergies reviewed: Yes  Medications reviewed: Yes    Medications: MEDICATION REFILLS NEEDED TODAY. Provider was notified.  Pharmacy name entered into [employer]:    [facility] ([facility] - [place], [place] - [address] [place] [address] PHARMACY #[id number] [place], [place] - [address] [facility]  [facility] - [address]    Frailty Screening:   Is the patient here for a new oncology consult visit in cancer care? 2. No    PHQ9:  Did this patient require a PHQ9?: No      Clinical concerns: none       Ruchi Tipton CMA                Again, thank you for allowing me to participate in the care of your patient.        Sincerely,        ROSEMARY Plummer CNP    Electronically signed"

## 2025-07-08 NOTE — PROGRESS NOTES
Infusion Nursing Note:  Veronica Nieves presents today for Zoladex.    Patient seen by provider today: Yes, Marleen Shukla CNP   present during visit today: Not Applicable.    Note: Iced site prior to administration. Zoladex administered to right lower abdomen. Site covered with 2x2 gauze and paper tape.     There were no vitals taken for this visit.    Intravenous Access:  No Intravenous access/labs at this visit.    Treatment Conditions:  Not Applicable.    Post Infusion Assessment:  Patient tolerated injection without incident.     Discharge Plan:   Discharge instructions reviewed with: Patient.  Patient and/or family verbalized understanding of discharge instructions and all questions answered.  AVS to patient via [employer].  Patient will return in one month for next appointment.   Patient discharged in stable condition accompanied by: self.  Departure Mode: Ambulatory.    Rohini Charles RN

## 2025-07-08 NOTE — PROGRESS NOTES
"Oncology Rooming Note    July 8, 2025 1:55 PM   Veronica Nieves is a 35 year old female who presents for:    Chief Complaint   Patient presents with    Oncology Clinic Visit     Return visit 6 months with lab and injection. Malignant neoplasm of upper-outer quadrant of left breast in female, estrogen receptor positive.     Initial Vitals: /67 (BP Location: Left arm, Patient Position: Sitting, Cuff Size: Adult Regular)   Pulse 52   Temp 98.7  F (37.1  C) (Oral)   Resp 16   Wt 92.1 kg (203 lb)   SpO2 98%   BMI 35.12 kg/m   Estimated body mass index is 35.12 kg/m  as calculated from the following:    Height as of 10/21/24: 1.619 m (5' 3.75\").    Weight as of this encounter: 92.1 kg (203 lb). Body surface area is 2.04 meters squared.  No Pain (0) Comment: Data Unavailable   No LMP recorded. Patient has had an injection.  Allergies reviewed: Yes  Medications reviewed: Yes    Medications: MEDICATION REFILLS NEEDED TODAY. Provider was notified.  Pharmacy name entered into [employer]:    [facility] - [place], [place] - [address] [facility]AC[facility] #[id number] [place], [place] - [address] [facility]  [facility] - [place], [place] - [address] [place] [address]    Frailty Screening:   Is the patient here for a new oncology consult visit in cancer care? 2. No    PHQ9:  Did this patient require a PHQ9?: No      Clinical concerns: none       Ruchi Tipton CMA              "

## 2025-07-08 NOTE — PROGRESS NOTES
[facility] Hematology and Oncology Progress Note    Patient: Veronica Nieves  MRN: [id number]  Date of Service: Jul 8, 2025          Reason for Visit    Chief Complaint   Patient presents with    Oncology Clinic Visit     Return visit 6 months with lab and injection. Malignant neoplasm of upper-outer quadrant of left breast in female, estrogen receptor positive.       Assessment and Plan     Cancer Staging   No matching staging information was found for the patient.    1.  Breast cancer, left breast, invasive ductal carcinoma, stage Ib: Currently on exemestane and zoladex. Has been on monotherapy since November 2021.  Recently was on tamoxifen from November 2021 until March 2023.  At that time we switched to Exemestane due to weight gain and hot flashes.  Overall she feels about the same.  Due to age, we have recommended her to continue for 10 years.  Patient will return in 6 months.  We did talk about the fact that could potentially stop the Zoladex and just do tamoxifen after being on treatment for 5 years to complete another 5 years.    2. Risk for Osteopenia: High risk for worsening bone density with the aromatase inhibitor.  She will continue calcium and vitamin D supplement. We will repeat her DEXA scan now.  Encourage also to participate in weightbearing exercises. Good calcium in diet.     3.  Resumption of periods after IUD removed: This has now stopped after going to the 1 month dosing of the Zoladex instead of the 3-month dosing.  I think she will need to continue on that.  I did tell her the only other alternative would be to have her ovaries removed which she is not interested in right now.  Continue monthly dosing.    4. Weight gain: has been since being on treatment. Likely from hormonal changes, lupron and exemestane. Encourage healthy lifestyle. She is going to ask PCP about GLP1 medications.       ECOG Performance    0 - Independent    Distress Screening (within last 30 days)    No data  recorded     Pain  Pain Score: No Pain (0)    Problem List    Patient Active Problem List   Diagnosis    Mild depression and anxiety    Malignant neoplasm of upper-outer quadrant of left breast in female, estrogen receptor positive (H)    Elevated serum creatinine    Chemotherapy-induced peripheral neuropathy    Cervical high risk HPV (human papillomavirus) test positive        ______________________________________________________________________________    History of Present Illness    Oncologist: Dr. Garcia    Diagnosis:     1.  Invasive ductal carcinoma of the left breast: Diagnosed May, 2021.  Initial imaging suggested 3 masses. Biopsy of the largest mass showed an invasive ductal carcinoma, grade 2.  ER/AL positive, HER-2 positive. Evaluation of the axilla was negative.     Treatment:     Neoadjuvant chemotherapy with TCHP initiated on May 27, 2021.  Cycle 6 was given on September 10.     Bilateral mastectomy performed on October 7.  Pathology showed a 9 mm focus of invasive ductal carcinoma of the left breast.  Lymph nodes are negative.     Kadcyla initiated November 29, 2021. Kadcyla completed August 26, 2022.     Zoladex and tamoxifen initiated November 29, 2021. Zoladex dosing q3 months starting Sept., 2022.  -Secondary to significant hot flashes and weight gain on tamoxifen, as well as considering SOFT trial data she was changed to exemestane 3/17/23  -was still having periods on Zoladex every 3 month dosing when IUD was removed so switched to zoladex 1 month dosing in October 2024. Periods stopped.      She had her right implant removed secondary to infection on Tiana 10, 2022.     Interim History:  Patient is here today for follow-up visit.  Patient feels like she is doing well.  Has not noticed any changes in her chest wall but does feel like she has little bit of stinging pain from time to time in the left armpit.  It is intermittent.  She denies any unexplained weight loss.  Denies any new bone or  back pain issues.  Denies any infectious complaints.  She is feeling frustrated because she has not been able to lose any weight and has actually gained weight since being on treatment which has been frustrating.  She continues to have some hot flashes which she notices mainly at night but still is able to sleep pretty well for the most part.      Review of Systems    Pertinent items are noted in HPI.    Past History    Past Medical History:   Diagnosis Date    Abnormal Pap smear of cervix     age 13, subsequent pap smears normal    Anxiety     Breast cancer (H) 05/04/2021    Left    Depression        PHYSICAL EXAM  /67 (BP Location: Left arm, Patient Position: Sitting, Cuff Size: Adult Regular)   Pulse 52   Temp 98.7  F (37.1  C) (Oral)   Resp 16   Wt 92.1 kg (203 lb)   SpO2 98%   BMI 35.12 kg/m      GENERAL: no acute distress. Cooperative in conversation. Alone in clinic  RESP: Regular respiratory rate. No expiratory wheezes   NEURO: non focal. Alert and oriented x3.   PSYCH: within normal limits. No depression or anxiety.  SKIN: exposed skin is dry intact.   BREAST: Patient is status post bilateral mastectomies with implants.  She has well-healed incisions.  No abnormal rashes or lesions or any evidence of local recurrence    Lab Results    Recent Results (from the past week)   Comprehensive metabolic panel   Result Value Ref Range    Sodium 139 135 - 145 mmol/L    Potassium 4.4 3.4 - 5.3 mmol/L    Carbon Dioxide (CO2) 29 22 - 29 mmol/L    Anion Gap 8 7 - 15 mmol/L    Urea Nitrogen 15.8 6.0 - 20.0 mg/dL    Creatinine 1.09 (H) 0.51 - 0.95 mg/dL    GFR Estimate 67 >60 mL/min/1.73m2    Calcium 9.7 8.8 - 10.4 mg/dL    Chloride 102 98 - 107 mmol/L    Glucose 94 70 - 99 mg/dL    Alkaline Phosphatase 76 40 - 150 U/L    AST 27 0 - 45 U/L    ALT 27 0 - 50 U/L    Protein Total 7.5 6.4 - 8.3 g/dL    Albumin 4.6 3.5 - 5.2 g/dL    Bilirubin Total 0.3 <=1.2 mg/dL   CBC with platelets and differential   Result Value  Ref Range    WBC Count 7.2 4.0 - 11.0 10e3/uL    RBC Count 4.50 3.80 - 5.20 10e6/uL    Hemoglobin 12.9 11.7 - 15.7 g/dL    Hematocrit 39.7 35.0 - 47.0 %    MCV 88 78 - 100 fL    MCH 28.7 26.5 - 33.0 pg    MCHC 32.5 31.5 - 36.5 g/dL    RDW 13.0 10.0 - 15.0 %    Platelet Count 251 150 - 450 10e3/uL    % Neutrophils 64 %    % Lymphocytes 25 %    % Monocytes 7 %    % Eosinophils 4 %    % Basophils 0 %    % Immature Granulocytes 0 %    NRBCs per 100 WBC 0 <1 /100    Absolute Neutrophils 4.6 1.6 - 8.3 10e3/uL    Absolute Lymphocytes 1.8 0.8 - 5.3 10e3/uL    Absolute Monocytes 0.5 0.0 - 1.3 10e3/uL    Absolute Eosinophils 0.3 0.0 - 0.7 10e3/uL    Absolute Basophils 0.0 0.0 - 0.2 10e3/uL    Absolute Immature Granulocytes 0.0 <=0.4 10e3/uL    Absolute NRBCs 0.0 10e3/uL         Imaging    No results found.    The longitudinal plan of care for the diagnosis(es)/condition(s) as documented were addressed during this visit. Due to the added complexity in care, I will continue to support Ry in the subsequent management and with ongoing continuity of care.      Signed by: ROSEMARY Plummer CNP

## 2025-07-10 ENCOUNTER — TELEPHONE (OUTPATIENT)
Dept: ONCOLOGY | Facility: HOSPITAL | Age: 35
End: 2025-07-10
Payer: COMMERCIAL

## 2025-07-21 ENCOUNTER — OFFICE VISIT (OUTPATIENT)
Dept: FAMILY MEDICINE | Facility: CLINIC | Age: 35
End: 2025-07-21
Payer: COMMERCIAL

## 2025-07-21 VITALS
HEIGHT: 64 IN | DIASTOLIC BLOOD PRESSURE: 62 MMHG | SYSTOLIC BLOOD PRESSURE: 126 MMHG | RESPIRATION RATE: 16 BRPM | TEMPERATURE: 97.7 F | WEIGHT: 203 LBS | OXYGEN SATURATION: 97 % | BODY MASS INDEX: 34.66 KG/M2 | HEART RATE: 72 BPM

## 2025-07-21 DIAGNOSIS — F32.A MILD DEPRESSION: ICD-10-CM

## 2025-07-21 DIAGNOSIS — Z12.4 CERVICAL CANCER SCREENING: ICD-10-CM

## 2025-07-21 DIAGNOSIS — Z17.0 MALIGNANT NEOPLASM OF UPPER-OUTER QUADRANT OF LEFT BREAST IN FEMALE, ESTROGEN RECEPTOR POSITIVE (H): ICD-10-CM

## 2025-07-21 DIAGNOSIS — R87.810 CERVICAL HIGH RISK HPV (HUMAN PAPILLOMAVIRUS) TEST POSITIVE: ICD-10-CM

## 2025-07-21 DIAGNOSIS — R79.89 ELEVATED SERUM CREATININE: ICD-10-CM

## 2025-07-21 DIAGNOSIS — C50.412 MALIGNANT NEOPLASM OF UPPER-OUTER QUADRANT OF LEFT BREAST IN FEMALE, ESTROGEN RECEPTOR POSITIVE (H): ICD-10-CM

## 2025-07-21 DIAGNOSIS — E78.5 HYPERLIPIDEMIA LDL GOAL <130: ICD-10-CM

## 2025-07-21 DIAGNOSIS — Z00.00 ROUTINE GENERAL MEDICAL EXAMINATION AT A HEALTH CARE FACILITY: Primary | ICD-10-CM

## 2025-07-21 PROCEDURE — 99395 PREV VISIT EST AGE 18-39: CPT

## 2025-07-21 PROCEDURE — 3074F SYST BP LT 130 MM HG: CPT

## 2025-07-21 PROCEDURE — 99214 OFFICE O/P EST MOD 30 MIN: CPT | Mod: 25

## 2025-07-21 PROCEDURE — 87624 HPV HI-RISK TYP POOLED RSLT: CPT

## 2025-07-21 PROCEDURE — 3078F DIAST BP <80 MM HG: CPT

## 2025-07-21 RX ORDER — CITALOPRAM HYDROBROMIDE 40 MG/1
40 TABLET ORAL DAILY
Qty: 30 TABLET | Refills: 11 | Status: SHIPPED | OUTPATIENT
Start: 2025-07-21

## 2025-07-21 SDOH — HEALTH STABILITY: PHYSICAL HEALTH: ON AVERAGE, HOW MANY DAYS PER WEEK DO YOU ENGAGE IN MODERATE TO STRENUOUS EXERCISE (LIKE A BRISK WALK)?: 2 DAYS

## 2025-07-21 ASSESSMENT — ANXIETY QUESTIONNAIRES
2. NOT BEING ABLE TO STOP OR CONTROL WORRYING: NOT AT ALL
1. FEELING NERVOUS, ANXIOUS, OR ON EDGE: SEVERAL DAYS
8. IF YOU CHECKED OFF ANY PROBLEMS, HOW DIFFICULT HAVE THESE MADE IT FOR YOU TO DO YOUR WORK, TAKE CARE OF THINGS AT HOME, OR GET ALONG WITH OTHER PEOPLE?: NOT DIFFICULT AT ALL
GAD7 TOTAL SCORE: 3
GAD7 TOTAL SCORE: 3
6. BECOMING EASILY ANNOYED OR IRRITABLE: SEVERAL DAYS
GAD7 TOTAL SCORE: 3
4. TROUBLE RELAXING: NOT AT ALL
3. WORRYING TOO MUCH ABOUT DIFFERENT THINGS: SEVERAL DAYS
5. BEING SO RESTLESS THAT IT IS HARD TO SIT STILL: NOT AT ALL
7. FEELING AFRAID AS IF SOMETHING AWFUL MIGHT HAPPEN: NOT AT ALL
7. FEELING AFRAID AS IF SOMETHING AWFUL MIGHT HAPPEN: NOT AT ALL
IF YOU CHECKED OFF ANY PROBLEMS ON THIS QUESTIONNAIRE, HOW DIFFICULT HAVE THESE PROBLEMS MADE IT FOR YOU TO DO YOUR WORK, TAKE CARE OF THINGS AT HOME, OR GET ALONG WITH OTHER PEOPLE: NOT DIFFICULT AT ALL

## 2025-07-21 ASSESSMENT — PATIENT HEALTH QUESTIONNAIRE - PHQ9
10. IF YOU CHECKED OFF ANY PROBLEMS, HOW DIFFICULT HAVE THESE PROBLEMS MADE IT FOR YOU TO DO YOUR WORK, TAKE CARE OF THINGS AT HOME, OR GET ALONG WITH OTHER PEOPLE: SOMEWHAT DIFFICULT
SUM OF ALL RESPONSES TO PHQ QUESTIONS 1-9: 7
SUM OF ALL RESPONSES TO PHQ QUESTIONS 1-9: 7

## 2025-07-21 ASSESSMENT — SOCIAL DETERMINANTS OF HEALTH (SDOH): HOW OFTEN DO YOU GET TOGETHER WITH FRIENDS OR RELATIVES?: TWICE A WEEK

## 2025-07-21 NOTE — PROGRESS NOTES
Preventive Care Visit  [facility]  Coreen Mark PA-C, Family Medicine  Jul 21, 2025      Assessment & Plan     Routine general medical examination at a health care facility  Patient seen today for annual physical exam.  Routine health maintenance reviewed.   Vaccinations: Declined COVID vaccine today.   Breast cancer screen: As indicated by oncology  Colon cancer screen: Start routine screening at age 45 years   Cervical cancer screening: Updated today  Acute and chronic concerns addressed below.   - REVIEW OF HEALTH MAINTENANCE PROTOCOL ORDERS  - PRIMARY CARE FOLLOW-UP SCHEDULING    Mild depression and anxiety Unknown or clinically undetermined   PHQ-9 score today 7   CONSTANZA-7 score today 3   No SI/HI  Medications: CONTINUE citalopram 40 mg once daily. Patient doesn't desire any changes in medication today.   Recommended mindfulness, meditation, and exercise. Sleep hygiene.   Follow up: 1 year, sooner prn  - citalopram (CELEXA) 40 MG tablet  Dispense: 30 tablet; Refill: 11    Cervical cancer screening  Cervical high risk HPV (human papillomavirus) test positive  2005 ASCUS pap, [place] - STEPHANIE 1. Normal paps until 2023 12/4/2023 NIL Pap, + HR HPV (neg 16/18). Plan cotest in 1 year.   12/11/2023 Pt notified by phone.   11/13/24 Reminder mychart  12/10/2024 Reminder call - lm  1/3/25 Lost to follow up  PAP smear updated today  - HPV and Gynecologic Cytology Panel - Recommended Age 30 - 65 Years    Malignant neoplasm of upper-outer quadrant of left breast in female, estrogen receptor positive (H)  Following with oncology q6 months. Currently taking exemestane 25 mg once daily which they manage.    Elevated serum creatinine  Appears stable. Last CMP check from 7/8 showing stable creat ~1.09.     Hyperlipidemia LDL goal <130  Patient will update her lipid panel when she returns for routine oncology monitoring labs.   - Lipid panel reflex to direct LDL Fasting    The longitudinal plan of care for  "the diagnosis(es)/condition(s) as documented were addressed during this visit. Due to the added complexity in care, I will continue to support Ry in the subsequent management and with ongoing continuity of care.      Patient has been advised of split billing requirements and indicates understanding: Yes    BMI  Estimated body mass index is 35.12 kg/m  as calculated from the following:    Height as of this encounter: 1.619 m (5' 3.75\").    Weight as of this encounter: 92.1 kg (203 lb).   Weight management plan: Discussed healthy diet and exercise guidelines    Counseling  Appropriate preventive services were addressed with this patient via screening, questionnaire, or discussion as appropriate for fall prevention, nutrition, physical activity, Tobacco-use cessation, social engagement, weight loss and cognition.  Checklist reviewing preventive services available has been given to the patient.  Reviewed patient's diet, addressing concerns and/or questions.   She is at risk for lack of exercise and has been provided with information to increase physical activity for the benefit of her well-being.   The patient's PHQ-9 score is consistent with mild depression. She was provided with information regarding depression.   Reviewed preventive health counseling, as reflected in patient instructions      Subjective   Ry is a 35 year old, presenting for the following:  Physical        7/21/2025     3:04 PM   Additional Questions   Roomed by JERRI eWeks          HPI    Depression/anxiety: Taking citalopram 40 mg once daily. Feels things are going 'fine'. Feels she is dealing with more difficulties with concentrating. Thinks it may be some left over 'chemo brain'. Easily distracted. Has tried an ADHD medication through oncology during chemo. No SI/HI. Doesn't want to make any med changes at this time.     Breast cancer, left. Invasive ductal carcinoma stage 1b. Estrogen positive.   Exemestane 25 mg once daily   Last saw oncology on " 7/8/25. Follow q6 months    CKD stage 2. Last CMP 7/8/25 - stable creat ~1.09, GFR 67.        Advance Care Planning    Document on file is a Health Care Directive or POLST.        7/21/2025   General Health   How would you rate your overall physical health? (!) FAIR   Feel stress (tense, anxious, or unable to sleep) Only a little         7/21/2025   Nutrition   Three or more servings of calcium each day? Yes   Diet: Regular (no restrictions)   How many servings of fruit and vegetables per day? (!) 2-3   How many sweetened beverages each day? 0-1         7/21/2025   Exercise   Days per week of moderate/strenous exercise 2 days         7/21/2025   Social Factors   Frequency of gathering with friends or relatives Twice a week         7/21/2025   Dental   Dentist two times every year? Yes       Today's PHQ-9 Score:       7/21/2025     3:02 PM   PHQ-9 SCORE   PHQ-9 Total Score MyChart 7 (Mild depression)   PHQ-9 Total Score 7        Patient-reported         4/27/2021     8:00 AM 6/8/2021     7:00 AM 7/21/2025     3:03 PM   CONSTANZA-7 SCORE   Total Score   3 (minimal anxiety)   Total Score 7 3 3        Patient-reported             7/21/2025   Substance Use   Alcohol more than 3/day or more than 7/wk No   Do you use any other substances recreationally? No     Social History     Tobacco Use    Smoking status: Never     Passive exposure: Never    Smokeless tobacco: Never   Vaping Use    Vaping status: Never Used   Substance Use Topics    Alcohol use: Yes     Alcohol/week: 6.0 - 7.0 standard drinks of alcohol    Drug use: No          Mammogram Screening - Mammography discussed, not appropriate due to bilateral mastectomy with reconstruction.  Surgical history and/or SOGI form updated.      History of abnormal Pap smear: YES - reflected in Problem List and Health Maintenance accordingly    2005 ASCUS pap, [place] - STEPHANIE 1. Normal paps until 2023 12/4/2023 NIL Pap, + HR HPV (neg 16/18). Plan cotest in 1 year.   12/11/2023 Pt notified  "by phone.   11/13/24 Reminder faustino  12/10/2024 Reminder call - lm  1/3/25 Lost to follow up        Latest Ref Rng & Units 12/4/2023     3:42 PM 12/17/2019     8:46 AM   PAP / HPV   PAP  Negative for Intraepithelial Lesion or Malignancy (NILM)  Negative for squamous intraepithelial lesion or malignancy  Electronically signed by Lolita Otero CT (ASCP) on 12/27/2019 at  3:10 PM      HPV 16 DNA Negative Negative  Negative    HPV 18 DNA Negative Negative  Negative    Other HR HPV Negative Positive  Negative            7/21/2025   Contraception/Family Planning   Questions about contraception or family planning No   What are your periods like? Not currently having periods        Reviewed and updated as needed this visit by Provider                    BP Readings from Last 3 Encounters:   07/21/25 126/62   07/08/25 118/67   06/02/25 119/71    Wt Readings from Last 3 Encounters:   07/21/25 92.1 kg (203 lb)   07/08/25 92.1 kg (203 lb)   01/13/25 92.3 kg (203 lb 8 oz)           Review of Systems  Constitutional, neuro, ENT, endocrine, pulmonary, cardiac, gastrointestinal, genitourinary, musculoskeletal, integument and psychiatric systems are negative, except as otherwise noted.     Objective    Exam  /62 (BP Location: Right arm, Patient Position: Sitting)   Pulse 72   Temp 97.7  F (36.5  C) (Oral)   Resp 16   Ht 1.619 m (5' 3.75\")   Wt 92.1 kg (203 lb)   SpO2 97%   BMI 35.12 kg/m     Estimated body mass index is 35.12 kg/m  as calculated from the following:    Height as of this encounter: 1.619 m (5' 3.75\").    Weight as of this encounter: 92.1 kg (203 lb).    Physical Exam  GENERAL: alert and no distress  EYES: Eyes grossly normal to inspection, conjunctivae and sclerae normal  HENT: ear canals and TM's normal, nose and mouth without ulcers or lesions  NECK: no adenopathy, no asymmetry, masses, or scars  RESP: lungs clear to auscultation - no rales, rhonchi or wheezes  CV: regular rate and rhythm, normal " S1 S2, no S3 or S4, no murmur, click or rub, no peripheral edema   (female): normal female external genitalia, normal urethral meatus, normal vaginal mucosa  MS: no gross musculoskeletal defects noted, no edema  SKIN: no suspicious lesions or rashes  NEURO: Normal strength and tone, mentation intact and speech normal  PSYCH: mentation appears normal, affect normal/bright        Signed Electronically by: Coreen Mark PA-C    Answers submitted by the patient for this visit:  Patient Health Questionnaire (Submitted on 7/21/2025)  If you checked off any problems, how difficult have these problems made it for you to do your work, take care of things at home, or get along with other people?: Somewhat difficult  PHQ9 TOTAL SCORE: 7  Patient Health Questionnaire (G7) (Submitted on 7/21/2025)  CONSTANZA 7 TOTAL SCORE: 3

## 2025-07-21 NOTE — PATIENT INSTRUCTIONS
Patient Education   Preventive Care Advice   This is general advice given by our system to help you stay healthy. However, your care team may have specific advice just for you. Please talk to your care team about your preventive care needs.  Nutrition  Eat 5 or more servings of fruits and vegetables each day.  Try wheat bread, brown rice and whole grain pasta (instead of white bread, rice, and pasta).  Get enough calcium and vitamin D. Check the label on foods and aim for 100% of the RDA (recommended daily allowance).  Lifestyle  Exercise at least 150 minutes each week  (30 minutes a day, 5 days a week).  Do muscle strengthening activities 2 days a week. These help control your weight and prevent disease.  No smoking.  Wear sunscreen to prevent skin cancer.  Have a dental exam and cleaning every 6 months.  Yearly exams  See your health care team every year to talk about:  Any changes in your health.  Any medicines your care team has prescribed.  Preventive care, family planning, and ways to prevent chronic diseases.  Shots (vaccines)   HPV shots (up to age 26), if you've never had them before.  Hepatitis B shots (up to age 59), if you've never had them before.  COVID-19 shot: Get this shot when it's due.  Flu shot: Get a flu shot every year.  Tetanus shot: Get a tetanus shot every 10 years.  Pneumococcal, hepatitis A, and RSV shots: Ask your care team if you need these based on your risk.  Shingles shot (for age 50 and up)  General health tests  Diabetes screening:  Starting at age 35, Get screened for diabetes at least every 3 years.  If you are younger than age 35, ask your care team if you should be screened for diabetes.  Cholesterol test: At age 39, start having a cholesterol test every 5 years, or more often if advised.  Bone density scan (DEXA): At age 50, ask your care team if you should have this scan for osteoporosis (brittle bones).  Hepatitis C: Get tested at least once in your life.  STIs (sexually  transmitted infections)  Before age 24: Ask your care team if you should be screened for STIs.  After age 24: Get screened for STIs if you're at risk. You are at risk for STIs (including HIV) if:  You are sexually active with more than one person.  You don't use condoms every time.  You or a partner was diagnosed with a sexually transmitted infection.  If you are at risk for HIV, ask about PrEP medicine to prevent HIV.  Get tested for HIV at least once in your life, whether you are at risk for HIV or not.  Cancer screening tests  Cervical cancer screening: If you have a cervix, begin getting regular cervical cancer screening tests starting at age 21.  Breast cancer scan (mammogram): If you've ever had breasts, begin having regular mammograms starting at age 40. This is a scan to check for breast cancer.  Colon cancer screening: It is important to start screening for colon cancer at age 45.  Have a colonoscopy test every 10 years (or more often if you're at risk) Or, ask your provider about stool tests like a FIT test every year or Cologuard test every 3 years.  To learn more about your testing options, visit:   .  For help making a decision, visit:   https://bit.ly/jc96285.  Prostate cancer screening test: If you have a prostate, ask your care team if a prostate cancer screening test (PSA) at age 55 is right for you.  Lung cancer screening: If you are a current or former smoker ages 50 to 80, ask your care team if ongoing lung cancer screenings are right for you.  For informational purposes only. Not to replace the advice of your health care provider. Copyright   2023 [facility]. All rights reserved. Clinically reviewed by the [facility]. [employer] 332072 - REV 01/24.  Learning About Depression Screening  What is depression screening?  Depression screening is a way to see if you have depression symptoms. It may be done by a doctor or counselor. It's often part of a routine  "checkup. That's because your mental health is just as important as your physical health.  Depression is a mental health condition that affects how you feel, think, and act. You may:  Have less energy.  Lose interest in your daily activities.  Feel sad and grouchy for a long time.  Depression is very common. It affects people of all ages.  Many things can lead to depression. Some people become depressed after they have a stroke or find out they have a major illness like cancer or heart disease. The death of a loved one or a breakup may lead to depression. It can run in families. Most experts believe that a combination of inherited genes and stressful life events can cause it.  What happens during screening?  You may be asked to fill out a form about your depression symptoms. You and the doctor will discuss your answers. The doctor may ask you more questions to learn more about how you think, act, and feel.  What happens after screening?  If you have symptoms of depression, your doctor will talk to you about your options.  Doctors usually treat depression with medicines or counseling. Often, combining the two works best. Many people don't get help because they think that they'll get over the depression on their own. But people with depression may not get better unless they get treatment.  The cause of depression is not well understood. There may be many factors involved. But if you have depression, it's not your fault.  A serious symptom of depression is thinking about death or suicide. If you or someone you care about talks about this or about feeling hopeless, get help right away.  It's important to know that depression can be treated. Medicine, counseling, and self-care may help.  Where can you learn more?  Go to https://www.healthwise.net/patiented  Enter T185 in the search box to learn more about \"Learning About Depression Screening.\"  Current as of: July 31, 2024  Content Version: 14.5 2024-2025 [place] [employer].   Care instructions adapted under license by your healthcare professional. If you have questions about a medical condition or this instruction, always ask your healthcare professional. [facility] [employer] disclaims any warranty or liability for your use of this information.

## 2025-07-24 LAB
HPV HR 12 DNA CVX QL NAA+PROBE: POSITIVE
HPV16 DNA CVX QL NAA+PROBE: NEGATIVE
HPV18 DNA CVX QL NAA+PROBE: NEGATIVE
HUMAN PAPILLOMA VIRUS FINAL DIAGNOSIS: ABNORMAL

## 2025-07-28 ENCOUNTER — PATIENT OUTREACH (OUTPATIENT)
Dept: FAMILY MEDICINE | Facility: CLINIC | Age: 35
End: 2025-07-28
Payer: COMMERCIAL

## 2025-07-28 ENCOUNTER — TELEPHONE (OUTPATIENT)
Dept: FAMILY MEDICINE | Facility: CLINIC | Age: 35
End: 2025-07-28
Payer: COMMERCIAL

## 2025-07-28 LAB
BKR AP ASSOCIATED HPV REPORT: NORMAL
BKR LAB AP GYN ADEQUACY: NORMAL
BKR LAB AP GYN INTERPRETATION: NORMAL
BKR LAB AP PREVIOUS ABNL DX: NORMAL
BKR LAB AP PREVIOUS ABNORMAL: NORMAL
PATH REPORT.COMMENTS IMP SPEC: NORMAL
PATH REPORT.COMMENTS IMP SPEC: NORMAL
PATH REPORT.RELEVANT HX SPEC: NORMAL

## 2025-07-28 NOTE — TELEPHONE ENCOUNTER
Attempted to reach patient to schedule with Dr. Sosa. No answer, voicemail left asking her to call back to assist in scheduling.     Ok to schedule clop on the following dates/times:     9/5/25 -3:00 PM with 2:40 PM arrival.   9/30/25 - 2:30 PM with 2:10 PM arrival.      Cathy Wahl MA on 7/28/2025 at 5:32 PM

## 2025-07-28 NOTE — TELEPHONE ENCOUNTER
Please call patient to assist her in scheduling a colposcopy prior to 10/21/25 with Dr. Sosa.      Ok to leave detailed message if patient doesn't answer.      Patient has been advised of pap results and recommendations for follow up.          Thank you,   Fernanda Magdaleno, Cervical Cancer Resulting-RN.

## 2025-08-06 ENCOUNTER — INFUSION THERAPY VISIT (OUTPATIENT)
Dept: INFUSION THERAPY | Facility: HOSPITAL | Age: 35
End: 2025-08-06
Attending: INTERNAL MEDICINE
Payer: COMMERCIAL

## 2025-08-06 VITALS
OXYGEN SATURATION: 98 % | RESPIRATION RATE: 16 BRPM | TEMPERATURE: 98.1 F | SYSTOLIC BLOOD PRESSURE: 139 MMHG | HEART RATE: 62 BPM | DIASTOLIC BLOOD PRESSURE: 79 MMHG

## 2025-08-06 DIAGNOSIS — Z17.0 MALIGNANT NEOPLASM OF UPPER-OUTER QUADRANT OF LEFT BREAST IN FEMALE, ESTROGEN RECEPTOR POSITIVE (H): Primary | ICD-10-CM

## 2025-08-06 DIAGNOSIS — C50.412 MALIGNANT NEOPLASM OF UPPER-OUTER QUADRANT OF LEFT BREAST IN FEMALE, ESTROGEN RECEPTOR POSITIVE (H): Primary | ICD-10-CM

## 2025-08-06 PROCEDURE — 250N000011 HC RX IP 250 OP 636: Mod: JZ | Performed by: NURSE PRACTITIONER

## 2025-08-06 PROCEDURE — 96402 CHEMO HORMON ANTINEOPL SQ/IM: CPT

## 2025-08-06 RX ADMIN — GOSERELIN ACETATE 3.6 MG: 3.6 IMPLANT SUBCUTANEOUS at 12:42

## 2025-09-02 ENCOUNTER — INFUSION THERAPY VISIT (OUTPATIENT)
Dept: INFUSION THERAPY | Facility: HOSPITAL | Age: 35
End: 2025-09-02
Attending: NURSE PRACTITIONER
Payer: COMMERCIAL

## 2025-09-02 VITALS
RESPIRATION RATE: 16 BRPM | OXYGEN SATURATION: 98 % | TEMPERATURE: 98.2 F | SYSTOLIC BLOOD PRESSURE: 121 MMHG | DIASTOLIC BLOOD PRESSURE: 76 MMHG | HEART RATE: 59 BPM

## 2025-09-02 DIAGNOSIS — Z17.0 MALIGNANT NEOPLASM OF UPPER-OUTER QUADRANT OF LEFT BREAST IN FEMALE, ESTROGEN RECEPTOR POSITIVE (H): Primary | ICD-10-CM

## 2025-09-02 DIAGNOSIS — C50.412 MALIGNANT NEOPLASM OF UPPER-OUTER QUADRANT OF LEFT BREAST IN FEMALE, ESTROGEN RECEPTOR POSITIVE (H): Primary | ICD-10-CM

## 2025-09-02 PROCEDURE — 250N000011 HC RX IP 250 OP 636: Mod: JZ | Performed by: NURSE PRACTITIONER

## 2025-09-02 PROCEDURE — 96402 CHEMO HORMON ANTINEOPL SQ/IM: CPT

## 2025-09-02 RX ADMIN — GOSERELIN ACETATE 3.6 MG: 3.6 IMPLANT SUBCUTANEOUS at 14:06

## (undated) DEVICE — SUTURE PDS 3-0 FS-2 Z423H

## (undated) DEVICE — SUTURE STRATAFIX MONOCRYL 3-0 RB-1 SPIRAL 16CM SXMP2B402

## (undated) DEVICE — SUCTION TIP YANKAUER W/O VENT K86

## (undated) DEVICE — BLADE KNIFE SURG 15 371115

## (undated) DEVICE — BRA STYLE 1524 SZ 42

## (undated) DEVICE — DRSG ABD TNDRSRB WET PRUF 8IN X 10IN STRL  9194A

## (undated) DEVICE — CUSTOM PACK GEN MAJOR SBA5BGMHEA

## (undated) DEVICE — GLOVE UNDER INDICATOR PI SZ 6.5 LF 41665

## (undated) DEVICE — SU PLAIN FAST ABSORB 5-0 PC-1 18" 1915G

## (undated) DEVICE — BRA STYLE 1524 SZ 38

## (undated) DEVICE — SOL WATER IRRIG 1000ML BOTTLE 2F7114

## (undated) DEVICE — DRSG KERLIX FLUFFS X5

## (undated) DEVICE — PLATE GROUNDING ADULT W/CORD 9165L

## (undated) DEVICE — ESU PENCIL SMOKE EVAC W/ROCKER SWITCH 0703-047-000

## (undated) DEVICE — SOL RINGERS LACTATED 1000ML BAG 2B2324X

## (undated) DEVICE — PITCHER STERILE 1000ML  SSK9004A

## (undated) DEVICE — ESU ELEC BLADE 2.75" COATED/INSULATED E1455

## (undated) DEVICE — GLOVE BIOGEL PI ULTRATOUCH G SZ 6.5 42165

## (undated) DEVICE — SOL ADH LIQUID BENZOIN SWAB 0.6ML C1544

## (undated) DEVICE — CLOSURE SYS SKIN PREMIERPRO EXOFIN FUSION 4X22CM STRL 3472

## (undated) DEVICE — NEEDLE HYPO 22X1-1/2 SAFETY 305900

## (undated) DEVICE — DRAPE SHEET THYROID 77X123 89255

## (undated) DEVICE — DRESSING MEPILEX AG SILVER 4X8 395890

## (undated) DEVICE — DRSG BIOPATCH GERMICIDAL SPLIT SPONGE 4MM MED 4150

## (undated) DEVICE — SUCTION MANIFOLD NEPTUNE 2 SYS 1 PORT 702-025-000

## (undated) DEVICE — DRAPE CHEST 100X72X124 89227

## (undated) DEVICE — SUTURE VICRYL+ 2-0 27IN SH UND VCP417H

## (undated) DEVICE — DRSG TEGADERM 4X4 3/4" 1626W

## (undated) DEVICE — DRAPE SHEET REV FOLD 3/4 9349

## (undated) DEVICE — DRAIN BLAKE 15FR SIL 2229

## (undated) DEVICE — GLOVE BIOGEL PI SZ 6.5 40865

## (undated) DEVICE — SU SILK 2-0 SH 30" K833H

## (undated) DEVICE — HOLDER DRAINAGE BULB 0814-8220

## (undated) DEVICE — SYR 50ML LL W/O NDL 309653

## (undated) DEVICE — GLOVE BIOGEL PI ULTRATOUCH G SZ 6.0 42160

## (undated) DEVICE — SOL NACL 0.9% IRRIG 1000ML BOTTLE 2F7124

## (undated) DEVICE — PREP CHLORAPREP 26ML TINTED HI-LITE ORANGE 930815

## (undated) DEVICE — LIGACLIP MEDIUM AESCULAP B2180-1

## (undated) DEVICE — DRAPE U SPLIT 74X120" 29440

## (undated) DEVICE — SYR 10ML LL W/O NDL 302995

## (undated) DEVICE — ADH SKIN CLOSURE PREMIERPRO EXOFIN MICOR HV 0.5ML 3471

## (undated) DEVICE — ESU ELEC EDGE INSULATED BLADE 4" E1455-4

## (undated) DEVICE — DECANTER VIAL 2006S

## (undated) DEVICE — GLOVE UNDER INDICATOR PI SZ 7.0 LF 41670

## (undated) DEVICE — Device

## (undated) DEVICE — BINDER ABDOMINAL 3PANEL LG 45IN 08140345

## (undated) DEVICE — SU PROLENE 2-0 V-7 36" 8977H

## (undated) DEVICE — DRAIN RESERVOIR 100ML JP 0070740

## (undated) DEVICE — GLOVE BIOGEL PI ORTHOPRO SZ 7.5 47675

## (undated) DEVICE — TUBING INFUSION INFILTRATION LIPOSUCTION 156" 24-6008

## (undated) DEVICE — GOWN LG DISP 9515

## (undated) DEVICE — SPONGE LAP 18X18" X8435

## (undated) DEVICE — GLOVE BIOGEL PI ULTRATOUCH G SZ 7.0 42170

## (undated) DEVICE — SU MONOCRYL+ 4-0 18IN PS2 UND MCP496G

## (undated) DEVICE — DRSG TEGADERM 2 3/8X2 3/4" 1624W

## (undated) DEVICE — COVER ULTRASOUND PROBE FLEXI-FEEL 4X96" 25-FF496

## (undated) DEVICE — SUTURE MONOCRYL+ 4-0 PS-2 27IN MCP426H

## (undated) DEVICE — SU MONOCRYL 3-0 PS-2 18" UND MCP497G

## (undated) DEVICE — GLOVE BIOGEL PI SZ 7.0 40870

## (undated) DEVICE — SYR 50ML CATH TIP W/O NDL 309620

## (undated) DEVICE — GLOVE SURG PI ULTRA TOUCH M SZ 6-1/2 LF

## (undated) DEVICE — SUTURE VICRYL+ 3-0 18IN PS-2 UND VCP497H

## (undated) DEVICE — CLOSURE SYS SKIN PREMIERPRO EXOFINFUSION 4X60CM 3473

## (undated) DEVICE — GOWN IMPERVIOUS BREATHABLE SMART XLG 89045

## (undated) DEVICE — SU ETHILON 3-0 PS-1 18" 1663G

## (undated) DEVICE — DRSG ABDOMINAL PAD UNSTERILE 5X9" 9190

## (undated) DEVICE — SYSTEM FAT PROCESSING RESOLVE RV0001

## (undated) DEVICE — GLOVE BIOGEL INDICATOR 7.5 LF 41675

## (undated) DEVICE — BLADE KNIFE SURG 11 371111

## (undated) RX ORDER — BUPIVACAINE HYDROCHLORIDE 5 MG/ML
INJECTION, SOLUTION EPIDURAL; INTRACAUDAL
Status: DISPENSED
Start: 2022-09-23

## (undated) RX ORDER — PROPOFOL 10 MG/ML
INJECTION, EMULSION INTRAVENOUS
Status: DISPENSED
Start: 2022-12-20

## (undated) RX ORDER — DEXAMETHASONE SODIUM PHOSPHATE 10 MG/ML
INJECTION INTRAMUSCULAR; INTRAVENOUS
Status: DISPENSED
Start: 2022-06-10

## (undated) RX ORDER — KETAMINE HYDROCHLORIDE 10 MG/ML
INJECTION INTRAMUSCULAR; INTRAVENOUS
Status: DISPENSED
Start: 2022-12-20

## (undated) RX ORDER — KETOROLAC TROMETHAMINE 30 MG/ML
INJECTION, SOLUTION INTRAMUSCULAR; INTRAVENOUS
Status: DISPENSED
Start: 2022-09-23

## (undated) RX ORDER — FENTANYL CITRATE-0.9 % NACL/PF 10 MCG/ML
PLASTIC BAG, INJECTION (ML) INTRAVENOUS
Status: DISPENSED
Start: 2022-06-10

## (undated) RX ORDER — ONDANSETRON 2 MG/ML
INJECTION INTRAMUSCULAR; INTRAVENOUS
Status: DISPENSED
Start: 2022-06-10

## (undated) RX ORDER — KETOROLAC TROMETHAMINE 30 MG/ML
INJECTION, SOLUTION INTRAMUSCULAR; INTRAVENOUS
Status: DISPENSED
Start: 2022-12-20

## (undated) RX ORDER — HEPARIN SODIUM (PORCINE) LOCK FLUSH IV SOLN 100 UNIT/ML 100 UNIT/ML
SOLUTION INTRAVENOUS
Status: DISPENSED
Start: 2022-09-14

## (undated) RX ORDER — ONDANSETRON 2 MG/ML
INJECTION INTRAMUSCULAR; INTRAVENOUS
Status: DISPENSED
Start: 2022-09-23

## (undated) RX ORDER — BUPIVACAINE HYDROCHLORIDE 2.5 MG/ML
INJECTION, SOLUTION EPIDURAL; INFILTRATION; INTRACAUDAL
Status: DISPENSED
Start: 2021-10-07

## (undated) RX ORDER — EPHEDRINE SULFATE 50 MG/ML
INJECTION, SOLUTION INTRAMUSCULAR; INTRAVENOUS; SUBCUTANEOUS
Status: DISPENSED
Start: 2022-12-20

## (undated) RX ORDER — EPHEDRINE SULFATE 50 MG/ML
INJECTION, SOLUTION INTRAMUSCULAR; INTRAVENOUS; SUBCUTANEOUS
Status: DISPENSED
Start: 2022-09-23

## (undated) RX ORDER — PROPOFOL 10 MG/ML
INJECTION, EMULSION INTRAVENOUS
Status: DISPENSED
Start: 2022-09-23

## (undated) RX ORDER — DEXAMETHASONE SODIUM PHOSPHATE 10 MG/ML
INJECTION, SOLUTION INTRAMUSCULAR; INTRAVENOUS
Status: DISPENSED
Start: 2022-09-23

## (undated) RX ORDER — FENTANYL CITRATE 50 UG/ML
INJECTION, SOLUTION INTRAMUSCULAR; INTRAVENOUS
Status: DISPENSED
Start: 2022-06-10

## (undated) RX ORDER — FENTANYL CITRATE-0.9 % NACL/PF 10 MCG/ML
PLASTIC BAG, INJECTION (ML) INTRAVENOUS
Status: DISPENSED
Start: 2022-12-20

## (undated) RX ORDER — PROPOFOL 10 MG/ML
INJECTION, EMULSION INTRAVENOUS
Status: DISPENSED
Start: 2022-06-10

## (undated) RX ORDER — LIDOCAINE HYDROCHLORIDE 10 MG/ML
INJECTION, SOLUTION EPIDURAL; INFILTRATION; INTRACAUDAL; PERINEURAL
Status: DISPENSED
Start: 2022-06-10

## (undated) RX ORDER — FENTANYL CITRATE 50 UG/ML
INJECTION, SOLUTION INTRAMUSCULAR; INTRAVENOUS
Status: DISPENSED
Start: 2022-12-20

## (undated) RX ORDER — BUPIVACAINE HYDROCHLORIDE 5 MG/ML
INJECTION, SOLUTION PERINEURAL
Status: DISPENSED
Start: 2022-12-20

## (undated) RX ORDER — FENTANYL CITRATE 50 UG/ML
INJECTION, SOLUTION INTRAMUSCULAR; INTRAVENOUS
Status: DISPENSED
Start: 2022-09-23